# Patient Record
Sex: MALE | Race: WHITE | NOT HISPANIC OR LATINO | Employment: UNEMPLOYED | URBAN - METROPOLITAN AREA
[De-identification: names, ages, dates, MRNs, and addresses within clinical notes are randomized per-mention and may not be internally consistent; named-entity substitution may affect disease eponyms.]

---

## 2017-01-11 ENCOUNTER — HISTORICAL (OUTPATIENT)
Dept: WOUND CARE | Facility: HOSPITAL | Age: 57
End: 2017-01-11

## 2017-01-18 ENCOUNTER — HISTORICAL (OUTPATIENT)
Dept: WOUND CARE | Facility: HOSPITAL | Age: 57
End: 2017-01-18

## 2017-01-24 ENCOUNTER — HISTORICAL (OUTPATIENT)
Dept: WOUND CARE | Facility: HOSPITAL | Age: 57
End: 2017-01-24

## 2017-01-27 ENCOUNTER — HISTORICAL (OUTPATIENT)
Dept: LAB | Facility: HOSPITAL | Age: 57
End: 2017-01-27

## 2017-02-01 ENCOUNTER — HISTORICAL (OUTPATIENT)
Dept: WOUND CARE | Facility: HOSPITAL | Age: 57
End: 2017-02-01

## 2017-02-02 ENCOUNTER — HISTORICAL (OUTPATIENT)
Dept: WOUND CARE | Facility: HOSPITAL | Age: 57
End: 2017-02-02

## 2017-02-03 ENCOUNTER — HISTORICAL (OUTPATIENT)
Dept: WOUND CARE | Facility: HOSPITAL | Age: 57
End: 2017-02-03

## 2017-02-06 ENCOUNTER — HISTORICAL (OUTPATIENT)
Dept: WOUND CARE | Facility: HOSPITAL | Age: 57
End: 2017-02-06

## 2017-02-09 ENCOUNTER — HISTORICAL (OUTPATIENT)
Dept: WOUND CARE | Facility: HOSPITAL | Age: 57
End: 2017-02-09

## 2017-02-10 ENCOUNTER — HISTORICAL (OUTPATIENT)
Dept: WOUND CARE | Facility: HOSPITAL | Age: 57
End: 2017-02-10

## 2017-02-14 ENCOUNTER — HISTORICAL (OUTPATIENT)
Dept: WOUND CARE | Facility: HOSPITAL | Age: 57
End: 2017-02-14

## 2017-02-15 ENCOUNTER — HISTORICAL (OUTPATIENT)
Dept: WOUND CARE | Facility: HOSPITAL | Age: 57
End: 2017-02-15

## 2017-02-16 ENCOUNTER — HISTORICAL (OUTPATIENT)
Dept: WOUND CARE | Facility: HOSPITAL | Age: 57
End: 2017-02-16

## 2017-07-07 ENCOUNTER — HISTORICAL (OUTPATIENT)
Dept: ADMINISTRATIVE | Facility: HOSPITAL | Age: 57
End: 2017-07-07

## 2017-07-07 LAB
BUN SERPL-MCNC: 20 MG/DL (ref 10–20)
CALCIUM SERPL-MCNC: 8.8 MG/DL (ref 8–10.5)
CHLORIDE SERPL-SCNC: 106 MMOL/L (ref 100–108)
CO2 SERPL-SCNC: 30 MMOL/L (ref 21–35)
CREAT SERPL-MCNC: 1.11 MG/DL (ref 0.7–1.3)
GLUCOSE SERPL-MCNC: 39 MG/DL (ref 75–116)
POTASSIUM SERPL-SCNC: 4.1 MMOL/L (ref 3.6–5.2)
SODIUM SERPL-SCNC: 144 MMOL/L (ref 135–145)

## 2018-01-10 ENCOUNTER — HISTORICAL (OUTPATIENT)
Dept: ADMINISTRATIVE | Facility: HOSPITAL | Age: 58
End: 2018-01-10

## 2018-02-21 ENCOUNTER — HISTORICAL (OUTPATIENT)
Dept: ADMINISTRATIVE | Facility: HOSPITAL | Age: 58
End: 2018-02-21

## 2021-07-08 ENCOUNTER — HISTORICAL (OUTPATIENT)
Dept: ADMINISTRATIVE | Facility: HOSPITAL | Age: 61
End: 2021-07-08

## 2021-09-09 ENCOUNTER — HISTORICAL (OUTPATIENT)
Dept: ADMINISTRATIVE | Facility: HOSPITAL | Age: 61
End: 2021-09-09

## 2022-04-07 ENCOUNTER — HISTORICAL (OUTPATIENT)
Dept: RADIOLOGY | Facility: HOSPITAL | Age: 62
End: 2022-04-07

## 2022-04-07 ENCOUNTER — HISTORICAL (OUTPATIENT)
Dept: ADMINISTRATIVE | Facility: HOSPITAL | Age: 62
End: 2022-04-07

## 2022-04-07 LAB
ALBUMIN SERPL-MCNC: 3.9 G/DL (ref 3.4–4.8)
ALBUMIN/GLOB SERPL: 1 {RATIO} (ref 1.1–2)
ALP SERPL-CCNC: 104 U/L (ref 40–150)
ALT SERPL-CCNC: 110 U/L (ref 0–55)
AST SERPL-CCNC: 61 U/L (ref 5–34)
BILIRUB SERPL-MCNC: 0.5 MG/DL
BILIRUBIN DIRECT+TOT PNL SERPL-MCNC: 0.2 (ref 0–0.8)
BILIRUBIN DIRECT+TOT PNL SERPL-MCNC: 0.3 (ref 0–0.5)
BUN SERPL-MCNC: 21 MG/DL (ref 8.4–25.7)
CALCIUM SERPL-MCNC: 9.8 MG/DL (ref 8.7–10.5)
CHLORIDE SERPL-SCNC: 107 MMOL/L (ref 98–107)
CO2 SERPL-SCNC: 22 MMOL/L (ref 23–31)
CREAT SERPL-MCNC: 1.21 MG/DL (ref 0.73–1.18)
CREAT UR-MCNC: 34 MG/DL (ref 58–161)
GGT SERPL-CCNC: 89 U/L (ref 12–64)
GLOBULIN SER-MCNC: 3.8 G/DL (ref 2.4–3.5)
GLUCOSE SERPL-MCNC: 165 MG/DL (ref 82–115)
HBV SURFACE AB SER-ACNC: 0.4 M[IU]/ML
HBV SURFACE AB SERPL IA-ACNC: NONREACTIVE M[IU]/ML
HBV SURFACE AG SERPL QL IA: 0.24
HBV SURFACE AG SERPL QL IA: NONREACTIVE
HCV AB SERPL QL IA: 0.16
HCV AB SERPL QL IA: NONREACTIVE
HEMOLYSIS INTERF INDEX SERPL-ACNC: 1
HIV 1+2 AB+HIV1 P24 AG SERPL QL IA: 0.04
HIV 1+2 AB+HIV1 P24 AG SERPL QL IA: NONREACTIVE
ICTERIC INTERF INDEX SERPL-ACNC: 1
LIPEMIC INTERF INDEX SERPL-ACNC: 0
MICROALBUMIN UR-MCNC: 7.5
MICROALBUMIN/CREAT RATIO PNL UR: 22.1 (ref 0–30)
POTASSIUM SERPL-SCNC: 5.1 MMOL/L (ref 3.5–5.1)
PROT SERPL-MCNC: 7.7 G/DL (ref 5.8–7.6)
SODIUM SERPL-SCNC: 139 MMOL/L (ref 136–145)

## 2022-04-11 ENCOUNTER — HISTORICAL (OUTPATIENT)
Dept: ADMINISTRATIVE | Facility: HOSPITAL | Age: 62
End: 2022-04-11

## 2022-04-22 ENCOUNTER — HISTORICAL (OUTPATIENT)
Dept: ADMINISTRATIVE | Facility: HOSPITAL | Age: 62
End: 2022-04-22
Payer: MEDICARE

## 2022-04-22 ENCOUNTER — HISTORICAL (OUTPATIENT)
Dept: RADIOLOGY | Facility: HOSPITAL | Age: 62
End: 2022-04-22
Payer: MEDICARE

## 2022-04-29 VITALS
SYSTOLIC BLOOD PRESSURE: 101 MMHG | WEIGHT: 198.63 LBS | HEIGHT: 66 IN | BODY MASS INDEX: 31.92 KG/M2 | DIASTOLIC BLOOD PRESSURE: 58 MMHG

## 2022-04-30 NOTE — OP NOTE
Patient:   Dhaval Hernández            MRN: [id number]            FIN: [id number]               Age:   60 years     Sex:  Male     :  1960   Associated Diagnoses:   None   Author:   Anthony Vallejo MD       Phacoemulsification of Cataract with Intraocular Implant   Preoperative Diagnosis: Cataract right eye   Postoperative Diagnosis : Cataract right eye  Surgeon: Anthony Vallejo MD  Assistant: BROWN Hernandez  Anestheisa: MAC  Complications: None  After the patient underwent topical anesthesia along with IV sedation in the holding area, the patient was brought to the operating suite. The patient was prepped and draped in a sterile fashion. A pediatric tegaderm and a lid speculum were used to retract the upper and lower lashes and lids.  A 1.0mm paracentesis was then made at the 11 oclock position. Intraocular non-preserved 1% Xylocaine (4% diluted down to 1%) was irrigated into the anterior chamber. Trypan blue dye was used to stain the anterior capsule then Endocoat was injected into the eye. A clear corneal incision was made with a 2.4 Keratome blade. A 4.75mm circular capsulotomy was then made with a pre bent 30 gauge needle and BSS was used to hydro dissect the nucleus from the capsule. The nucleus was then phacoemulsified with the Abbott machine for a EFX of (_42). The cortex was then removed with the I/A hand piece and Helon was placed into the posterior bag of the eye. An posterior chamber implant (ZCB00_) of power (17.5_) was placed in the capsular bag. The Helon was then removed from the eye with the I/A hand piece . The anterior chamber was inflated with BSS and the wound was checked for leaks. The lid speculum was removed and a drop of Besivance was placed in the operative eye. The patient was brought to the recovery suite in stable condition.

## 2022-04-30 NOTE — OP NOTE
Patient:   Dhaval Hernández            MRN: [id number]            FIN: [id number]               Age:   60 years     Sex:  Male     :  1960   Associated Diagnoses:   None   Author:   Anthony Vallejo MD       Phacoemulsification of Cataract with Intraocular Implant   Preoperative Diagnosis: Cataract left eye   Postoperative Diagnosis : Cataract left eye  Surgeon: Anthony Vallejo MD  Assistant: BROWN Hernandez  Anestheisa: MAC  Complications: None    After the patient underwent topical anesthesia along with IV sedation in the holding area, the patient was brought to the operating suite. The patient prepped and draped in a sterile fashion. A pediatric tegaderm and a lid speculum were used to retract the upper and lower lashes and lids.  A 1.0mm paracentesis was then made at the 5 oclock and 11 oclock position. Intraocular non-preserved 1% Xylocaine (4% diluted down to 1%) was irrigated into the anterior chamber. Trypan blue dye was used to stain the anterior capsule then Endocoat was injected into the eye. A clear corneal incision was made with a 2.4 Keratome blade. A 4.75mm circular capsulotomy was then made with a pre bent 30 gauge needle and BSS was used to hydro dissect the nucleus from the capsule. The nucleus was then phacoemulsified with the Abbott machine for a EFX of (_64). The cortex was then removed with the I/A hand piece and Helon was placed into the posterior bag of the eye. An posterior chamber implant (ZCB00_) of power (18.5_) was placed in the capsular bag. The Helon was then removed from the eye with the I/A hand piece . The anterior chamber was inflated with BSS and the wound was checked for leaks. The lid speculum was removed and a drop of Besivance was placed in the operative eye. The patient was brought to the recovery suite in stable condition.

## 2022-05-10 DIAGNOSIS — I25.10 CVD (CARDIOVASCULAR DISEASE): Primary | ICD-10-CM

## 2022-05-10 DIAGNOSIS — E78.5 HYPERLIPEMIA: ICD-10-CM

## 2022-05-10 DIAGNOSIS — I10 HIGH BLOOD PRESSURE: ICD-10-CM

## 2022-06-01 ENCOUNTER — PROCEDURE VISIT (OUTPATIENT)
Dept: RESPIRATORY THERAPY | Facility: HOSPITAL | Age: 62
End: 2022-06-01
Attending: INTERNAL MEDICINE
Payer: MEDICARE

## 2022-06-01 ENCOUNTER — HOSPITAL ENCOUNTER (OUTPATIENT)
Dept: RADIOLOGY | Facility: HOSPITAL | Age: 62
Discharge: HOME | End: 2022-06-01
Attending: INTERNAL MEDICINE
Payer: MEDICARE

## 2022-06-01 DIAGNOSIS — I25.10 CORONARY ATHEROSCLEROSIS OF NATIVE CORONARY ARTERY: ICD-10-CM

## 2022-06-01 DIAGNOSIS — I10 ESSENTIAL HYPERTENSION, MALIGNANT: ICD-10-CM

## 2022-06-01 DIAGNOSIS — E78.5 HYPERLIPEMIA: ICD-10-CM

## 2022-06-01 DIAGNOSIS — I10 HIGH BLOOD PRESSURE: ICD-10-CM

## 2022-06-01 DIAGNOSIS — I25.10 CVD (CARDIOVASCULAR DISEASE): Primary | ICD-10-CM

## 2022-06-01 PROCEDURE — 94010 BREATHING CAPACITY TEST: CPT

## 2022-06-01 PROCEDURE — 94729 DIFFUSING CAPACITY: CPT

## 2022-06-01 PROCEDURE — 94727 GAS DIL/WSHOT DETER LNG VOL: CPT

## 2022-06-01 PROCEDURE — 71046 X-RAY EXAM CHEST 2 VIEWS: CPT | Mod: TC

## 2023-01-17 ENCOUNTER — CLINICAL SUPPORT (OUTPATIENT)
Dept: RESPIRATORY THERAPY | Facility: HOSPITAL | Age: 63
End: 2023-01-17
Attending: NURSE PRACTITIONER
Payer: MEDICARE

## 2023-01-17 DIAGNOSIS — E11.9 DIABETES MELLITUS WITHOUT COMPLICATION: Primary | ICD-10-CM

## 2023-01-17 DIAGNOSIS — E11.9 DIABETES MELLITUS WITHOUT COMPLICATION: ICD-10-CM

## 2023-01-17 PROCEDURE — 93010 ELECTROCARDIOGRAM REPORT: CPT | Mod: ,,, | Performed by: INTERNAL MEDICINE

## 2023-01-17 PROCEDURE — 93005 ELECTROCARDIOGRAM TRACING: CPT

## 2023-01-17 PROCEDURE — 93010 EKG 12-LEAD: ICD-10-PCS | Mod: ,,, | Performed by: INTERNAL MEDICINE

## 2023-06-13 ENCOUNTER — LAB VISIT (OUTPATIENT)
Dept: LAB | Facility: HOSPITAL | Age: 63
End: 2023-06-13
Attending: INTERNAL MEDICINE
Payer: MEDICARE

## 2023-06-13 DIAGNOSIS — I10 HYPERTENSION, UNSPECIFIED TYPE: ICD-10-CM

## 2023-06-13 DIAGNOSIS — I25.10 CORONARY ARTERY DISEASE, UNSPECIFIED VESSEL OR LESION TYPE, UNSPECIFIED WHETHER ANGINA PRESENT, UNSPECIFIED WHETHER NATIVE OR TRANSPLANTED HEART: Primary | ICD-10-CM

## 2023-06-13 LAB
ALBUMIN SERPL-MCNC: 3.9 G/DL (ref 3.4–4.8)
ALBUMIN/GLOB SERPL: 1 RATIO (ref 1.1–2)
ALP SERPL-CCNC: 82 UNIT/L (ref 40–150)
ALT SERPL-CCNC: 40 UNIT/L (ref 0–55)
AST SERPL-CCNC: 34 UNIT/L (ref 5–34)
BILIRUBIN DIRECT+TOT PNL SERPL-MCNC: 0.5 MG/DL
BUN SERPL-MCNC: 20 MG/DL (ref 8.4–25.7)
CALCIUM SERPL-MCNC: 9.5 MG/DL (ref 8.8–10)
CHLORIDE SERPL-SCNC: 107 MMOL/L (ref 98–107)
CO2 SERPL-SCNC: 19 MMOL/L (ref 23–31)
CREAT SERPL-MCNC: 1.4 MG/DL (ref 0.73–1.18)
GFR SERPLBLD CREATININE-BSD FMLA CKD-EPI: 56 MLS/MIN/1.73/M2
GLOBULIN SER-MCNC: 4.1 GM/DL (ref 2.4–3.5)
GLUCOSE SERPL-MCNC: 137 MG/DL (ref 82–115)
POTASSIUM SERPL-SCNC: 4.8 MMOL/L (ref 3.5–5.1)
PROT SERPL-MCNC: 8 GM/DL (ref 5.8–7.6)
SODIUM SERPL-SCNC: 139 MMOL/L (ref 136–145)

## 2023-06-13 PROCEDURE — 80053 COMPREHEN METABOLIC PANEL: CPT

## 2023-06-13 PROCEDURE — 36415 COLL VENOUS BLD VENIPUNCTURE: CPT

## 2023-10-25 ENCOUNTER — TELEPHONE (OUTPATIENT)
Dept: FAMILY MEDICINE | Facility: CLINIC | Age: 63
End: 2023-10-25
Payer: MEDICARE

## 2023-10-25 NOTE — TELEPHONE ENCOUNTER
----- Message from Matt Joyce sent at 10/25/2023  9:13 AM CDT -----  Contact: pt  Type: Needs Medical Advice  Who Called: pt  Best Call Back Number: [phone]    Additional Information: Pt is calling the office needs an appt for him and wife on the same day.Please call back and advise.

## 2023-10-30 ENCOUNTER — TELEPHONE (OUTPATIENT)
Dept: FAMILY MEDICINE | Facility: CLINIC | Age: 63
End: 2023-10-30

## 2023-10-30 ENCOUNTER — LAB VISIT (OUTPATIENT)
Dept: LAB | Facility: HOSPITAL | Age: 63
End: 2023-10-30
Attending: STUDENT IN AN ORGANIZED HEALTH CARE EDUCATION/TRAINING PROGRAM
Payer: MEDICAID

## 2023-10-30 ENCOUNTER — OFFICE VISIT (OUTPATIENT)
Dept: FAMILY MEDICINE | Facility: CLINIC | Age: 63
End: 2023-10-30
Payer: MEDICARE

## 2023-10-30 VITALS
BODY MASS INDEX: 31.82 KG/M2 | SYSTOLIC BLOOD PRESSURE: 110 MMHG | DIASTOLIC BLOOD PRESSURE: 60 MMHG | HEART RATE: 64 BPM | OXYGEN SATURATION: 98 % | WEIGHT: 198 LBS | HEIGHT: 66 IN | TEMPERATURE: 98 F

## 2023-10-30 DIAGNOSIS — Z76.89 ENCOUNTER TO ESTABLISH CARE: ICD-10-CM

## 2023-10-30 DIAGNOSIS — Z13.31 POSITIVE DEPRESSION SCREENING: ICD-10-CM

## 2023-10-30 DIAGNOSIS — Z23 NEED FOR PROPHYLACTIC VACCINATION AGAINST STREPTOCOCCUS PNEUMONIAE (PNEUMOCOCCUS) AND INFLUENZA: ICD-10-CM

## 2023-10-30 DIAGNOSIS — F32.A DEPRESSION, UNSPECIFIED DEPRESSION TYPE: ICD-10-CM

## 2023-10-30 DIAGNOSIS — Z89.519: ICD-10-CM

## 2023-10-30 DIAGNOSIS — N18.2 TYPE 2 DIABETES MELLITUS WITH STAGE 2 CHRONIC KIDNEY DISEASE, WITHOUT LONG-TERM CURRENT USE OF INSULIN: Primary | ICD-10-CM

## 2023-10-30 DIAGNOSIS — Z12.12 ENCOUNTER FOR SCREENING FOR COLORECTAL MALIGNANT NEOPLASM: ICD-10-CM

## 2023-10-30 DIAGNOSIS — Z12.11 ENCOUNTER FOR SCREENING FOR COLORECTAL MALIGNANT NEOPLASM: ICD-10-CM

## 2023-10-30 DIAGNOSIS — I25.10 CVD (CARDIOVASCULAR DISEASE): ICD-10-CM

## 2023-10-30 DIAGNOSIS — E11.22 TYPE 2 DIABETES MELLITUS WITH STAGE 2 CHRONIC KIDNEY DISEASE, WITHOUT LONG-TERM CURRENT USE OF INSULIN: Primary | ICD-10-CM

## 2023-10-30 PROBLEM — E08.22 DIABETES MELLITUS DUE TO UNDERLYING CONDITION WITH DIABETIC CHRONIC KIDNEY DISEASE: Status: ACTIVE | Noted: 2023-10-30

## 2023-10-30 LAB
BASOPHILS # BLD AUTO: 0.05 K/UL (ref 0–0.2)
BASOPHILS NFR BLD: 0.8 % (ref 0–1.9)
DIFFERENTIAL METHOD: ABNORMAL
EOSINOPHIL # BLD AUTO: 0.1 K/UL (ref 0–0.5)
EOSINOPHIL NFR BLD: 1.9 % (ref 0–8)
ERYTHROCYTE [DISTWIDTH] IN BLOOD BY AUTOMATED COUNT: 18.6 % (ref 11.5–14.5)
HCT VFR BLD AUTO: 42.3 % (ref 40–54)
HGB BLD-MCNC: 13.2 G/DL (ref 14–18)
IMM GRANULOCYTES # BLD AUTO: 0.02 K/UL (ref 0–0.04)
IMM GRANULOCYTES NFR BLD AUTO: 0.3 % (ref 0–0.5)
LYMPHOCYTES # BLD AUTO: 2 K/UL (ref 1–4.8)
LYMPHOCYTES NFR BLD: 31.3 % (ref 18–48)
MCH RBC QN AUTO: 25.9 PG (ref 27–31)
MCHC RBC AUTO-ENTMCNC: 31.2 G/DL (ref 32–36)
MCV RBC AUTO: 83 FL (ref 82–98)
MONOCYTES # BLD AUTO: 0.5 K/UL (ref 0.3–1)
MONOCYTES NFR BLD: 8.6 % (ref 4–15)
NEUTROPHILS # BLD AUTO: 3.6 K/UL (ref 1.8–7.7)
NEUTROPHILS NFR BLD: 57.1 % (ref 38–73)
NRBC BLD-RTO: 0 /100 WBC
PLATELET # BLD AUTO: 248 K/UL (ref 150–450)
PMV BLD AUTO: 10.7 FL (ref 9.2–12.9)
RBC # BLD AUTO: 5.09 M/UL (ref 4.6–6.2)
WBC # BLD AUTO: 6.26 K/UL (ref 3.9–12.7)

## 2023-10-30 PROCEDURE — 1101F PT FALLS ASSESS-DOCD LE1/YR: CPT | Mod: CPTII,S$GLB,, | Performed by: STUDENT IN AN ORGANIZED HEALTH CARE EDUCATION/TRAINING PROGRAM

## 2023-10-30 PROCEDURE — 1160F PR REVIEW ALL MEDS BY PRESCRIBER/CLIN PHARMACIST DOCUMENTED: ICD-10-PCS | Mod: CPTII,S$GLB,, | Performed by: STUDENT IN AN ORGANIZED HEALTH CARE EDUCATION/TRAINING PROGRAM

## 2023-10-30 PROCEDURE — G0009 ADMIN PNEUMOCOCCAL VACCINE: HCPCS | Mod: S$GLB,,, | Performed by: STUDENT IN AN ORGANIZED HEALTH CARE EDUCATION/TRAINING PROGRAM

## 2023-10-30 PROCEDURE — 85025 COMPLETE CBC W/AUTO DIFF WBC: CPT | Performed by: STUDENT IN AN ORGANIZED HEALTH CARE EDUCATION/TRAINING PROGRAM

## 2023-10-30 PROCEDURE — 99999 PR PBB SHADOW E&M-EST. PATIENT-LVL V: ICD-10-PCS | Mod: PBBFAC,,, | Performed by: STUDENT IN AN ORGANIZED HEALTH CARE EDUCATION/TRAINING PROGRAM

## 2023-10-30 PROCEDURE — 84443 ASSAY THYROID STIM HORMONE: CPT | Performed by: STUDENT IN AN ORGANIZED HEALTH CARE EDUCATION/TRAINING PROGRAM

## 2023-10-30 PROCEDURE — G0009 PNEUMOCOCCAL CONJUGATE VACCINE 20-VALENT: ICD-10-PCS | Mod: S$GLB,,, | Performed by: STUDENT IN AN ORGANIZED HEALTH CARE EDUCATION/TRAINING PROGRAM

## 2023-10-30 PROCEDURE — 90677 PNEUMOCOCCAL CONJUGATE VACCINE 20-VALENT: ICD-10-PCS | Mod: S$GLB,,, | Performed by: STUDENT IN AN ORGANIZED HEALTH CARE EDUCATION/TRAINING PROGRAM

## 2023-10-30 PROCEDURE — 3288F PR FALLS RISK ASSESSMENT DOCUMENTED: ICD-10-PCS | Mod: CPTII,S$GLB,, | Performed by: STUDENT IN AN ORGANIZED HEALTH CARE EDUCATION/TRAINING PROGRAM

## 2023-10-30 PROCEDURE — 4010F PR ACE/ARB THEARPY RXD/TAKEN: ICD-10-PCS | Mod: CPTII,S$GLB,, | Performed by: STUDENT IN AN ORGANIZED HEALTH CARE EDUCATION/TRAINING PROGRAM

## 2023-10-30 PROCEDURE — 99203 PR OFFICE/OUTPT VISIT, NEW, LEVL III, 30-44 MIN: ICD-10-PCS | Mod: 25,S$GLB,, | Performed by: STUDENT IN AN ORGANIZED HEALTH CARE EDUCATION/TRAINING PROGRAM

## 2023-10-30 PROCEDURE — 3288F FALL RISK ASSESSMENT DOCD: CPT | Mod: CPTII,S$GLB,, | Performed by: STUDENT IN AN ORGANIZED HEALTH CARE EDUCATION/TRAINING PROGRAM

## 2023-10-30 PROCEDURE — 3074F SYST BP LT 130 MM HG: CPT | Mod: CPTII,S$GLB,, | Performed by: STUDENT IN AN ORGANIZED HEALTH CARE EDUCATION/TRAINING PROGRAM

## 2023-10-30 PROCEDURE — 1159F PR MEDICATION LIST DOCUMENTED IN MEDICAL RECORD: ICD-10-PCS | Mod: CPTII,S$GLB,, | Performed by: STUDENT IN AN ORGANIZED HEALTH CARE EDUCATION/TRAINING PROGRAM

## 2023-10-30 PROCEDURE — 3078F PR MOST RECENT DIASTOLIC BLOOD PRESSURE < 80 MM HG: ICD-10-PCS | Mod: CPTII,S$GLB,, | Performed by: STUDENT IN AN ORGANIZED HEALTH CARE EDUCATION/TRAINING PROGRAM

## 2023-10-30 PROCEDURE — 90694 VACC AIIV4 NO PRSRV 0.5ML IM: CPT | Mod: S$GLB,,, | Performed by: STUDENT IN AN ORGANIZED HEALTH CARE EDUCATION/TRAINING PROGRAM

## 2023-10-30 PROCEDURE — 36415 COLL VENOUS BLD VENIPUNCTURE: CPT | Mod: PO | Performed by: STUDENT IN AN ORGANIZED HEALTH CARE EDUCATION/TRAINING PROGRAM

## 2023-10-30 PROCEDURE — 80061 LIPID PANEL: CPT | Performed by: STUDENT IN AN ORGANIZED HEALTH CARE EDUCATION/TRAINING PROGRAM

## 2023-10-30 PROCEDURE — 1101F PR PT FALLS ASSESS DOC 0-1 FALLS W/OUT INJ PAST YR: ICD-10-PCS | Mod: CPTII,S$GLB,, | Performed by: STUDENT IN AN ORGANIZED HEALTH CARE EDUCATION/TRAINING PROGRAM

## 2023-10-30 PROCEDURE — 4010F ACE/ARB THERAPY RXD/TAKEN: CPT | Mod: CPTII,S$GLB,, | Performed by: STUDENT IN AN ORGANIZED HEALTH CARE EDUCATION/TRAINING PROGRAM

## 2023-10-30 PROCEDURE — 90677 PCV20 VACCINE IM: CPT | Mod: S$GLB,,, | Performed by: STUDENT IN AN ORGANIZED HEALTH CARE EDUCATION/TRAINING PROGRAM

## 2023-10-30 PROCEDURE — 80053 COMPREHEN METABOLIC PANEL: CPT | Performed by: STUDENT IN AN ORGANIZED HEALTH CARE EDUCATION/TRAINING PROGRAM

## 2023-10-30 PROCEDURE — 3074F PR MOST RECENT SYSTOLIC BLOOD PRESSURE < 130 MM HG: ICD-10-PCS | Mod: CPTII,S$GLB,, | Performed by: STUDENT IN AN ORGANIZED HEALTH CARE EDUCATION/TRAINING PROGRAM

## 2023-10-30 PROCEDURE — 3008F BODY MASS INDEX DOCD: CPT | Mod: CPTII,S$GLB,, | Performed by: STUDENT IN AN ORGANIZED HEALTH CARE EDUCATION/TRAINING PROGRAM

## 2023-10-30 PROCEDURE — 99999 PR PBB SHADOW E&M-EST. PATIENT-LVL V: CPT | Mod: PBBFAC,,, | Performed by: STUDENT IN AN ORGANIZED HEALTH CARE EDUCATION/TRAINING PROGRAM

## 2023-10-30 PROCEDURE — 1159F MED LIST DOCD IN RCRD: CPT | Mod: CPTII,S$GLB,, | Performed by: STUDENT IN AN ORGANIZED HEALTH CARE EDUCATION/TRAINING PROGRAM

## 2023-10-30 PROCEDURE — G0008 FLU VACCINE - QUADRIVALENT - ADJUVANTED: ICD-10-PCS | Mod: S$GLB,,, | Performed by: STUDENT IN AN ORGANIZED HEALTH CARE EDUCATION/TRAINING PROGRAM

## 2023-10-30 PROCEDURE — 1160F RVW MEDS BY RX/DR IN RCRD: CPT | Mod: CPTII,S$GLB,, | Performed by: STUDENT IN AN ORGANIZED HEALTH CARE EDUCATION/TRAINING PROGRAM

## 2023-10-30 PROCEDURE — 83036 HEMOGLOBIN GLYCOSYLATED A1C: CPT | Performed by: STUDENT IN AN ORGANIZED HEALTH CARE EDUCATION/TRAINING PROGRAM

## 2023-10-30 PROCEDURE — 90694 FLU VACCINE - QUADRIVALENT - ADJUVANTED: ICD-10-PCS | Mod: S$GLB,,, | Performed by: STUDENT IN AN ORGANIZED HEALTH CARE EDUCATION/TRAINING PROGRAM

## 2023-10-30 PROCEDURE — 3008F PR BODY MASS INDEX (BMI) DOCUMENTED: ICD-10-PCS | Mod: CPTII,S$GLB,, | Performed by: STUDENT IN AN ORGANIZED HEALTH CARE EDUCATION/TRAINING PROGRAM

## 2023-10-30 PROCEDURE — 99203 OFFICE O/P NEW LOW 30 MIN: CPT | Mod: 25,S$GLB,, | Performed by: STUDENT IN AN ORGANIZED HEALTH CARE EDUCATION/TRAINING PROGRAM

## 2023-10-30 PROCEDURE — G0008 ADMIN INFLUENZA VIRUS VAC: HCPCS | Mod: S$GLB,,, | Performed by: STUDENT IN AN ORGANIZED HEALTH CARE EDUCATION/TRAINING PROGRAM

## 2023-10-30 PROCEDURE — 3078F DIAST BP <80 MM HG: CPT | Mod: CPTII,S$GLB,, | Performed by: STUDENT IN AN ORGANIZED HEALTH CARE EDUCATION/TRAINING PROGRAM

## 2023-10-30 RX ORDER — ATORVASTATIN CALCIUM 80 MG/1
40 TABLET, FILM COATED ORAL
COMMUNITY
Start: 2023-10-26

## 2023-10-30 RX ORDER — ESCITALOPRAM OXALATE 5 MG/1
5 TABLET ORAL DAILY
Qty: 30 TABLET | Refills: 11 | Status: SHIPPED | OUTPATIENT
Start: 2023-10-30 | End: 2024-10-29

## 2023-10-30 RX ORDER — MULTIVITAMIN
1 TABLET ORAL
COMMUNITY

## 2023-10-30 RX ORDER — METFORMIN HYDROCHLORIDE 1000 MG/1
1000 TABLET ORAL
COMMUNITY
Start: 2021-07-01 | End: 2023-12-20 | Stop reason: SDUPTHER

## 2023-10-30 RX ORDER — EZETIMIBE 10 MG/1
10 TABLET ORAL
COMMUNITY
Start: 2021-07-01

## 2023-10-30 RX ORDER — CARVEDILOL 25 MG/1
25 TABLET ORAL
COMMUNITY

## 2023-10-30 RX ORDER — LOSARTAN POTASSIUM 100 MG/1
100 TABLET ORAL
COMMUNITY
Start: 2023-10-26

## 2023-10-30 RX ORDER — SPIRONOLACTONE 25 MG/1
12.5 TABLET ORAL
COMMUNITY
Start: 2021-07-01 | End: 2023-10-30

## 2023-10-30 RX ORDER — LEVOTHYROXINE SODIUM 75 UG/1
75 TABLET ORAL EVERY MORNING
COMMUNITY
Start: 2023-10-26 | End: 2023-12-20 | Stop reason: SDUPTHER

## 2023-10-30 RX ORDER — ASPIRIN 81 MG/1
81 TABLET ORAL
COMMUNITY
Start: 2023-10-26

## 2023-10-30 RX ORDER — AMIODARONE HYDROCHLORIDE 200 MG/1
200 TABLET ORAL
COMMUNITY

## 2023-10-30 RX ORDER — NAPROXEN SODIUM 220 MG/1
TABLET ORAL
COMMUNITY
Start: 2021-07-01

## 2023-10-30 RX ORDER — AMLODIPINE BESYLATE 5 MG/1
5 TABLET ORAL
COMMUNITY
Start: 2021-07-01 | End: 2023-12-20 | Stop reason: SDUPTHER

## 2023-10-30 RX ORDER — SITAGLIPTIN 100 MG/1
100 TABLET, FILM COATED ORAL
COMMUNITY
Start: 2023-10-26 | End: 2023-12-20 | Stop reason: SDUPTHER

## 2023-10-30 RX ORDER — ALOGLIPTIN 12.5 MG/1
12.5 TABLET, FILM COATED ORAL
COMMUNITY
Start: 2021-07-01 | End: 2023-10-30

## 2023-10-30 RX ORDER — FAMOTIDINE 20 MG/1
20 TABLET, FILM COATED ORAL 2 TIMES DAILY
COMMUNITY
Start: 2023-10-26

## 2023-10-30 RX ORDER — ASPIRIN 325 MG
50000 TABLET, DELAYED RELEASE (ENTERIC COATED) ORAL
COMMUNITY
Start: 2021-07-01

## 2023-10-30 NOTE — PATIENT INSTRUCTIONS
Allan Puentes,     If you are due for any health screening(s) below please notify me so we can arrange them to be ordered and scheduled. Most healthy patients at your age complete them, but you are free to accept or refuse.     If you can't do it, I'll definitely understand. If you can, I'd certainly appreciate it!    Tests to Keep You Healthy    Colon Cancer Screening: DUE  Last Blood Pressure <= 139/89 ( ): NO      Its time for your colon cancer screening     Colorectal cancer is one of the leading causes of cancer death for men and women but it doesnt have to be. Screenings can prevent colorectal cancer or find it early enough to treat and cure the disease.     Our records indicate that you may be overdue for colon cancer screening. A colonoscopy or stool screening test can help identify patients at risk for developing colon cancer. Cancer screenings save lives, so schedule yours today to stay healthy.     A colonoscopy is the preferred test for detecting colon cancer. It is needed only once every 10 years if results are negative. While you are sedated, a flexible, lighted tube with a tiny camera is inserted into the rectum and advanced through the colon to look for cancers.     An alternative screening test that is used at home and returned to the lab may also be used. It detects hidden blood in bowel movements which could indicate cancer in the colon. If results are positive, you will need a colonoscopy to determine if the blood is a sign of cancer. This type of follow up (diagnostic) colonoscopy usually requires additional copays as required by your insurance provider.     If you recently had your colon cancer screening performed outside of [facility], please let your Health care team know so that they can update your health record. Please contact your PCP if you have any questions.

## 2023-10-30 NOTE — PROGRESS NOTES
[facility] Note    Subjective:  Chief Complaint:   Chief Complaint   Patient presents with    Establish Care       History of Present Illness:  Dhaval is a pleasant intelligent patient who is here for establishing care    DM  Empagliflozin 25  Lantus 16U qhs   Januvia 100  Metformin 1000 bid    HTN  Amlodipine 5  Losartan 100    HLD  Atorvastatin 80  Asa    Hypothyroid  Levothyroxine 75    GERD  Famotidine 20    Cardiac arrhythmias s/p pacemaker   Amiodarone  Follows with Cardiology and EP  [facility]     CKD 2  Was seen by Nephrology, states no longer needed    Positive depression screening  Denies SI/HI/AVH  Mood screen score:  Nine-symptom Checklist   Name ______________________ Date _________   Over the last 2 weeks, how often have you been bothered by any of the following problems? Not at all Several days More than half the days Nearly every day   1. Little interest or pleasure in doing things 0x 1 2 3   2. Feeling down, depressed, or hopeless 0 1 2x 3   3. Trouble falling or staying asleep, or sleeping too much 0 1 2x 3   4. Feeling tired or having little energy 0 1 2x 3   5. Poor appetite or overeating 0x 1 2 3   6. Feeling bad about yourself--or that you are a failure or have let yourself or your family down 0 1 2 3x   7. Trouble concentrating on things, such as reading the newspaper or watching television 0 1 2x 3   8. Moving or speaking so slowly that other people could have noticed? Or the opposite--being so fidgety or restless that you have been moving around a lot more than usual 0 1 2x 3   9. Thoughts that you would be better off dead or of hurting yourself in some way 0 1 2 3x   (For office coding: Total Score __16__ = ____ + ____ + ____)     If you checked off any problems, how difficult have these problems made it for you to do your work, take care of things at home, or get along with other people?  Not difficult at all Somewhat  difficult Very difficult Extremely difficult   ? ? ? ?     Depression Severity: 0-4 none, 5-9 mild, 10-14 moderate, 15-19 moderately severe, 20-27 severe.  Interpreting PHQ-9 Scores  Diagnosis Total Score For Score Action  ? 4   The score suggests the patient may not need depression  treatment  5 - 14 Physician uses clinical judgment about treatment, based on  patient's duration of symptoms and functional impairment    > 14  Warrants treatment for depression, using antidepressant,  psychotherapy and/or a combination of treatment    Colon cancer screening due, discussed risk vs benefit of colonoscopy vs cologuard     Recommend COVID, Shingles, RSV, Pneumococcal, flu vaccinations.     Screening  Health Maintenance         Date Due Completion Date    PROSTATE-SPECIFIC ANTIGEN Never done ---    Lipid Panel Never done ---    TETANUS VACCINE Never done ---    Colorectal Cancer Screening Never done ---    Shingles Vaccine (1 of 2) Never done ---    RSV Vaccine (Age 60+) (1 - 1-dose 60+ series) Never done ---    Pneumococcal Vaccines (Age 65+) (2 - PCV) 02/13/2020 2/13/2019    Influenza Vaccine (1) 09/01/2023 1/21/2022    COVID-19 Vaccine (4 - 2023-24 season) 09/01/2023 1/21/2022    High Dose Statin 10/30/2024 10/30/2023    Aspirin/Antiplatelet Therapy 10/30/2024 10/30/2023          There is no immunization history for the selected administration types on file for this patient.  The ASCVD Risk score (Chante DK, et al., 2019) failed to calculate for the following reasons:    Cannot find a previous HDL lab    Cannot find a previous total cholesterol lab    Allergies:  Review of patient's allergies indicates:   Allergen Reactions    Fish containing products      Other reaction(s): .       Home Medications:  Current Outpatient Medications on File Prior to Visit   Medication Sig    amiodarone (PACERONE) 200 MG Tab Take 200 mg by mouth.    amLODIPine (NORVASC) 5 MG tablet Take 5 mg by mouth.    aspirin (ECOTRIN) 81 MG EC tablet  Take 81 mg by mouth.    atorvastatin (LIPITOR) 80 MG tablet Take 40 mg by mouth.    carvediloL (COREG) 25 MG tablet Take 25 mg by mouth.    cholecalciferol, vitamin D3, 1,250 mcg (50,000 unit) capsule Take 50,000 Int'l Units by mouth.    ezetimibe (ZETIA) 10 mg tablet Take 10 mg by mouth.    famotidine (PEPCID) 20 MG tablet Take 20 mg by mouth 2 (two) times daily.    insulin glargine,hum.rec.anlog (LANTUS SOLOSTAR U-100 INSULIN SUBQ) Inject into the skin.    JANUVIA 100 mg Tab Take 100 mg by mouth.    levothyroxine (SYNTHROID) 75 MCG tablet Take 75 mcg by mouth every morning.    losartan (COZAAR) 100 MG tablet Take 100 mg by mouth.    metFORMIN (GLUCOPHAGE) 1000 MG tablet Take 1,000 mg by mouth.    multivitamin (THERAGRAN) per tablet Take 1 tablet by mouth.    omega 3-dha-epa-fish oil (FISH OIL) 1,200 (144-216) mg Cap   0 Refill(s)    [DISCONTINUED] alogliptin (NESINA) 12.5 mg Tab Take 12.5 mg by mouth.    [DISCONTINUED] empagliflozin (JARDIANCE) 25 mg tablet Take 1 tablet by mouth.    [DISCONTINUED] spironolactone (ALDACTONE) 25 MG tablet Take 12.5 mg by mouth.     No current facility-administered medications on file prior to visit.       Past Medical History:   Diagnosis Date    Diabetes mellitus, type 2     GERD (gastroesophageal reflux disease)     Hyperlipidemia     Hypertension      Past Surgical History:   Procedure Laterality Date    FRACTURE SURGERY      INSERTION OF PACEMAKER      LEG AMPUTATION       History reviewed. No pertinent family history.  Social History     Tobacco Use    Smoking status: Some Days     Types: Cigarettes    Smokeless tobacco: Never            The patient's past medical history, surgical history, social history, family history, allergies and medications have been reviewed.    Review of Systems     10 point review of systems was conducted and only the pertinent positives and pertinent negatives are noted above in the HPI section.    Physical Examination  General appearance: alert,  "cooperative, no distress  HEENT: normocephalic, atraumatic, PERRLA  Neck: trachea midline, no neck stiffness  Lungs: clear to auscultation, no wheezes, rales or rhonchi, symmetric air entry  Heart: normal rate, regular rhythm, normal S1, S2, no murmurs, rubs, clicks or gallops  Abdomen: soft, nontender  Skin: No rashes or abnormal skin lesions, no apparent jaundice or bruising  Extremities: Full ROM of all extremities, left left amputation   Neurological:alert, oriented, normal speech, no new focal findings or movement disorder noted from baseline, wheelchair       BP Readings from Last 3 Encounters:   10/30/23 110/60   02/21/18 (!) 101/58     Wt Readings from Last 3 Encounters:   10/30/23 89.8 kg (198 lb)   02/21/18 90.1 kg (198 lb 10.2 oz)     /60 (BP Location: Right arm, Patient Position: Sitting, BP Method: Medium (Automatic))   Pulse 64   Temp 98 °F (36.7 °C) (Oral)   Ht 5' 6.14" (1.68 m)   Wt 89.8 kg (198 lb)   SpO2 98%   BMI 31.82 kg/m²       274}  Laboratory: I have reviewed old labs below:       274}    Lab Results   Component Value Date    WBC 5.3 12/10/2017    HGB 17.6 12/10/2017    HCT 52.6 (H) 12/10/2017    MCV 87 12/10/2017     12/10/2017     06/13/2023    K 4.8 06/13/2023    CALCIUM 9.5 06/13/2023    PHOS 2.9 11/24/2017    CO2 19 (L) 06/13/2023    BUN 20.0 06/13/2023    CREATININE 1.40 (H) 06/13/2023    EGFRNORACEVR 56 06/13/2023    ALBUMIN 3.9 06/13/2023    BILITOT 0.5 06/13/2023    ALKPHOS 82 06/13/2023    ALT 40 06/13/2023    AST 34 06/13/2023    INR 1.04 11/24/2017    HGBA1C 7.0 (H) 11/23/2017      Lab reviewed by me: Particular labs of significance that I will monitor, workup, or treat to improve are mentioned below in diagnostic impression remarks.    Imaging/EKG: I have reviewed the pertinent results and my findings are noted in remarks.     274}    CC:   Chief Complaint   Patient presents with    Establish Care           274}    Assessment/Plan  Dhaval Hernández is a " 63 y.o. male who presents to clinic with:  1. Type 2 diabetes mellitus with stage 2 chronic kidney disease, without long-term current use of insulin    2. Encounter to establish care    3. Status post unilateral below-knee amputation    4. BMI 31.0-31.9,adult    5. CVD (cardiovascular disease)    6. Positive depression screening    7. Encounter for screening for colorectal malignant neoplasm    8. Diabetes mellitus due to underlying condition with diabetic chronic kidney disease    9. Depression, unspecified depression type    10. Need for prophylactic vaccination against Streptococcus pneumoniae (pneumococcus) and influenza          274}  Diagnostic Impression Remarks       63 y.o. male who  has a past medical history of Diabetes mellitus, type 2, GERD (gastroesophageal reflux disease), Hyperlipidemia, and Hypertension. presents to clinic today for est care    This is the extent of this pleasant patient's concerns at this present time. He did not feel chest pain upon exertion, dyspnea, nausea, vomiting, diaphoresis, or syncope. No pleuritic chest pain, unilateral leg swelling, calf tenderness, or calf pain. Negative for unintentional weight loss night sweats, hematuria, and fevers. Dhaval will return to clinic in a few months for further workup and reassessment or sooner as needed. He was instructed to call the clinic or go to the emergency department or urgent care immediately if his symptoms do not improve, worsens, or if any new symptoms develop. As we discussed that symptoms could worsen over the next 24 hours he was advised that if any increased swelling, pain, or numbness arise to go immediately to the ED. Patient knows to call any time if an emergency arises. Shared decision making occurred and he verbalized understanding in agreement with this plan. I discussed imaging findings, diagnosis, possibilities, treatment options, medications, risks, and benefits. He had many questions regarding the options and  long-term effects. All questions were answered. He expressed understanding after counseling regarding the diagnosis and recommendations. He was capable and demonstrated competence with understanding of these options. Shared decision making was performed resulting in him choosing the current treatment plan. Patient handout was given with instructions and recommendations. Advised the patient that if they become pregnant to alert us immediately to assess for medication changes. Having a healthy weight can decrease the risk of 13 cancers and is an important goal. I also discussed the importance of close follow up to discuss labs, change or modify his medications if needed, monitor side effects, and further evaluation of medical problems.     Additional workup planned: see labs ordered below.    See below for labs and meds ordered with associated diagnosis      1. Encounter to establish care    2. Type 2 diabetes mellitus with stage 2 chronic kidney disease, without long-term current use of insulin  - empagliflozin (JARDIANCE) 25 mg tablet; Take 1 tablet (25 mg total) by mouth once daily.  Dispense: 90 tablet; Refill: 3  Continue current regimen   Monitor    3. Status post unilateral below-knee amputation  - WHEELCHAIR FOR HOME USE    4. BMI 31.0-31.9,adult  - CBC Auto Differential; Future  - Comprehensive Metabolic Panel; Future  - Hemoglobin A1C; Future  - Lipid Panel; Future  - TSH; Future  I also recommend the following therapeutic lifestyle changes:     - Transition to a low salt, mediterranean-style diet which emphasizes:  Eating primarily plant-based foods, such as fruits and vegetables, whole grains, legumes and nuts   Replacing butter with healthy fats, such as olive oil   Using herbs and spices instead of salt to flavor foods   Limiting red meat to no more than a few times a month   Eating fish and poultry at least twice a week      - Patient counseled on benefits of regular exercise.  Initiation of a graded  aerobic exercise plan (goal 30-45 minutes per day 4-5 times per week)  Patient encouraged to participate in low intensity strength exercising and if unfamiliar with strength and conditioning training, I recommend joining a gym with access to a  to further instruct the patient.      - If weight/obesity is a concern a reasonable weight loss goal of 1-2lbs per month to reach a goal of 5-10% weight loss in 5-6 months, or a BMI less than 25.    5. CVD (cardiovascular disease)  - Ambulatory referral/consult to Cardiology; Future    6. Positive depression screening  See Depression     7. Encounter for screening for colorectal malignant neoplasm  - Cologuard Screening (Multitarget Stool DNA); Future  - Cologuard Screening (Multitarget Stool DNA)    8. Diabetes mellitus due to underlying condition with diabetic chronic kidney disease  - Hemoglobin A1C; Future  - Lipid Panel; Future  - TSH; Future    9. Depression, unspecified depression type  - Ambulatory referral/consult to Psychology; Future  - EScitalopram oxalate (LEXAPRO) 5 MG Tab; Take 1 tablet (5 mg total) by mouth once daily.  Dispense: 30 tablet; Refill: 11  PHQ9 score 16    10. Need for prophylactic vaccination against Streptococcus pneumoniae (pneumococcus) and influenza  - (In Office Administered) Pneumococcal Conjugate Vaccine (20 Valent) (IM) (Preferred)  Received flu today    RTC 6 wk for depression fu or PRN     Jessie Jung MD, [facility]   Family Medicine Physician  10/30/2023      If you are due for any health screening(s) below please notify me so we can arrange them to be ordered and scheduled to maintain your health.     Health Maintenance Due   Topic    Lipid Panel     Colorectal Cancer Screening           Colon Cancer Screening    Of cancers affecting both men and women, colorectal cancer is the third leading cancer killer in the [place]. But it doesnt have to be. Screening can prevent colorectal cancer or find it at an early  stage when treatment often leads to a cure.    A colonoscopy is the preferred test for detecting colon cancer. It is needed only once every 10 years if results are negative. While sedated, a flexible, lighted tube with a tiny camera is inserted into the rectum and advanced through the colon to look for cancers. An alternative screening test that is used at home and returned to the lab may also be used. It detects hidden blood in bowel movements which could indicate cancer in the colon. If results are positive, you will need a colonoscopy to determine if the blood is a sign of cancer. This type of follow up (diagnostic) colonoscopy usually requires additional copays as required by your insurance provider. Please contact your PCP if you have any questions.              The following information is provided to all patients.  This information is to help you find resources for any of the problems found today that may be affecting your health:                Living healthy guide: www.[facility].gov       Understanding Diabetes: www.diabetes.org       Eating healthy: www.cdc.gov/healthyweight      [facility] home safety checklist: www.cdc.gov/steadi/patient.html      Agency on Aging: www.goea.[place].gov       Alcoholics anonymous (AA): www.aa.org      Physical Activity: www.connor.nih.gov/ic8zxoc       Tobacco use: www.quitwithusla.org

## 2023-10-30 NOTE — PROGRESS NOTES
ID patient by name and date of birth.  Allergies confirmed. HD Flu Immunization given as per orders , using aseptic technique.  Patient tolerated well.  Information sheet reviewed and given to patient.     ID patient by name and date of birth.  Allergies confirmed. Pneumo 20 Immunization given as per orders , using aseptic technique.  Patient tolerated well.  Information sheet reviewed and given to patient.

## 2023-10-30 NOTE — TELEPHONE ENCOUNTER
----- Message from Jessie Jung MD sent at 10/30/2023 10:32 AM CDT -----  Regarding: dme  DME for wheelchair ordered, please fax    Dr. Jung

## 2023-10-31 LAB
ALBUMIN SERPL BCP-MCNC: 4.1 G/DL (ref 3.5–5.2)
ALP SERPL-CCNC: 97 U/L (ref 55–135)
ALT SERPL W/O P-5'-P-CCNC: 37 U/L (ref 10–44)
ANION GAP SERPL CALC-SCNC: 11 MMOL/L (ref 8–16)
AST SERPL-CCNC: 34 U/L (ref 10–40)
BILIRUB SERPL-MCNC: 0.7 MG/DL (ref 0.1–1)
BUN SERPL-MCNC: 20 MG/DL (ref 8–23)
CALCIUM SERPL-MCNC: 9.8 MG/DL (ref 8.7–10.5)
CHLORIDE SERPL-SCNC: 107 MMOL/L (ref 95–110)
CHOLEST SERPL-MCNC: 132 MG/DL (ref 120–199)
CHOLEST/HDLC SERPL: 2.8 {RATIO} (ref 2–5)
CO2 SERPL-SCNC: 18 MMOL/L (ref 23–29)
CREAT SERPL-MCNC: 1.1 MG/DL (ref 0.5–1.4)
EST. GFR  (NO RACE VARIABLE): >60 ML/MIN/1.73 M^2
ESTIMATED AVG GLUCOSE: 140 MG/DL (ref 68–131)
GLUCOSE SERPL-MCNC: 98 MG/DL (ref 70–110)
HBA1C MFR BLD: 6.5 % (ref 4–5.6)
HDLC SERPL-MCNC: 48 MG/DL (ref 40–75)
HDLC SERPL: 36.4 % (ref 20–50)
LDLC SERPL CALC-MCNC: 67.2 MG/DL (ref 63–159)
NONHDLC SERPL-MCNC: 84 MG/DL
POTASSIUM SERPL-SCNC: 4.7 MMOL/L (ref 3.5–5.1)
PROT SERPL-MCNC: 8.3 G/DL (ref 6–8.4)
SODIUM SERPL-SCNC: 136 MMOL/L (ref 136–145)
TRIGL SERPL-MCNC: 84 MG/DL (ref 30–150)
TSH SERPL DL<=0.005 MIU/L-ACNC: 2.18 UIU/ML (ref 0.4–4)

## 2023-11-10 ENCOUNTER — TELEPHONE (OUTPATIENT)
Dept: FAMILY MEDICINE | Facility: CLINIC | Age: 63
End: 2023-11-10
Payer: MEDICARE

## 2023-11-10 DIAGNOSIS — N18.2 TYPE 2 DIABETES MELLITUS WITH STAGE 2 CHRONIC KIDNEY DISEASE, WITHOUT LONG-TERM CURRENT USE OF INSULIN: ICD-10-CM

## 2023-11-10 DIAGNOSIS — E11.22 TYPE 2 DIABETES MELLITUS WITH STAGE 2 CHRONIC KIDNEY DISEASE, WITHOUT LONG-TERM CURRENT USE OF INSULIN: ICD-10-CM

## 2023-11-10 DIAGNOSIS — Z89.519: ICD-10-CM

## 2023-11-10 DIAGNOSIS — I25.10 CVD (CARDIOVASCULAR DISEASE): Primary | ICD-10-CM

## 2023-11-10 NOTE — TELEPHONE ENCOUNTER
Spoke to patient   Instructed new orders have been sent    Instructed to call back if no response from [facility]

## 2023-11-10 NOTE — TELEPHONE ENCOUNTER
Spoke with [facility]   His insurance will not pay for the diagnosis code   Needed to be changed   Wheel chair order pended      ----- Message from Lissy Bledsoe sent at 11/10/2023  8:16 AM CST -----  Type:  Needs Medical Advice    Who Called:  Pt    Would the patient rather a call back or a response via [facility]?  Call back    Best Call Back Number:  [phone]    Additional Information:  Pt is calling to check the status of the wheel chair because he has not heard anything back about it.   Please call back to advise. Thanks!

## 2023-11-13 ENCOUNTER — TELEPHONE (OUTPATIENT)
Dept: FAMILY MEDICINE | Facility: CLINIC | Age: 63
End: 2023-11-13
Payer: MEDICARE

## 2023-11-13 NOTE — TELEPHONE ENCOUNTER
----- Message from Lynette Cui sent at 11/13/2023  8:14 AM CST -----  Regarding: wheel chair has not arrived  Type:  Needs Medical Advice    Who Called: pt    Would the patient rather a call back or a response via [facility]? Call back    Best Call Back Number: [phone]    Additional Information: Wheel chair has not arrived.  Wheel chair company was supposed to call him over the weekend and they did not call.  Please advise.  Thank you.

## 2023-11-15 ENCOUNTER — TELEPHONE (OUTPATIENT)
Dept: PSYCHIATRY | Facility: CLINIC | Age: 63
End: 2023-11-15
Payer: MEDICARE

## 2023-11-15 NOTE — TELEPHONE ENCOUNTER
Called to verify patient would like to be placed on the wait list. No answer, no voice mail set up, sent my chart message.

## 2023-11-16 LAB — NONINV COLON CA DNA+OCC BLD SCRN STL QL: NEGATIVE

## 2023-11-20 ENCOUNTER — TELEPHONE (OUTPATIENT)
Dept: FAMILY MEDICINE | Facility: CLINIC | Age: 63
End: 2023-11-20
Payer: MEDICARE

## 2023-11-20 DIAGNOSIS — F32.A DEPRESSION, UNSPECIFIED DEPRESSION TYPE: ICD-10-CM

## 2023-11-20 DIAGNOSIS — E11.22 TYPE 2 DIABETES MELLITUS WITH STAGE 2 CHRONIC KIDNEY DISEASE, WITHOUT LONG-TERM CURRENT USE OF INSULIN: ICD-10-CM

## 2023-11-20 DIAGNOSIS — N18.2 TYPE 2 DIABETES MELLITUS WITH STAGE 2 CHRONIC KIDNEY DISEASE, WITHOUT LONG-TERM CURRENT USE OF INSULIN: ICD-10-CM

## 2023-11-20 RX ORDER — FAMOTIDINE 20 MG/1
20 TABLET, FILM COATED ORAL 2 TIMES DAILY
Qty: 120 TABLET | Refills: 3 | OUTPATIENT
Start: 2023-11-20

## 2023-11-20 RX ORDER — ESCITALOPRAM OXALATE 5 MG/1
5 TABLET ORAL DAILY
Qty: 30 TABLET | Refills: 11 | OUTPATIENT
Start: 2023-11-20 | End: 2024-11-19

## 2023-11-20 RX ORDER — LEVOTHYROXINE SODIUM 75 UG/1
75 TABLET ORAL EVERY MORNING
Qty: 90 TABLET | Refills: 3 | OUTPATIENT
Start: 2023-11-20

## 2023-11-20 RX ORDER — LOSARTAN POTASSIUM 100 MG/1
100 TABLET ORAL DAILY
Qty: 90 TABLET | Refills: 3 | OUTPATIENT
Start: 2023-11-20

## 2023-11-20 RX ORDER — SITAGLIPTIN 100 MG/1
100 TABLET, FILM COATED ORAL DAILY
Qty: 90 TABLET | Refills: 3 | OUTPATIENT
Start: 2023-11-20

## 2023-11-20 RX ORDER — ATORVASTATIN CALCIUM 80 MG/1
40 TABLET, FILM COATED ORAL DAILY
Qty: 90 TABLET | Refills: 3 | OUTPATIENT
Start: 2023-11-20

## 2023-11-20 RX ORDER — ASPIRIN 81 MG/1
81 TABLET ORAL ONCE
Qty: 1 TABLET | Refills: 0 | OUTPATIENT
Start: 2023-11-20 | End: 2023-11-20

## 2023-11-20 NOTE — TELEPHONE ENCOUNTER
Called patient in regards to medication refills. No answer, unable to leave voicemail due to none being set up.

## 2023-11-20 NOTE — TELEPHONE ENCOUNTER
No care due was identified.  Health [facility] Embedded Care Due Messages. Reference number: [id number].   11/20/2023 10:28:15 AM CST

## 2023-11-20 NOTE — TELEPHONE ENCOUNTER
----- Message from Antonette Chacko sent at 11/20/2023  9:09 AM CST -----  Type:  RX Refill Request    Who Called:  pt  Refill or New Rx:  REFILL  RX Name and Strength:  Every Rx that needs a refill  How is the patient currently taking it? (ex. 1XDay):  as directed  Is this a 30 day or 90 day RX:  90  Preferred Pharmacy with phone number:    [facility] [place] - [address]  [address] [place] [address]  Phone: [phone] Fax: [phone]  Best Call Back Number:  [phone]  Additional Information:  pt is calling in regards to Please call back and advise. Thanks!

## 2023-12-11 PROBLEM — I70.0 ATHEROSCLEROSIS OF AORTA: Chronic | Status: ACTIVE | Noted: 2023-12-11

## 2023-12-11 PROBLEM — Z95.0 CARDIAC PACEMAKER: Chronic | Status: ACTIVE | Noted: 2023-12-11

## 2023-12-12 NOTE — PATIENT INSTRUCTIONS
Nail Fungal Infection  A nail fungal infection changes the way fingernails and toenails look. They may thicken, discolor, change shape, or split. This condition is hard to treat because nails grow slowly and have limited blood supply. The infection often comes back after treatment.  There are 2 types of medicines used to treat this condition:  Topical anti-fungal medicines. These are applied to the surface of the skin and nail area. These medicines are not very effective because they cant get deep into the nail.  Oral antifungal medicines. These medicines work better because they go into the nail from the inside out. But the infection may still come back. It may take 9 to 12 months for your nail to look normal again. This means you are cured. You can repeat treatment if needed. Most people take these medicines without any problems. It is rare to stop therapy because of side effects. But your healthcare provider may give you some monitoring tests. Talk about possible side effects with your provider before starting treatment.  If medicines fail, the nail can be removed surgically or chemically. These methods physically remove the fungus from the body. This helps medical treatment be more effective.  Home care  Use medicines exactly as directed for as long as directed. Treating a fungal infection can take longer than other kinds of infections.  Smoking is a risk factor for fungal infection. This is one more reason to quit.  Wear absorbent socks, and shoes that let your feet breathe. Sweaty feet increase your risk of fungal infection. They also make an existing infection harder to treat.  Use footwear when in damp public places like swimming pools, gyms, and shower rooms. This will help you avoid the fungus that grows there.  Don't share nail clippers or scissors with others.  Follow-up care  Follow up with your healthcare provider, or as advised.  When to seek medical advice  Call your healthcare provider right away  if any of these occur:  Skin by the nail becomes red, swollen, painful, or drains pus (a creamy yellow or white liquid)  Side effects from oral anti-fungal medicines  Date Last Reviewed: 8/1/2016  © 5501-7034 [employer]. [address] [place] [address]. All rights reserved. This information is not intended as a substitute for professional medical care. Always follow your healthcare professional's instructions.

## 2023-12-13 ENCOUNTER — OFFICE VISIT (OUTPATIENT)
Dept: PODIATRY | Facility: CLINIC | Age: 63
End: 2023-12-13
Payer: MEDICARE

## 2023-12-13 VITALS — BODY MASS INDEX: 31.82 KG/M2 | WEIGHT: 198 LBS | HEIGHT: 66 IN

## 2023-12-13 DIAGNOSIS — E11.22 TYPE 2 DIABETES MELLITUS WITH STAGE 2 CHRONIC KIDNEY DISEASE, WITHOUT LONG-TERM CURRENT USE OF INSULIN: Primary | ICD-10-CM

## 2023-12-13 DIAGNOSIS — E11.42 DIABETIC POLYNEUROPATHY ASSOCIATED WITH TYPE 2 DIABETES MELLITUS: ICD-10-CM

## 2023-12-13 DIAGNOSIS — B35.1 ONYCHOMYCOSIS DUE TO DERMATOPHYTE: ICD-10-CM

## 2023-12-13 DIAGNOSIS — N18.2 TYPE 2 DIABETES MELLITUS WITH STAGE 2 CHRONIC KIDNEY DISEASE, WITHOUT LONG-TERM CURRENT USE OF INSULIN: Primary | ICD-10-CM

## 2023-12-13 PROCEDURE — 99203 OFFICE O/P NEW LOW 30 MIN: CPT | Mod: S$GLB,,, | Performed by: PODIATRIST

## 2023-12-13 PROCEDURE — 3288F PR FALLS RISK ASSESSMENT DOCUMENTED: ICD-10-PCS | Mod: CPTII,S$GLB,, | Performed by: PODIATRIST

## 2023-12-13 PROCEDURE — 99203 PR OFFICE/OUTPT VISIT, NEW, LEVL III, 30-44 MIN: ICD-10-PCS | Mod: S$GLB,,, | Performed by: PODIATRIST

## 2023-12-13 PROCEDURE — 1159F PR MEDICATION LIST DOCUMENTED IN MEDICAL RECORD: ICD-10-PCS | Mod: CPTII,S$GLB,, | Performed by: PODIATRIST

## 2023-12-13 PROCEDURE — 1101F PR PT FALLS ASSESS DOC 0-1 FALLS W/OUT INJ PAST YR: ICD-10-PCS | Mod: CPTII,S$GLB,, | Performed by: PODIATRIST

## 2023-12-13 PROCEDURE — 3008F PR BODY MASS INDEX (BMI) DOCUMENTED: ICD-10-PCS | Mod: CPTII,S$GLB,, | Performed by: PODIATRIST

## 2023-12-13 PROCEDURE — 99999 PR PBB SHADOW E&M-EST. PATIENT-LVL III: CPT | Mod: PBBFAC,,, | Performed by: PODIATRIST

## 2023-12-13 PROCEDURE — 3044F PR MOST RECENT HEMOGLOBIN A1C LEVEL <7.0%: ICD-10-PCS | Mod: CPTII,S$GLB,, | Performed by: PODIATRIST

## 2023-12-13 PROCEDURE — 99999 PR PBB SHADOW E&M-EST. PATIENT-LVL III: ICD-10-PCS | Mod: PBBFAC,,, | Performed by: PODIATRIST

## 2023-12-13 PROCEDURE — 1160F RVW MEDS BY RX/DR IN RCRD: CPT | Mod: CPTII,S$GLB,, | Performed by: PODIATRIST

## 2023-12-13 PROCEDURE — 4010F ACE/ARB THERAPY RXD/TAKEN: CPT | Mod: CPTII,S$GLB,, | Performed by: PODIATRIST

## 2023-12-13 PROCEDURE — 3288F FALL RISK ASSESSMENT DOCD: CPT | Mod: CPTII,S$GLB,, | Performed by: PODIATRIST

## 2023-12-13 PROCEDURE — 1160F PR REVIEW ALL MEDS BY PRESCRIBER/CLIN PHARMACIST DOCUMENTED: ICD-10-PCS | Mod: CPTII,S$GLB,, | Performed by: PODIATRIST

## 2023-12-13 PROCEDURE — 4010F PR ACE/ARB THEARPY RXD/TAKEN: ICD-10-PCS | Mod: CPTII,S$GLB,, | Performed by: PODIATRIST

## 2023-12-13 PROCEDURE — 1101F PT FALLS ASSESS-DOCD LE1/YR: CPT | Mod: CPTII,S$GLB,, | Performed by: PODIATRIST

## 2023-12-13 PROCEDURE — 3044F HG A1C LEVEL LT 7.0%: CPT | Mod: CPTII,S$GLB,, | Performed by: PODIATRIST

## 2023-12-13 PROCEDURE — 3008F BODY MASS INDEX DOCD: CPT | Mod: CPTII,S$GLB,, | Performed by: PODIATRIST

## 2023-12-13 PROCEDURE — 1159F MED LIST DOCD IN RCRD: CPT | Mod: CPTII,S$GLB,, | Performed by: PODIATRIST

## 2023-12-13 RX ORDER — CICLOPIROX 80 MG/ML
SOLUTION TOPICAL NIGHTLY
Qty: 6.6 ML | Refills: 5 | Status: SHIPPED | OUTPATIENT
Start: 2023-12-13

## 2023-12-13 NOTE — PROGRESS NOTES
"  [address] [facility] [address] [place] [address]  Phone: [phone]   [phone]    Patient's PCP:Jessie Jung MD  Referring Provider: Aaareferral Self    Subjective:      Chief Complaint:: Nail Problem (Fungal nails )    Nail Problem  Associated symptoms include numbness. Pertinent negatives include no abdominal pain, arthralgias, chest pain, chills, coughing, fatigue, fever, headaches, joint swelling, myalgias, nausea, neck pain, rash or weakness.     Dhaval Hernández is a 63 y.o. male who presents today with a complaint of fungal nails. The current episode started a few months.  The symptoms include hardened discolored nails. Probable cause of complaint unknown.  The symptoms are aggravated by none. The problem has stayed the same.     Systemic Doctor:   Date Last Seen: 10/30/23  Blood Sugar: not taken   Hemoglobin A1c: 6.5    Vitals:    12/13/23 1013   Weight: 89.8 kg (198 lb)   Height: 5' 6" (1.676 m)   PainSc: 0-No pain      Shoe Size: 10    Past Surgical History:   Procedure Laterality Date    FRACTURE SURGERY      INSERTION OF PACEMAKER      LEG AMPUTATION       Past Medical History:   Diagnosis Date    Diabetes mellitus, type 2     GERD (gastroesophageal reflux disease)     Hyperlipidemia     Hypertension      History reviewed. No pertinent family history.     Social History:   Marital Status:   Alcohol History:  has no history on file for alcohol use.  Tobacco History:  reports that he has been smoking cigarettes. He has never used smokeless tobacco.  Drug History:  has no history on file for drug use.    Review of patient's allergies indicates:   Allergen Reactions    Fish containing products      Other reaction(s): .       Current Outpatient Medications   Medication Sig Dispense Refill    amiodarone (PACERONE) 200 MG Tab Take 200 mg by mouth.      amLODIPine (NORVASC) 5 MG tablet Take 5 mg by mouth.      aspirin (ECOTRIN) 81 MG EC tablet Take 81 mg by mouth.      " atorvastatin (LIPITOR) 80 MG tablet Take 40 mg by mouth.      carvediloL (COREG) 25 MG tablet Take 25 mg by mouth.      cholecalciferol, vitamin D3, 1,250 mcg (50,000 unit) capsule Take 50,000 Int'l Units by mouth.      ciclopirox (PENLAC) 8 % Soln Apply topically nightly. 6.6 mL 5    empagliflozin (JARDIANCE) 25 mg tablet Take 1 tablet (25 mg total) by mouth once daily. 90 tablet 3    EScitalopram oxalate (LEXAPRO) 5 MG Tab Take 1 tablet (5 mg total) by mouth once daily. 30 tablet 11    ezetimibe (ZETIA) 10 mg tablet Take 10 mg by mouth.      famotidine (PEPCID) 20 MG tablet Take 20 mg by mouth 2 (two) times daily.      insulin glargine,hum.rec.anlog (LANTUS SOLOSTAR U-100 INSULIN SUBQ) Inject into the skin.      JANUVIA 100 mg Tab Take 100 mg by mouth.      levothyroxine (SYNTHROID) 75 MCG tablet Take 75 mcg by mouth every morning.      losartan (COZAAR) 100 MG tablet Take 100 mg by mouth.      metFORMIN (GLUCOPHAGE) 1000 MG tablet Take 1,000 mg by mouth.      multivitamin (THERAGRAN) per tablet Take 1 tablet by mouth.      omega 3-dha-epa-fish oil (FISH OIL) 1,200 (144-216) mg Cap   0 Refill(s)       No current facility-administered medications for this visit.       Review of Systems   Constitutional:  Negative for chills, fatigue, fever and unexpected weight change.   HENT:  Negative for hearing loss and trouble swallowing.    Eyes:  Negative for photophobia and visual disturbance.   Respiratory:  Negative for cough, shortness of breath and wheezing.    Cardiovascular:  Negative for chest pain, palpitations and leg swelling.   Gastrointestinal:  Negative for abdominal pain and nausea.   Genitourinary:  Negative for dysuria and frequency.   Musculoskeletal:  Positive for gait problem. Negative for arthralgias, back pain, joint swelling, myalgias and neck pain.   Skin:  Negative for rash and wound.   Neurological:  Positive for numbness. Negative for tremors, seizures, weakness and headaches.   Hematological:   Does not bruise/bleed easily.         Objective:        Physical Exam:   Foot Exam    General  Orientation: alert and oriented to person, place, and time   Affect: appropriate   Assistance: wheelchair use       Right Foot/Ankle     Inspection and Palpation  Ecchymosis: none  Tenderness: none   Swelling: (Mild lower extremity edema)  Arch: normal  Skin Exam: skin intact; no drainage, no ulcer and no erythema   Fungus Toenails: present    Neurovascular  Dorsalis pedis: 1+  Posterior tibial: 1+  Capillary Refill: 3+  Varicose veins: not present  Saphenous nerve sensation: diminished  Tibial nerve sensation: diminished  Superficial peroneal nerve sensation: diminished  Deep peroneal nerve sensation: diminished  Sural nerve sensation: diminished    Comments  Nails 1 through 5 show varying degrees of thickening discoloration and dystrophy    Left Foot/Ankle      Comments  Previous lower extremity amputation fully healed.    Physical Exam  Cardiovascular:      Pulses:           Dorsalis pedis pulses are 1+ on the right side.        Posterior tibial pulses are 1+ on the right side.   Feet:      Right foot:      Skin integrity: No ulcer or erythema.      Toenail Condition: Fungal disease present.              Right Ankle/Foot Exam     Comments:  Nails 1 through 5 show varying degrees of thickening discoloration and dystrophy    Left Ankle/Foot Exam     Comments:  Previous lower extremity amputation fully healed.      Vascular Exam     Right Pulses  Dorsalis Pedis:      1+  Posterior Tibial:      1+           Imaging: none            Assessment:       1. Type 2 diabetes mellitus with stage 2 chronic kidney disease, without long-term current use of insulin    2. Diabetic polyneuropathy associated with type 2 diabetes mellitus    3. Onychomycosis due to dermatophyte      Plan:   Type 2 diabetes mellitus with stage 2 chronic kidney disease, without long-term current use of insulin    Diabetic polyneuropathy associated with type  2 diabetes mellitus    Onychomycosis due to dermatophyte  -     ciclopirox (PENLAC) 8 % Soln; Apply topically nightly.  Dispense: 6.6 mL; Refill: 5      Follow up if symptoms worsen or fail to improve.    Procedures        Fungal infection of toenails explained. Treatment options including no treatment, periodic debridement, topical medications, oral medications, and removal of the nail were discussed, as well as success rates and risks of recurrence. We agreed on topical medication      Counseling:     I provided patient education verbally regarding:   Patient diagnosis, treatment options, as well as alternatives, risks, and benefits.     This note was created using Dragon voice recognition software that occasionally misinterpreted phrases or words.

## 2023-12-19 ENCOUNTER — TELEPHONE (OUTPATIENT)
Dept: FAMILY MEDICINE | Facility: CLINIC | Age: 63
End: 2023-12-19
Payer: MEDICARE

## 2023-12-19 NOTE — TELEPHONE ENCOUNTER
----- Message from Monique Carranza, Patient Care Assistant sent at 12/19/2023 11:21 AM CST -----  Contact: Yvonne younger  Type:  RX Refill Request    Who Called:  Carmita   Refill or New Rx:   refill   RX Name and Strength: JANUVIA 100 mg Tab, metFORMIN (GLUCOPHAGE) 1000 MG tablet,   amLODIPine (NORVASC) 5 MG tablet,  levothyroxine (SYNTHROID) 75 MCG tablet   How is the patient currently taking it? (ex. 1XDay):   as directed   Is this a 30 day or 90 day RX:  30  [facility] Pharm[facility] with phone number:    [facility] [place] [facility] [address] [facility]  [address] [facility]  [place] [address]  Phone: [phone] Fax: [phone]  Local or Mail Order:  local   Ordering Provider:  Dr Erich Mckay Call Back Number  Additional Information:  thanks

## 2023-12-19 NOTE — TELEPHONE ENCOUNTER
Returned patients call to advise he has refills available at the pharmacy. He just needs to call them. No answer, left voicemail to return call.

## 2023-12-20 DIAGNOSIS — E11.9 TYPE 2 DIABETES MELLITUS WITHOUT COMPLICATION: ICD-10-CM

## 2023-12-20 RX ORDER — METFORMIN HYDROCHLORIDE 1000 MG/1
1000 TABLET ORAL 2 TIMES DAILY WITH MEALS
Qty: 180 TABLET | Refills: 3 | Status: SHIPPED | OUTPATIENT
Start: 2023-12-20 | End: 2024-12-19

## 2023-12-20 RX ORDER — LEVOTHYROXINE SODIUM 75 UG/1
75 TABLET ORAL EVERY MORNING
Qty: 90 TABLET | Refills: 3 | Status: SHIPPED | OUTPATIENT
Start: 2023-12-20 | End: 2024-12-19

## 2023-12-20 RX ORDER — AMLODIPINE BESYLATE 5 MG/1
5 TABLET ORAL DAILY
Qty: 90 TABLET | Refills: 3 | Status: SHIPPED | OUTPATIENT
Start: 2023-12-20 | End: 2024-12-19

## 2023-12-20 RX ORDER — SITAGLIPTIN 100 MG/1
100 TABLET, FILM COATED ORAL DAILY
Qty: 90 TABLET | Refills: 3 | Status: SHIPPED | OUTPATIENT
Start: 2023-12-20 | End: 2024-12-19

## 2023-12-20 NOTE — TELEPHONE ENCOUNTER
----- Message from Lindsay Lynch sent at 12/20/2023 12:08 PM CST -----  Regarding: refill  Contact: patient  Type:  RX Refill Request    Who Called:  patient   Refill or New Rx:  refill  RX Name and Strength:    JANUVIA 100 mg Tab  amLODIPine (NORVASC) 5 MG tablet  levothyroxine (SYNTHROID) 75 MCG tablet    metFORMIN (GLUCOPHAGE) 1000 MG tablet    How is the patient currently taking it? (ex. 1XDay):    Is this a 30 day or 90 day RX:    [facility] Pharm[facility] with phone number:    [facility] [place] - [address] [facility]  [address] [facility]  [place] [address]  Phone: [phone] Fax: [phone]      Local or Mail Order:    Ordering Provider:    Woody Call Back Number:  [phone]    Additional Information:  call once it has been sent thanks!

## 2024-01-08 ENCOUNTER — TELEPHONE (OUTPATIENT)
Dept: FAMILY MEDICINE | Facility: CLINIC | Age: 64
End: 2024-01-08
Payer: MEDICARE

## 2024-01-08 NOTE — TELEPHONE ENCOUNTER
----- Message from Lissy Bledsoe sent at 1/8/2024  9:06 AM CST -----  Type:  Needs Medical Advice    Who Called:  Pt    Would the patient rather a call back or a response via [facility]?  Call back    Best Call Back Number:  [phone]    Additional Information:  Pt needs Dr Jung to call insurance and confirm she is his pcp. They are trying to refer him to a new pcp and he does not want to change. He still has [employer] managed medicare.   Please call back to advise. Thanks!

## 2024-01-08 NOTE — TELEPHONE ENCOUNTER
[facility] regards to needing to keep Dr Bansal's as his PCP. [facility] stated patient can continue to see Dr Jung, she is covered under patients plan.

## 2024-01-24 ENCOUNTER — TELEPHONE (OUTPATIENT)
Dept: CARDIOLOGY | Facility: CLINIC | Age: 64
End: 2024-01-24
Payer: MEDICARE

## 2024-01-24 NOTE — TELEPHONE ENCOUNTER
----- Message from Arsenio Guthrie sent at 1/24/2024  9:30 AM CST -----  Contact: Self  Type: Needs Medical Advice  Who Called:  Patient    Best Call Back Number: [phone]   Additional Information: States transportation was regulo for 11:30. Would like to know if he can come into appt for 11:30 on 1/30

## 2024-04-11 ENCOUNTER — TELEPHONE (OUTPATIENT)
Dept: CARDIOLOGY | Facility: CLINIC | Age: 64
End: 2024-04-11
Payer: MEDICARE

## 2024-04-11 NOTE — TELEPHONE ENCOUNTER
----- Message from Gustavo Ku sent at 4/11/2024 11:53 AM CDT -----  Regarding: appt question  Contact: wife at [phone]  Type: Needs Medical Advice    Who Called:  wife / tan Mckay Call Back Number: [phone]    Additional Information: pt has 4/12/24 appt at 09:00 with Dr Diaz. Pt needs 24-hour notice for transpo. PT is currently scheduled for [address] with transpo. If needs different location will need to know early today to reschedule transpo. If can't get transpo reschedule appt b/c of transpo.

## 2024-04-12 ENCOUNTER — OFFICE VISIT (OUTPATIENT)
Dept: CARDIOLOGY | Facility: CLINIC | Age: 64
End: 2024-04-12
Payer: MEDICARE

## 2024-04-12 VITALS
SYSTOLIC BLOOD PRESSURE: 142 MMHG | DIASTOLIC BLOOD PRESSURE: 71 MMHG | BODY MASS INDEX: 31.82 KG/M2 | HEIGHT: 66 IN | WEIGHT: 198 LBS | HEART RATE: 66 BPM

## 2024-04-12 DIAGNOSIS — I42.9 CARDIOMYOPATHY, UNSPECIFIED TYPE: ICD-10-CM

## 2024-04-12 DIAGNOSIS — Z95.810 ICD (IMPLANTABLE CARDIOVERTER-DEFIBRILLATOR) IN PLACE: Primary | ICD-10-CM

## 2024-04-12 DIAGNOSIS — I70.0 ATHEROSCLEROSIS OF AORTA: ICD-10-CM

## 2024-04-12 DIAGNOSIS — I25.10 CORONARY ARTERY DISEASE, UNSPECIFIED VESSEL OR LESION TYPE, UNSPECIFIED WHETHER ANGINA PRESENT, UNSPECIFIED WHETHER NATIVE OR TRANSPLANTED HEART: ICD-10-CM

## 2024-04-12 DIAGNOSIS — I10 HYPERTENSION, UNSPECIFIED TYPE: ICD-10-CM

## 2024-04-12 PROCEDURE — 3077F SYST BP >= 140 MM HG: CPT | Mod: CPTII,S$GLB,, | Performed by: INTERNAL MEDICINE

## 2024-04-12 PROCEDURE — 4010F ACE/ARB THERAPY RXD/TAKEN: CPT | Mod: CPTII,S$GLB,, | Performed by: INTERNAL MEDICINE

## 2024-04-12 PROCEDURE — 3078F DIAST BP <80 MM HG: CPT | Mod: CPTII,S$GLB,, | Performed by: INTERNAL MEDICINE

## 2024-04-12 PROCEDURE — 1101F PT FALLS ASSESS-DOCD LE1/YR: CPT | Mod: CPTII,S$GLB,, | Performed by: INTERNAL MEDICINE

## 2024-04-12 PROCEDURE — 1159F MED LIST DOCD IN RCRD: CPT | Mod: CPTII,S$GLB,, | Performed by: INTERNAL MEDICINE

## 2024-04-12 PROCEDURE — 3288F FALL RISK ASSESSMENT DOCD: CPT | Mod: CPTII,S$GLB,, | Performed by: INTERNAL MEDICINE

## 2024-04-12 PROCEDURE — 99999 PR PBB SHADOW E&M-EST. PATIENT-LVL III: CPT | Mod: PBBFAC,,, | Performed by: INTERNAL MEDICINE

## 2024-04-12 PROCEDURE — 99204 OFFICE O/P NEW MOD 45 MIN: CPT | Mod: S$GLB,,, | Performed by: INTERNAL MEDICINE

## 2024-04-12 PROCEDURE — 3008F BODY MASS INDEX DOCD: CPT | Mod: CPTII,S$GLB,, | Performed by: INTERNAL MEDICINE

## 2024-04-12 PROCEDURE — 1126F AMNT PAIN NOTED NONE PRSNT: CPT | Mod: CPTII,S$GLB,, | Performed by: INTERNAL MEDICINE

## 2024-04-12 PROCEDURE — 1160F RVW MEDS BY RX/DR IN RCRD: CPT | Mod: CPTII,S$GLB,, | Performed by: INTERNAL MEDICINE

## 2024-04-12 PROCEDURE — 93000 ELECTROCARDIOGRAM COMPLETE: CPT | Mod: S$GLB,,, | Performed by: GENERAL PRACTICE

## 2024-04-12 NOTE — PROGRESS NOTES
Subjective:    Patient ID:  Dhaval Hernández is a 63 y.o. male patient here for evaluation Establish Care      History of Present Illness:     63-year-old male with past medical history of diabetes, hypertension, hyperlipidemia, MI status post PCI few years ago, ICD placement in 2016, CVA, left leg traumatic amputation many years ago came here for establishment of care.  Patient moved from [facility] in October 2023.  He was following up Cardiology at [facility] before.  He had an echo last year.  No recent stress test.  Device check was 1 year ago.  Denies anginal symptoms.  Wheelchair-bound.  Denies any ICD shocks.  Compliant with the medications.  Right foot dependent edema which is chronic        Review of patient's allergies indicates:   Allergen Reactions    Fish containing products      Other reaction(s): .       Past Medical History:   Diagnosis Date    Diabetes mellitus, type 2     GERD (gastroesophageal reflux disease)     Hyperlipidemia     Hypertension      Past Surgical History:   Procedure Laterality Date    FRACTURE SURGERY      INSERTION OF PACEMAKER      LEG AMPUTATION       Social History     Tobacco Use    Smoking status: Some Days     Types: Cigarettes    Smokeless tobacco: Never        Review of Systems   Negative except as mentioned in HPI         Objective        Vitals:    04/12/24 0843   BP: (!) 142/71   Pulse: 66       LIPIDS - LAST 2   Lab Results   Component Value Date    CHOL 132 10/30/2023    HDL 48 10/30/2023    LDLCALC 67.2 10/30/2023    TRIG 84 10/30/2023    CHOLHDL 36.4 10/30/2023       CBC - LAST 2  Lab Results   Component Value Date    WBC 6.26 10/30/2023    WBC 5.3 12/10/2017    RBC 5.09 10/30/2023    RBC 6.06 12/10/2017    HGB 13.2 (L) 10/30/2023    HGB 17.6 12/10/2017    HCT 42.3 10/30/2023    HCT 52.6 (H) 12/10/2017    MCV 83 10/30/2023    MCV 87 12/10/2017    MCH 25.9 (L) 10/30/2023    MCH 29 12/10/2017    MCHC 31.2 (L) 10/30/2023    MCHC 34 12/10/2017    RDW 18.6 (H)  10/30/2023    RDW 16.2 12/10/2017     10/30/2023     12/10/2017    MPV 10.7 10/30/2023    MPV 9.3 12/10/2017    GRAN 3.6 10/30/2023    GRAN 57.1 10/30/2023    LYMPH 2.0 10/30/2023    LYMPH 31.3 10/30/2023    MONO 0.5 10/30/2023    MONO 8.6 10/30/2023    BASO 0.05 10/30/2023    NRBC 0 10/30/2023       CHEMISTRY & LIVER FUNCTION - LAST 2  Lab Results   Component Value Date     10/30/2023     06/13/2023    K 4.7 10/30/2023    K 4.8 06/13/2023     10/30/2023    CO2 18 (L) 10/30/2023    CO2 19 (L) 06/13/2023    ANIONGAP 11 10/30/2023    BUN 20 10/30/2023    BUN 20.0 06/13/2023    CREATININE 1.1 10/30/2023    CREATININE 1.40 (H) 06/13/2023    GLU 98 10/30/2023    CALCIUM 9.8 10/30/2023    CALCIUM 9.5 06/13/2023    MG 2.1 11/24/2017    MG 1.7 (L) 11/23/2017    ALBUMIN 4.1 10/30/2023    ALBUMIN 3.9 06/13/2023    PROT 8.3 10/30/2023    ALKPHOS 97 10/30/2023    ALKPHOS 82 06/13/2023    ALT 37 10/30/2023    ALT 40 06/13/2023    AST 34 10/30/2023    AST 34 06/13/2023    BILITOT 0.7 10/30/2023    BILITOT 0.5 06/13/2023        CARDIAC PROFILE - LAST 2  Lab Results   Component Value Date     (H) 11/28/2017     (H) 11/28/2017     05/18/2017    CPKMB 1.4 05/18/2017    TROPONINI <0.017 05/18/2017        COAGULATION - LAST 2  Lab Results   Component Value Date    INR 1.04 11/24/2017    INR 1.1 05/18/2017       ENDOCRINE & PSA - LAST 2  Lab Results   Component Value Date    HGBA1C 6.5 (H) 10/30/2023    HGBA1C 7.0 (H) 11/23/2017    TSH 2.178 10/30/2023        ECHOCARDIOGRAM RESULTS  No results found for this or any previous visit.      CURRENT/PREVIOUS VISIT EKG  Results for orders placed or performed in visit on 01/17/23   EKG 12-lead    Collection Time: 01/17/23  9:46 AM    Narrative    Test Reason : E11.9,    Vent. Rate : 066 BPM     Atrial Rate : 066 BPM     P-R Int : 266 ms          QRS Dur : 082 ms      QT Int : 424 ms       P-R-T Axes : 031 -68 027 degrees     QTc Int : 444  ms    Sinus rhythm with 1st degree A-V block  Left axis deviation  Low voltage QRS  Inferior infarct ,age undetermined  Abnormal ECG  Confirmed by Giovanny Camacho MD ([id number]) on 1/17/2023 10:17:37 PM    Referred By: RAFA CHURCHILL           Confirmed By:Giovanny Camacho MD     No valid procedures specified.   No results found for this or any previous visit.    No valid procedures specified.          PREVIOUS STRESS TEST              PREVIOUS ANGIOGRAM        PHYSICAL EXAM    CONSTITUTIONAL: Well built, well nourished in no apparent distress  HEENT: No pallor  NECK: no JVD  LUNGS: CTA b/l  HEART: regular rate and rhythm, S1, S2 normal, no murmur   ABDOMEN: soft, non-tender; bowel sounds normal  EXTREMITIES:  Left leg amputation.  Right foot edema.  NEURO: AAO X 3   Psych:  Normal affect    I HAVE REVIEWED :    The vital signs, nurses notes, and all the pertinent radiology and labs.        Current Outpatient Medications   Medication Instructions    amiodarone (PACERONE) 200 mg, Oral    amLODIPine (NORVASC) 5 mg, Oral, Daily    aspirin (ECOTRIN) 81 mg, Oral    atorvastatin (LIPITOR) 40 mg, Oral    carvediloL (COREG) 25 mg, Oral    cholecalciferol, vitamin D3, 1,250 mcg (50,000 unit) capsule 50,000 Int'l Units, Oral    ciclopirox (PENLAC) 8 % Soln Topical (Top), Nightly    empagliflozin (JARDIANCE) 25 mg, Oral, Daily    EScitalopram oxalate (LEXAPRO) 5 mg, Oral, Daily    ezetimibe (ZETIA) 10 mg, Oral    famotidine (PEPCID) 20 mg, Oral, 2 times daily    insulin glargine,hum.rec.anlog (LANTUS SOLOSTAR U-100 INSULIN SUBQ) Subcutaneous    JANUVIA 100 mg, Oral, Daily    levothyroxine (SYNTHROID) 75 mcg, Oral, Every morning    losartan (COZAAR) 100 mg, Oral    metFORMIN (GLUCOPHAGE) 1,000 mg, Oral, 2 times daily with meals    multivitamin (THERAGRAN) per tablet 1 tablet, Oral    omega 3-dha-epa-fish oil (FISH OIL) 1,200 (144-216) mg Cap   0 Refill(s)        ECG reviewed by me: SR, 1st degree AV block, inferior infarct, anterolateral  infarct  Assessment & Plan     63-year-old male with past medical history of diabetes, hypertension, hyperlipidemia, MI status post PCI few years ago, ICD placement in 2016, CVA, left leg traumatic amputation many years ago came here for establishment of care.  Patient moved from [facility] in October 2023.  He was following up Cardiology at [facility] before.  He had an echo last year.  No recent stress test.  Device check was 1 year ago.  Denies anginal symptoms.  Wheelchair-bound.  Denies any ICD shocks.  Compliant with the medications.  Right foot dependent edema which is chronic    History of MI status post PCI  History of CVA  History of ICD placement. ?  Cardiomyopathy  Aortic atherosclerosis  Diabetes  Hypertension  Dyslipidemia    Plan:  Patient's previous records not available to review.  will obtain records from [facility]  Continue current meds  LDL less than 70  Home BP monitoring  [employer]tr[employer] device check  Repeat echocardiogram          Follow up in about 3 months (around 7/12/2024).

## 2024-04-16 ENCOUNTER — TELEPHONE (OUTPATIENT)
Dept: CARDIOLOGY | Facility: HOSPITAL | Age: 64
End: 2024-04-16

## 2024-04-17 ENCOUNTER — CLINICAL SUPPORT (OUTPATIENT)
Dept: CARDIOLOGY | Facility: HOSPITAL | Age: 64
End: 2024-04-17
Attending: INTERNAL MEDICINE
Payer: MEDICARE

## 2024-04-17 VITALS — HEIGHT: 66 IN | WEIGHT: 198 LBS | BODY MASS INDEX: 31.82 KG/M2

## 2024-04-17 DIAGNOSIS — Z95.810 ICD (IMPLANTABLE CARDIOVERTER-DEFIBRILLATOR) IN PLACE: ICD-10-CM

## 2024-04-17 DIAGNOSIS — I42.9 CARDIOMYOPATHY, UNSPECIFIED TYPE: ICD-10-CM

## 2024-04-17 LAB
AORTIC ROOT ANNULUS: 3.5 CM
AORTIC VALVE CUSP SEPERATION: 2.1 CM
AV INDEX (PROSTH): 0.84
AV MEAN GRADIENT: 3 MMHG
AV PEAK GRADIENT: 5 MMHG
AV VALVE AREA BY VELOCITY RATIO: 2.39 CM²
AV VALVE AREA: 2.37 CM²
AV VELOCITY RATIO: 0.84
BSA FOR ECHO PROCEDURE: 2.04 M2
CV ECHO LV RWT: 0.54 CM
DOP CALC AO PEAK VEL: 1.14 M/S
DOP CALC AO VTI: 25.9 CM
DOP CALC LVOT AREA: 2.8 CM2
DOP CALC LVOT DIAMETER: 1.9 CM
DOP CALC LVOT PEAK VEL: 0.96 M/S
DOP CALC LVOT STROKE VOLUME: 61.49 CM3
DOP CALC MV VTI: 32 CM
DOP CALCLVOT PEAK VEL VTI: 21.7 CM
E WAVE DECELERATION TIME: 190 MSEC
E/A RATIO: 1.93
E/E' RATIO: 12 M/S
ECHO LV POSTERIOR WALL: 1.3 CM (ref 0.6–1.1)
FRACTIONAL SHORTENING: 35 % (ref 28–44)
INTERVENTRICULAR SEPTUM: 1.3 CM (ref 0.6–1.1)
IVC DIAMETER: 2.3 CM
LEFT INTERNAL DIMENSION IN SYSTOLE: 3.1 CM (ref 2.1–4)
LEFT VENTRICLE DIASTOLIC VOLUME INDEX: 54.27 ML/M2
LEFT VENTRICLE DIASTOLIC VOLUME: 108 ML
LEFT VENTRICLE MASS INDEX: 123 G/M2
LEFT VENTRICLE SYSTOLIC VOLUME INDEX: 19 ML/M2
LEFT VENTRICLE SYSTOLIC VOLUME: 37.9 ML
LEFT VENTRICULAR INTERNAL DIMENSION IN DIASTOLE: 4.8 CM (ref 3.5–6)
LEFT VENTRICULAR MASS: 245.73 G
LV LATERAL E/E' RATIO: 9 M/S
LV SEPTAL E/E' RATIO: 18 M/S
LVOT MG: 2 MMHG
LVOT MV: 0.65 CM/S
MV MEAN GRADIENT: 2 MMHG
MV PEAK A VEL: 0.56 M/S
MV PEAK E VEL: 1.08 M/S
MV PEAK GRADIENT: 6 MMHG
MV VALVE AREA BY CONTINUITY EQUATION: 1.92 CM2
OHS CV RV/LV RATIO: 0.73 CM
OHS LV EJECTION FRACTION SIMPSONS BIPLANE MOD: 61 %
PISA MRMAX VEL: 3.93 M/S
PISA TR MAX VEL: 2.69 M/S
PV MV: 0.53 M/S
PV PEAK GRADIENT: 2 MMHG
PV PEAK VELOCITY: 0.76 M/S
RA PRESSURE ESTIMATED: 3 MMHG
RIGHT VENTRICULAR END-DIASTOLIC DIMENSION: 3.5 CM
RV TB RVSP: 6 MMHG
RV TISSUE DOPPLER FREE WALL SYSTOLIC VELOCITY 1 (APICAL 4 CHAMBER VIEW): 10.3 CM/S
TDI LATERAL: 0.12 M/S
TDI SEPTAL: 0.06 M/S
TDI: 0.09 M/S
TR MAX PG: 29 MMHG
TRICUSPID ANNULAR PLANE SYSTOLIC EXCURSION: 2.63 CM
TV REST PULMONARY ARTERY PRESSURE: 32 MMHG
Z-SCORE OF LEFT VENTRICULAR DIMENSION IN END DIASTOLE: -1.84
Z-SCORE OF LEFT VENTRICULAR DIMENSION IN END SYSTOLE: -1.06

## 2024-04-17 PROCEDURE — 93306 TTE W/DOPPLER COMPLETE: CPT | Mod: 26,,, | Performed by: INTERNAL MEDICINE

## 2024-04-17 PROCEDURE — 93306 TTE W/DOPPLER COMPLETE: CPT

## 2024-05-02 DIAGNOSIS — Z95.0 CARDIAC PACEMAKER: Primary | Chronic | ICD-10-CM

## 2024-05-02 LAB
OHS QRS DURATION: 74 MS
OHS QTC CALCULATION: 453 MS

## 2024-05-07 ENCOUNTER — HOSPITAL ENCOUNTER (OUTPATIENT)
Dept: CARDIOLOGY | Facility: CLINIC | Age: 64
Discharge: HOME | End: 2024-05-07
Attending: INTERNAL MEDICINE
Payer: MEDICARE

## 2024-05-07 DIAGNOSIS — Z95.810 ICD (IMPLANTABLE CARDIOVERTER-DEFIBRILLATOR) IN PLACE: ICD-10-CM

## 2024-05-07 PROCEDURE — 93289 INTERROG DEVICE EVAL HEART: CPT | Mod: ,,, | Performed by: GENERAL PRACTICE

## 2024-05-08 DIAGNOSIS — E11.9 TYPE 2 DIABETES MELLITUS WITHOUT COMPLICATION: ICD-10-CM

## 2024-05-21 LAB
BATTERY VOLTAGE (V): 2.98 V
CHARGE TIME (SEC): 4 SEC
HV IMPEDANCE (OHM): 68 OHM
IMPEDANCE RA LEAD (NATIVE): 361 OHMS
P/R-WAVE RA LEAD (NATIVE): 11.9 MV
THRESHOLD MS RA LEAD (NATIVE): 0.4 MS
THRESHOLD V RA LEAD (NATIVE): 1.25 V

## 2024-05-22 ENCOUNTER — TELEPHONE (OUTPATIENT)
Dept: CARDIOLOGY | Facility: CLINIC | Age: 64
End: 2024-05-22
Payer: MEDICARE

## 2024-05-22 NOTE — TELEPHONE ENCOUNTER
----- Message from Nancie Nunez sent at 5/22/2024  9:14 AM CDT -----  Regarding: Needs Medical Release Status  Contact: patient at [phone]  Type: Needs Medical Release Status  Who Called:  patient at [phone]    Additional Information: calling to see if medical release was sent to patient's doctors in [place], Dr. Barney (cardio) [phone] and Dr. Mcdowell (pacemaker) [phone]. They are stating it wasn't received. Please call and advise. Thank you

## 2024-05-22 NOTE — TELEPHONE ENCOUNTER
----- Message from Merissa Shafer, Patient Care Assistant sent at 5/22/2024  9:25 AM CDT -----  Regarding: advice  Contact: June with [facility]  Type: Needs Medical Advice    Who Called:  June with [facility]    Best Call Back Number: [phone] - fax # [phone]     Additional Information: please call June with [facility] to advise. Thanks!

## 2024-06-04 DIAGNOSIS — Z89.519: ICD-10-CM

## 2024-06-04 DIAGNOSIS — M62.838 MUSCLE SPASM: Primary | ICD-10-CM

## 2024-06-04 RX ORDER — AMLODIPINE BESYLATE 5 MG/1
5 TABLET ORAL DAILY
Qty: 90 TABLET | Refills: 1 | Status: SHIPPED | OUTPATIENT
Start: 2024-06-04 | End: 2025-06-04

## 2024-06-04 RX ORDER — LEVOTHYROXINE SODIUM 75 UG/1
75 TABLET ORAL EVERY MORNING
Qty: 90 TABLET | Refills: 1 | Status: SHIPPED | OUTPATIENT
Start: 2024-06-04 | End: 2025-06-04

## 2024-06-04 RX ORDER — METFORMIN HYDROCHLORIDE 1000 MG/1
1000 TABLET ORAL 2 TIMES DAILY WITH MEALS
Qty: 180 TABLET | Refills: 1 | Status: SHIPPED | OUTPATIENT
Start: 2024-06-04 | End: 2025-06-04

## 2024-06-04 RX ORDER — SITAGLIPTIN 100 MG/1
100 TABLET, FILM COATED ORAL DAILY
Qty: 90 TABLET | Refills: 1 | Status: SHIPPED | OUTPATIENT
Start: 2024-06-04 | End: 2025-06-04

## 2024-06-04 RX ORDER — METHOCARBAMOL 500 MG/1
500 TABLET, FILM COATED ORAL 3 TIMES DAILY
Qty: 30 TABLET | Refills: 0 | Status: SHIPPED | OUTPATIENT
Start: 2024-06-04 | End: 2024-06-14

## 2024-06-04 RX ORDER — INSULIN GLARGINE 100 [IU]/ML
16 INJECTION, SOLUTION SUBCUTANEOUS NIGHTLY
Qty: 14.4 ML | Refills: 1 | Status: SHIPPED | OUTPATIENT
Start: 2024-06-04 | End: 2025-06-04

## 2024-06-04 NOTE — TELEPHONE ENCOUNTER
Patient is stating that he had a leg amputated, states that it is painful and cramping. Wants to know if you will call out a medication for him.

## 2024-06-05 RX ORDER — AMIODARONE HYDROCHLORIDE 200 MG/1
200 TABLET ORAL DAILY
Qty: 30 TABLET | Refills: 11 | Status: SHIPPED | OUTPATIENT
Start: 2024-06-05

## 2024-06-05 NOTE — TELEPHONE ENCOUNTER
----- Message from Merissa Shafer, Patient Care Assistant sent at 6/5/2024  1:40 PM CDT -----  Regarding: refill  [facility]  Type:  RX Refill Request    Who Called:  [facility]   Refill or New Rx:  refill   RX Name and Strength:  amiodarone (PACERONE) 200 MG Tab    How is the patient currently taking it? 1xday   Is this a 30 day or 90 day RX:  30    Preferred Pharm[facility] with phone number:    [facility] [place] [facility] [address] [facility]  [address] [place] [address]  Phone: [phone] Fax: [phone]    Local or Mail Order:  local   Ordering Provider:  Erich Mckay Call Back Number:  [phone]     Additional Information:  please call to advise. Thanks!

## 2024-07-03 RX ORDER — SPIRONOLACTONE 25 MG/1
12.5 TABLET ORAL
COMMUNITY
Start: 2024-06-05

## 2024-07-03 NOTE — TELEPHONE ENCOUNTER
----- Message from Kathryn Farfan sent at 7/3/2024  4:17 PM CDT -----  Contact: pt  Type:  RX Refill Request    Who Called: pt    Refill or New Rx: refill    RX Name and Strength: 2 medications    carvediloL (COREG) 25 MG tablet  Spironolactone 25 mg    How is the patient currently taking it? (ex. 1XDay): as directed    Is this a 30 day or 90 day RX: 30    [facility] Pharm[facility] with phone number:  [facility] [place] [facility] [address] [facility]  [address] [facility]  [place] [address]  Phone: [phone] Fax: [phone]      Local or Mail Order:local    Ordering Provider: Erich    Would the patient rather a call back or a response via [facility]?  Call if questions    Best Call Back Number: [phone]    Additional Information: please refill scripts     Pharmacy is waiting on these 2 in order to fill all meds in pack dose at once    Thanks

## 2024-07-05 RX ORDER — SPIRONOLACTONE 25 MG/1
12.5 TABLET ORAL
OUTPATIENT
Start: 2024-07-05

## 2024-07-05 RX ORDER — CARVEDILOL 25 MG/1
25 TABLET ORAL DAILY
Qty: 90 TABLET | Refills: 3 | Status: SHIPPED | OUTPATIENT
Start: 2024-07-05 | End: 2025-07-05

## 2024-07-05 RX ORDER — SPIRONOLACTONE 25 MG/1
TABLET ORAL
Qty: 15 TABLET | Refills: 10 | OUTPATIENT
Start: 2024-07-05

## 2024-07-05 NOTE — TELEPHONE ENCOUNTER
----- Message from Merissa Shafer, Patient Care Assistant sent at 7/5/2024 12:21 PM CDT -----  Regarding: refill  Contact: Annelise castro's wife  Type:  RX Refill Request    Who Called:  Annelise pt's wife   Refill or New Rx:  refill   RX Name and Strength:  spironolactone (ALDACTONE) 25 MG tablet    How is the patient currently taking it? 1xday   Is this a 30 day or 90 day RX:  30    Preferred Pharm[facility] with phone number:    [facility] [place] [facility] [address] [facility]  [address] [facility]  [place] [address]  Phone: [phone] Fax: [phone]    Local or Mail Order:  local   Ordering Provider:  Erich Mckay Call Back Number:     Additional Information: please call Annelise castro's wife to advise. Thanks!

## 2024-07-08 RX ORDER — SPIRONOLACTONE 25 MG/1
12.5 TABLET ORAL
OUTPATIENT
Start: 2024-07-08

## 2024-07-08 NOTE — TELEPHONE ENCOUNTER
----- Message from Merissa Shafer, Patient Care Assistant sent at 7/8/2024 11:42 AM CDT -----  Regarding: refill  Contact: pt  Type:  RX Refill Request    Who Called:  pt     Refill or New Rx:  refill    RX Name and Strength:  spironolactone (ALDACTONE) 25 MG tablet    How is the patient currently taking it? 1xday   Is this a 30 day or 90 day RX:  60     Preferred Pharmacy with phone number:   [facility] - [place] - [address]  [address] [place] [address]  Phone: [phone] Fax: [phone]    Local or Mail Order:  local   Ordering Provider:  Erich Mckay Call Back Number:  [phone]     Additional Information:  please call to advise. Thanks!

## 2024-07-09 RX ORDER — SPIRONOLACTONE 25 MG/1
12.5 TABLET ORAL DAILY
Qty: 45 TABLET | Refills: 3 | Status: SHIPPED | OUTPATIENT
Start: 2024-07-09 | End: 2025-07-09

## 2024-07-09 NOTE — TELEPHONE ENCOUNTER
No care due was identified.  [facility] Embedded Care Due Messages. Reference number: [id number].   7/09/2024 11:06:27 AM CDT

## 2024-07-09 NOTE — TELEPHONE ENCOUNTER
----- Message from Moncho Alexander sent at 7/9/2024 10:53 AM CDT -----  Regarding: refill  Contact: patient  Type:  RX Refill Request    Who Called: patient  Refill or New Rx:refill  RX Name and Strength:spironolactone (ALDACTONE) 25 MG tablet/ Check all active medication to be refilled  How is the patient currently taking it? (ex. 1XDay):  Is this a 30 day or 90 day RX:90  Preferred Pharmacy with phone number:  [facility] - [place] - [address] [facility]  [address] [facility]  [place] [address]  Phone: [phone] Fax: [phone]  Local or Mail Order:local  Ordering Provider:Erich  Would the patient rather a call back or a response via [facility]? refill  Best Call Back Number:[phone]  Additional Information:

## 2024-07-09 NOTE — TELEPHONE ENCOUNTER
Spoke to pt's wife who is requesting refills. States pt has been taking this medication and is currently almost out. Pt is taking 12.5mg a half tablet of 25mg of Spironolactone.

## 2024-07-10 DIAGNOSIS — E11.9 TYPE 2 DIABETES MELLITUS WITHOUT COMPLICATION, UNSPECIFIED WHETHER LONG TERM INSULIN USE: ICD-10-CM

## 2024-07-11 ENCOUNTER — TELEPHONE (OUTPATIENT)
Dept: FAMILY MEDICINE | Facility: CLINIC | Age: 64
End: 2024-07-11
Payer: MEDICARE

## 2024-07-11 NOTE — TELEPHONE ENCOUNTER
Returned call and spoke to patient wife regarding appointment. Appointment scheduled with Dr. Jung for 7/23/24

## 2024-07-11 NOTE — TELEPHONE ENCOUNTER
----- Message from Robbie Casillas sent at 7/11/2024  1:20 PM CDT -----  Contact: Self  Type:  Sooner Appointment Request    Caller is requesting a sooner appointment.  Caller declined first available appointment listed below.  Caller will not accept being placed on the waitlist and is requesting a message be sent to doctor.    Name of Caller:  Patient  When is the first available appointment?  Aug    Would the patient rather a call back or a response via [facility]? Call Back  Best Call Back Number:  [phone]  Additional Information:  Patient canceled his appt for July 17, and is wanting to be seen sooner than Aug

## 2024-07-12 NOTE — TELEPHONE ENCOUNTER
Care Due:                  Date            Visit Type   Department     Provider  --------------------------------------------------------------------------------                                NP -                              PRIMARY      [facility] FAM[facility]  Last Visit: 10-      CARE ([facility])   MEDICINE       Jessie Jung                              EP -                              PRIMARY      [facility]  Next Visit: 07-      CARE ([facility])   MEDICINE       Jessie Jung                                                            Last  Test          Frequency    Reason                     Performed    Due Date  --------------------------------------------------------------------------------    HBA1C.......  6 months...  JANUVIA, empagliflozin,    10-   04-                             insulin, metFORMIN.......    [employer] Embedded Care Due Messages. Reference number: [id number].   7/12/2024 3:02:43 PM CDT

## 2024-07-12 NOTE — TELEPHONE ENCOUNTER
Returned call to pharmacy to advise the coreg is to be taken once daily per the patients chart. Verbalized understanding.

## 2024-07-12 NOTE — TELEPHONE ENCOUNTER
----- Message from Antonettedane Chacko sent at 7/12/2024  2:59 PM CDT -----  Type:  Pharmacy Calling to Clarify an RX    Name of Caller:  Maria T  Pharmacy Name:    [facility] [place] - [address] [facility]  [address] [place] [address]  Phone: [phone] Fax: [phone]  Prescription Name:  carvediloL (COREG) 25 MG tablet  What do they need to clarify?:  directions  Best Call Back Number:  [phone]  Additional Information:  Maria T is calling in regards to needing to make sure the pt's Rx was supposed to be changed from twice a day to once a day. Please call back and advise. Thanks!

## 2024-07-13 NOTE — TELEPHONE ENCOUNTER
Refill Routing Note   Medication(s) are not appropriate for processing by [facility] for the following reason(s):        New or recently adjusted medication  Required vitals abnormal    [facility] action(s):  Defer        Medication Therapy Plan: Pharmacy comment: patient has been on twice daily. Was it changed on purpose to once daily?      Appointments  past 12m or future 3m with PCP    Date Provider   Last Visit   10/30/2023 Jessie Jung MD   Next Visit   7/23/2024 Jessie Jung MD   ED visits in past 90 days: 0        Note composed:7:03 AM 07/13/2024

## 2024-07-15 RX ORDER — CARVEDILOL 25 MG/1
25 TABLET ORAL DAILY
Qty: 90 TABLET | Refills: 3 | Status: SHIPPED | OUTPATIENT
Start: 2024-07-15 | End: 2025-07-15

## 2024-07-22 ENCOUNTER — TELEPHONE (OUTPATIENT)
Dept: CARDIOLOGY | Facility: CLINIC | Age: 64
End: 2024-07-22
Payer: MEDICARE

## 2024-07-22 NOTE — TELEPHONE ENCOUNTER
----- Message from Agnes Ramirez sent at 7/22/2024 11:32 AM CDT -----  Name of Who is Calling:SEBAS BLANCO [[id number]]        What is the request in detail:Pt would like a callback from the office in regards to getting confirmation records were received from (cardio/pacemaker) Dr Barney and Dr Mcdowell office in [place]. ALSO to [facility] 3 mon f/u appt.Please advise thank you       Can the clinic reply by [facility]:NO        What Number to Call Back [facility] not [facility]Telephone Information:  Mobile          [phone]

## 2024-07-23 ENCOUNTER — TELEPHONE (OUTPATIENT)
Dept: FAMILY MEDICINE | Facility: CLINIC | Age: 64
End: 2024-07-23

## 2024-07-23 ENCOUNTER — PATIENT MESSAGE (OUTPATIENT)
Dept: FAMILY MEDICINE | Facility: CLINIC | Age: 64
End: 2024-07-23

## 2024-07-23 ENCOUNTER — OFFICE VISIT (OUTPATIENT)
Dept: FAMILY MEDICINE | Facility: CLINIC | Age: 64
End: 2024-07-23
Payer: MEDICARE

## 2024-07-23 VITALS
HEIGHT: 66 IN | OXYGEN SATURATION: 96 % | BODY MASS INDEX: 36.28 KG/M2 | DIASTOLIC BLOOD PRESSURE: 60 MMHG | SYSTOLIC BLOOD PRESSURE: 132 MMHG | HEART RATE: 68 BPM | WEIGHT: 225.75 LBS | TEMPERATURE: 98 F

## 2024-07-23 DIAGNOSIS — Z89.519: ICD-10-CM

## 2024-07-23 DIAGNOSIS — F32.A DEPRESSION, UNSPECIFIED DEPRESSION TYPE: Primary | ICD-10-CM

## 2024-07-23 PROCEDURE — 3078F DIAST BP <80 MM HG: CPT | Mod: CPTII,S$GLB,, | Performed by: STUDENT IN AN ORGANIZED HEALTH CARE EDUCATION/TRAINING PROGRAM

## 2024-07-23 PROCEDURE — 1101F PT FALLS ASSESS-DOCD LE1/YR: CPT | Mod: CPTII,S$GLB,, | Performed by: STUDENT IN AN ORGANIZED HEALTH CARE EDUCATION/TRAINING PROGRAM

## 2024-07-23 PROCEDURE — 1126F AMNT PAIN NOTED NONE PRSNT: CPT | Mod: CPTII,S$GLB,, | Performed by: STUDENT IN AN ORGANIZED HEALTH CARE EDUCATION/TRAINING PROGRAM

## 2024-07-23 PROCEDURE — 4010F ACE/ARB THERAPY RXD/TAKEN: CPT | Mod: CPTII,S$GLB,, | Performed by: STUDENT IN AN ORGANIZED HEALTH CARE EDUCATION/TRAINING PROGRAM

## 2024-07-23 PROCEDURE — 99214 OFFICE O/P EST MOD 30 MIN: CPT | Mod: S$GLB,,, | Performed by: STUDENT IN AN ORGANIZED HEALTH CARE EDUCATION/TRAINING PROGRAM

## 2024-07-23 PROCEDURE — 3008F BODY MASS INDEX DOCD: CPT | Mod: CPTII,S$GLB,, | Performed by: STUDENT IN AN ORGANIZED HEALTH CARE EDUCATION/TRAINING PROGRAM

## 2024-07-23 PROCEDURE — 3288F FALL RISK ASSESSMENT DOCD: CPT | Mod: CPTII,S$GLB,, | Performed by: STUDENT IN AN ORGANIZED HEALTH CARE EDUCATION/TRAINING PROGRAM

## 2024-07-23 PROCEDURE — 99999 PR PBB SHADOW E&M-EST. PATIENT-LVL V: CPT | Mod: PBBFAC,,, | Performed by: STUDENT IN AN ORGANIZED HEALTH CARE EDUCATION/TRAINING PROGRAM

## 2024-07-23 PROCEDURE — 1160F RVW MEDS BY RX/DR IN RCRD: CPT | Mod: CPTII,S$GLB,, | Performed by: STUDENT IN AN ORGANIZED HEALTH CARE EDUCATION/TRAINING PROGRAM

## 2024-07-23 PROCEDURE — 1159F MED LIST DOCD IN RCRD: CPT | Mod: CPTII,S$GLB,, | Performed by: STUDENT IN AN ORGANIZED HEALTH CARE EDUCATION/TRAINING PROGRAM

## 2024-07-23 PROCEDURE — 3075F SYST BP GE 130 - 139MM HG: CPT | Mod: CPTII,S$GLB,, | Performed by: STUDENT IN AN ORGANIZED HEALTH CARE EDUCATION/TRAINING PROGRAM

## 2024-07-23 RX ORDER — ESCITALOPRAM OXALATE 10 MG/1
10 TABLET ORAL DAILY
Qty: 90 TABLET | Refills: 3 | Status: SHIPPED | OUTPATIENT
Start: 2024-07-23 | End: 2025-07-23

## 2024-07-23 NOTE — TELEPHONE ENCOUNTER
Returned call to pharmacy and advised that patient's lexapro prescription has been changed to 10 mg. Verbalized understanding.

## 2024-07-23 NOTE — PROGRESS NOTES
[facility] Note    Subjective:  Chief Complaint:   Chief Complaint   Patient presents with    Diabetes    Follow-up       History of Present Illness:  Dhaval is here for depression follow up   [place]     Positive depression screening last visit  missed 6 wk f/u   Started on lexapro 5 - tolerating well   Reports his dog , had received comfort from him    Denies SI/HI/AVH    DM  HLD  Empagliflozin 25  Lantus 16U qhs   Januvia 100  Metformin 1000 bid  Atorvastatin 80  Ezetimibe 10   Asa 81  Fasting glucose: no checks      Hypothyroid  Levothyroxine 75     GERD  Famotidine 20 bid      Cardiac arrhythmias s/p pacemaker   HTN  Amlodipine 5  Losartan 100  Spironolactone 25   Amiodarone 200   Carvedilol 25  Follows with Cardiology and EP  [facility]        Allergies:  Review of patient's allergies indicates:   Allergen Reactions    Fish containing products      Other reaction(s): .       Home Medications:  Current Outpatient Medications on File Prior to Visit   Medication Sig    amiodarone (PACERONE) 200 MG Tab Take 1 tablet (200 mg total) by mouth once daily.    amLODIPine (NORVASC) 5 MG tablet Take 1 tablet (5 mg total) by mouth once daily.    aspirin (ECOTRIN) 81 MG EC tablet Take 81 mg by mouth.    atorvastatin (LIPITOR) 80 MG tablet Take 40 mg by mouth.    carvediloL (COREG) 25 MG tablet Take 1 tablet (25 mg total) by mouth once daily.    cholecalciferol, vitamin D3, 1,250 mcg (50,000 unit) capsule Take 50,000 Int'l Units by mouth.    ciclopirox (PENLAC) 8 % Soln Apply topically nightly.    empagliflozin (JARDIANCE) 25 mg tablet Take 1 tablet (25 mg total) by mouth once daily.    ezetimibe (ZETIA) 10 mg tablet Take 10 mg by mouth.    famotidine (PEPCID) 20 MG tablet Take 20 mg by mouth 2 (two) times daily.    insulin glargine U-100, Lantus, (LANTUS SOLOSTAR U-100 INSULIN) 100 unit/mL (3 mL) InPn pen Inject 16 Units into the skin every evening.     JANUVIA 100 mg Tab Take 1 tablet (100 mg total) by mouth once daily.    levothyroxine (SYNTHROID) 75 MCG tablet Take 1 tablet (75 mcg total) by mouth every morning.    losartan (COZAAR) 100 MG tablet Take 100 mg by mouth.    metFORMIN (GLUCOPHAGE) 1000 MG tablet Take 1 tablet (1,000 mg total) by mouth 2 (two) times daily with meals.    multivitamin (THERAGRAN) per tablet Take 1 tablet by mouth.    omega 3-dha-epa-fish oil (FISH OIL) 1,200 (144-216) mg Cap   0 Refill(s)    spironolactone (ALDACTONE) 25 MG tablet Take 0.5 tablets (12.5 mg total) by mouth once daily.    [DISCONTINUED] EScitalopram oxalate (LEXAPRO) 5 MG Tab Take 1 tablet (5 mg total) by mouth once daily.     No current facility-administered medications on file prior to visit.       Past Medical History:   Diagnosis Date    Diabetes mellitus, type 2     GERD (gastroesophageal reflux disease)     Hyperlipidemia     Hypertension      Past Surgical History:   Procedure Laterality Date    FRACTURE SURGERY      INSERTION OF PACEMAKER      LEG AMPUTATION       No family history on file.  Social History     Tobacco Use    Smoking status: Some Days     Types: Cigarettes    Smokeless tobacco: Never            The patient's past medical history, surgical history, social history, family history, allergies and medications have been reviewed.    Review of Systems     10 point review of systems was conducted and only the pertinent positives and pertinent negatives are noted above in the HPI section.    Physical Examination  General appearance: alert, cooperative, no distress, ambulates in wheelchair   HEENT: normocephalic, atraumatic, PERRLA   Neck: trachea midline,   no neck stiffness  Lungs: clear to auscultation, no wheezes, rales or rhonchi, symmetric air entry  Heart: normal rate, regular rhythm, normal S1, S2, no murmurs, rubs, clicks or gallops  Skin: No rashes or abnormal skin lesions, no apparent jaundice or bruising  Extremities: Full ROM of all  "extremities, left leg ambulation above the knee   Neurological:alert, oriented, normal speech, no new focal findings or movement disorder noted from baseline      BP Readings from Last 3 Encounters:   07/23/24 132/60   04/12/24 (!) 142/71   10/30/23 110/60     Wt Readings from Last 3 Encounters:   07/23/24 102.4 kg (225 lb 12 oz)   04/17/24 89.8 kg (197 lb 15.6 oz)   04/12/24 89.8 kg (198 lb)     /60 (BP Location: Right arm, Patient Position: Sitting, BP Method: Medium (Manual))   Pulse 68   Temp 98.2 °F (36.8 °C) (Oral)   Ht 5' 6" (1.676 m)   Wt 102.4 kg (225 lb 12 oz)   SpO2 96%   BMI 36.44 kg/m²    274}  Laboratory: I have reviewed old labs below:    274}    Lab Results   Component Value Date    WBC 6.26 10/30/2023    HGB 13.2 (L) 10/30/2023    HCT 42.3 10/30/2023    MCV 83 10/30/2023     10/30/2023     10/30/2023    K 4.7 10/30/2023     10/30/2023    CALCIUM 9.8 10/30/2023    PHOS 2.9 11/24/2017    CO2 18 (L) 10/30/2023    GLU 98 10/30/2023    BUN 20 10/30/2023    CREATININE 1.1 10/30/2023    EGFRNORACEVR >60.0 10/30/2023    ANIONGAP 11 10/30/2023    PROT 8.3 10/30/2023    ALBUMIN 4.1 10/30/2023    BILITOT 0.7 10/30/2023    ALKPHOS 97 10/30/2023    ALT 37 10/30/2023    AST 34 10/30/2023    INR 1.04 11/24/2017    CHOL 132 10/30/2023    TRIG 84 10/30/2023    HDL 48 10/30/2023    LDLCALC 67.2 10/30/2023    TSH 2.178 10/30/2023    HGBA1C 6.5 (H) 10/30/2023      Lab reviewed by me: Particular labs of significance that I will monitor, workup, or treat to improve are mentioned below in diagnostic impression remarks.    Imaging/EKG: I have reviewed the pertinent results and my findings are noted in remarks.  274}    CC:   Chief Complaint   Patient presents with    Diabetes    Follow-up        274}    Assessment/Plan  Dhaval Hernández is a 63 y.o. male who presents to clinic with:  1. Depression, unspecified depression type    2. Status post unilateral below-knee amputation       " 274}  Diagnostic Impression Remarks       63 y.o. male who presents to clinic today for follow up     This is the extent of this pleasant patient's concerns at this present time.  I also discussed the importance of close follow up to discuss labs, change or modify his medications if needed, monitor side effects, and further evaluation of medical problems.     Additional workup planned: see labs ordered below.    See below for labs and meds ordered with associated diagnosis      1. Depression, unspecified depression type  - EScitalopram oxalate (LEXAPRO) 10 MG tablet; Take 1 tablet (10 mg total) by mouth once daily.  Dispense: 90 tablet; Refill: 3  Uncontrolled. Increase lexapro 5 to 10.     2. Status post unilateral below-knee amputation  - Ambulatory referral/consult to Physical Medicine Rehab; Future  Pain at end of amputation where sits on chair and cramping, not relieved by muscle relaxer   Recommended physical cushioning         RTC 3 mo for annual or prn     Jessie Jung MD, [facility]   Family Medicine Physician  07/23/2024    [email or web address]

## 2024-07-23 NOTE — TELEPHONE ENCOUNTER
----- Message from Ronit Hendricks sent at 7/23/2024 10:20 AM CDT -----  Contact: self  Type: Pharmacy Calling to Clarify an RX        Name of Caller: [facility]   Pharmacy Name: [facility]   Prescription Name: EScitalopram oxalate (LEXAPRO) 10 MG tablet   What do they need to clarify?: Pharmacy just wants to confirm if the pt is on 5mg or 10mg. Thanks   Best Call Back Number: [phone]  Additional Information: Plz call to confirm Dosage

## 2024-07-23 NOTE — PATIENT INSTRUCTIONS
Allan Puentes,     If you are due for any health screening(s) below please notify me so we can arrange them to be ordered and scheduled. Most healthy patients at your age complete them, but you are free to accept or refuse.     If you can't do it, I'll definitely understand. If you can, I'd certainly appreciate it!    Tests to Keep You Healthy    Eye Exam: ORDERED BUT NOT SCHEDULED  Colon Cancer Screening: Met on 11/7/2023  Last Blood Pressure <= 139/89 (4/12/2024): NO  Last HbA1c < 8 (10/30/2023): Yes      Your diabetic retinal eye exam is due     Diabetes is the #1 cause of blindness in the [place] - early detection before signs or symptoms develop can prevent debilitating blindness.     Our records indicate that you may be overdue for your annual diabetic eye exam. Eye screening can help identify patients at risk for developing vision loss which is common in diabetes. This simple screening is an important step to keeping you healthy and preventing complications from diabetes.     This recommended diabetic eye exam should take place once per year and can prevent and treat diabetes complications in the eye before developing symptoms. This can be done with a special camera is used to take photographs of the back of your eye without having to dilate them, or you can see an eye doctor for a full dilated exam.     If you recently had your yearly diabetic eye exam performed outside of [facility], please let your Health care team know so that they can update your health record.        Were here to help you quit smoking     Our records indicated that you are still smoking. One of the best things you can do for your health is to stop smoking and we are here to help.     Talk with your provider about our Smoking Cessation Program and how we can support you on your journey.

## 2024-09-03 ENCOUNTER — PATIENT MESSAGE (OUTPATIENT)
Dept: ADMINISTRATIVE | Facility: HOSPITAL | Age: 64
End: 2024-09-03
Payer: MEDICARE

## 2024-09-25 DIAGNOSIS — E11.22 TYPE 2 DIABETES MELLITUS WITH STAGE 2 CHRONIC KIDNEY DISEASE, WITHOUT LONG-TERM CURRENT USE OF INSULIN: ICD-10-CM

## 2024-09-25 DIAGNOSIS — N18.2 TYPE 2 DIABETES MELLITUS WITH STAGE 2 CHRONIC KIDNEY DISEASE, WITHOUT LONG-TERM CURRENT USE OF INSULIN: ICD-10-CM

## 2024-09-25 NOTE — TELEPHONE ENCOUNTER
Refill Routing Note   Medication(s) are not appropriate for processing by [facility] for the following reason(s):        Required labs outdated    [facility] action(s):  Defer     Requires labs : Yes             Appointments  past 12m or future 3m with PCP    Date Provider   Last Visit   7/23/2024 Jessie Jung MD   Next Visit   10/23/2024 Jessie Jung MD   ED visits in past 90 days: 0        Note composed:6:40 PM 09/25/2024

## 2024-09-25 NOTE — TELEPHONE ENCOUNTER
Care Due:                  Date            Visit Type   Department     Provider  --------------------------------------------------------------------------------                                EP -                              PRIMARY      [facility]  Last Visit: 07-      CARE ([facility])   MEDICINE       Jessie Jung                              EP -                              PRIMARY      [facility]  Next Visit: 10-      CARE ([facility])   MEDICINE       Jessie Jung                                                            Last  Test          Frequency    Reason                     Performed    Due Date  --------------------------------------------------------------------------------    CMP.........  12 months..  JANUVIA, empagliflozin,    10-   10-                             insulin, metFORMIN,                             spironolactone...........    HBA1C.......  6 months...  JANUVIA, empagliflozin,    10-   04-                             insulin, metFORMIN.......    TSH.........  12 months..  levothyroxine............  10-   10-    [facility] Embedded Care Due Messages. Reference number: [id number].   9/25/2024 12:12:05 PM CDT

## 2024-09-30 NOTE — TELEPHONE ENCOUNTER
No care due was identified.  [facility] Embedded Care Due Messages. Reference number: [id number].   9/30/2024 1:36:35 PM CDT

## 2024-09-30 NOTE — TELEPHONE ENCOUNTER
Refill Routing Note   Medication(s) are not appropriate for processing by [facility] for the following reason(s):        No active prescription written by provider    [facility] action(s):  Defer             Appointments  past 12m or future 3m with PCP    Date Provider   Last Visit   7/23/2024 Jessie Jung MD   Next Visit   10/23/2024 Jessie Jung MD   ED visits in past 90 days: 0        Note composed:3:44 PM 09/30/2024

## 2024-10-01 ENCOUNTER — OFFICE VISIT (OUTPATIENT)
Dept: CARDIOLOGY | Facility: CLINIC | Age: 64
End: 2024-10-01
Payer: MEDICARE

## 2024-10-01 ENCOUNTER — TELEPHONE (OUTPATIENT)
Dept: CARDIOLOGY | Facility: CLINIC | Age: 64
End: 2024-10-01
Payer: MEDICARE

## 2024-10-01 VITALS
HEIGHT: 66 IN | OXYGEN SATURATION: 96 % | BODY MASS INDEX: 36.44 KG/M2 | SYSTOLIC BLOOD PRESSURE: 132 MMHG | HEART RATE: 60 BPM | DIASTOLIC BLOOD PRESSURE: 72 MMHG

## 2024-10-01 DIAGNOSIS — Z95.810 ICD (IMPLANTABLE CARDIOVERTER-DEFIBRILLATOR) IN PLACE: Primary | ICD-10-CM

## 2024-10-01 DIAGNOSIS — Z79.4 DIABETES MELLITUS DUE TO UNDERLYING CONDITION WITH STAGE 1 CHRONIC KIDNEY DISEASE, WITH LONG-TERM CURRENT USE OF INSULIN: ICD-10-CM

## 2024-10-01 DIAGNOSIS — Z95.0 CARDIAC PACEMAKER: Chronic | ICD-10-CM

## 2024-10-01 DIAGNOSIS — Z71.2 ENCOUNTER TO DISCUSS TEST RESULTS: ICD-10-CM

## 2024-10-01 DIAGNOSIS — E08.22 DIABETES MELLITUS DUE TO UNDERLYING CONDITION WITH STAGE 1 CHRONIC KIDNEY DISEASE, WITH LONG-TERM CURRENT USE OF INSULIN: ICD-10-CM

## 2024-10-01 DIAGNOSIS — N18.2 TYPE 2 DIABETES MELLITUS WITH STAGE 2 CHRONIC KIDNEY DISEASE, WITHOUT LONG-TERM CURRENT USE OF INSULIN: ICD-10-CM

## 2024-10-01 DIAGNOSIS — I70.0 ATHEROSCLEROSIS OF AORTA: Chronic | ICD-10-CM

## 2024-10-01 DIAGNOSIS — E11.22 TYPE 2 DIABETES MELLITUS WITH STAGE 2 CHRONIC KIDNEY DISEASE, WITHOUT LONG-TERM CURRENT USE OF INSULIN: ICD-10-CM

## 2024-10-01 DIAGNOSIS — Z86.73 HISTORY OF STROKE: ICD-10-CM

## 2024-10-01 DIAGNOSIS — N18.1 DIABETES MELLITUS DUE TO UNDERLYING CONDITION WITH STAGE 1 CHRONIC KIDNEY DISEASE, WITH LONG-TERM CURRENT USE OF INSULIN: ICD-10-CM

## 2024-10-01 DIAGNOSIS — E78.2 MIXED HYPERLIPIDEMIA: ICD-10-CM

## 2024-10-01 DIAGNOSIS — E66.01 SEVERE OBESITY (BMI 35.0-39.9) WITH COMORBIDITY: ICD-10-CM

## 2024-10-01 DIAGNOSIS — I10 PRIMARY HYPERTENSION: ICD-10-CM

## 2024-10-01 DIAGNOSIS — Z89.519: ICD-10-CM

## 2024-10-01 PROCEDURE — 99999 PR PBB SHADOW E&M-EST. PATIENT-LVL IV: CPT | Mod: PBBFAC,,, | Performed by: NURSE PRACTITIONER

## 2024-10-01 RX ORDER — CARVEDILOL 12.5 MG/1
12.5 TABLET ORAL 2 TIMES DAILY
Qty: 180 TABLET | Refills: 3 | Status: SHIPPED | OUTPATIENT
Start: 2024-10-01 | End: 2025-10-01
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
## 2024-10-01 NOTE — ASSESSMENT & PLAN NOTE
Reviewed echocardiogram and ICD report results with the patient and his wife    We need to get his prior cardiology records from [place]
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
## 2024-10-01 NOTE — ASSESSMENT & PLAN NOTE
Latest Reference Range & Units Most Recent   Hemoglobin A1C External 4.0 - 5.6 % 6.5 (H)  10/30/23 12:05   (H): Data is abnormally high

## 2024-10-01 NOTE — ASSESSMENT & PLAN NOTE
Stroke 2016  Blood pressure and cholesterol are well controlled  Continue aspirin, Zetia, and statin

## 2024-10-01 NOTE — ASSESSMENT & PLAN NOTE
Device check on 5/7/24 as below:      Medtronic in clinic ICD check.    Normal device function.    Normal lead function.    No new events.    No ICD shocks.    Return 1 year.    See full report in media.    Battery voltage: 2.98 V Estimated longevity: 4.6 years. Cap Reform Date: 4/21/2024 Charge Time (sec): 4.0 HV / SVC Impedance: 68 Ohms Leads RV Lead: P/R-wave: 11.9 mV Impedance: 361 Ohms Paced: 0% Thresholds RV Lead: 1.25 V @ 0.4 ms

## 2024-10-01 NOTE — ASSESSMENT & PLAN NOTE
Continue current regimen of Zetia and Lipitor and fish oil   Latest Reference Range & Units Most Recent   Cholesterol Total 120 - 199 mg/dL 132  10/30/23 12:05   HDL 40 - 75 mg/dL 48  10/30/23 12:05             Total Cholesterol/HDL Ratio 2.0 - 5.0  2.8  10/30/23 12:05   Triglycerides 30 - 150 mg/dL 84  10/30/23 12:05   LDL Cholesterol 63.0 - 159.0 mg/dL 67.2  10/30/23 12:05   Follow up Dr. Jung, lab work scheduled this month

## 2024-10-01 NOTE — ASSESSMENT & PLAN NOTE
/72 in the office today.  Continue losartan 100 mg daily, Coreg 12.5 mg b.i.d., amlodipine 5 mg daily, and spironolactone 12.5 mg daily      Obtain medical records from cardiologist in [place]

## 2024-10-01 NOTE — ASSESSMENT & PLAN NOTE
We discussed Endocrinology referral  He is currently seeing Dr. Jung primary care for his diabetes management    Overdue for A1c  A1c 10/2023 6.5  States he is scheduled to get blood work this month  Wife is concerned about high metformin dosage stating he has had more diarrhea with this dose   Will discuss with Dr Booth.

## 2024-10-02 RX ORDER — ATORVASTATIN CALCIUM 80 MG/1
80 TABLET, FILM COATED ORAL NIGHTLY
Qty: 90 TABLET | Refills: 3 | Status: SHIPPED | OUTPATIENT
Start: 2024-10-02 | End: 2025-10-02

## 2024-10-02 RX ORDER — LOSARTAN POTASSIUM 100 MG/1
100 TABLET ORAL DAILY
Qty: 90 TABLET | Refills: 3 | Status: SHIPPED | OUTPATIENT
Start: 2024-10-02 | End: 2025-10-02

## 2024-10-22 ENCOUNTER — HOSPITAL ENCOUNTER (INPATIENT)
Facility: HOSPITAL | Age: 64
LOS: 9 days | Discharge: NURSING, REHAB OR LONG-TERM CARE | DRG: 853 | End: 2024-10-31
Attending: EMERGENCY MEDICINE | Admitting: INTERNAL MEDICINE
Payer: MEDICARE

## 2024-10-22 DIAGNOSIS — N17.9 ACUTE RENAL FAILURE, UNSPECIFIED ACUTE RENAL FAILURE TYPE: ICD-10-CM

## 2024-10-22 DIAGNOSIS — K80.10 CALCULUS OF GALLBLADDER WITH CHOLECYSTITIS WITHOUT BILIARY OBSTRUCTION, UNSPECIFIED CHOLECYSTITIS ACUITY: ICD-10-CM

## 2024-10-22 DIAGNOSIS — B95.8 BACTEREMIA DUE TO STAPHYLOCOCCUS: ICD-10-CM

## 2024-10-22 DIAGNOSIS — E86.0 DEHYDRATION: ICD-10-CM

## 2024-10-22 DIAGNOSIS — L02.414 ABSCESS OF LEFT ELBOW: ICD-10-CM

## 2024-10-22 DIAGNOSIS — A41.9 SEPSIS, DUE TO UNSPECIFIED ORGANISM, UNSPECIFIED WHETHER ACUTE ORGAN DYSFUNCTION PRESENT: ICD-10-CM

## 2024-10-22 DIAGNOSIS — Z95.810 ICD (IMPLANTABLE CARDIOVERTER-DEFIBRILLATOR) IN PLACE: ICD-10-CM

## 2024-10-22 DIAGNOSIS — Z86.73 HISTORY OF STROKE: ICD-10-CM

## 2024-10-22 DIAGNOSIS — I95.9 HYPOTENSION: Primary | ICD-10-CM

## 2024-10-22 DIAGNOSIS — R78.81 BACTEREMIA DUE TO STAPHYLOCOCCUS: ICD-10-CM

## 2024-10-22 DIAGNOSIS — R65.21 SEPTIC SHOCK: ICD-10-CM

## 2024-10-22 DIAGNOSIS — R07.9 CHEST PAIN: ICD-10-CM

## 2024-10-22 DIAGNOSIS — Z95.0 CARDIAC PACEMAKER: ICD-10-CM

## 2024-10-22 DIAGNOSIS — A41.9 SEPTIC SHOCK: ICD-10-CM

## 2024-10-22 DIAGNOSIS — D72.829 LEUKOCYTOSIS, UNSPECIFIED TYPE: ICD-10-CM

## 2024-10-22 DIAGNOSIS — M70.22 OLECRANON BURSITIS OF LEFT ELBOW: ICD-10-CM

## 2024-10-22 PROBLEM — K81.0 CHOLECYSTITIS, ACUTE: Status: ACTIVE | Noted: 2024-10-22

## 2024-10-22 PROBLEM — J18.9 ACUTE PNEUMONIA: Status: ACTIVE | Noted: 2024-10-22

## 2024-10-22 PROBLEM — E87.5 HYPERKALEMIA: Status: ACTIVE | Noted: 2024-10-22

## 2024-10-22 PROBLEM — E87.29 HIGH ANION GAP METABOLIC ACIDOSIS: Status: ACTIVE | Noted: 2024-10-22

## 2024-10-22 PROBLEM — F17.200 TOBACCO DEPENDENCY: Status: ACTIVE | Noted: 2024-10-22

## 2024-10-22 PROBLEM — I48.20 ATRIAL FIBRILLATION, CHRONIC: Status: ACTIVE | Noted: 2024-10-22

## 2024-10-22 LAB
ALBUMIN SERPL BCP-MCNC: 3.1 G/DL (ref 3.5–5.2)
ALLENS TEST: ABNORMAL
ALP SERPL-CCNC: 68 U/L (ref 55–135)
ALT SERPL W/O P-5'-P-CCNC: 69 U/L (ref 10–44)
ANION GAP SERPL CALC-SCNC: 15 MMOL/L (ref 8–16)
ANION GAP SERPL CALC-SCNC: 18 MMOL/L (ref 8–16)
ANISOCYTOSIS BLD QL SMEAR: SLIGHT
ANISOCYTOSIS BLD QL SMEAR: SLIGHT
AST SERPL-CCNC: 58 U/L (ref 10–40)
BACTERIA #/AREA URNS HPF: ABNORMAL /HPF
BASOPHILS # BLD AUTO: 0.04 K/UL (ref 0–0.2)
BASOPHILS # BLD AUTO: 0.04 K/UL (ref 0–0.2)
BASOPHILS NFR BLD: 0.2 % (ref 0–1.9)
BASOPHILS NFR BLD: 0.2 % (ref 0–1.9)
BILIRUB SERPL-MCNC: 0.6 MG/DL (ref 0.1–1)
BILIRUB UR QL STRIP: NEGATIVE
BNP SERPL-MCNC: 170 PG/ML (ref 0–99)
BUN SERPL-MCNC: 64 MG/DL (ref 8–23)
BUN SERPL-MCNC: 71 MG/DL (ref 8–23)
BURR CELLS BLD QL SMEAR: ABNORMAL
BURR CELLS BLD QL SMEAR: ABNORMAL
CALCIUM SERPL-MCNC: 7.9 MG/DL (ref 8.7–10.5)
CALCIUM SERPL-MCNC: 8 MG/DL (ref 8.7–10.5)
CHLORIDE SERPL-SCNC: 100 MMOL/L (ref 95–110)
CHLORIDE SERPL-SCNC: 100 MMOL/L (ref 95–110)
CLARITY UR: CLEAR
CO2 SERPL-SCNC: 12 MMOL/L (ref 23–29)
CO2 SERPL-SCNC: 13 MMOL/L (ref 23–29)
COLOR UR: YELLOW
CREAT SERPL-MCNC: 4.5 MG/DL (ref 0.5–1.4)
CREAT SERPL-MCNC: 5.1 MG/DL (ref 0.5–1.4)
CREAT SERPL-MCNC: 5.2 MG/DL (ref 0.5–1.4)
DELSYS: ABNORMAL
DIFFERENTIAL METHOD BLD: ABNORMAL
DIFFERENTIAL METHOD BLD: ABNORMAL
EOSINOPHIL # BLD AUTO: 0 K/UL (ref 0–0.5)
EOSINOPHIL # BLD AUTO: 0.1 K/UL (ref 0–0.5)
EOSINOPHIL NFR BLD: 0.2 % (ref 0–8)
EOSINOPHIL NFR BLD: 0.4 % (ref 0–8)
ERYTHROCYTE [DISTWIDTH] IN BLOOD BY AUTOMATED COUNT: 18.6 % (ref 11.5–14.5)
ERYTHROCYTE [DISTWIDTH] IN BLOOD BY AUTOMATED COUNT: 18.6 % (ref 11.5–14.5)
EST. GFR  (NO RACE VARIABLE): 11.7 ML/MIN/1.73 M^2
EST. GFR  (NO RACE VARIABLE): 13.7 ML/MIN/1.73 M^2
GLUCOSE SERPL-MCNC: 171 MG/DL (ref 70–110)
GLUCOSE SERPL-MCNC: 196 MG/DL (ref 70–110)
GLUCOSE SERPL-MCNC: 208 MG/DL (ref 70–110)
GLUCOSE UR QL STRIP: ABNORMAL
HCO3 UR-SCNC: 12 MMOL/L (ref 24–28)
HCT VFR BLD AUTO: 32.9 % (ref 40–54)
HCT VFR BLD AUTO: 33 % (ref 40–54)
HCT VFR BLD CALC: 32 %PCV (ref 36–54)
HGB BLD-MCNC: 10.2 G/DL (ref 14–18)
HGB BLD-MCNC: 9.9 G/DL (ref 14–18)
HGB UR QL STRIP: ABNORMAL
HYALINE CASTS #/AREA URNS LPF: 49 /LPF
HYPOCHROMIA BLD QL SMEAR: ABNORMAL
HYPOCHROMIA BLD QL SMEAR: ABNORMAL
IMM GRANULOCYTES # BLD AUTO: 0.43 K/UL (ref 0–0.04)
IMM GRANULOCYTES # BLD AUTO: 0.57 K/UL (ref 0–0.04)
IMM GRANULOCYTES NFR BLD AUTO: 2.1 % (ref 0–0.5)
IMM GRANULOCYTES NFR BLD AUTO: 3.1 % (ref 0–0.5)
KETONES UR QL STRIP: NEGATIVE
LACTATE SERPL-SCNC: 1.9 MMOL/L (ref 0.5–1.9)
LACTATE SERPL-SCNC: 2.5 MMOL/L (ref 0.5–1.9)
LDH SERPL L TO P-CCNC: 3.4 MMOL/L (ref 0.5–2.2)
LEUKOCYTE ESTERASE UR QL STRIP: NEGATIVE
LIPASE SERPL-CCNC: 9 U/L (ref 4–60)
LYMPHOCYTES # BLD AUTO: 0.9 K/UL (ref 1–4.8)
LYMPHOCYTES # BLD AUTO: 1.3 K/UL (ref 1–4.8)
LYMPHOCYTES NFR BLD: 4.8 % (ref 18–48)
LYMPHOCYTES NFR BLD: 6.2 % (ref 18–48)
MAGNESIUM SERPL-MCNC: 1.6 MG/DL (ref 1.6–2.6)
MCH RBC QN AUTO: 26 PG (ref 27–31)
MCH RBC QN AUTO: 27.1 PG (ref 27–31)
MCHC RBC AUTO-ENTMCNC: 30.1 G/DL (ref 32–36)
MCHC RBC AUTO-ENTMCNC: 30.9 G/DL (ref 32–36)
MCV RBC AUTO: 86 FL (ref 82–98)
MCV RBC AUTO: 88 FL (ref 82–98)
MICROSCOPIC COMMENT: ABNORMAL
MODE: ABNORMAL
MONOCYTES # BLD AUTO: 1.7 K/UL (ref 0.3–1)
MONOCYTES # BLD AUTO: 2.6 K/UL (ref 0.3–1)
MONOCYTES NFR BLD: 12.4 % (ref 4–15)
MONOCYTES NFR BLD: 9 % (ref 4–15)
NEUTROPHILS # BLD AUTO: 15.4 K/UL (ref 1.8–7.7)
NEUTROPHILS # BLD AUTO: 16.2 K/UL (ref 1.8–7.7)
NEUTROPHILS NFR BLD: 78.7 % (ref 38–73)
NEUTROPHILS NFR BLD: 82.7 % (ref 38–73)
NITRITE UR QL STRIP: NEGATIVE
NON-SQ EPI CELLS #/AREA URNS HPF: 1 /HPF
NRBC BLD-RTO: 0 /100 WBC
NRBC BLD-RTO: 0 /100 WBC
OVALOCYTES BLD QL SMEAR: ABNORMAL
OVALOCYTES BLD QL SMEAR: ABNORMAL
PCO2 BLDA: 26.5 MMHG (ref 35–45)
PH SMN: 7.26 [PH] (ref 7.35–7.45)
PH UR STRIP: 6 [PH] (ref 5–8)
PLATELET # BLD AUTO: 193 K/UL (ref 150–450)
PLATELET # BLD AUTO: 211 K/UL (ref 150–450)
PLATELET BLD QL SMEAR: ABNORMAL
PLATELET BLD QL SMEAR: ABNORMAL
PMV BLD AUTO: 10.5 FL (ref 9.2–12.9)
PMV BLD AUTO: 10.9 FL (ref 9.2–12.9)
PO2 BLDA: 63 MMHG (ref 80–100)
POC BE: -15 MMOL/L
POC IONIZED CALCIUM: 1.14 MMOL/L (ref 1.06–1.42)
POC SATURATED O2: 89 % (ref 95–100)
POC TCO2: 13 MMOL/L (ref 23–27)
POCT GLUCOSE: 204 MG/DL (ref 70–110)
POCT GLUCOSE: 240 MG/DL (ref 70–110)
POTASSIUM BLD-SCNC: 5.7 MMOL/L (ref 3.5–5.1)
POTASSIUM SERPL-SCNC: 5.2 MMOL/L (ref 3.5–5.1)
POTASSIUM SERPL-SCNC: 5.7 MMOL/L (ref 3.5–5.1)
POTASSIUM SERPL-SCNC: 5.7 MMOL/L (ref 3.5–5.1)
PROCALCITONIN SERPL IA-MCNC: 9.38 NG/ML (ref 0–0.5)
PROT SERPL-MCNC: 5.9 G/DL (ref 6–8.4)
PROT UR QL STRIP: ABNORMAL
RBC # BLD AUTO: 3.76 M/UL (ref 4.6–6.2)
RBC # BLD AUTO: 3.81 M/UL (ref 4.6–6.2)
RBC #/AREA URNS HPF: 10 /HPF (ref 0–4)
SAMPLE: ABNORMAL
SITE: ABNORMAL
SODIUM BLD-SCNC: 128 MMOL/L (ref 136–145)
SODIUM SERPL-SCNC: 128 MMOL/L (ref 136–145)
SODIUM SERPL-SCNC: 130 MMOL/L (ref 136–145)
SP GR UR STRIP: 1.01 (ref 1–1.03)
SQUAMOUS #/AREA URNS HPF: 0 /HPF
TROPONIN I SERPL HS-MCNC: 13.6 PG/ML (ref 0–14.9)
URN SPEC COLLECT METH UR: ABNORMAL
UROBILINOGEN UR STRIP-ACNC: NEGATIVE EU/DL
WBC # BLD AUTO: 18.57 K/UL (ref 3.9–12.7)
WBC # BLD AUTO: 20.63 K/UL (ref 3.9–12.7)
WBC #/AREA URNS HPF: 19 /HPF (ref 0–5)
YEAST URNS QL MICRO: ABNORMAL

## 2024-10-22 PROCEDURE — 93005 ELECTROCARDIOGRAM TRACING: CPT | Performed by: INTERNAL MEDICINE

## 2024-10-22 PROCEDURE — 63600175 PHARM REV CODE 636 W HCPCS: Performed by: INTERNAL MEDICINE

## 2024-10-22 PROCEDURE — 84484 ASSAY OF TROPONIN QUANT: CPT | Performed by: EMERGENCY MEDICINE

## 2024-10-22 PROCEDURE — 25000003 PHARM REV CODE 250: Performed by: INTERNAL MEDICINE

## 2024-10-22 PROCEDURE — 94799 UNLISTED PULMONARY SVC/PX: CPT

## 2024-10-22 PROCEDURE — 83690 ASSAY OF LIPASE: CPT | Performed by: EMERGENCY MEDICINE

## 2024-10-22 PROCEDURE — 87077 CULTURE AEROBIC IDENTIFY: CPT | Performed by: EMERGENCY MEDICINE

## 2024-10-22 PROCEDURE — 83605 ASSAY OF LACTIC ACID: CPT | Mod: 91 | Performed by: INTERNAL MEDICINE

## 2024-10-22 PROCEDURE — 99406 BEHAV CHNG SMOKING 3-10 MIN: CPT

## 2024-10-22 PROCEDURE — 25000003 PHARM REV CODE 250: Performed by: EMERGENCY MEDICINE

## 2024-10-22 PROCEDURE — 84132 ASSAY OF SERUM POTASSIUM: CPT | Performed by: INTERNAL MEDICINE

## 2024-10-22 PROCEDURE — 82803 BLOOD GASES ANY COMBINATION: CPT

## 2024-10-22 PROCEDURE — 80048 BASIC METABOLIC PNL TOTAL CA: CPT | Performed by: EMERGENCY MEDICINE

## 2024-10-22 PROCEDURE — 36415 COLL VENOUS BLD VENIPUNCTURE: CPT | Performed by: EMERGENCY MEDICINE

## 2024-10-22 PROCEDURE — 96376 TX/PRO/DX INJ SAME DRUG ADON: CPT

## 2024-10-22 PROCEDURE — 96365 THER/PROPH/DIAG IV INF INIT: CPT

## 2024-10-22 PROCEDURE — 84145 PROCALCITONIN (PCT): CPT | Performed by: EMERGENCY MEDICINE

## 2024-10-22 PROCEDURE — 82962 GLUCOSE BLOOD TEST: CPT

## 2024-10-22 PROCEDURE — 51702 INSERT TEMP BLADDER CATH: CPT

## 2024-10-22 PROCEDURE — 83735 ASSAY OF MAGNESIUM: CPT | Performed by: EMERGENCY MEDICINE

## 2024-10-22 PROCEDURE — 36600 WITHDRAWAL OF ARTERIAL BLOOD: CPT

## 2024-10-22 PROCEDURE — 96374 THER/PROPH/DIAG INJ IV PUSH: CPT | Mod: 59

## 2024-10-22 PROCEDURE — 87186 SC STD MICRODIL/AGAR DIL: CPT | Performed by: EMERGENCY MEDICINE

## 2024-10-22 PROCEDURE — 81001 URINALYSIS AUTO W/SCOPE: CPT | Performed by: EMERGENCY MEDICINE

## 2024-10-22 PROCEDURE — 87154 CUL TYP ID BLD PTHGN 6+ TRGT: CPT | Performed by: EMERGENCY MEDICINE

## 2024-10-22 PROCEDURE — 99285 EMERGENCY DEPT VISIT HI MDM: CPT | Mod: 25

## 2024-10-22 PROCEDURE — 87040 BLOOD CULTURE FOR BACTERIA: CPT | Mod: 59 | Performed by: EMERGENCY MEDICINE

## 2024-10-22 PROCEDURE — 96361 HYDRATE IV INFUSION ADD-ON: CPT

## 2024-10-22 PROCEDURE — 63600175 PHARM REV CODE 636 W HCPCS: Performed by: EMERGENCY MEDICINE

## 2024-10-22 PROCEDURE — 85025 COMPLETE CBC W/AUTO DIFF WBC: CPT | Mod: 91 | Performed by: EMERGENCY MEDICINE

## 2024-10-22 PROCEDURE — 80053 COMPREHEN METABOLIC PANEL: CPT | Performed by: EMERGENCY MEDICINE

## 2024-10-22 PROCEDURE — 93010 ELECTROCARDIOGRAM REPORT: CPT | Mod: ,,, | Performed by: INTERNAL MEDICINE

## 2024-10-22 PROCEDURE — 99900035 HC TECH TIME PER 15 MIN (STAT)

## 2024-10-22 PROCEDURE — 83880 ASSAY OF NATRIURETIC PEPTIDE: CPT | Performed by: EMERGENCY MEDICINE

## 2024-10-22 PROCEDURE — 81003 URINALYSIS AUTO W/O SCOPE: CPT | Performed by: EMERGENCY MEDICINE

## 2024-10-22 PROCEDURE — 96367 TX/PROPH/DG ADDL SEQ IV INF: CPT

## 2024-10-22 PROCEDURE — 87086 URINE CULTURE/COLONY COUNT: CPT | Performed by: EMERGENCY MEDICINE

## 2024-10-22 PROCEDURE — 82565 ASSAY OF CREATININE: CPT

## 2024-10-22 PROCEDURE — 83605 ASSAY OF LACTIC ACID: CPT | Performed by: EMERGENCY MEDICINE

## 2024-10-22 PROCEDURE — 25000003 PHARM REV CODE 250: Mod: JZ,JG | Performed by: INTERNAL MEDICINE

## 2024-10-22 PROCEDURE — 99900031 HC PATIENT EDUCATION (STAT)

## 2024-10-22 PROCEDURE — 96375 TX/PRO/DX INJ NEW DRUG ADDON: CPT

## 2024-10-22 PROCEDURE — 94761 N-INVAS EAR/PLS OXIMETRY MLT: CPT | Mod: XB

## 2024-10-22 PROCEDURE — 96366 THER/PROPH/DIAG IV INF ADDON: CPT

## 2024-10-22 PROCEDURE — 20000000 HC ICU ROOM

## 2024-10-22 RX ORDER — PANTOPRAZOLE SODIUM 40 MG/10ML
40 INJECTION, POWDER, LYOPHILIZED, FOR SOLUTION INTRAVENOUS DAILY
Status: DISCONTINUED | OUTPATIENT
Start: 2024-10-23 | End: 2024-10-31 | Stop reason: HOSPADM

## 2024-10-22 RX ORDER — SODIUM BICARBONATE 1 MEQ/ML
50 SYRINGE (ML) INTRAVENOUS
Status: COMPLETED | OUTPATIENT
Start: 2024-10-22 | End: 2024-10-22

## 2024-10-22 RX ORDER — INSULIN ASPART 100 [IU]/ML
0-10 INJECTION, SOLUTION INTRAVENOUS; SUBCUTANEOUS
Status: DISCONTINUED | OUTPATIENT
Start: 2024-10-22 | End: 2024-10-31 | Stop reason: HOSPADM

## 2024-10-22 RX ORDER — VANCOMYCIN HCL IN 5 % DEXTROSE 1G/250ML
1000 PLASTIC BAG, INJECTION (ML) INTRAVENOUS ONCE
Status: COMPLETED | OUTPATIENT
Start: 2024-10-22 | End: 2024-10-22

## 2024-10-22 RX ORDER — ONDANSETRON HYDROCHLORIDE 2 MG/ML
4 INJECTION, SOLUTION INTRAVENOUS EVERY 6 HOURS PRN
Status: DISCONTINUED | OUTPATIENT
Start: 2024-10-22 | End: 2024-10-31 | Stop reason: HOSPADM

## 2024-10-22 RX ORDER — FUROSEMIDE 10 MG/ML
60 INJECTION INTRAMUSCULAR; INTRAVENOUS ONCE
Status: COMPLETED | OUTPATIENT
Start: 2024-10-22 | End: 2024-10-22

## 2024-10-22 RX ORDER — SODIUM CHLORIDE 9 MG/ML
INJECTION, SOLUTION INTRAVENOUS CONTINUOUS
Status: DISCONTINUED | OUTPATIENT
Start: 2024-10-22 | End: 2024-10-22

## 2024-10-22 RX ORDER — MEROPENEM 1 G/1
1 INJECTION, POWDER, FOR SOLUTION INTRAVENOUS
Status: DISCONTINUED | OUTPATIENT
Start: 2024-10-22 | End: 2024-10-22

## 2024-10-22 RX ORDER — ACETAMINOPHEN 325 MG/1
650 TABLET ORAL EVERY 4 HOURS PRN
Status: DISCONTINUED | OUTPATIENT
Start: 2024-10-22 | End: 2024-10-31 | Stop reason: HOSPADM

## 2024-10-22 RX ORDER — GLUCAGON 1 MG
1 KIT INJECTION
Status: DISCONTINUED | OUTPATIENT
Start: 2024-10-22 | End: 2024-10-31 | Stop reason: HOSPADM

## 2024-10-22 RX ORDER — TALC
6 POWDER (GRAM) TOPICAL NIGHTLY PRN
Status: DISCONTINUED | OUTPATIENT
Start: 2024-10-22 | End: 2024-10-31 | Stop reason: HOSPADM

## 2024-10-22 RX ORDER — ACETAMINOPHEN 325 MG/1
650 TABLET ORAL EVERY 8 HOURS PRN
Status: DISCONTINUED | OUTPATIENT
Start: 2024-10-22 | End: 2024-10-31 | Stop reason: HOSPADM

## 2024-10-22 RX ORDER — NALOXONE HCL 0.4 MG/ML
0.02 VIAL (ML) INJECTION
Status: DISCONTINUED | OUTPATIENT
Start: 2024-10-22 | End: 2024-10-31 | Stop reason: HOSPADM

## 2024-10-22 RX ORDER — IBUPROFEN 200 MG
16 TABLET ORAL
Status: DISCONTINUED | OUTPATIENT
Start: 2024-10-22 | End: 2024-10-31 | Stop reason: HOSPADM

## 2024-10-22 RX ORDER — ALUMINUM HYDROXIDE, MAGNESIUM HYDROXIDE, AND SIMETHICONE 1200; 120; 1200 MG/30ML; MG/30ML; MG/30ML
30 SUSPENSION ORAL 4 TIMES DAILY PRN
Status: DISCONTINUED | OUTPATIENT
Start: 2024-10-22 | End: 2024-10-31 | Stop reason: HOSPADM

## 2024-10-22 RX ORDER — SODIUM CHLORIDE 9 MG/ML
1000 INJECTION, SOLUTION INTRAVENOUS
Status: COMPLETED | OUTPATIENT
Start: 2024-10-22 | End: 2024-10-22

## 2024-10-22 RX ORDER — IBUPROFEN 200 MG
24 TABLET ORAL
Status: DISCONTINUED | OUTPATIENT
Start: 2024-10-22 | End: 2024-10-31 | Stop reason: HOSPADM

## 2024-10-22 RX ORDER — VANCOMYCIN HCL IN 5 % DEXTROSE 1G/250ML
1000 PLASTIC BAG, INJECTION (ML) INTRAVENOUS ONCE
Status: DISCONTINUED | OUTPATIENT
Start: 2024-10-22 | End: 2024-10-23

## 2024-10-22 RX ORDER — SODIUM CHLORIDE, SODIUM LACTATE, POTASSIUM CHLORIDE, CALCIUM CHLORIDE 600; 310; 30; 20 MG/100ML; MG/100ML; MG/100ML; MG/100ML
INJECTION, SOLUTION INTRAVENOUS CONTINUOUS
Status: DISCONTINUED | OUTPATIENT
Start: 2024-10-22 | End: 2024-10-22

## 2024-10-22 RX ORDER — ENOXAPARIN SODIUM 100 MG/ML
30 INJECTION SUBCUTANEOUS EVERY 24 HOURS
Status: DISCONTINUED | OUTPATIENT
Start: 2024-10-22 | End: 2024-10-25

## 2024-10-22 RX ORDER — MUPIROCIN 20 MG/G
OINTMENT TOPICAL 2 TIMES DAILY
Status: COMPLETED | OUTPATIENT
Start: 2024-10-22 | End: 2024-10-27

## 2024-10-22 RX ORDER — ACETAMINOPHEN 500 MG
5000 TABLET ORAL DAILY
COMMUNITY

## 2024-10-22 RX ORDER — NOREPINEPHRINE BITARTRATE/D5W 4MG/250ML
0-3 PLASTIC BAG, INJECTION (ML) INTRAVENOUS CONTINUOUS
Status: DISCONTINUED | OUTPATIENT
Start: 2024-10-22 | End: 2024-10-24

## 2024-10-22 RX ORDER — MEROPENEM 500 MG/1
500 INJECTION, POWDER, FOR SOLUTION INTRAVENOUS
Status: DISCONTINUED | OUTPATIENT
Start: 2024-10-22 | End: 2024-10-24

## 2024-10-22 RX ORDER — FUROSEMIDE 10 MG/ML
80 INJECTION INTRAMUSCULAR; INTRAVENOUS ONCE
Status: DISCONTINUED | OUTPATIENT
Start: 2024-10-22 | End: 2024-10-22

## 2024-10-22 RX ORDER — CALCIUM GLUCONATE 20 MG/ML
1 INJECTION, SOLUTION INTRAVENOUS ONCE
Status: COMPLETED | OUTPATIENT
Start: 2024-10-22 | End: 2024-10-22

## 2024-10-22 RX ADMIN — MEROPENEM 500 MG: 500 INJECTION, POWDER, FOR SOLUTION INTRAVENOUS at 03:10

## 2024-10-22 RX ADMIN — NOREPINEPHRINE BITARTRATE 0.1 MCG/KG/MIN: 4 INJECTION, SOLUTION INTRAVENOUS at 01:10

## 2024-10-22 RX ADMIN — SODIUM CHLORIDE, POTASSIUM CHLORIDE, SODIUM LACTATE AND CALCIUM CHLORIDE 1000 ML: 600; 310; 30; 20 INJECTION, SOLUTION INTRAVENOUS at 11:10

## 2024-10-22 RX ADMIN — SODIUM BICARBONATE 50 MEQ: 84 INJECTION INTRAVENOUS at 03:10

## 2024-10-22 RX ADMIN — CALCIUM GLUCONATE 1 G: 20 INJECTION, SOLUTION INTRAVENOUS at 06:10

## 2024-10-22 RX ADMIN — SODIUM BICARBONATE: 84 INJECTION, SOLUTION INTRAVENOUS at 06:10

## 2024-10-22 RX ADMIN — NOREPINEPHRINE BITARTRATE 0.22 MCG/KG/MIN: 4 INJECTION, SOLUTION INTRAVENOUS at 05:10

## 2024-10-22 RX ADMIN — MUPIROCIN 1 G: 20 OINTMENT TOPICAL at 09:10

## 2024-10-22 RX ADMIN — INSULIN HUMAN 5 UNITS: 100 INJECTION, SOLUTION PARENTERAL at 06:10

## 2024-10-22 RX ADMIN — SODIUM CHLORIDE 1000 ML: 9 INJECTION, SOLUTION INTRAVENOUS at 02:10

## 2024-10-22 RX ADMIN — FUROSEMIDE 60 MG: 10 INJECTION, SOLUTION INTRAMUSCULAR; INTRAVENOUS at 06:10

## 2024-10-22 RX ADMIN — ENOXAPARIN SODIUM 30 MG: 30 INJECTION SUBCUTANEOUS at 06:10

## 2024-10-22 RX ADMIN — DEXTROSE MONOHYDRATE 25 G: 25 INJECTION, SOLUTION INTRAVENOUS at 06:10

## 2024-10-22 RX ADMIN — SODIUM CHLORIDE 1000 ML: 9 INJECTION, SOLUTION INTRAVENOUS at 04:10

## 2024-10-22 RX ADMIN — DEXTROSE MONOHYDRATE 1000 MG: 50 INJECTION, SOLUTION INTRAVENOUS at 04:10

## 2024-10-22 NOTE — HPI
64 year pt getting admitted with septic shock and hyperkalemia  Pt has been suffering from diarrhea/N&V for past 4 days  Stools very watery and vomitus was clear  Later he started having crampy abdominal pain/radiation to back. No aggravating/relieving factors   He acme to ER and was found to be in chock and got admitted

## 2024-10-22 NOTE — ASSESSMENT & PLAN NOTE
Ca gluconate/50% dextrose 25 g/iv insulin now  Serum K every 6 hrs     Recent Labs     10/22/24  1144 10/22/24  1548   K 5.7* 5.7*

## 2024-10-22 NOTE — CONSULTS
Nephrology Consult Note        Patient Name: Dhaval Hernández  MRN: [id number]    Patient Class: IP- Inpatient   Admission Date: 10/22/2024  Length of Stay: 0 days  Date of Service: 10/22/2024    Attending Physician: Caesar Gordon MD  Primary Care Provider: Jessie Jung MD    Reason for Consult: siria    SUBJECTIVE:     HPI: 64M with DM, HTN, GERD, left BKA, pacemaker, HFpEF presents to the emergency department with generalized weakness, persistent loose watery diarrheal stools over last 4 days with associated abdominal cramping. Denied vomiting, hematochezia, melena, fever.     Upon arrival to the emergency department patient found with blood pressure 70/30. Heart rate was in the 110s. Received IVF promptly, giron was placed, labs obtained - notable for SIRIA, hyponatremia, hyperkalemia, acidosis, bump in LFTs, anemia with elevated WBC. ProCal 9, , A1c 6.5. POC ABG with pH 7.26, pCO2 26, iCa 1.14, K 5.7. CT scan without renal obstruction.    Review of Systems:  Constitutional:  Negative for chills, fever, malaise/fatigue and weight loss.   HENT:  Negative for hearing loss and nosebleeds.    Eyes:  Negative for blurred vision, double vision and photophobia.   Respiratory:  Negative for cough, shortness of breath and wheezing.    Cardiovascular:  Negative for chest pain, palpitations and leg swelling.   Gastrointestinal:  Negative constipation, heartburn and vomiting, POSITIVE for abdominal pain, diarrhea, nausea.   Genitourinary:  Negative for dysuria, frequency and urgency.   Musculoskeletal:  Negative for falls, joint pain and myalgias.   Skin:  Negative for itching and rash.   Neurological:  Negative for dizziness, speech change, focal weakness, loss of consciousness and headaches.   Endo/Heme/Allergies:  Does not bruise/bleed easily.   Psychiatric/Behavioral:  Negative for depression and substance abuse. The patient is not nervous/anxious.      ASSESSMENT/PLAN:     SIRIA  Metabolic  "acidosis  Hyperkalemia  Hyponatremia  Hypovolemia  Sepsis  Anemia  HTN  DM  CKD stage 2, eGFR > 60 ml/min in 10/2023  No NSAIDs, ACEI/ARB, IV contrast or other nephrotoxins.  Keep MAP > 60, SBP > 100.  Dose meds for GFR < 30 ml/min.  Renal diet - low K, low phos.  Bicarb drip, no Lokelma, repeat labs -- consider diuretic, [id number] as needed to control hyperkalemia.  Control BG, hold Jardiance and ARB.  Broad abx, pressors as needed.  Hgb and HCT are acceptable. Monitor for now.  Tolerate asymptomatic HTN up to -160.HOLD home meds.    Thank you for allowing us to participate in the care of your patient!   We will follow the patient and provide recommendations as needed.         Laboratory:  Recent Labs   Lab 10/22/24  1144   *   K 5.7*      CO2 12*   BUN 71*   CREATININE 5.1*   *       Recent Labs   Lab 10/22/24  1144   CALCIUM 8.0*   ALBUMIN 3.1*   MG 1.6             Recent Labs   Lab 10/22/24  1117   POCTGLUCOSE 204*       Recent Labs   Lab 10/30/23  1205   Hemoglobin A1C 6.5 H       Recent Labs   Lab 10/22/24  1144 10/22/24  1423 10/22/24  1548   WBC 20.63*  --  18.57*   HGB 10.2*  --  9.9*   HCT 33.0* 32* 32.9*     --  211   MCV 88  --  86   MCHC 30.9*  --  30.1*   MONO 12.4  2.6*  --   --    EOSINOPHIL 0.4  --   --        Recent Labs   Lab 10/22/24  1144   BILITOT 0.6   PROT 5.9*   ALBUMIN 3.1*   ALKPHOS 68   ALT 69*   AST 58*             Invalid input(s): "LEUCOCYTESUR"    Recent Labs   Lab 10/22/24  1139 10/22/24  1345 10/22/24  1423   POC PH  --   --  7.263 LL   POC PCO2  --   --  26.5 LL   POC HCO3  --   --  12.0 L   POC PO2  --   --  63 L   POC SATURATED O2  --   --  89   POC BE  --   --  -15 L   Sample VENOUS VENOUS ARTERIAL       Microbiology Results (last 7 days)       Procedure Component Value Units Date/Time    Blood culture x two cultures. Draw prior to antibiotics. [[id number]] Collected: 10/22/24 1136    Order Status: Sent Specimen: Blood from Peripheral, Hand, " Left Updated: 10/22/24 1202    Narrative:      Collection has been rescheduled by ZCORBIN at 10/22/2024 11:44 Reason:   Done  Collection has been rescheduled by [email or web address] at 10/22/2024 11:44 Reason:   Done    Blood culture x two cultures. Draw prior to antibiotics. [[id number]] Collected: 10/22/24 1143    Order Status: Sent Specimen: Blood from Peripheral, Hand, Right Updated: 10/22/24 1202    Narrative:      Collection has been rescheduled by ZCORBIN at 10/22/2024 11:44 Reason:   Done  Collection has been rescheduled by MAI at 10/22/2024 11:44 Reason:   Done    Stool culture **cannot be ordered stat** [[id number]]     Order Status: No result Specimen: Stool             Review of patient's allergies indicates:   Allergen Reactions    Fish containing products      Other reaction(s): .       Outpatient meds:  No current facility-administered medications on file prior to encounter.     Current Outpatient Medications on File Prior to Encounter   Medication Sig Dispense Refill    amiodarone (PACERONE) 200 MG Tab Take 1 tablet (200 mg total) by mouth once daily. 30 tablet 11    amLODIPine (NORVASC) 5 MG tablet Take 1 tablet (5 mg total) by mouth once daily. 90 tablet 1    aspirin (ECOTRIN) 81 MG EC tablet Take 81 mg by mouth once daily.      carvediloL (COREG) 12.5 MG tablet Take 1 tablet (12.5 mg total) by mouth 2 (two) times daily. (Patient taking differently: Take 25 mg by mouth once daily.) 180 tablet 3    cholecalciferol, vitamin D3, (VITAMIN D3) 125 mcg (5,000 unit) Tab Take 5,000 Units by mouth once daily.      empagliflozin (JARDIANCE) 25 mg tablet Take 1 tablet (25 mg total) by mouth once daily. 90 tablet 0    ezetimibe (ZETIA) 10 mg tablet Take 10 mg by mouth.      famotidine (PEPCID) 20 MG tablet Take 20 mg by mouth 2 (two) times daily.      insulin glargine U-100, Lantus, (LANTUS SOLOSTAR U-100 INSULIN) 100 unit/mL (3 mL) InPn pen Inject 16 Units into the skin every evening. 14.4 mL 1    JANUVIA 100 mg Tab Take 1 tablet  (100 mg total) by mouth once daily. 90 tablet 1    levothyroxine (SYNTHROID) 75 MCG tablet Take 1 tablet (75 mcg total) by mouth every morning. 90 tablet 1    losartan (COZAAR) 100 MG tablet Take 1 tablet (100 mg total) by mouth once daily. 90 tablet 3    metFORMIN (GLUCOPHAGE) 1000 MG tablet Take 1 tablet (1,000 mg total) by mouth 2 (two) times daily with meals. 180 tablet 1    omega 3-dha-epa-fish oil (FISH OIL) 1,200 (144-216) mg Cap Take 1 capsule by mouth once daily.      spironolactone (ALDACTONE) 25 MG tablet Take 0.5 tablets (12.5 mg total) by mouth once daily. 45 tablet 3    atorvastatin (LIPITOR) 80 MG tablet Take 1 tablet (80 mg total) by mouth every evening. 90 tablet 3    cholecalciferol, vitamin D3, 1,250 mcg (50,000 unit) capsule Take 50,000 Int'l Units by mouth.      ciclopirox (PENLAC) 8 % Soln Apply topically nightly. 6.6 mL 5    EScitalopram oxalate (LEXAPRO) 10 MG tablet Take 1 tablet (10 mg total) by mouth once daily. 90 tablet 3    multivitamin (THERAGRAN) per tablet Take 1 tablet by mouth.         Scheduled meds:   meropenem IV (PEDS and ADULTS)  500 mg Intravenous Q12H    vancomycin (VANCOCIN) IV (PEDS and ADULTS)  1,000 mg Intravenous Once    Followed by    vancomycin (VANCOCIN) IV (PEDS and ADULTS)  1,000 mg Intravenous Once       Infusions:   NORepinephrine bitartrate-D5W  0-3 mcg/kg/min Intravenous Continuous 84.2 mL/hr at 10/22/24 1700 0.22 mcg/kg/min at 10/22/24 1700       PRN meds:    Current Facility-Administered Medications:     Pharmacy to dose Vancomycin consult, , , Once **AND** vancomycin - pharmacy to dose, , Intravenous, pharmacy to manage frequency    Past Medical History:   Diagnosis Date    Diabetes mellitus, type 2     GERD (gastroesophageal reflux disease)     Hyperlipidemia     Hypertension      Past Surgical History:   Procedure Laterality Date    FRACTURE SURGERY      INSERTION OF PACEMAKER      LEG AMPUTATION       No family history on file.  Social History     Tobacco  Use    Smoking status: Some Days     Types: Cigarettes    Smokeless tobacco: Never       OBJECTIVE:     Vital Signs and IO:  Temp:  [97.6 °F (36.4 °C)]   Pulse:  [55-65]   Resp:  [14-22]   BP: ()/(39-79)   SpO2:  [92 %-97 %]   No intake/output data recorded.  Wt Readings from Last 5 Encounters:   10/22/24 102.1 kg (225 lb)   07/23/24 102.4 kg (225 lb 12 oz)   04/17/24 89.8 kg (197 lb 15.6 oz)   04/12/24 89.8 kg (198 lb)   12/13/23 89.8 kg (198 lb)     Body mass index is 36.32 kg/m².    Physical Exam  Constitutional:       General: Patient is not in acute distress.     Appearance: Patient is well-developed. She is not diaphoretic.   HENT:      Head: Normocephalic and atraumatic.      Mouth/Throat: Mucous membranes are moist.   Eyes:      General: No scleral icterus.     Pupils: Pupils are equal, round, and reactive to light.   Cardiovascular:      Rate and Rhythm: Normal rate and regular rhythm.   Pulmonary:      Effort: Pulmonary effort is normal. No respiratory distress.      Breath sounds: No stridor.   Abdominal:      General: There is no distension.      Palpations: Abdomen is soft.   Musculoskeletal:         General: No deformity. Normal range of motion.      Cervical back: Neck supple.   Skin:     General: Skin is warm and dry.      Findings: No rash present. No erythema.   Neurological:      Mental Status: Patient is alert and oriented to person, place, and time.      Cranial Nerves: No cranial nerve deficit.   Psychiatric:         Behavior: Behavior normal.          Patient care time was spent personally by me on the following activities:     Obtaining a history.  Examination of patient.  Providing medical care at the patients bedside.  Developing a treatment plan with patient or surrogate and bedside caregivers.  Ordering and reviewing laboratory studies, radiographic studies, pulse oximetry.  Ordering and performing treatments and interventions.  Evaluation of patient's response to  treatment.  Discussions with consultants while on the unit and immediately available to the patient.  Re-evaluation of the patient's condition.  Documentation in the medical record.     Jabari Verma MD    [facility]  [address] [place] [id number]    [phone] - tel  [phone] - fax    10/22/2024

## 2024-10-22 NOTE — ASSESSMENT & PLAN NOTE
"Suspected pneumonia  Maintain present regime    Antibiotics (From admission, onward)      Start     Stop Route Frequency Ordered    10/22/24 2100  mupirocin 2 % ointment         10/27/24 2059 Nasl 2 times daily 10/22/24 1650    10/22/24 1615  vancomycin in dextrose 5 % 1 gram/250 mL IVPB 1,000 mg        Placed in "Followed by" Linked Group    -- IV Once 10/22/24 1332    10/22/24 1445  vancomycin in dextrose 5 % 1 gram/250 mL IVPB 1,000 mg        Placed in "Followed by" Linked Group    -- IV Once 10/22/24 1332    10/22/24 1430  meropenem injection 500 mg         -- IV Every 12 hours (non-standard times) 10/22/24 1326            Microbiology Results (last 7 days)       Procedure Component Value Units Date/Time    Blood culture x two cultures. Draw prior to antibiotics. [[id number]] Collected: 10/22/24 1136    Order Status: Sent Specimen: Blood from Peripheral, Hand, Left Updated: 10/22/24 1202    Narrative:      Collection has been rescheduled by [email or web address] at 10/22/2024 11:44 Reason:   Done  Collection has been rescheduled by [email or web address] at 10/22/2024 11:44 Reason:   Done    Blood culture x two cultures. Draw prior to antibiotics. [[id number]] Collected: 10/22/24 1143    Order Status: Sent Specimen: Blood from Peripheral, Hand, Right Updated: 10/22/24 1202    Narrative:      Collection has been rescheduled by [email or web address] at 10/22/2024 11:44 Reason:   Done  Collection has been rescheduled by [email or web address] at 10/22/2024 11:44 Reason:   Done    Stool culture **cannot be ordered stat** [[id number]]     Order Status: No result Specimen: Stool           "

## 2024-10-22 NOTE — PROGRESS NOTES
Pharmacist Renal Dose Adjustment Note    Dhaval Hernández is a 64 y.o. male being treated with the medication Meropenem    Patient Data:    Vital Signs (Most Recent):  Temp: 97.6 °F (36.4 °C) (10/22/24 1115)  Pulse: (!) 56 (10/22/24 1245)  Resp: 18 (10/22/24 1245)  BP: (!) 70/45 (10/22/24 1245)  SpO2: 95 % (10/22/24 1245) Vital Signs (72h Range):  Temp:  [97.6 °F (36.4 °C)]   Pulse:  [55-60]   Resp:  [14-18]   BP: (65-75)/(39-47)   SpO2:  [92 %-95 %]      Recent Labs   Lab 10/22/24  1144   CREATININE 5.1*     Serum creatinine: 5.1 mg/dL (H) 10/22/24 1144  Estimated creatinine clearance: 15.9 mL/min (A)    Meropenem 1g IV Q6H will be changed to Meropenem 500 mg IV Q12H for CrCl between 10 and 25 mL/min per pharmacy protocol.    Pharmacist's Name: Radha Gary  Pharmacist's Extension: [phone]

## 2024-10-22 NOTE — ED PROVIDER NOTES
Encounter Date: 10/22/2024       History     Chief Complaint   Patient presents with    Numbness     Pt is complaining of left sided numbness and tingling in arm started at 9 am.       64-year-old male with history of obesity, non-insulin-dependent diabetes, hypertension, hyperlipidemia, gastroesophageal reflux, left BKA, cardiac pacemaker, last echo 04/12/2024 with EF 65-70%.  Patient presents emergency department with complaint of generalized weakness, persistent loose watery diarrheal stools over last 4 days with associated abdominal cramping.  Patient denied vomiting although did admit being nauseated, no hematochezia, melena, no hematemesis.  Patient denied having fever.  Upon arrival to the emergency department patient found with blood pressure 70/30.  Heart rate was in the 110s.  Patient had grossly nonfocal neurologic examination.  On exam did have diffuse abdominal tenderness.  Admitted to having decreased p.o. intake as well over last 3-4 days during his recent illness.      Review of patient's allergies indicates:   Allergen Reactions    Fish containing products      Other reaction(s): .     Past Medical History:   Diagnosis Date    Diabetes mellitus, type 2     GERD (gastroesophageal reflux disease)     Hyperlipidemia     Hypertension      Past Surgical History:   Procedure Laterality Date    FRACTURE SURGERY      INSERTION OF PACEMAKER      LEG AMPUTATION       No family history on file.  Social History     Tobacco Use    Smoking status: Some Days     Types: Cigarettes    Smokeless tobacco: Never     Review of Systems   Constitutional:  Positive for fatigue. Negative for fever.   HENT:  Negative for sore throat.    Respiratory:  Negative for shortness of breath.    Cardiovascular:  Negative for chest pain.   Gastrointestinal:  Positive for abdominal pain, diarrhea and nausea. Negative for abdominal distention, blood in stool, constipation and vomiting.   Genitourinary:  Negative for dysuria.    Musculoskeletal:  Negative for back pain.   Skin:  Negative for rash.   Neurological:  Positive for weakness and light-headedness. Negative for tremors, speech difficulty and numbness.   Hematological:  Does not bruise/bleed easily.       Physical Exam     Initial Vitals [10/22/24 1115]   BP Pulse Resp Temp SpO2   (!) 75/39 60 18 97.6 °F (36.4 °C) (!) 92 %      MAP       --         Physical Exam    Nursing note and vitals reviewed.  Constitutional: He appears well-developed and well-nourished.   HENT:   Head: Normocephalic and atraumatic.   Nose: Nose normal. Mouth/Throat: Oropharynx is clear and moist.   Eyes: Conjunctivae and EOM are normal. Pupils are equal, round, and reactive to light. No scleral icterus.   Neck: Neck supple.   Normal range of motion.  Cardiovascular:  Normal rate, regular rhythm, normal heart sounds and intact distal pulses.     Exam reveals no gallop and no friction rub.       No murmur heard.  Pulmonary/Chest: No stridor. No respiratory distress.   Course bilateral breath sounds no adventitious sounds   Abdominal: Abdomen is soft. Bowel sounds are normal. He exhibits no mass. There is no abdominal tenderness.   Diffuse abdominal tenderness, no rebound, no guarding noted. There is no rebound and no guarding.   Musculoskeletal:         General: No edema. Normal range of motion.      Cervical back: Normal range of motion and neck supple.      Comments: Left BKA, moves all extremities well, cap refill less than 2 seconds.     Lymphadenopathy:     He has no cervical adenopathy.   Neurological: He is alert and oriented to person, place, and time. He has normal strength and normal reflexes. No cranial nerve deficit or sensory deficit. GCS score is 15. GCS eye subscore is 4. GCS verbal subscore is 5. GCS motor subscore is 6.   Skin: Skin is warm and dry. Capillary refill takes less than 2 seconds. No rash noted.   Psychiatric: He has a normal mood and affect. His behavior is normal. Judgment and  thought content normal.         ED Course   Procedures  Labs Reviewed   CBC W/ AUTO DIFFERENTIAL - Abnormal       Result Value    WBC 20.63 (*)     RBC 3.76 (*)     Hemoglobin 10.2 (*)     Hematocrit 33.0 (*)     MCV 88      MCH 27.1      MCHC 30.9 (*)     RDW 18.6 (*)     Platelets 193      MPV 10.5      Immature Granulocytes 2.1 (*)     Gran # (ANC) 16.2 (*)     Immature Grans (Abs) 0.43 (*)     Lymph # 1.3      Mono # 2.6 (*)     Eos # 0.1      Baso # 0.04      nRBC 0      Gran % 78.7 (*)     Lymph % 6.2 (*)     Mono % 12.4      Eosinophil % 0.4      Basophil % 0.2      Platelet Estimate Appears normal      Aniso Slight      Hypo Occasional      Ovalocytes Occasional      Everett Cells Moderate      Differential Method Automated     COMPREHENSIVE METABOLIC PANEL - Abnormal    Sodium 130 (*)     Potassium 5.7 (*)     Chloride 100      CO2 12 (*)     Glucose 171 (*)     BUN 71 (*)     Creatinine 5.1 (*)     Calcium 8.0 (*)     Total Protein 5.9 (*)     Albumin 3.1 (*)     Total Bilirubin 0.6      Alkaline Phosphatase 68      AST 58 (*)     ALT 69 (*)     eGFR 11.7 (*)     Anion Gap 18 (*)    B-TYPE NATRIURETIC PEPTIDE - Abnormal     (*)    POCT GLUCOSE - Abnormal    POCT Glucose 204 (*)    ISTAT LACTATE - Abnormal    POC Lactate 3.40 (*)     Sample VENOUS     ISTAT CREATININE - Abnormal    POC Creatinine 5.2 (*)     Sample VENOUS     ISTAT PROCEDURE - Abnormal    POC PH 7.263 (*)     POC PCO2 26.5 (*)     POC PO2 63 (*)     POC HCO3 12.0 (*)     POC BE -15 (*)     POC SATURATED O2 89      POC Glucose 196 (*)     POC Sodium 128 (*)     POC Potassium 5.7 (*)     POC TCO2 13 (*)     POC Ionized Calcium 1.14      POC Hematocrit 32 (*)     Sample ARTERIAL      Site RR      Allens Test Pass      DelSys Room Air      Mode SPONT     CULTURE, BLOOD    Narrative:     Collection has been rescheduled by MAI at 10/22/2024 11:44 Reason:   Done  Collection has been rescheduled by MAI at 10/22/2024 11:44 Reason:   Done    CULTURE, BLOOD    Narrative:     Collection has been rescheduled by ZCORBIN at 10/22/2024 11:44 Reason:   Done  Collection has been rescheduled by ZCORBIN at 10/22/2024 11:44 Reason:   Done   CULTURE, STOOL   MAGNESIUM    Magnesium 1.6     TROPONIN I HIGH SENSITIVITY    Troponin I High Sensitivity 13.6     LIPASE    Lipase 9     LACTIC ACID, PLASMA   URINALYSIS, REFLEX TO URINE CULTURE   PROCALCITONIN   STOOL EXAM-OVA,CYSTS,PARASITES   WBC, STOOL   OCCULT BLOOD X 1, STOOL   POCT LACTATE   POCT CREATININE        ECG Results              EKG 12-lead (In process)        Collection Time Result Time QRS Duration OHS QTC Calculation    10/22/24 11:45:15 10/22/24 11:54:08 90 428                     In process by Interface, Lab In [facility] (10/22/24 11:54:15)                   Narrative:    Test Reason : I95.9,    Vent. Rate : 056 BPM     Atrial Rate : 056 BPM     P-R Int : 306 ms          QRS Dur : 090 ms      QT Int : 444 ms       P-R-T Axes : 031 -27 034 degrees     QTc Int : 428 ms    Sinus bradycardia with 1st degree A-V block  Low voltage QRS  Inferior infarct (cited on or before 17-JAN-2023)  Possible Anterolateral infarct (cited on or before 12-APR-2024)  Abnormal ECG  When compared with ECG of 12-APR-2024 09:00,  No significant change was found    Referred By: [email or web address]   SELF           Confirmed By:                                   Imaging Results              X-Ray Chest AP Portable (Final result)  Result time 10/22/24 12:23:07      Final result by Ros Carpenter MD (10/22/24 12:23:07)                   Impression:      Hypoinflation with cardiomegaly and faint airspace disease in the lung bases suggestive of atelectasis versus infiltrate      Electronically signed by: Ros Carpenter  Date:    10/22/2024  Time:    12:23               Narrative:    EXAMINATION:  XR CHEST AP PORTABLE    CLINICAL HISTORY:  Sepsis;    FINDINGS:  Portable chest at 12:13 is compared to 06/01/2022 shows cardiomegaly.  There is a left  subclavian vein pacemaker with lead tip overlying the right atrium.    There is hypoinflation with faint airspace disease in the lung bases which may be secondary to atelectasis versus infiltrates.  The upper lobes are clear.  There are no pleural effusions.  Pulmonary vasculature is normal. No acute osseous abnormality.                                       Medications   vancomycin - pharmacy to dose (has no administration in time range)   NORepinephrine 4 mg in dextrose 5% 250 mL infusion (premix) (0.18 mcg/kg/min × 102.1 kg Intravenous Rate/Dose Change 10/22/24 1500)   meropenem injection 500 mg (has no administration in time range)   vancomycin in dextrose 5 % 1 gram/250 mL IVPB 1,000 mg (has no administration in time range)     Followed by   vancomycin in dextrose 5 % 1 gram/250 mL IVPB 1,000 mg (has no administration in time range)   lactated ringers bolus 1,000 mL (has no administration in time range)   lactated ringers infusion (has no administration in time range)   lactated ringers bolus 1,000 mL (0 mLs Intravenous Stopped 10/22/24 1238)   lactated ringers bolus 1,000 mL (0 mLs Intravenous Stopped 10/22/24 1238)   0.9%  NaCl infusion (1,000 mLs Intravenous New Bag 10/22/24 1431)     Medical Decision Making             ED Course as of 10/23/24 1529   Tue Oct 22, 2024   1554 Patient seen evaluated emergency department.  Patient here with hypotension, diarrhea over last 3-4 days.  Patient found with low blood pressure 70/30.  Workup in emergency department revealed white count 54150, acute kidney injury/failure with potassium 5.7 CO2 12 BUN 71 creatinine 5.1 with T bili 0.6 alk-phos 68 AST 58 ALT 69.  .  Patient CT abdomen noncontrast study showed findings consistent with mild gallbladder wall thickening with multiple gallstones.  No evidence of acute lisette cholecystitis identified however, no bowel obstruction noted.  No colitis noted.  Patient was covered with meropenem and 1 time vancomycin renal  dose in emergency department.  Patient was given IV fluid resuscitation received 3 L normal saline.  Also placed on 130 cc an hour normal saline afterwards.  Patient was begun on norepinephrine with improvement in blood pressure of 120 over 60's.  Patient did have overall improvement and hemodynamic status.  Have refill measured within 4 hours which showed cap refill less than 2-3 seconds.  Patient did have initial point of care lactic at 3.4 with repeat lactate pending.  Case was discussed with Nephrology.  Currently at this time Mercado catheter was placed.  Patient had a proximally 100 mL of dark urine return.  Also consulted General surgery.  At this time states that patient should receive HIDA scan in addition to ultrasound.  We will continue on antibiotics.  Patient remained hemodynamically adequate.  Currently being admitted to Hospital Medicine.  Repeat labs obtained including CBC and BNP.  Will follow up for electrolytes and potassium.  Did receive 1 amp sodium bicarb in emergency department. [RM]      ED Course User Index  [RM] Robbie Faith MD               Medical Decision Making:   Initial Assessment:   64-year-old male with history of obesity, non-insulin-dependent diabetes, hypertension, hyperlipidemia, gastroesophageal reflux, left BKA, cardiac pacemaker, last echo 04/12/2024 with EF 65-70%.  Patient presents emergency department with complaint of generalized weakness, persistent loose watery diarrheal stools over last 4 days with associated abdominal cramping.  Patient denied vomiting although did admit being nauseated, no hematochezia, melena, no hematemesis.  Patient denied having fever.  Upon arrival to the emergency department patient found with blood pressure 70/30.  Heart rate was in the 110s.  Patient had grossly nonfocal neurologic examination.  On exam did have diffuse abdominal tenderness.  Admitted to having decreased p.o. intake as well over last 3-4 days during his recent  "illness.    Differential Diagnosis:   Acute dehydration, acute kidney injury, volume depletion, sepsis, hypovolemia, cardiogenic shock, acute renal failure,   Clinical Tests:   Sepsis Perfusion Assessment: "I attest a sepsis perfusion exam was performed within 6 hours of sepsis, severe sepsis, or septic shock presentation, following fluid resuscitation."             Clinical Impression:  Final diagnoses:  [I95.9] Hypotension                 Robbie Faith MD  10/23/24 1529    "

## 2024-10-22 NOTE — SUBJECTIVE & OBJECTIVE
Past Medical History:   Diagnosis Date    Diabetes mellitus, type 2     GERD (gastroesophageal reflux disease)     Hyperlipidemia     Hypertension        Past Surgical History:   Procedure Laterality Date    FRACTURE SURGERY      INSERTION OF PACEMAKER      LEG AMPUTATION         Review of patient's allergies indicates:   Allergen Reactions    Fish containing products      Other reaction(s): .       No current facility-administered medications on file prior to encounter.     Current Outpatient Medications on File Prior to Encounter   Medication Sig    amiodarone (PACERONE) 200 MG Tab Take 1 tablet (200 mg total) by mouth once daily.    amLODIPine (NORVASC) 5 MG tablet Take 1 tablet (5 mg total) by mouth once daily.    aspirin (ECOTRIN) 81 MG EC tablet Take 81 mg by mouth once daily.    carvediloL (COREG) 12.5 MG tablet Take 1 tablet (12.5 mg total) by mouth 2 (two) times daily. (Patient taking differently: Take 25 mg by mouth once daily.)    cholecalciferol, vitamin D3, (VITAMIN D3) 125 mcg (5,000 unit) Tab Take 5,000 Units by mouth once daily.    empagliflozin (JARDIANCE) 25 mg tablet Take 1 tablet (25 mg total) by mouth once daily.    ezetimibe (ZETIA) 10 mg tablet Take 10 mg by mouth.    famotidine (PEPCID) 20 MG tablet Take 20 mg by mouth 2 (two) times daily.    insulin glargine U-100, Lantus, (LANTUS SOLOSTAR U-100 INSULIN) 100 unit/mL (3 mL) InPn pen Inject 16 Units into the skin every evening.    JANUVIA 100 mg Tab Take 1 tablet (100 mg total) by mouth once daily.    levothyroxine (SYNTHROID) 75 MCG tablet Take 1 tablet (75 mcg total) by mouth every morning.    losartan (COZAAR) 100 MG tablet Take 1 tablet (100 mg total) by mouth once daily.    metFORMIN (GLUCOPHAGE) 1000 MG tablet Take 1 tablet (1,000 mg total) by mouth 2 (two) times daily with meals.    omega 3-dha-epa-fish oil (FISH OIL) 1,200 (144-216) mg Cap Take 1 capsule by mouth once daily.    spironolactone (ALDACTONE) 25 MG tablet Take 0.5 tablets  (12.5 mg total) by mouth once daily.    atorvastatin (LIPITOR) 80 MG tablet Take 1 tablet (80 mg total) by mouth every evening.    cholecalciferol, vitamin D3, 1,250 mcg (50,000 unit) capsule Take 50,000 Int'l Units by mouth.    ciclopirox (PENLAC) 8 % Soln Apply topically nightly.    EScitalopram oxalate (LEXAPRO) 10 MG tablet Take 1 tablet (10 mg total) by mouth once daily.    multivitamin (THERAGRAN) per tablet Take 1 tablet by mouth.     Family History       Problem Relation (Age of Onset)    Arthritis Mother          Tobacco Use    Smoking status: Some Days     Types: Cigarettes    Smokeless tobacco: Never   Substance and Sexual Activity    Alcohol use: Not on file    Drug use: Not on file    Sexual activity: Not on file     Review of Systems   Constitutional:  Negative for activity change and appetite change.   HENT:  Negative for congestion and dental problem.    Eyes:  Negative for discharge and itching.   Respiratory:  Negative for shortness of breath.    Cardiovascular:  Negative for chest pain.   Gastrointestinal:  Negative for abdominal distention and abdominal pain.   Endocrine: Negative for cold intolerance.   Genitourinary:  Negative for difficulty urinating and dysuria.   Musculoskeletal:  Negative for arthralgias and back pain.   Skin:  Negative for color change.   Neurological:  Positive for numbness. Negative for dizziness and facial asymmetry.   Hematological:  Negative for adenopathy.   Psychiatric/Behavioral:  Negative for agitation and behavioral problems.      Objective:     Vital Signs (Most Recent):  Temp: 97.6 °F (36.4 °C) (10/22/24 1115)  Pulse: 65 (10/22/24 1650)  Resp: 20 (10/22/24 1650)  BP: (!) 103/55 (10/22/24 1645)  SpO2: 96 % (10/22/24 1650) Vital Signs (24h Range):  Temp:  [97.6 °F (36.4 °C)] 97.6 °F (36.4 °C)  Pulse:  [55-67] 65  Resp:  [14-25] 20  SpO2:  [92 %-97 %] 96 %  BP: ()/(39-79) 103/55     Weight: 102.1 kg (225 lb)  Body mass index is 36.32 kg/m².     Physical  Exam  Vitals and nursing note reviewed.   Constitutional:       Appearance: He is well-developed.   HENT:      Head: Atraumatic.      Right Ear: External ear normal.      Left Ear: External ear normal.      Nose: Nose normal.      Mouth/Throat:      Mouth: Mucous membranes are dry.   Cardiovascular:      Rate and Rhythm: Normal rate.   Pulmonary:      Effort: Pulmonary effort is normal.   Abdominal:      General: There is distension.      Palpations: Abdomen is soft.   Musculoskeletal:      Cervical back: Full passive range of motion without pain and normal range of motion.      Comments: L BKA   Skin:     General: Skin is warm.   Neurological:      Mental Status: He is alert and oriented to person, place, and time.   Psychiatric:         Behavior: Behavior normal.                Significant Labs: All pertinent labs within the past 24 hours have been reviewed.  CBC:   Recent Labs   Lab 10/22/24  1144 10/22/24  1423 10/22/24  1548   WBC 20.63*  --  18.57*   HGB 10.2*  --  9.9*   HCT 33.0* 32* 32.9*     --  211     CMP:   Recent Labs   Lab 10/22/24  1144 10/22/24  1548   * 128*   K 5.7* 5.7*    100   CO2 12* 13*   * 208*   BUN 71* 64*   CREATININE 5.1* 4.5*   CALCIUM 8.0* 7.9*   PROT 5.9*  --    ALBUMIN 3.1*  --    BILITOT 0.6  --    ALKPHOS 68  --    AST 58*  --    ALT 69*  --    ANIONGAP 18* 15       Significant Imaging: I have reviewed all pertinent imaging results/findings within the past 24 hours.

## 2024-10-22 NOTE — H&P
[facility] - Emergency Dept  Hospital Medicine  History & Physical    Patient Name: Dhaval Hernández  MRN: [id number]  Patient Class: IP- Inpatient  Admission Date: 10/22/2024  Attending Physician: Caesar Gordon MD   Primary Care Provider: Jessie Jung MD         Patient information was obtained from patient, spouse/SO, ER records, and primary team.     Subjective:     Principal Problem:Septic shock    Chief Complaint:   Chief Complaint   Patient presents with    Numbness     Pt is complaining of left sided numbness and tingling in arm started at 9 am.          HPI: 64 year pt getting admitted with septic shock and hyperkalemia  Pt has been suffering from diarrhea/N&V for past 4 days  Stools very watery and vomitus was clear  Later he started having crampy abdominal pain/radiation to back. No aggravating/relieving factors   He acme to ER and was found to be in chock and got admitted       Past Medical History:   Diagnosis Date    Diabetes mellitus, type 2     GERD (gastroesophageal reflux disease)     Hyperlipidemia     Hypertension        Past Surgical History:   Procedure Laterality Date    FRACTURE SURGERY      INSERTION OF PACEMAKER      LEG AMPUTATION         Review of patient's allergies indicates:   Allergen Reactions    Fish containing products      Other reaction(s): .       No current facility-administered medications on file prior to encounter.     Current Outpatient Medications on File Prior to Encounter   Medication Sig    amiodarone (PACERONE) 200 MG Tab Take 1 tablet (200 mg total) by mouth once daily.    amLODIPine (NORVASC) 5 MG tablet Take 1 tablet (5 mg total) by mouth once daily.    aspirin (ECOTRIN) 81 MG EC tablet Take 81 mg by mouth once daily.    carvediloL (COREG) 12.5 MG tablet Take 1 tablet (12.5 mg total) by mouth 2 (two) times daily. (Patient taking differently: Take 25 mg by mouth once daily.)    cholecalciferol, vitamin D3, (VITAMIN D3) 125 mcg (5,000 unit) Tab  Take 5,000 Units by mouth once daily.    empagliflozin (JARDIANCE) 25 mg tablet Take 1 tablet (25 mg total) by mouth once daily.    ezetimibe (ZETIA) 10 mg tablet Take 10 mg by mouth.    famotidine (PEPCID) 20 MG tablet Take 20 mg by mouth 2 (two) times daily.    insulin glargine U-100, Lantus, (LANTUS SOLOSTAR U-100 INSULIN) 100 unit/mL (3 mL) InPn pen Inject 16 Units into the skin every evening.    JANUVIA 100 mg Tab Take 1 tablet (100 mg total) by mouth once daily.    levothyroxine (SYNTHROID) 75 MCG tablet Take 1 tablet (75 mcg total) by mouth every morning.    losartan (COZAAR) 100 MG tablet Take 1 tablet (100 mg total) by mouth once daily.    metFORMIN (GLUCOPHAGE) 1000 MG tablet Take 1 tablet (1,000 mg total) by mouth 2 (two) times daily with meals.    omega 3-dha-epa-fish oil (FISH OIL) 1,200 (144-216) mg Cap Take 1 capsule by mouth once daily.    spironolactone (ALDACTONE) 25 MG tablet Take 0.5 tablets (12.5 mg total) by mouth once daily.    atorvastatin (LIPITOR) 80 MG tablet Take 1 tablet (80 mg total) by mouth every evening.    cholecalciferol, vitamin D3, 1,250 mcg (50,000 unit) capsule Take 50,000 Int'l Units by mouth.    ciclopirox (PENLAC) 8 % Soln Apply topically nightly.    EScitalopram oxalate (LEXAPRO) 10 MG tablet Take 1 tablet (10 mg total) by mouth once daily.    multivitamin (THERAGRAN) per tablet Take 1 tablet by mouth.     Family History       Problem Relation (Age of Onset)    Arthritis Mother          Tobacco Use    Smoking status: Some Days     Types: Cigarettes    Smokeless tobacco: Never   Substance and Sexual Activity    Alcohol use: Not on file    Drug use: Not on file    Sexual activity: Not on file     Review of Systems   Constitutional:  Negative for activity change and appetite change.   HENT:  Negative for congestion and dental problem.    Eyes:  Negative for discharge and itching.   Respiratory:  Negative for shortness of breath.    Cardiovascular:  Negative for chest pain.    Gastrointestinal:  Negative for abdominal distention and abdominal pain.   Endocrine: Negative for cold intolerance.   Genitourinary:  Negative for difficulty urinating and dysuria.   Musculoskeletal:  Negative for arthralgias and back pain.   Skin:  Negative for color change.   Neurological:  Positive for numbness. Negative for dizziness and facial asymmetry.   Hematological:  Negative for adenopathy.   Psychiatric/Behavioral:  Negative for agitation and behavioral problems.      Objective:     Vital Signs (Most Recent):  Temp: 97.6 °F (36.4 °C) (10/22/24 1115)  Pulse: 65 (10/22/24 1650)  Resp: 20 (10/22/24 1650)  BP: (!) 103/55 (10/22/24 1645)  SpO2: 96 % (10/22/24 1650) Vital Signs (24h Range):  Temp:  [97.6 °F (36.4 °C)] 97.6 °F (36.4 °C)  Pulse:  [55-67] 65  Resp:  [14-25] 20  SpO2:  [92 %-97 %] 96 %  BP: ()/(39-79) 103/55     Weight: 102.1 kg (225 lb)  Body mass index is 36.32 kg/m².     Physical Exam  Vitals and nursing note reviewed.   Constitutional:       Appearance: He is well-developed.   HENT:      Head: Atraumatic.      Right Ear: External ear normal.      Left Ear: External ear normal.      Nose: Nose normal.      Mouth/Throat:      Mouth: Mucous membranes are dry.   Cardiovascular:      Rate and Rhythm: Normal rate.   Pulmonary:      Effort: Pulmonary effort is normal.   Abdominal:      General: There is distension.      Palpations: Abdomen is soft.   Musculoskeletal:      Cervical back: Full passive range of motion without pain and normal range of motion.      Comments: L BKA   Skin:     General: Skin is warm.   Neurological:      Mental Status: He is alert and oriented to person, place, and time.   Psychiatric:         Behavior: Behavior normal.                Significant Labs: All pertinent labs within the past 24 hours have been reviewed.  CBC:   Recent Labs   Lab 10/22/24  1144 10/22/24  1423 10/22/24  1548   WBC 20.63*  --  18.57*   HGB 10.2*  --  9.9*   HCT 33.0* 32* 32.9*      "--  211     CMP:   Recent Labs   Lab 10/22/24  1144 10/22/24  1548   * 128*   K 5.7* 5.7*    100   CO2 12* 13*   * 208*   BUN 71* 64*   CREATININE 5.1* 4.5*   CALCIUM 8.0* 7.9*   PROT 5.9*  --    ALBUMIN 3.1*  --    BILITOT 0.6  --    ALKPHOS 68  --    AST 58*  --    ALT 69*  --    ANIONGAP 18* 15       Significant Imaging: I have reviewed all pertinent imaging results/findings within the past 24 hours.    Assessment/Plan:     * Septic shock  Mostly gram negative shock  Source unknown  CXR showing questionable pneumonia  CT abdomen showed gas in GB with thickening  Follow up USS abdomen   Maintain iv meropenem  Received one dose if iv vancomycin in ER     SIRIA (acute kidney injury)  SIRIA in the background of septic/hypovolemic shock  Maintain iv fluids   Recent Labs     10/22/24  1144 10/22/24  1548   CREATININE 5.1* 4.5*   EGFRNORACEVR 11.7* 13.7*       High anion gap metabolic acidosis  In the background of lactic acidemia      Hyperkalemia  Ca gluconate/50% dextrose 25 g/iv insulin now  Serum K every 6 hrs     Recent Labs     10/22/24  1144 10/22/24  1548   K 5.7* 5.7*               Atrial fibrillation, chronic  Stable     Acute pneumonia  Suspected pneumonia  Maintain present regime    Antibiotics (From admission, onward)      Start     Stop Route Frequency Ordered    10/22/24 2100  mupirocin 2 % ointment         10/27/24 2059 Nasl 2 times daily 10/22/24 1650    10/22/24 1615  vancomycin in dextrose 5 % 1 gram/250 mL IVPB 1,000 mg        Placed in "Followed by" Linked Group    -- IV Once 10/22/24 1332    10/22/24 1445  vancomycin in dextrose 5 % 1 gram/250 mL IVPB 1,000 mg        Placed in "Followed by" Linked Group    -- IV Once 10/22/24 1332    10/22/24 1430  meropenem injection 500 mg         -- IV Every 12 hours (non-standard times) 10/22/24 1326            Microbiology Results (last 7 days)       Procedure Component Value Units Date/Time    Blood culture x two cultures. Draw prior to " antibiotics. [[id number]] Collected: 10/22/24 1136    Order Status: Sent Specimen: Blood from Peripheral, Hand, Left Updated: 10/22/24 1202    Narrative:      Collection has been rescheduled by [email or web address] at 10/22/2024 11:44 Reason:   Done  Collection has been rescheduled by [email or web address] at 10/22/2024 11:44 Reason:   Done    Blood culture x two cultures. Draw prior to antibiotics. [[id number]] Collected: 10/22/24 1143    Order Status: Sent Specimen: Blood from Peripheral, Hand, Right Updated: 10/22/24 1202    Narrative:      Collection has been rescheduled by [email or web address] at 10/22/2024 11:44 Reason:   Done  Collection has been rescheduled by [email or web address] at 10/22/2024 11:44 Reason:   Done    Stool culture **cannot be ordered stat** [[id number]]     Order Status: No result Specimen: Stool             Tobacco dependency  Aware     Severe obesity (BMI 35.0-39.9) with comorbidity  Body mass index is 36.32 kg/m². Morbid obesity complicates all aspects of disease management from diagnostic modalities to treatment.     Cardiac pacemaker  PPM insitu      Type 2 diabetes mellitus with stage 2 chronic kidney disease, without long-term current use of insulin  Maintain present insulin regime    Status post unilateral below-knee amputation  L BKA        VTE Risk Mitigation (From admission, onward)           Ordered     enoxaparin injection 30 mg  Daily         10/22/24 1641     IP VTE HIGH RISK PATIENT  Once         10/22/24 1641     Place sequential compression device  Until discontinued         10/22/24 1641                               Pharmacist Renal Dose Adjustment Note    Dhaval Hernández is a 64 y.o. male being treated with the medication Meropenem    Patient Data:    Vital Signs (Most Recent):  Temp: 97.6 °F (36.4 °C) (10/22/24 1115)  Pulse: (!) 56 (10/22/24 1245)  Resp: 18 (10/22/24 1245)  BP: (!) 70/45 (10/22/24 1245)  SpO2: 95 % (10/22/24 1245) Vital Signs (72h Range):  Temp:  [97.6 °F (36.4 °C)]   Pulse:  [55-60]   Resp:  [14-18]   BP:  (65-75)/(39-47)   SpO2:  [92 %-95 %]      Recent Labs   Lab 10/22/24  1144   CREATININE 5.1*     Serum creatinine: 5.1 mg/dL (H) 10/22/24 1144  Estimated creatinine clearance: 15.9 mL/min (A)    Meropenem 1g IV Q6H will be changed to Meropenem 500 mg IV Q12H for CrCl between 10 and 25 mL/min per pharmacy protocol.    Pharmacist's Name: Radha Gary  Pharmacist's Extension: [phone]      Caesar Gordon MD  Department of Hospital Medicine  Formerly [facility], later [facility] - Emergency Dept           normal... well appearing, well nourished, and in no apparent distress.

## 2024-10-22 NOTE — ASSESSMENT & PLAN NOTE
Body mass index is 36.32 kg/m². Morbid obesity complicates all aspects of disease management from diagnostic modalities to treatment.

## 2024-10-22 NOTE — ASSESSMENT & PLAN NOTE
SIRIA in the background of septic/hypovolemic shock  Maintain iv fluids   Recent Labs     10/22/24  1144 10/22/24  1548   CREATININE 5.1* 4.5*   EGFRNORACEVR 11.7* 13.7*

## 2024-10-22 NOTE — ASSESSMENT & PLAN NOTE
Mostly gram negative shock  Source unknown  CXR showing questionable pneumonia  CT abdomen showed gas in GB with thickening  Follow up USS abdomen   Maintain iv meropenem  Received one dose if iv vancomycin in ER

## 2024-10-23 ENCOUNTER — PATIENT MESSAGE (OUTPATIENT)
Dept: ADMINISTRATIVE | Facility: HOSPITAL | Age: 64
End: 2024-10-23
Payer: MEDICARE

## 2024-10-23 LAB
ACINETOBACTER CALCOACETICUS/BAUMANNII COMPLEX: NOT DETECTED
ALBUMIN SERPL BCP-MCNC: 2.9 G/DL (ref 3.5–5.2)
ALLENS TEST: ABNORMAL
ALP SERPL-CCNC: 74 U/L (ref 55–135)
ALT SERPL W/O P-5'-P-CCNC: 73 U/L (ref 10–44)
ANION GAP SERPL CALC-SCNC: 14 MMOL/L (ref 8–16)
AST SERPL-CCNC: 69 U/L (ref 10–40)
BACTEROIDES FRAGILIS: NOT DETECTED
BASOPHILS # BLD AUTO: 0.04 K/UL (ref 0–0.2)
BASOPHILS NFR BLD: 0.3 % (ref 0–1.9)
BILIRUB SERPL-MCNC: 0.6 MG/DL (ref 0.1–1)
BUN SERPL-MCNC: 63 MG/DL (ref 8–23)
CALCIUM SERPL-MCNC: 8.1 MG/DL (ref 8.7–10.5)
CANDIDA ALBICANS: NOT DETECTED
CANDIDA AURIS: NOT DETECTED
CANDIDA GLABRATA: NOT DETECTED
CANDIDA KRUSEI: NOT DETECTED
CANDIDA PARAPSILOSIS: NOT DETECTED
CANDIDA TROPICALIS: NOT DETECTED
CHLORIDE SERPL-SCNC: 101 MMOL/L (ref 95–110)
CO2 SERPL-SCNC: 17 MMOL/L (ref 23–29)
CREAT SERPL-MCNC: 3.8 MG/DL (ref 0.5–1.4)
CRYPTOCOCCUS NEOFORMANS/GATTII: NOT DETECTED
CTX-M GENE (ESBL PRODUCER): ABNORMAL
DELSYS: ABNORMAL
DIFFERENTIAL METHOD BLD: ABNORMAL
ENTEROBACTER CLOACAE COMPLEX: NOT DETECTED
ENTEROBACTERALES: NOT DETECTED
ENTEROCOCCUS FAECALIS: NOT DETECTED
ENTEROCOCCUS FAECIUM: NOT DETECTED
EOSINOPHIL # BLD AUTO: 0 K/UL (ref 0–0.5)
EOSINOPHIL NFR BLD: 0.3 % (ref 0–8)
ERYTHROCYTE [DISTWIDTH] IN BLOOD BY AUTOMATED COUNT: 18.4 % (ref 11.5–14.5)
ESCHERICHIA COLI: NOT DETECTED
EST. GFR  (NO RACE VARIABLE): 16.7 ML/MIN/1.73 M^2
FLOW: 3
GLUCOSE SERPL-MCNC: 126 MG/DL (ref 70–110)
GLUCOSE SERPL-MCNC: 127 MG/DL (ref 70–110)
HAEMOPHILUS INFLUENZAE: NOT DETECTED
HCO3 UR-SCNC: 18.7 MMOL/L (ref 24–28)
HCT VFR BLD AUTO: 32 % (ref 40–54)
HCT VFR BLD CALC: 31 %PCV (ref 36–54)
HGB BLD-MCNC: 10.1 G/DL (ref 14–18)
IMM GRANULOCYTES # BLD AUTO: 0.07 K/UL (ref 0–0.04)
IMM GRANULOCYTES NFR BLD AUTO: 0.6 % (ref 0–0.5)
IMP GENE (CARBAPENEM RESISTANT): ABNORMAL
KLEBSIELLA AEROGENES: NOT DETECTED
KLEBSIELLA OXYTOCA: NOT DETECTED
KLEBSIELLA PNEUMONIAE GROUP: NOT DETECTED
KPC RESISTANCE GENE (CARBAPENEM): ABNORMAL
LACTATE SERPL-SCNC: 0.9 MMOL/L (ref 0.5–1.9)
LACTATE SERPL-SCNC: 1.1 MMOL/L (ref 0.5–1.9)
LISTERIA MONOCYTOGENES: NOT DETECTED
LYMPHOCYTES # BLD AUTO: 0.7 K/UL (ref 1–4.8)
LYMPHOCYTES NFR BLD: 6.3 % (ref 18–48)
MAGNESIUM SERPL-MCNC: 1.4 MG/DL (ref 1.6–2.6)
MCH RBC QN AUTO: 26.1 PG (ref 27–31)
MCHC RBC AUTO-ENTMCNC: 31.6 G/DL (ref 32–36)
MCR-1: ABNORMAL
MCV RBC AUTO: 83 FL (ref 82–98)
MEC A/C AND MREJ (MRSA): NOT DETECTED
MEC A/C: ABNORMAL
MODE: ABNORMAL
MONOCYTES # BLD AUTO: 1.3 K/UL (ref 0.3–1)
MONOCYTES NFR BLD: 11.3 % (ref 4–15)
NDM GENE (CARBAPENEM RESISTANT): ABNORMAL
NEISSERIA MENINGITIDIS: NOT DETECTED
NEUTROPHILS # BLD AUTO: 9.6 K/UL (ref 1.8–7.7)
NEUTROPHILS NFR BLD: 81.2 % (ref 38–73)
NRBC BLD-RTO: 0 /100 WBC
OB PNL STL: POSITIVE
OXA-48-LIKE (CARBAPENEM RESISTANT): ABNORMAL
PCO2 BLDA: 32.2 MMHG (ref 35–45)
PH SMN: 7.37 [PH] (ref 7.35–7.45)
PLATELET # BLD AUTO: 201 K/UL (ref 150–450)
PMV BLD AUTO: 10.2 FL (ref 9.2–12.9)
PO2 BLDA: 61 MMHG (ref 80–100)
POC BE: -7 MMOL/L
POC IONIZED CALCIUM: 1.15 MMOL/L (ref 1.06–1.42)
POC SATURATED O2: 91 % (ref 95–100)
POC TCO2: 20 MMOL/L (ref 23–27)
POCT GLUCOSE: 174 MG/DL (ref 70–110)
POCT GLUCOSE: 211 MG/DL (ref 70–110)
POTASSIUM BLD-SCNC: 4.5 MMOL/L (ref 3.5–5.1)
POTASSIUM SERPL-SCNC: 4.6 MMOL/L (ref 3.5–5.1)
POTASSIUM SERPL-SCNC: 4.7 MMOL/L (ref 3.5–5.1)
PROT SERPL-MCNC: 5.9 G/DL (ref 6–8.4)
PROTEUS SPECIES: NOT DETECTED
PSEUDOMONAS AERUGINOSA: NOT DETECTED
RBC # BLD AUTO: 3.87 M/UL (ref 4.6–6.2)
SALMONELLA SP: NOT DETECTED
SAMPLE: ABNORMAL
SERRATIA MARCESCENS: NOT DETECTED
SITE: ABNORMAL
SODIUM BLD-SCNC: 133 MMOL/L (ref 136–145)
SODIUM SERPL-SCNC: 132 MMOL/L (ref 136–145)
SP02: 92
STAPHYLOCOCCUS AUREUS: DETECTED
STAPHYLOCOCCUS EPIDERMIDIS: NOT DETECTED
STAPHYLOCOCCUS LUGDUNESIS: NOT DETECTED
STAPHYLOCOCCUS SPECIES: ABNORMAL
STENOTROPHOMONAS MALTOPHILIA: NOT DETECTED
STREPTOCOCCUS AGALACTIAE: NOT DETECTED
STREPTOCOCCUS PNEUMONIAE: NOT DETECTED
STREPTOCOCCUS PYOGENES: NOT DETECTED
STREPTOCOCCUS SPECIES: NOT DETECTED
VAN A/B (VRE GENE): ABNORMAL
VIM GENE (CARBAPENEM RESISTANT): ABNORMAL
WBC # BLD AUTO: 11.77 K/UL (ref 3.9–12.7)

## 2024-10-23 PROCEDURE — 87177 OVA AND PARASITES SMEARS: CPT | Performed by: EMERGENCY MEDICINE

## 2024-10-23 PROCEDURE — 63600175 PHARM REV CODE 636 W HCPCS: Performed by: HOSPITALIST

## 2024-10-23 PROCEDURE — 63600175 PHARM REV CODE 636 W HCPCS: Performed by: EMERGENCY MEDICINE

## 2024-10-23 PROCEDURE — 84132 ASSAY OF SERUM POTASSIUM: CPT | Performed by: INTERNAL MEDICINE

## 2024-10-23 PROCEDURE — 82803 BLOOD GASES ANY COMBINATION: CPT

## 2024-10-23 PROCEDURE — 99222 1ST HOSP IP/OBS MODERATE 55: CPT | Mod: ,,, | Performed by: SURGERY

## 2024-10-23 PROCEDURE — 94761 N-INVAS EAR/PLS OXIMETRY MLT: CPT | Mod: XB

## 2024-10-23 PROCEDURE — 36415 COLL VENOUS BLD VENIPUNCTURE: CPT | Performed by: INTERNAL MEDICINE

## 2024-10-23 PROCEDURE — 83605 ASSAY OF LACTIC ACID: CPT | Performed by: INTERNAL MEDICINE

## 2024-10-23 PROCEDURE — 87046 STOOL CULTR AEROBIC BACT EA: CPT | Mod: 59 | Performed by: EMERGENCY MEDICINE

## 2024-10-23 PROCEDURE — 82330 ASSAY OF CALCIUM: CPT

## 2024-10-23 PROCEDURE — 80053 COMPREHEN METABOLIC PANEL: CPT | Performed by: INTERNAL MEDICINE

## 2024-10-23 PROCEDURE — 20000000 HC ICU ROOM

## 2024-10-23 PROCEDURE — 36600 WITHDRAWAL OF ARTERIAL BLOOD: CPT

## 2024-10-23 PROCEDURE — 87449 NOS EACH ORGANISM AG IA: CPT | Performed by: EMERGENCY MEDICINE

## 2024-10-23 PROCEDURE — 84295 ASSAY OF SERUM SODIUM: CPT

## 2024-10-23 PROCEDURE — 85025 COMPLETE CBC W/AUTO DIFF WBC: CPT | Performed by: INTERNAL MEDICINE

## 2024-10-23 PROCEDURE — 63600175 PHARM REV CODE 636 W HCPCS: Performed by: INTERNAL MEDICINE

## 2024-10-23 PROCEDURE — 83605 ASSAY OF LACTIC ACID: CPT | Mod: 91 | Performed by: INTERNAL MEDICINE

## 2024-10-23 PROCEDURE — 84132 ASSAY OF SERUM POTASSIUM: CPT

## 2024-10-23 PROCEDURE — 87045 FECES CULTURE AEROBIC BACT: CPT | Performed by: EMERGENCY MEDICINE

## 2024-10-23 PROCEDURE — 85014 HEMATOCRIT: CPT

## 2024-10-23 PROCEDURE — 25000003 PHARM REV CODE 250: Performed by: INTERNAL MEDICINE

## 2024-10-23 PROCEDURE — 25000003 PHARM REV CODE 250: Performed by: STUDENT IN AN ORGANIZED HEALTH CARE EDUCATION/TRAINING PROGRAM

## 2024-10-23 PROCEDURE — 83735 ASSAY OF MAGNESIUM: CPT | Performed by: INTERNAL MEDICINE

## 2024-10-23 PROCEDURE — 82272 OCCULT BLD FECES 1-3 TESTS: CPT | Performed by: EMERGENCY MEDICINE

## 2024-10-23 PROCEDURE — 89055 LEUKOCYTE ASSESSMENT FECAL: CPT | Performed by: EMERGENCY MEDICINE

## 2024-10-23 PROCEDURE — 99900035 HC TECH TIME PER 15 MIN (STAT)

## 2024-10-23 PROCEDURE — 99900031 HC PATIENT EDUCATION (STAT)

## 2024-10-23 PROCEDURE — 27000221 HC OXYGEN, UP TO 24 HOURS

## 2024-10-23 RX ORDER — LANOLIN ALCOHOL/MO/W.PET/CERES
800 CREAM (GRAM) TOPICAL
Status: DISCONTINUED | OUTPATIENT
Start: 2024-10-23 | End: 2024-10-31 | Stop reason: HOSPADM

## 2024-10-23 RX ORDER — LEVOTHYROXINE SODIUM 25 UG/1
75 TABLET ORAL
Status: DISCONTINUED | OUTPATIENT
Start: 2024-10-24 | End: 2024-10-31 | Stop reason: HOSPADM

## 2024-10-23 RX ORDER — INSULIN GLARGINE 100 [IU]/ML
16 INJECTION, SOLUTION SUBCUTANEOUS NIGHTLY
Status: DISCONTINUED | OUTPATIENT
Start: 2024-10-23 | End: 2024-10-28

## 2024-10-23 RX ORDER — SODIUM,POTASSIUM PHOSPHATES 280-250MG
2 POWDER IN PACKET (EA) ORAL
Status: DISCONTINUED | OUTPATIENT
Start: 2024-10-23 | End: 2024-10-31 | Stop reason: HOSPADM

## 2024-10-23 RX ADMIN — MEROPENEM 500 MG: 500 INJECTION, POWDER, FOR SOLUTION INTRAVENOUS at 02:10

## 2024-10-23 RX ADMIN — Medication 800 MG: at 12:10

## 2024-10-23 RX ADMIN — INSULIN ASPART 4 UNITS: 100 INJECTION, SOLUTION INTRAVENOUS; SUBCUTANEOUS at 07:10

## 2024-10-23 RX ADMIN — ACETAMINOPHEN 650 MG: 325 TABLET ORAL at 05:10

## 2024-10-23 RX ADMIN — ENOXAPARIN SODIUM 30 MG: 30 INJECTION SUBCUTANEOUS at 05:10

## 2024-10-23 RX ADMIN — SODIUM BICARBONATE: 84 INJECTION, SOLUTION INTRAVENOUS at 12:10

## 2024-10-23 RX ADMIN — Medication 800 MG: at 02:10

## 2024-10-23 RX ADMIN — MUPIROCIN 1 G: 20 OINTMENT TOPICAL at 09:10

## 2024-10-23 RX ADMIN — Medication 800 MG: at 06:10

## 2024-10-23 RX ADMIN — INSULIN GLARGINE 16 UNITS: 100 INJECTION, SOLUTION SUBCUTANEOUS at 09:10

## 2024-10-23 RX ADMIN — MUPIROCIN 1 G: 20 OINTMENT TOPICAL at 08:10

## 2024-10-23 RX ADMIN — PANTOPRAZOLE SODIUM 40 MG: 40 INJECTION, POWDER, LYOPHILIZED, FOR SOLUTION INTRAVENOUS at 08:10

## 2024-10-23 RX ADMIN — ACETAMINOPHEN 650 MG: 325 TABLET ORAL at 10:10

## 2024-10-23 RX ADMIN — SODIUM BICARBONATE: 84 INJECTION, SOLUTION INTRAVENOUS at 02:10

## 2024-10-23 NOTE — CONSULTS
GENERAL SURGERY  INPATIENT CONSULT    REASON FOR CONSULT: Cholecystitis    HPI: Dhaval Hernández is a 64 y.o. male with a history of hypertension, type 2 diabetes, left AKA, heart failure with preserved EF, pacemaker who presented with 4 day history of nausea, vomiting and diarrhea associated with abdominal cramping. He was found to be hypotensive and tachycardic concerning for sepsis. Lactic acid was 2.5. Had acute kidney injury and hyperkalemia and hyponatremia. Slight bumps in AST and ALT but bilirubin normal. Fluid resuscitated with improvement. Underwent CT imaging, ultrasound and subsequently HIDA scan. Concern for cholecystitis due to no uptake on HIDA scan.  Ultrasound showed a contracted gallbladder. Consulted for evaluation.  Patient was seen in ICU.  Hemodynamically stable.  Denies any abdominal pain or nausea at this time.  We would like to eat. It was making good urine. On 4 L nasal cannula.  Chest x-ray concerning for possible pneumonia. Cultures with gram-positive bacteria.    I have reviewed the patient's chart including prior progress notes, procedures and testing.     ROS:   Review of Systems    PROBLEM LIST:  Patient Active Problem List   Diagnosis    Hypertension    Status post unilateral below-knee amputation    Type 2 diabetes mellitus with stage 2 chronic kidney disease, without long-term current use of insulin    BMI 31.0-31.9,adult    Depression    Diabetes mellitus due to underlying condition with diabetic chronic kidney disease    Cardiac pacemaker    Atherosclerosis of aorta    ICD (implantable cardioverter-defibrillator) in place    Severe obesity (BMI 35.0-39.9) with comorbidity    History of stroke    Mixed hyperlipidemia    Encounter to discuss test results    Tobacco dependency    Septic shock    High anion gap metabolic acidosis    Acute pneumonia    SIRIA (acute kidney injury)    Hyperkalemia    Atrial fibrillation, chronic         HISTORY  Past Medical History:   Diagnosis Date     Diabetes mellitus, type 2     GERD (gastroesophageal reflux disease)     Hyperlipidemia     Hypertension        Past Surgical History:   Procedure Laterality Date    FRACTURE SURGERY      INSERTION OF PACEMAKER      LEG AMPUTATION         Social History     Tobacco Use    Smoking status: Some Days     Types: Cigarettes    Smokeless tobacco: Never       Family History   Problem Relation Name Age of Onset    Arthritis Mother           MEDS:  No current facility-administered medications on file prior to encounter.     Current Outpatient Medications on File Prior to Encounter   Medication Sig Dispense Refill    amiodarone (PACERONE) 200 MG Tab Take 1 tablet (200 mg total) by mouth once daily. 30 tablet 11    amLODIPine (NORVASC) 5 MG tablet Take 1 tablet (5 mg total) by mouth once daily. 90 tablet 1    aspirin (ECOTRIN) 81 MG EC tablet Take 81 mg by mouth once daily.      carvediloL (COREG) 12.5 MG tablet Take 1 tablet (12.5 mg total) by mouth 2 (two) times daily. (Patient taking differently: Take 25 mg by mouth once daily.) 180 tablet 3    cholecalciferol, vitamin D3, (VITAMIN D3) 125 mcg (5,000 unit) Tab Take 5,000 Units by mouth once daily.      empagliflozin (JARDIANCE) 25 mg tablet Take 1 tablet (25 mg total) by mouth once daily. 90 tablet 0    ezetimibe (ZETIA) 10 mg tablet Take 10 mg by mouth.      famotidine (PEPCID) 20 MG tablet Take 20 mg by mouth 2 (two) times daily.      insulin glargine U-100, Lantus, (LANTUS SOLOSTAR U-100 INSULIN) 100 unit/mL (3 mL) InPn pen Inject 16 Units into the skin every evening. 14.4 mL 1    JANUVIA 100 mg Tab Take 1 tablet (100 mg total) by mouth once daily. 90 tablet 1    levothyroxine (SYNTHROID) 75 MCG tablet Take 1 tablet (75 mcg total) by mouth every morning. 90 tablet 1    losartan (COZAAR) 100 MG tablet Take 1 tablet (100 mg total) by mouth once daily. 90 tablet 3    metFORMIN (GLUCOPHAGE) 1000 MG tablet Take 1 tablet (1,000 mg total) by mouth 2 (two) times daily with  meals. 180 tablet 1    omega 3-dha-epa-fish oil (FISH OIL) 1,200 (144-216) mg Cap Take 1 capsule by mouth once daily.      spironolactone (ALDACTONE) 25 MG tablet Take 0.5 tablets (12.5 mg total) by mouth once daily. 45 tablet 3    atorvastatin (LIPITOR) 80 MG tablet Take 1 tablet (80 mg total) by mouth every evening. 90 tablet 3    cholecalciferol, vitamin D3, 1,250 mcg (50,000 unit) capsule Take 50,000 Int'l Units by mouth.      ciclopirox (PENLAC) 8 % Soln Apply topically nightly. 6.6 mL 5    EScitalopram oxalate (LEXAPRO) 10 MG tablet Take 1 tablet (10 mg total) by mouth once daily. 90 tablet 3    multivitamin (THERAGRAN) per tablet Take 1 tablet by mouth.         ALLERGIES:  Review of patient's allergies indicates:   Allergen Reactions    Fish containing products      Other reaction(s): .         VITALS:  Temp:  [98.1 °F (36.7 °C)-98.9 °F (37.2 °C)] 98.9 °F (37.2 °C)  Pulse:  [56-73] 68  Resp:  [14-38] 22  SpO2:  [92 %-98 %] 92 %  BP: ()/(44-79) 110/56    I/O last 3 completed shifts:  In: 1998 [IV Piggyback:1998]  Out: 3250 [Urine:3250]      PHYSICAL EXAM:  Physical Exam  Vitals reviewed.   Constitutional:       General: He is not in acute distress.     Appearance: Normal appearance. He is well-developed.   HENT:      Head: Normocephalic and atraumatic.   Eyes:      General: No scleral icterus.  Neck:      Trachea: No tracheal deviation.   Cardiovascular:      Rate and Rhythm: Normal rate and regular rhythm.      Pulses: Normal pulses.   Pulmonary:      Effort: Pulmonary effort is normal. No respiratory distress.      Breath sounds: Normal breath sounds.   Abdominal:      General: There is no distension.      Palpations: Abdomen is soft.      Tenderness: There is no abdominal tenderness. There is no guarding or rebound.   Musculoskeletal:         General: Signs of injury (left elbow swelling and erythema) present. No swelling or tenderness. Normal range of motion.      Cervical back: Normal range of  motion and neck supple. No rigidity.      Comments: Left AKA well healed   Skin:     General: Skin is warm and dry.      Coloration: Skin is not jaundiced.      Findings: No erythema.   Neurological:      General: No focal deficit present.      Mental Status: He is alert and oriented to person, place, and time. He is not disoriented.      Motor: No weakness or abnormal muscle tone.   Psychiatric:         Mood and Affect: Mood normal.         Behavior: Behavior normal.         Thought Content: Thought content normal.         Judgment: Judgment normal.           LABS:  Lab Results   Component Value Date    WBC 11.77 10/23/2024    RBC 3.87 (L) 10/23/2024    HGB 10.1 (L) 10/23/2024    HCT 31 (L) 10/23/2024    HCT 32.0 (L) 10/23/2024     10/23/2024     Lab Results   Component Value Date     (H) 10/23/2024     (L) 10/23/2024    K 4.6 10/23/2024     10/23/2024    CO2 17 (L) 10/23/2024    BUN 63 (H) 10/23/2024    CREATININE 3.8 (H) 10/23/2024    CALCIUM 8.1 (L) 10/23/2024     Lab Results   Component Value Date    ALT 73 (H) 10/23/2024    AST 69 (H) 10/23/2024    GGT 89 04/07/2022    ALKPHOS 74 10/23/2024    BILITOT 0.6 10/23/2024     Lab Results   Component Value Date    MG 1.4 (L) 10/23/2024    PHOS 2.9 11/24/2017       STUDIES:  Images and reports were personally reviewed.    Abdominal ultrasound  FINDINGS:  The pancreas and midline retroperitoneum are obscured by bowel gas.  Visualization of the liver is also limited by bowel gas and body habitus.  No hepatic mass or contour abnormality is identified.  Hepatopetal flow is noted within the portal vein.     The common bile duct is normal in caliber at 5 mm.  The gallbladder is contracted markedly contracted.  Echogenic focus within its lumen could reflect a gallstone or gallbladder polyp.  There is no definite evidence of cholecystitis.     Benign-appearing cyst with thin internal septation is noted at the lower pole of the right kidney, 2.8 cm in  greatest dimension.  There is no right upper quadrant free fluid.     Impression:     1. Limited examination as above.  2. The gallbladder is markedly contracted.  Echogenic focus within its lumen is consistent with a gallstone or gallbladder polyp.  No definite evidence of cholecystitis.  3. Benign-appearing septated cyst at the lower pole of the right kidney.      HIDA  FINDINGS:  The early images demonstrate homogeneous uptake of the radiopharmaceutical by the liver with no focal hepatic abnormalities.     Normal activity is present in the common bile duct and small bowel.  Gallbladder is not visualized after 60 minutes.  No delayed imaging is available at time of dictation.     Impression:     Abnormal study.  Gallbladder is not visualized after 60 minutes.  No delayed imaging is available at time of dictation.  Correlate for acute or chronic cholecystitis.      ASSESSMENT & PLAN:  64 y.o. male with history of nausea, vomiting, diarrhea admitted sepsis acute kidney injury electrolyte abnormalities, acute kidney injury, concerns for cholecystitis  -the HIDA scan is concerning for cholecystitis though he is currently asymptomatic   -cultures showing Gram-positive bacteria, biliary source would be unlikely for this  -condition likely combination of severe intravascular volume depletion from multiple days of nausea and diarrhea with possible underlying infection  -continue resuscitation and conservative management  -will allow for diet, if tolerated no plans for surgical intervention, if not tolerated will consider cholecystectomy in 24-48 hours after additional clinical improvement  -wean O2 tolerated  -nephrology on board for acute kidney injury  -antibiotics per primary

## 2024-10-23 NOTE — ASSESSMENT & PLAN NOTE
Suspected pneumonia  Maintain present regime    Antibiotics (From admission, onward)      Start     Stop Route Frequency Ordered    10/22/24 2100  mupirocin 2 % ointment         10/27/24 2059 Nasl 2 times daily 10/22/24 1650    10/22/24 1430  meropenem injection 500 mg         -- IV Every 12 hours (non-standard times) 10/22/24 1326            Microbiology Results (last 7 days)       Procedure Component Value Units Date/Time    Stool culture **cannot be ordered stat** [[id number]] Collected: 10/23/24 1357    Order Status: Sent Specimen: Stool Updated: 10/23/24 1417    Rapid Organism ID by PCR (from Blood culture) [[id number]]  (Abnormal) Collected: 10/22/24 1143    Order Status: Completed Updated: 10/23/24 0918     Enterococcus faecalis Not Detected     Enterococcus faecium Not Detected     Listeria monocytogenes Not Detected     Staphylococcus spp. See species for ID     Staphylococcus aureus Detected     Staphylococcus epidermidis Not Detected     Staphylococcus lugdunensis Not Detected     Streptococcus species Not Detected     Streptococcus agalactiae Not Detected     Streptococcus pneumoniae Not Detected     Streptococcus pyogenes Not Detected     Acinetobacter calcoaceticus/baumannii complex Not Detected     Bacteroides fragilis Not Detected     Enterobacterales Not Detected     Enterobacter cloacae complex Not Detected     Escherichia coli Not Detected     Klebsiella aerogenes Not Detected     Klebsiella oxytoca Not Detected     Klebsiella pneumoniae group Not Detected     Proteus Not Detected     Salmonella sp Not Detected     Serratia marcescens Not Detected     Haemophilus influenzae Not Detected     Neisseria meningtidis Not Detected     Pseudomonas aeruginosa Not Detected     Stenotrophomonas maltophilia Not Detected     Candida albicans Not Detected     Candida auris Not Detected     Candida glabrata Not Detected     Candida krusei Not Detected     Candida parapsilosis Not Detected     Candida tropicalis  Not Detected     Cryptococcus neoformans/gattii Not Detected     CTX-M (ESBL ) Test not applicable     IMP (Carbapenem resistant) Test not applicable     KPC resistance gene (Carbapenem resistant) Test not applicable     mcr-1  Test not applicable     mec A/C  Test not applicable     mec A/C and MREJ (MRSA) gene Not Detected     NDM (Carbapenem resistant) Test not applicable     OXA-48-like (Carbapenem resistant) Test not applicable     van A/B (VRE gene) Test not applicable     VIM (Carbapenem resistant) Test not applicable    Narrative:      Aerobic and anaerobic    Blood culture x two cultures. Draw prior to antibiotics. [[id number]] Collected: 10/22/24 1136    Order Status: Completed Specimen: Blood from Peripheral, Hand, Left Updated: 10/23/24 0912     Blood Culture, Routine Gram stain aer bottle: Gram positive cocci      Positive results previously called on Order #[id number]    Narrative:      Aerobic and anaerobic  Collection has been rescheduled by ZCORBIN at 10/22/2024 11:44 Reason:   Done  Collection has been rescheduled by MAI at 10/22/2024 11:44 Reason:   Done    Blood culture x two cultures. Draw prior to antibiotics. [[id number]] Collected: 10/22/24 1143    Order Status: Completed Specimen: Blood from Peripheral, Hand, Right Updated: 10/23/24 0756     Blood Culture, Routine Gram stain aer bottle: Gram positive cocci      Results called to and read back by:Tiffani Moulton RN-1ICU;      10/23/2024  07:54 CJD    Narrative:      Aerobic and anaerobic  Collection has been rescheduled by ZCORBIN at 10/22/2024 11:44 Reason:   Done  Collection has been rescheduled by ZCORBIN at 10/22/2024 11:44 Reason:   Done    Urine culture [[id number]] Collected: 10/22/24 1514    Order Status: Completed Specimen: Urine Updated: 10/23/24 0745     Urine Culture, Routine No growth to date    Narrative:      Specimen Source->Urine        Repeat blood culture    98.1

## 2024-10-23 NOTE — PROGRESS NOTES
[facility] Medicine  Progress Note    Patient Name: Dhaval Hernández  MRN: [id number]  Patient Class: IP- Inpatient   Admission Date: 10/22/2024  Length of Stay: 1 days  Attending Physician: Pauline Rose MD  Primary Care Provider: Jessie Jung MD        Subjective:     Principal Problem:Septic shock        HPI:  64 year pt getting admitted with septic shock and hyperkalemia  Pt has been suffering from diarrhea/N&V for past 4 days  Stools very watery and vomitus was clear  Later he started having crampy abdominal pain/radiation to back. No aggravating/relieving factors   He acme to ER and was found to be in chock and got admitted       Overview/Hospital Course:  Patient admitted with septic shock/SIRIA/hyperK. Found to have GPC bacteremia, species pending. Possible gallbladder diease and UTI. No collected stools since admission but reported several days of N/V and diarrhea. WBC trending down.   Gallbladder study completed with findings:  Abnormal study.  Gallbladder is not visualized after 60 minutes.  No delayed imaging is available at time of dictation.  Correlate for acute or chronic cholecystitis.   Blood culture: GPC- Vanc dc'd. Continued on meropenem.        Interval History: seen and examined. Denies abdominal pain; c/o hunger     Review of Systems   Constitutional:  Positive for activity change and appetite change.   Respiratory:  Positive for shortness of breath.    Cardiovascular: Negative.    Gastrointestinal:  Positive for diarrhea, nausea and vomiting. Negative for abdominal pain.   Genitourinary: Negative.    Musculoskeletal:  Positive for arthralgias, back pain and gait problem.   Skin: Negative.    Neurological:  Positive for weakness.     Objective:     Vital Signs (Most Recent):  Temp: 98.8 °F (37.1 °C) (10/23/24 1515)  Pulse: 72 (10/23/24 1500)  Resp: 19 (10/23/24 1500)  BP: (!) 118/58 (10/23/24 1500)  SpO2: (!) 93 % (10/23/24 1500) Vital Signs (24h  Range):  Temp:  [98.1 °F (36.7 °C)-98.9 °F (37.2 °C)] 98.8 °F (37.1 °C)  Pulse:  [65-73] 72  Resp:  [14-38] 19  SpO2:  [92 %-98 %] 93 %  BP: ()/(49-65) 118/58     Weight: 102.1 kg (225 lb)  Body mass index is 36.32 kg/m².    Intake/Output Summary (Last 24 hours) at 10/23/2024 1604  Last data filed at 10/23/2024 1501  Gross per 24 hour   Intake --   Output 4775 ml   Net -4775 ml         Physical Exam  Constitutional:       General: He is not in acute distress.     Appearance: He is ill-appearing.   HENT:      Head: Normocephalic and atraumatic.      Mouth/Throat:      Mouth: Mucous membranes are dry.      Pharynx: Oropharynx is clear.   Eyes:      General: No scleral icterus.     Extraocular Movements: Extraocular movements intact.   Cardiovascular:      Rate and Rhythm: Normal rate.      Pulses: Normal pulses.   Abdominal:      General: Bowel sounds are normal. There is distension.      Tenderness: There is no abdominal tenderness. There is no guarding.   Musculoskeletal:         General: Deformity present. No swelling.      Cervical back: Normal range of motion and neck supple.   Skin:     General: Skin is warm and dry.      Capillary Refill: Capillary refill takes less than 2 seconds.      Coloration: Skin is pale.   Neurological:      General: No focal deficit present.      Mental Status: He is alert and oriented to person, place, and time.   Psychiatric:         Mood and Affect: Mood normal.         Behavior: Behavior normal.             Significant Labs: All pertinent labs within the past 24 hours have been reviewed.  Recent Lab Results  (Last 5 results in the past 24 hours)        10/23/24  0456   10/23/24  0443   10/23/24  0003   10/22/24  1822   10/22/24  1807        Albumin   2.9             ALP   74             Allens Test Pass               ALT   73             Anion Gap   14             AST   69             Baso #   0.04             Basophil %   0.3             BILIRUBIN TOTAL   0.6  Comment: For  infants and newborns, interpretation of results should be based  on gestational age, weight and in agreement with clinical  observations.    Premature Infant recommended reference ranges:  Up to 24 hours.............<8.0 mg/dL  Up to 48 hours............<12.0 mg/dL  3-5 days..................<15.0 mg/dL  6-29 days.................<15.0 mg/dL               Site RR               BUN   63             Calcium   8.1             Chloride   101             CO2   17             Creatinine   3.8             DelSys Nasal Can               Differential Method   Automated             eGFR   16.7             Eos #   0.0             Eos %   0.3             Flow 3               Glucose   126             Gran # (ANC)   9.6             Gran %   81.2             Hematocrit   32.0             Hemoglobin   10.1             Immature Grans (Abs)   0.07  Comment: Mild elevation in immature granulocytes is non specific and   can be seen in a variety of conditions including stress response,   acute inflammation, trauma and pregnancy. Correlation with other   laboratory and clinical findings is essential.               Immature Granulocytes   0.6             Lactic Acid Level   0.9  Comment: Falsely low lactic acid results can be found in samples   containing >=13.0 mg/dL total bilirubin and/or >=3.5 mg/dL   direct bilirubin.     1.1  Comment: Falsely low lactic acid results can be found in samples   containing >=13.0 mg/dL total bilirubin and/or >=3.5 mg/dL   direct bilirubin.     1.9  Comment: Falsely low lactic acid results can be found in samples   containing >=13.0 mg/dL total bilirubin and/or >=3.5 mg/dL   direct bilirubin.           Lymph #   0.7             Lymph %   6.3             Magnesium    1.4             MCH   26.1             MCHC   31.6             MCV   83             Mode SPONT               Mono #   1.3             Mono %   11.3             MPV   10.2             nRBC   0             Platelet Count   201             POC  BE -7               POC Glucose 127               POC HCO3 18.7               POC Hematocrit 31               POC Ionized Calcium 1.15               POC PCO2 32.2               POC PH 7.371               POC PO2 61               POC Potassium 4.5               POC SATURATED O2 91               POC Sodium 133               POC TCO2 20               POCT Glucose         240       Potassium   4.6   4.7   5.2         PROTEIN TOTAL   5.9             RBC   3.87             RDW   18.4             Sample ARTERIAL               Sodium   132             Sp02 92               WBC   11.77                                    Significant Imaging: I have reviewed all pertinent imaging results/findings within the past 24 hours.    Assessment/Plan:      * Septic shock  Mostly gram negative shock  Source unknown  CXR showing questionable pneumonia  CT abdomen showed gas in GB with thickening  Follow up USS abdomen   Maintain iv meropenem  Received one dose if iv vancomycin in ER     Atrial fibrillation, chronic  Stable     Hyperkalemia  Ca gluconate/50% dextrose 25 g/iv insulin now  Serum K every 6 hrs     Recent Labs     10/22/24  1144 10/22/24  1548   K 5.7* 5.7*               SIRIA (acute kidney injury)  SIRIA in the background of septic/hypovolemic shock  Maintain iv fluids   Recent Labs     10/22/24  1144 10/22/24  1548   CREATININE 5.1* 4.5*   EGFRNORACEVR 11.7* 13.7*       Acute pneumonia  Suspected pneumonia  Maintain present regime    Antibiotics (From admission, onward)      Start     Stop Route Frequency Ordered    10/22/24 2100  mupirocin 2 % ointment         10/27/24 2059 Nasl 2 times daily 10/22/24 1650    10/22/24 1430  meropenem injection 500 mg         -- IV Every 12 hours (non-standard times) 10/22/24 1326            Microbiology Results (last 7 days)       Procedure Component Value Units Date/Time    Stool culture **cannot be ordered stat** [[id number]] Collected: 10/23/24 1357    Order Status: Sent Specimen: Stool  Updated: 10/23/24 1417    Rapid Organism ID by PCR (from Blood culture) [[id number]]  (Abnormal) Collected: 10/22/24 1143    Order Status: Completed Updated: 10/23/24 0918     Enterococcus faecalis Not Detected     Enterococcus faecium Not Detected     Listeria monocytogenes Not Detected     Staphylococcus spp. See species for ID     Staphylococcus aureus Detected     Staphylococcus epidermidis Not Detected     Staphylococcus lugdunensis Not Detected     Streptococcus species Not Detected     Streptococcus agalactiae Not Detected     Streptococcus pneumoniae Not Detected     Streptococcus pyogenes Not Detected     Acinetobacter calcoaceticus/baumannii complex Not Detected     Bacteroides fragilis Not Detected     Enterobacterales Not Detected     Enterobacter cloacae complex Not Detected     Escherichia coli Not Detected     Klebsiella aerogenes Not Detected     Klebsiella oxytoca Not Detected     Klebsiella pneumoniae group Not Detected     Proteus Not Detected     Salmonella sp Not Detected     Serratia marcescens Not Detected     Haemophilus influenzae Not Detected     Neisseria meningtidis Not Detected     Pseudomonas aeruginosa Not Detected     Stenotrophomonas maltophilia Not Detected     Candida albicans Not Detected     Candida auris Not Detected     Candida glabrata Not Detected     Candida krusei Not Detected     Candida parapsilosis Not Detected     Candida tropicalis Not Detected     Cryptococcus neoformans/gattii Not Detected     CTX-M (ESBL ) Test not applicable     IMP (Carbapenem resistant) Test not applicable     KPC resistance gene (Carbapenem resistant) Test not applicable     mcr-1  Test not applicable     mec A/C  Test not applicable     mec A/C and MREJ (MRSA) gene Not Detected     NDM (Carbapenem resistant) Test not applicable     OXA-48-like (Carbapenem resistant) Test not applicable     van A/B (VRE gene) Test not applicable     VIM (Carbapenem resistant) Test not applicable     Narrative:      Aerobic and anaerobic    Blood culture x two cultures. Draw prior to antibiotics. [[id number]] Collected: 10/22/24 1136    Order Status: Completed Specimen: Blood from Peripheral, Hand, Left Updated: 10/23/24 0912     Blood Culture, Routine Gram stain aer bottle: Gram positive cocci      Positive results previously called on Order #[id number]    Narrative:      Aerobic and anaerobic  Collection has been rescheduled by ZCORBIN at 10/22/2024 11:44 Reason:   Done  Collection has been rescheduled by ZCORBIN at 10/22/2024 11:44 Reason:   Done    Blood culture x two cultures. Draw prior to antibiotics. [[id number]] Collected: 10/22/24 1143    Order Status: Completed Specimen: Blood from Peripheral, Hand, Right Updated: 10/23/24 0756     Blood Culture, Routine Gram stain aer bottle: Gram positive cocci      Results called to and read back by:Tiffani Moulton RN-1ICU;      10/23/2024  07:54 CJD    Narrative:      Aerobic and anaerobic  Collection has been rescheduled by ZCORBIN at 10/22/2024 11:44 Reason:   Done  Collection has been rescheduled by [email or web address] at 10/22/2024 11:44 Reason:   Done    Urine culture [[id number]] Collected: 10/22/24 1514    Order Status: Completed Specimen: Urine Updated: 10/23/24 0745     Urine Culture, Routine No growth to date    Narrative:      Specimen Source->Urine        Repeat blood culture     High anion gap metabolic acidosis  In the background of lactic acidemia      Tobacco dependency  Aware     Severe obesity (BMI 35.0-39.9) with comorbidity  Body mass index is 36.32 kg/m². Morbid obesity complicates all aspects of disease management from diagnostic modalities to treatment.     Cardiac pacemaker  PPM insitu      Type 2 diabetes mellitus with stage 2 chronic kidney disease, without long-term current use of insulin  Maintain present insulin regime    Status post unilateral below-knee amputation  L BKA        VTE Risk Mitigation (From admission, onward)           Ordered     enoxaparin  injection 30 mg  Daily         10/22/24 1641     IP VTE HIGH RISK PATIENT  Once         10/22/24 1641     Place sequential compression device  Until discontinued         10/22/24 1641                    Discharge Planning   JILLIAN: 10/26/2024     Code Status: Full Code   Is the patient medically ready for discharge?:     Reason for patient still in hospital (select all that apply): Patient trending condition  Discharge Plan A: Home with family                  Pauline Rose MD  Department of Hospital Medicine   [facility]

## 2024-10-23 NOTE — PLAN OF CARE
Problem: Infection  Goal: Absence of Infection Signs and Symptoms  Outcome: Progressing     Problem: Adult Inpatient Plan of Care  Goal: Plan of Care Review  Outcome: Progressing  Goal: Patient-Specific Goal (Individualized)  Outcome: Progressing  Goal: Absence of Hospital-Acquired Illness or Injury  Outcome: Progressing  Goal: Optimal Comfort and Wellbeing  Outcome: Progressing  Goal: Readiness for Transition of Care  Outcome: Progressing     Problem: Sepsis/Septic Shock  Goal: Optimal Coping  Outcome: Progressing  Goal: Absence of Bleeding  Outcome: Progressing  Goal: Blood Glucose Level Within Targeted Range  Outcome: Progressing  Goal: Absence of Infection Signs and Symptoms  Outcome: Progressing  Goal: Optimal Nutrition Intake  Outcome: Progressing     Problem: Acute Kidney Injury/Impairment  Goal: Fluid and Electrolyte Balance  Outcome: Progressing  Goal: Improved Oral Intake  Outcome: Progressing  Goal: Effective Renal Function  Outcome: Progressing     Problem: Pneumonia  Goal: Fluid Balance  Outcome: Progressing  Goal: Resolution of Infection Signs and Symptoms  Outcome: Progressing  Goal: Effective Oxygenation and Ventilation  Outcome: Progressing     Problem: Diabetes Comorbidity  Goal: Blood Glucose Level Within Targeted Range  Outcome: Progressing

## 2024-10-23 NOTE — PROGRESS NOTES
Nephrology Consult Note        Patient Name: Dhaval Hernández  MRN: [id number]    Patient Class: IP- Inpatient   Admission Date: 10/22/2024  Length of Stay: 1 days  Date of Service: 10/23/2024    Attending Physician: Pauline Rose MD  Primary Care Provider: Jessie Jung MD    Reason for Consult: siria    SUBJECTIVE:     HPI: 64M with DM, HTN, GERD, left BKA, pacemaker, HFpEF presents to the emergency department with generalized weakness, persistent loose watery diarrheal stools over last 4 days with associated abdominal cramping. Denied vomiting, hematochezia, melena, fever.     Upon arrival to the emergency department patient found with blood pressure 70/30. Heart rate was in the 110s. Received IVF promptly, giron was placed, labs obtained - notable for SIRIA, hyponatremia, hyperkalemia, acidosis, bump in LFTs, anemia with elevated WBC. ProCal 9, , A1c 6.5. POC ABG with pH 7.26, pCO2 26, iCa 1.14, K 5.7. CT scan without renal obstruction.    10/23 VSS. No new complains. Decrease labs to daily, monitor UO. Bicarb for another 24h.    ASSESSMENT/PLAN:     SIRIA  Metabolic acidosis  Hyperkalemia  Hyponatremia  Hypovolemia  Staph sepsis  Anemia  HTN  DM  CKD stage 2, eGFR > 60 ml/min in 10/2023  No NSAIDs, ACEI/ARB, IV contrast or other nephrotoxins.  Keep MAP > 60, SBP > 100.  Dose meds for GFR < 30 ml/min.  Renal diet - low K, low phos.  Bicarb drip for another 24h, no Lokelma, repeat labs BID -- consider diuretic, [id number] as needed to control hyperkalemia.  Control BG, holding Jardiance.  Broad abx, narrow based on Cx results, pressors as needed.  Hgb and HCT are acceptable. Monitor for now.  Tolerate asymptomatic HTN up to -160. HOLD home meds.    Thank you for allowing us to participate in the care of your patient!   We will follow the patient and provide recommendations as needed.         Laboratory:  Recent Labs   Lab 10/22/24  1144 10/22/24  1548 10/22/24  1822 10/23/24  0003  10/23/24  0443   * 128*  --   --  132*   K 5.7* 5.7* 5.2* 4.7 4.6    100  --   --  101   CO2 12* 13*  --   --  17*   BUN 71* 64*  --   --  63*   CREATININE 5.1* 4.5*  --   --  3.8*   * 208*  --   --  126*       Recent Labs   Lab 10/22/24  1144 10/22/24  1548 10/23/24  0443   CALCIUM 8.0* 7.9* 8.1*   ALBUMIN 3.1*  --  2.9*   MG 1.6  --  1.4*             Recent Labs   Lab 10/22/24  1117 10/22/24  1807   POCTGLUCOSE 204* 240*       Recent Labs   Lab 10/30/23  1205   Hemoglobin A1C 6.5 H       Recent Labs   Lab 10/22/24  1144 10/22/24  1423 10/22/24  1548 10/23/24  0443 10/23/24  0456   WBC 20.63*  --  18.57* 11.77  --    HGB 10.2*  --  9.9* 10.1*  --    HCT 33.0*   < > 32.9* 32.0* 31*     --  211 201  --    MCV 88  --  86 83  --    MCHC 30.9*  --  30.1* 31.6*  --    MONO 12.4  2.6*  --  9.0  1.7* 11.3  1.3*  --    EOSINOPHIL 0.4  --  0.2 0.3  --     < > = values in this interval not displayed.       Recent Labs   Lab 10/22/24  1144 10/23/24  0443   BILITOT 0.6 0.6   PROT 5.9* 5.9*   ALBUMIN 3.1* 2.9*   ALKPHOS 68 74   ALT 69* 73*   AST 58* 69*       Recent Labs   Lab 10/22/24  1514   Color, UA Yellow   Appearance, UA Clear   pH, UA 6.0   Specific Gravity, UA 1.010   Protein, UA Trace A   Glucose, UA 4+ A   Ketones, UA Negative   Urobilinogen, UA Negative   Bilirubin (UA) Negative   Occult Blood UA 1+ A   Nitrite, UA Negative   RBC, UA 10 H   WBC, UA 19 H   Bacteria None   Hyaline Casts, UA 49 A       Recent Labs   Lab 10/22/24  1345 10/22/24  1423 10/23/24  0456   POC PH  --  7.263 LL 7.371   POC PCO2  --  26.5 LL 32.2 L   POC HCO3  --  12.0 L 18.7 L   POC PO2  --  63 L 61 L   POC SATURATED O2  --  89 91   POC BE  --  -15 L -7 L   Sample VENOUS ARTERIAL ARTERIAL       Microbiology Results (last 7 days)       Procedure Component Value Units Date/Time    Rapid Organism ID by PCR (from Blood culture) [[id number]]  (Abnormal) Collected: 10/22/24 1143    Order Status: Completed Updated: 10/23/24  0918     Enterococcus faecalis Not Detected     Enterococcus faecium Not Detected     Listeria monocytogenes Not Detected     Staphylococcus spp. See species for ID     Staphylococcus aureus Detected     Staphylococcus epidermidis Not Detected     Staphylococcus lugdunensis Not Detected     Streptococcus species Not Detected     Streptococcus agalactiae Not Detected     Streptococcus pneumoniae Not Detected     Streptococcus pyogenes Not Detected     Acinetobacter calcoaceticus/baumannii complex Not Detected     Bacteroides fragilis Not Detected     Enterobacterales Not Detected     Enterobacter cloacae complex Not Detected     Escherichia coli Not Detected     Klebsiella aerogenes Not Detected     Klebsiella oxytoca Not Detected     Klebsiella pneumoniae group Not Detected     Proteus Not Detected     Salmonella sp Not Detected     Serratia marcescens Not Detected     Haemophilus influenzae Not Detected     Neisseria meningtidis Not Detected     Pseudomonas aeruginosa Not Detected     Stenotrophomonas maltophilia Not Detected     Candida albicans Not Detected     Candida auris Not Detected     Candida glabrata Not Detected     Candida krusei Not Detected     Candida parapsilosis Not Detected     Candida tropicalis Not Detected     Cryptococcus neoformans/gattii Not Detected     CTX-M (ESBL ) Test not applicable     IMP (Carbapenem resistant) Test not applicable     KPC resistance gene (Carbapenem resistant) Test not applicable     mcr-1  Test not applicable     mec A/C  Test not applicable     mec A/C and MREJ (MRSA) gene Not Detected     NDM (Carbapenem resistant) Test not applicable     OXA-48-like (Carbapenem resistant) Test not applicable     van A/B (VRE gene) Test not applicable     VIM (Carbapenem resistant) Test not applicable    Narrative:      Aerobic and anaerobic    Blood culture x two cultures. Draw prior to antibiotics. [[id number]] Collected: 10/22/24 1136    Order Status: Completed Specimen:  Blood from Peripheral, Hand, Left Updated: 10/23/24 0912     Blood Culture, Routine Gram stain aer bottle: Gram positive cocci      Positive results previously called on Order #[id number]    Narrative:      Aerobic and anaerobic  Collection has been rescheduled by ZCORBIN at 10/22/2024 11:44 Reason:   Done  Collection has been rescheduled by ZCORBIN at 10/22/2024 11:44 Reason:   Done    Blood culture x two cultures. Draw prior to antibiotics. [[id number]] Collected: 10/22/24 1143    Order Status: Completed Specimen: Blood from Peripheral, Hand, Right Updated: 10/23/24 0756     Blood Culture, Routine Gram stain aer bottle: Gram positive cocci      Results called to and read back by:Tiffani Moulton RN-1ICU;      10/23/2024  07:54 CJD    Narrative:      Aerobic and anaerobic  Collection has been rescheduled by MAI at 10/22/2024 11:44 Reason:   Done  Collection has been rescheduled by ZCORBIN at 10/22/2024 11:44 Reason:   Done    Urine culture [[id number]] Collected: 10/22/24 1514    Order Status: Completed Specimen: Urine Updated: 10/23/24 0745     Urine Culture, Routine No growth to date    Narrative:      Specimen Source->Urine    Stool culture **cannot be ordered stat** [[id number]]     Order Status: No result Specimen: Stool             Review of patient's allergies indicates:   Allergen Reactions    Fish containing products      Other reaction(s): .       Outpatient meds:  No current facility-administered medications on file prior to encounter.     Current Outpatient Medications on File Prior to Encounter   Medication Sig Dispense Refill    amiodarone (PACERONE) 200 MG Tab Take 1 tablet (200 mg total) by mouth once daily. 30 tablet 11    amLODIPine (NORVASC) 5 MG tablet Take 1 tablet (5 mg total) by mouth once daily. 90 tablet 1    aspirin (ECOTRIN) 81 MG EC tablet Take 81 mg by mouth once daily.      carvediloL (COREG) 12.5 MG tablet Take 1 tablet (12.5 mg total) by mouth 2 (two) times daily. (Patient taking differently:  Take 25 mg by mouth once daily.) 180 tablet 3    cholecalciferol, vitamin D3, (VITAMIN D3) 125 mcg (5,000 unit) Tab Take 5,000 Units by mouth once daily.      empagliflozin (JARDIANCE) 25 mg tablet Take 1 tablet (25 mg total) by mouth once daily. 90 tablet 0    ezetimibe (ZETIA) 10 mg tablet Take 10 mg by mouth.      famotidine (PEPCID) 20 MG tablet Take 20 mg by mouth 2 (two) times daily.      insulin glargine U-100, Lantus, (LANTUS SOLOSTAR U-100 INSULIN) 100 unit/mL (3 mL) InPn pen Inject 16 Units into the skin every evening. 14.4 mL 1    JANUVIA 100 mg Tab Take 1 tablet (100 mg total) by mouth once daily. 90 tablet 1    levothyroxine (SYNTHROID) 75 MCG tablet Take 1 tablet (75 mcg total) by mouth every morning. 90 tablet 1    losartan (COZAAR) 100 MG tablet Take 1 tablet (100 mg total) by mouth once daily. 90 tablet 3    metFORMIN (GLUCOPHAGE) 1000 MG tablet Take 1 tablet (1,000 mg total) by mouth 2 (two) times daily with meals. 180 tablet 1    omega 3-dha-epa-fish oil (FISH OIL) 1,200 (144-216) mg Cap Take 1 capsule by mouth once daily.      spironolactone (ALDACTONE) 25 MG tablet Take 0.5 tablets (12.5 mg total) by mouth once daily. 45 tablet 3    atorvastatin (LIPITOR) 80 MG tablet Take 1 tablet (80 mg total) by mouth every evening. 90 tablet 3    cholecalciferol, vitamin D3, 1,250 mcg (50,000 unit) capsule Take 50,000 Int'l Units by mouth.      ciclopirox (PENLAC) 8 % Soln Apply topically nightly. 6.6 mL 5    EScitalopram oxalate (LEXAPRO) 10 MG tablet Take 1 tablet (10 mg total) by mouth once daily. 90 tablet 3    multivitamin (THERAGRAN) per tablet Take 1 tablet by mouth.         Scheduled meds:   enoxparin  30 mg Subcutaneous Daily    meropenem IV (PEDS and ADULTS)  500 mg Intravenous Q12H    mupirocin   Nasal BID    pantoprazole  40 mg Intravenous Daily       Infusions:   NORepinephrine bitartrate-D5W  0-3 mcg/kg/min Intravenous Continuous   Stopped at 10/22/24 2030    sodium bicarbonate 100 mEq in 0.45%  NaCl 1,000 mL infusion   Intravenous Continuous 100 mL/hr at 10/23/24 0201 New Bag at 10/23/24 0201       PRN meds:    Current Facility-Administered Medications:     acetaminophen, 650 mg, Oral, Q8H PRN    acetaminophen, 650 mg, Oral, Q4H PRN    aluminum-magnesium hydroxide-simethicone, 30 mL, Oral, QID PRN    dextrose 50%, 12.5 g, Intravenous, PRN    dextrose 50%, 25 g, Intravenous, PRN    glucagon (human recombinant), 1 mg, Intramuscular, PRN    glucose, 16 g, Oral, PRN    glucose, 24 g, Oral, PRN    insulin aspart U-100, 0-10 Units, Subcutaneous, QID (AC + HS) PRN    magnesium oxide, 800 mg, Oral, PRN    magnesium oxide, 800 mg, Oral, PRN    melatonin, 6 mg, Oral, Nightly PRN    naloxone, 0.02 mg, Intravenous, PRN    ondansetron, 4 mg, Intravenous, Q6H PRN    potassium bicarbonate, 35 mEq, Oral, PRN    potassium bicarbonate, 50 mEq, Oral, PRN    potassium bicarbonate, 60 mEq, Oral, PRN    potassium, sodium phosphates, 2 packet, Oral, PRN    potassium, sodium phosphates, 2 packet, Oral, PRN    potassium, sodium phosphates, 2 packet, Oral, PRN    Past Medical History:   Diagnosis Date    Diabetes mellitus, type 2     GERD (gastroesophageal reflux disease)     Hyperlipidemia     Hypertension      Past Surgical History:   Procedure Laterality Date    FRACTURE SURGERY      INSERTION OF PACEMAKER      LEG AMPUTATION       Family History   Problem Relation Name Age of Onset    Arthritis Mother       Social History     Tobacco Use    Smoking status: Some Days     Types: Cigarettes    Smokeless tobacco: Never       OBJECTIVE:     Vital Signs and IO:  Temp:  [97.6 °F (36.4 °C)-98.9 °F (37.2 °C)]   Pulse:  [55-73]   Resp:  [14-38]   BP: ()/(39-79)   SpO2:  [92 %-98 %]   I/O last 3 completed shifts:  In: 1998 [IV Piggyback:1998]  Out: 3250 [Urine:3250]  Wt Readings from Last 5 Encounters:   10/22/24 102.1 kg (225 lb)   07/23/24 102.4 kg (225 lb 12 oz)   04/17/24 89.8 kg (197 lb 15.6 oz)   04/12/24 89.8 kg (198 lb)    12/13/23 89.8 kg (198 lb)     Body mass index is 36.32 kg/m².    Physical Exam  Constitutional:       General: Patient is not in acute distress.     Appearance: Patient is well-developed. She is not diaphoretic.   HENT:      Head: Normocephalic and atraumatic.      Mouth/Throat: Mucous membranes are moist.   Eyes:      General: No scleral icterus.     Pupils: Pupils are equal, round, and reactive to light.   Cardiovascular:      Rate and Rhythm: Normal rate and regular rhythm.   Pulmonary:      Effort: Pulmonary effort is normal. No respiratory distress.      Breath sounds: No stridor.   Abdominal:      General: There is no distension.      Palpations: Abdomen is soft.   Musculoskeletal:         General: No deformity. Normal range of motion.      Cervical back: Neck supple.   Skin:     General: Skin is warm and dry.      Findings: No rash present. No erythema.   Neurological:      Mental Status: Patient is alert and oriented to person, place, and time.      Cranial Nerves: No cranial nerve deficit.   Psychiatric:         Behavior: Behavior normal.          Patient care time was spent personally by me on the following activities:     Obtaining a history.  Examination of patient.  Providing medical care at the patients bedside.  Developing a treatment plan with patient or surrogate and bedside caregivers.  Ordering and reviewing laboratory studies, radiographic studies, pulse oximetry.  Ordering and performing treatments and interventions.  Evaluation of patient's response to treatment.  Discussions with consultants while on the unit and immediately available to the patient.  Re-evaluation of the patient's condition.  Documentation in the medical record.     Jabari Verma MD    [facility]  [address] [place] [address] [place] [address]    [phone] - tel  [phone] - fax    10/23/2024

## 2024-10-23 NOTE — HOSPITAL COURSE
Patient admitted with septic shock. Bacteremia with Staph +. Repeat b/c showed NGTD. Source likely from left elbow infection. Continue with antibiotics and ID is following patient, Patient is om cefazolin. Ortho consulted for possible left elbow abscess. GI recommends outpatient follow up for FOBT +.  Patient underwent debridement on 10/27 by orthopedics. Abscess was drained and fluid is also growing MSSA. Cardiology consulted as patient has pacemaker and may need ROSE. Patient was transferred to ICU post ROSE.  ROSE was negative. Final antibiotics recommendations as below. Cefazolin 6 weeks until 12/5. Rifampin PO from 11/1 until 12/5. Weekly labs. PICC line placed. Patient discharged to NH to complete antibiotics and continue rehab.

## 2024-10-23 NOTE — SUBJECTIVE & OBJECTIVE
Interval History: seen and examined. Denies abdominal pain; c/o hunger     Review of Systems   Constitutional:  Positive for activity change and appetite change.   Respiratory:  Positive for shortness of breath.    Cardiovascular: Negative.    Gastrointestinal:  Positive for diarrhea, nausea and vomiting. Negative for abdominal pain.   Genitourinary: Negative.    Musculoskeletal:  Positive for arthralgias, back pain and gait problem.   Skin: Negative.    Neurological:  Positive for weakness.     Objective:     Vital Signs (Most Recent):  Temp: 98.8 °F (37.1 °C) (10/23/24 1515)  Pulse: 72 (10/23/24 1500)  Resp: 19 (10/23/24 1500)  BP: (!) 118/58 (10/23/24 1500)  SpO2: (!) 93 % (10/23/24 1500) Vital Signs (24h Range):  Temp:  [98.1 °F (36.7 °C)-98.9 °F (37.2 °C)] 98.8 °F (37.1 °C)  Pulse:  [65-73] 72  Resp:  [14-38] 19  SpO2:  [92 %-98 %] 93 %  BP: ()/(49-65) 118/58     Weight: 102.1 kg (225 lb)  Body mass index is 36.32 kg/m².    Intake/Output Summary (Last 24 hours) at 10/23/2024 1604  Last data filed at 10/23/2024 1501  Gross per 24 hour   Intake --   Output 4775 ml   Net -4775 ml         Physical Exam  Constitutional:       General: He is not in acute distress.     Appearance: He is ill-appearing.   HENT:      Head: Normocephalic and atraumatic.      Mouth/Throat:      Mouth: Mucous membranes are dry.      Pharynx: Oropharynx is clear.   Eyes:      General: No scleral icterus.     Extraocular Movements: Extraocular movements intact.   Cardiovascular:      Rate and Rhythm: Normal rate.      Pulses: Normal pulses.   Abdominal:      General: Bowel sounds are normal. There is distension.      Tenderness: There is no abdominal tenderness. There is no guarding.   Musculoskeletal:         General: Deformity present. No swelling.      Cervical back: Normal range of motion and neck supple.   Skin:     General: Skin is warm and dry.      Capillary Refill: Capillary refill takes less than 2 seconds.      Coloration:  Skin is pale.   Neurological:      General: No focal deficit present.      Mental Status: He is alert and oriented to person, place, and time.   Psychiatric:         Mood and Affect: Mood normal.         Behavior: Behavior normal.             Significant Labs: All pertinent labs within the past 24 hours have been reviewed.  Recent Lab Results  (Last 5 results in the past 24 hours)        10/23/24  0456   10/23/24  0443   10/23/24  0003   10/22/24  1822   10/22/24  1807        Albumin   2.9             ALP   74             Allens Test Pass               ALT   73             Anion Gap   14             AST   69             Baso #   0.04             Basophil %   0.3             BILIRUBIN TOTAL   0.6  Comment: For infants and newborns, interpretation of results should be based  on gestational age, weight and in agreement with clinical  observations.    Premature Infant recommended reference ranges:  Up to 24 hours.............<8.0 mg/dL  Up to 48 hours............<12.0 mg/dL  3-5 days..................<15.0 mg/dL  6-29 days.................<15.0 mg/dL               Site RR               BUN   63             Calcium   8.1             Chloride   101             CO2   17             Creatinine   3.8             DelSys Nasal Can               Differential Method   Automated             eGFR   16.7             Eos #   0.0             Eos %   0.3             Flow 3               Glucose   126             Gran # (ANC)   9.6             Gran %   81.2             Hematocrit   32.0             Hemoglobin   10.1             Immature Grans (Abs)   0.07  Comment: Mild elevation in immature granulocytes is non specific and   can be seen in a variety of conditions including stress response,   acute inflammation, trauma and pregnancy. Correlation with other   laboratory and clinical findings is essential.               Immature Granulocytes   0.6             Lactic Acid Level   0.9  Comment: Falsely low lactic acid results can be found  in samples   containing >=13.0 mg/dL total bilirubin and/or >=3.5 mg/dL   direct bilirubin.     1.1  Comment: Falsely low lactic acid results can be found in samples   containing >=13.0 mg/dL total bilirubin and/or >=3.5 mg/dL   direct bilirubin.     1.9  Comment: Falsely low lactic acid results can be found in samples   containing >=13.0 mg/dL total bilirubin and/or >=3.5 mg/dL   direct bilirubin.           Lymph #   0.7             Lymph %   6.3             Magnesium    1.4             MCH   26.1             MCHC   31.6             MCV   83             Mode SPONT               Mono #   1.3             Mono %   11.3             MPV   10.2             nRBC   0             Platelet Count   201             POC BE -7               POC Glucose 127               POC HCO3 18.7               POC Hematocrit 31               POC Ionized Calcium 1.15               POC PCO2 32.2               POC PH 7.371               POC PO2 61               POC Potassium 4.5               POC SATURATED O2 91               POC Sodium 133               POC TCO2 20               POCT Glucose         240       Potassium   4.6   4.7   5.2         PROTEIN TOTAL   5.9             RBC   3.87             RDW   18.4             Sample ARTERIAL               Sodium   132             Sp02 92               WBC   11.77                                    Significant Imaging: I have reviewed all pertinent imaging results/findings within the past 24 hours.

## 2024-10-23 NOTE — PLAN OF CARE
Plan of care reviewed with patient. Patient verbalized complete understanding. Pt awake, alert, and oriented. PT not complaining of any pain to me. Pt does complain of some numbness to LUE, but mobility seems to be intact. Pts labs frequently monitored. Pt has not been on Levo since arriving to the floor, B/p soft but stable. Afebrile. NSR on monitor. Mercado draining clear yellow urine. IV CDI. All fall precautions maintained, bed in lowest position, locked, call light within reach. Side rails up times 2. Slip resistant socks maintained.

## 2024-10-23 NOTE — PLAN OF CARE
[facility] - Emergency Dept  Initial Discharge Assessment       Primary Care Provider: Jessie Jung MD    Admission Diagnosis: Septic shock [A41.9, R65.21]    Admission Date: 10/22/2024  Expected Discharge Date:      met with Pt at bedside to complete discharge assessment. Pt AAOx4s. Demographics, PCP, and insurance verified. No home health. No dialysis. Pt uses wheelchair and prosthetic leg to ambulate. Pt also uses shower chair for bathing.Pt reports ability to complete all other ADLs without assistance. Pt verbalized plan to discharge home via family transport. Pt has no other needs to be addressed at this time.     Transition of Care Barriers: None    Payor: [employer] [facility] / Plan: [employer] [facility] / Product Type: Medicare Advantage /     Extended Emergency Contact Information  Primary Emergency Contact: Wanda Hernández  Address: [id number]           [place] [address] [place]  Mobile Phone: [phone]  Relation: Spouse  Preferred language: English   needed? No    Discharge Plan A: Home with family  Discharge Plan B: Home      [facility] [place] - [address]  [address] [address]  Phone: [phone] Fax: [phone]      Initial Assessment (most recent)       Adult Discharge Assessment - 10/1960          Discharge Assessment    Assessment Type Discharge Planning Assessment     Confirmed/corrected address, phone number and insurance Yes     Confirmed Demographics Correct on Facesheet     Source of Information patient     When was your last doctors appointment? --   Pt reports a few weeks ago.    Communicated JILLIAN with patient/caregiver Date not available/Unable to determine     Reason For Admission Septic shock     People in Home alone     Facility Arrived From: Home     Do you expect to return to your current living situation? Yes     Do you have help at home or someone to  help you manage your care at home? Yes     Who are your caregiver(s) and their phone number(s)? Wife: Wanda Hernández: [phone]     Prior to hospitilization cognitive status: Alert/Oriented     Current cognitive status: Alert/Oriented     Walking or Climbing Stairs Difficulty yes     Walking or Climbing Stairs ambulation difficulty, requires equipment     Dressing/Bathing Difficulty yes     Dressing/Bathing bathing difficulty, requires equipment     Home Accessibility wheelchair accessible     Home Layout Able to live on 1st floor     Equipment Currently Used at Home none     Readmission within 30 days? No     Patient currently being followed by outpatient case management? No     Do you currently have service(s) that help you manage your care at home? No     Do you take prescription medications? Yes     Do you have prescription coverage? Yes     Coverage Payor: [employer] [facility] [place] [facility]"" -     Do you have any problems affording any of your prescribed medications? No     Is the patient taking medications as prescribed? yes     Who is going to help you get home at discharge? [employer]     How do you get to doctors appointments? public transportation     Are you on dialysis? No     Do you take coumadin? No     Discharge Plan A Home with family     Discharge Plan B Home     DME Needed Upon Discharge  none     Discharge Plan discussed with: Patient     Transition of Care Barriers None        Physical Activity    On average, how many days per week do you engage in moderate to strenuous exercise (like a brisk walk)? 0 days     On average, how many minutes do you engage in exercise at this level? 0 min        Financial Resource Strain    How hard is it for you to pay for the very basics like food, housing, medical care, and heating? Not hard at all        Housing Stability    In the last 12 months, was there a time when you were not able to pay the mortgage or rent on time? No      At any time in the past 12 months, were you homeless or living in a shelter (including now)? No        Transportation Needs    Has the lack of transportation kept you from medical appointments, meetings, work or from getting things needed for daily living? No        Food Insecurity    Within the past 12 months, you worried that your food would run out before you got the money to buy more. Never true     Within the past 12 months, the food you bought just didn't last and you didn't have money to get more. Never true        Stress    Do you feel stress - tense, restless, nervous, or anxious, or unable to sleep at night because your mind is troubled all the time - these days? Not at all        Social Isolation    How often do you feel lonely or isolated from those around you?  Never        Alcohol Use    Q1: How often do you have a drink containing alcohol? Never     Q2: How many drinks containing alcohol do you have on a typical day when you are drinking? Patient does not drink     Q3: How often do you have six or more drinks on one occasion? Never        Utilities    In the past 12 months has the electric, gas, oil, or water company threatened to shut off services in your home? No        Health Literacy    How often do you need to have someone help you when you read instructions, pamphlets, or other written material from your doctor or pharmacy? Never

## 2024-10-24 PROBLEM — M70.22 OLECRANON BURSITIS OF LEFT ELBOW: Status: ACTIVE | Noted: 2024-10-24

## 2024-10-24 LAB
ALBUMIN SERPL BCP-MCNC: 2.9 G/DL (ref 3.5–5.2)
ALP SERPL-CCNC: 76 U/L (ref 55–135)
ALT SERPL W/O P-5'-P-CCNC: 60 U/L (ref 10–44)
ANION GAP SERPL CALC-SCNC: 9 MMOL/L (ref 8–16)
ANION GAP SERPL CALC-SCNC: 9 MMOL/L (ref 8–16)
AST SERPL-CCNC: 63 U/L (ref 10–40)
BASOPHILS # BLD AUTO: 0.02 K/UL (ref 0–0.2)
BASOPHILS NFR BLD: 0.2 % (ref 0–1.9)
BILIRUB SERPL-MCNC: 0.7 MG/DL (ref 0.1–1)
BUN SERPL-MCNC: 38 MG/DL (ref 8–23)
BUN SERPL-MCNC: 45 MG/DL (ref 8–23)
CALCIUM SERPL-MCNC: 8 MG/DL (ref 8.7–10.5)
CALCIUM SERPL-MCNC: 8.3 MG/DL (ref 8.7–10.5)
CHLORIDE SERPL-SCNC: 101 MMOL/L (ref 95–110)
CHLORIDE SERPL-SCNC: 98 MMOL/L (ref 95–110)
CO2 SERPL-SCNC: 25 MMOL/L (ref 23–29)
CO2 SERPL-SCNC: 28 MMOL/L (ref 23–29)
CREAT SERPL-MCNC: 1.7 MG/DL (ref 0.5–1.4)
CREAT SERPL-MCNC: 2.3 MG/DL (ref 0.5–1.4)
CRP SERPL-MCNC: 25.3 MG/DL
DIFFERENTIAL METHOD BLD: ABNORMAL
EOSINOPHIL # BLD AUTO: 0.2 K/UL (ref 0–0.5)
EOSINOPHIL NFR BLD: 1.9 % (ref 0–8)
ERYTHROCYTE [DISTWIDTH] IN BLOOD BY AUTOMATED COUNT: 18.1 % (ref 11.5–14.5)
ERYTHROCYTE [SEDIMENTATION RATE] IN BLOOD BY WESTERGREN METHOD: 57 MM/HR (ref 0–10)
EST. GFR  (NO RACE VARIABLE): 30.6 ML/MIN/1.73 M^2
EST. GFR  (NO RACE VARIABLE): 43.9 ML/MIN/1.73 M^2
GLUCOSE SERPL-MCNC: 168 MG/DL (ref 70–110)
GLUCOSE SERPL-MCNC: 193 MG/DL (ref 70–110)
HCT VFR BLD AUTO: 31.4 % (ref 40–54)
HGB BLD-MCNC: 10.2 G/DL (ref 14–18)
IMM GRANULOCYTES # BLD AUTO: 0.14 K/UL (ref 0–0.04)
IMM GRANULOCYTES NFR BLD AUTO: 1.3 % (ref 0–0.5)
LYMPHOCYTES # BLD AUTO: 1.2 K/UL (ref 1–4.8)
LYMPHOCYTES NFR BLD: 10.6 % (ref 18–48)
MAGNESIUM SERPL-MCNC: 1.5 MG/DL (ref 1.6–2.6)
MCH RBC QN AUTO: 26 PG (ref 27–31)
MCHC RBC AUTO-ENTMCNC: 32.5 G/DL (ref 32–36)
MCV RBC AUTO: 80 FL (ref 82–98)
MONOCYTES # BLD AUTO: 1.1 K/UL (ref 0.3–1)
MONOCYTES NFR BLD: 10.1 % (ref 4–15)
NEUTROPHILS # BLD AUTO: 8.2 K/UL (ref 1.8–7.7)
NEUTROPHILS NFR BLD: 75.9 % (ref 38–73)
NRBC BLD-RTO: 0 /100 WBC
PLATELET # BLD AUTO: 188 K/UL (ref 150–450)
PMV BLD AUTO: 10.1 FL (ref 9.2–12.9)
POCT GLUCOSE: 132 MG/DL (ref 70–110)
POCT GLUCOSE: 169 MG/DL (ref 70–110)
POTASSIUM SERPL-SCNC: 4.1 MMOL/L (ref 3.5–5.1)
POTASSIUM SERPL-SCNC: 4.6 MMOL/L (ref 3.5–5.1)
PROCALCITONIN SERPL IA-MCNC: 4.13 NG/ML (ref 0–0.5)
PROT SERPL-MCNC: 5.7 G/DL (ref 6–8.4)
RBC # BLD AUTO: 3.93 M/UL (ref 4.6–6.2)
SODIUM SERPL-SCNC: 135 MMOL/L (ref 136–145)
SODIUM SERPL-SCNC: 135 MMOL/L (ref 136–145)
WBC # BLD AUTO: 10.85 K/UL (ref 3.9–12.7)
WBC #/AREA STL HPF: NORMAL /[HPF]

## 2024-10-24 PROCEDURE — 99231 SBSQ HOSP IP/OBS SF/LOW 25: CPT | Mod: ,,, | Performed by: SURGERY

## 2024-10-24 PROCEDURE — 36415 COLL VENOUS BLD VENIPUNCTURE: CPT | Performed by: INTERNAL MEDICINE

## 2024-10-24 PROCEDURE — 25000003 PHARM REV CODE 250: Performed by: INTERNAL MEDICINE

## 2024-10-24 PROCEDURE — 80053 COMPREHEN METABOLIC PANEL: CPT | Performed by: INTERNAL MEDICINE

## 2024-10-24 PROCEDURE — 99223 1ST HOSP IP/OBS HIGH 75: CPT | Mod: ,,, | Performed by: INTERNAL MEDICINE

## 2024-10-24 PROCEDURE — 83735 ASSAY OF MAGNESIUM: CPT | Performed by: INTERNAL MEDICINE

## 2024-10-24 PROCEDURE — 63600175 PHARM REV CODE 636 W HCPCS: Performed by: EMERGENCY MEDICINE

## 2024-10-24 PROCEDURE — 63600175 PHARM REV CODE 636 W HCPCS: Performed by: INTERNAL MEDICINE

## 2024-10-24 PROCEDURE — 25500020 PHARM REV CODE 255: Performed by: HOSPITALIST

## 2024-10-24 PROCEDURE — 97165 OT EVAL LOW COMPLEX 30 MIN: CPT

## 2024-10-24 PROCEDURE — 87040 BLOOD CULTURE FOR BACTERIA: CPT | Performed by: INTERNAL MEDICINE

## 2024-10-24 PROCEDURE — 25000003 PHARM REV CODE 250: Performed by: HOSPITALIST

## 2024-10-24 PROCEDURE — 12000002 HC ACUTE/MED SURGE SEMI-PRIVATE ROOM

## 2024-10-24 PROCEDURE — 97530 THERAPEUTIC ACTIVITIES: CPT

## 2024-10-24 PROCEDURE — 97162 PT EVAL MOD COMPLEX 30 MIN: CPT

## 2024-10-24 PROCEDURE — 94761 N-INVAS EAR/PLS OXIMETRY MLT: CPT

## 2024-10-24 PROCEDURE — 86140 C-REACTIVE PROTEIN: CPT | Performed by: INTERNAL MEDICINE

## 2024-10-24 PROCEDURE — 84145 PROCALCITONIN (PCT): CPT | Performed by: HOSPITALIST

## 2024-10-24 PROCEDURE — 85651 RBC SED RATE NONAUTOMATED: CPT | Performed by: INTERNAL MEDICINE

## 2024-10-24 PROCEDURE — 97535 SELF CARE MNGMENT TRAINING: CPT

## 2024-10-24 PROCEDURE — 87186 SC STD MICRODIL/AGAR DIL: CPT | Performed by: INTERNAL MEDICINE

## 2024-10-24 PROCEDURE — 80048 BASIC METABOLIC PNL TOTAL CA: CPT | Performed by: INTERNAL MEDICINE

## 2024-10-24 PROCEDURE — 87154 CUL TYP ID BLD PTHGN 6+ TRGT: CPT | Performed by: INTERNAL MEDICINE

## 2024-10-24 PROCEDURE — 85025 COMPLETE CBC W/AUTO DIFF WBC: CPT | Performed by: INTERNAL MEDICINE

## 2024-10-24 PROCEDURE — 99900031 HC PATIENT EDUCATION (STAT)

## 2024-10-24 PROCEDURE — 87147 CULTURE TYPE IMMUNOLOGIC: CPT | Performed by: INTERNAL MEDICINE

## 2024-10-24 PROCEDURE — 36415 COLL VENOUS BLD VENIPUNCTURE: CPT | Performed by: HOSPITALIST

## 2024-10-24 PROCEDURE — 25000003 PHARM REV CODE 250: Performed by: STUDENT IN AN ORGANIZED HEALTH CARE EDUCATION/TRAINING PROGRAM

## 2024-10-24 PROCEDURE — 87077 CULTURE AEROBIC IDENTIFY: CPT | Performed by: INTERNAL MEDICINE

## 2024-10-24 RX ORDER — CEFAZOLIN SODIUM 1 G/3ML
2 INJECTION, POWDER, FOR SOLUTION INTRAMUSCULAR; INTRAVENOUS
Status: DISCONTINUED | OUTPATIENT
Start: 2024-10-24 | End: 2024-10-25

## 2024-10-24 RX ORDER — CITALOPRAM 10 MG/1
10 TABLET ORAL DAILY
Status: DISCONTINUED | OUTPATIENT
Start: 2024-10-24 | End: 2024-10-31 | Stop reason: HOSPADM

## 2024-10-24 RX ORDER — SODIUM CHLORIDE 9 MG/ML
INJECTION, SOLUTION INTRAVENOUS CONTINUOUS
Status: ACTIVE | OUTPATIENT
Start: 2024-10-24 | End: 2024-10-25

## 2024-10-24 RX ORDER — CEFTRIAXONE 2 G/1
2 INJECTION, POWDER, FOR SOLUTION INTRAMUSCULAR; INTRAVENOUS
Status: DISCONTINUED | OUTPATIENT
Start: 2024-10-24 | End: 2024-10-24

## 2024-10-24 RX ADMIN — SODIUM CHLORIDE: 9 INJECTION, SOLUTION INTRAVENOUS at 10:10

## 2024-10-24 RX ADMIN — ENOXAPARIN SODIUM 30 MG: 30 INJECTION SUBCUTANEOUS at 04:10

## 2024-10-24 RX ADMIN — PANTOPRAZOLE SODIUM 40 MG: 40 INJECTION, POWDER, LYOPHILIZED, FOR SOLUTION INTRAVENOUS at 08:10

## 2024-10-24 RX ADMIN — INSULIN GLARGINE 16 UNITS: 100 INJECTION, SOLUTION SUBCUTANEOUS at 09:10

## 2024-10-24 RX ADMIN — Medication 800 MG: at 08:10

## 2024-10-24 RX ADMIN — CITALOPRAM HYDROBROMIDE 10 MG: 10 TABLET ORAL at 12:10

## 2024-10-24 RX ADMIN — Medication 6 MG: at 10:10

## 2024-10-24 RX ADMIN — Medication 800 MG: at 05:10

## 2024-10-24 RX ADMIN — MUPIROCIN 1 G: 20 OINTMENT TOPICAL at 09:10

## 2024-10-24 RX ADMIN — SODIUM BICARBONATE: 84 INJECTION, SOLUTION INTRAVENOUS at 01:10

## 2024-10-24 RX ADMIN — MEROPENEM 500 MG: 500 INJECTION, POWDER, FOR SOLUTION INTRAVENOUS at 03:10

## 2024-10-24 RX ADMIN — CEFAZOLIN 2 G: 330 INJECTION, POWDER, FOR SOLUTION INTRAMUSCULAR; INTRAVENOUS at 10:10

## 2024-10-24 RX ADMIN — IOHEXOL 100 ML: 350 INJECTION, SOLUTION INTRAVENOUS at 08:10

## 2024-10-24 RX ADMIN — CEFAZOLIN 2 G: 330 INJECTION, POWDER, FOR SOLUTION INTRAMUSCULAR; INTRAVENOUS at 12:10

## 2024-10-24 RX ADMIN — LEVOTHYROXINE SODIUM 75 MCG: 0.03 TABLET ORAL at 05:10

## 2024-10-24 RX ADMIN — MUPIROCIN 1 G: 20 OINTMENT TOPICAL at 08:10

## 2024-10-24 NOTE — PLAN OF CARE
Plan of care reviewed with patient. Patient/family voiced understanding. Care ongoing.      Problem: Adult Inpatient Plan of Care  Goal: Plan of Care Review  Outcome: Progressing  Goal: Patient-Specific Goal (Individualized)  Outcome: Progressing  Goal: Absence of Hospital-Acquired Illness or Injury  Outcome: Progressing  Goal: Optimal Comfort and Wellbeing  Outcome: Progressing  Goal: Readiness for Transition of Care  Outcome: Progressing     Problem: Infection  Goal: Absence of Infection Signs and Symptoms  Outcome: Progressing     Problem: Sepsis/Septic Shock  Goal: Optimal Coping  Outcome: Progressing  Goal: Absence of Bleeding  Outcome: Progressing  Goal: Blood Glucose Level Within Targeted Range  Outcome: Progressing  Goal: Absence of Infection Signs and Symptoms  Outcome: Progressing  Goal: Optimal Nutrition Intake  Outcome: Progressing

## 2024-10-24 NOTE — PT/OT/SLP EVAL
Occupational Therapy   Evaluation    Name: Dhaval Hernández  MRN: [id number]  Admitting Diagnosis: Septic shock  Recent Surgery: * No surgery found *      Recommendations:     Discharge Recommendations: Moderate Intensity Therapy  Discharge Equipment Recommendations:  to be determined by next level of care  Barriers to discharge:   (increased assist with ADLs and mobility)    Assessment:     Dhaval Hernández is a 64 y.o. male with a medical diagnosis of Septic shock. Pt agreeable to OT evaluation this AM. Performance deficits affecting function: weakness, impaired endurance, impaired self care skills, impaired functional mobility, gait instability, impaired balance, decreased upper extremity function, decreased lower extremity function, decreased safety awareness, pain, decreased ROM, edema, impaired cardiopulmonary response to activity.      Rehab Prognosis: Fair; patient would benefit from acute skilled OT services to address these deficits and reach maximum level of function.       Plan:     Patient to be seen 5 x/week to address the above listed problems via self-care/home management, therapeutic activities, therapeutic exercises  Plan of Care Expires: 11/24/24  Plan of Care Reviewed with: patient    Subjective     Chief Complaint: pain to L elbow and R shoulder   Patient/Family Comments/goals: none stated    Occupational Profile:  Living Environment: Pt lives with spouse, son, and DIL in a 1 story home with a ramp to enter. Pt has a tub/shower combo with a TTB and standard height toilet.   Previous level of function: Mod I with w/c transfers and ADLs, until a few weeks ago where pt injured his L elbow and R shldr during t/f to toilet and has required increased assist with ADLs and mobility since.  Roles and Routines: ; father  Equipment Used at Home: wheelchair, bedside commode, bath bench, walker, rolling, prosthesis  Assistance upon Discharge: yes, from facility    Pain/Comfort:  Pain Rating 1:  (not  rated)  Location - Side 1: Left  Location 1: elbow  Pain Addressed 1: Reposition, Distraction, Cessation of Activity  Pain Rating 2:  (not rated)  Location - Side 2: Right  Location 2: shoulder  Pain Addressed 2: Cessation of Activity, Distraction, Reposition    Patients cultural, spiritual, Temple conflicts given the current situation:      Objective:     Communicated with: nursing prior to session.  Patient found up in chair with telemetry, peripheral IV, giron catheter upon OT entry to room.    General Precautions: Standard, fall  Orthopedic Precautions: N/A  Braces: N/A  Respiratory Status: Nasal cannula, flow 4 L/min    Occupational Performance:    Activities of Daily Living:  Grooming: setup assistance seated in chair to brush teeth and to wash face; increased time needed due to pain with BUE's    Toileting: giron      Cognitive/Visual Perceptual:  Cognitive/Psychosocial Skills:     -       Oriented to: Person, Place, Time, and Situation   -       Follows Commands/attention:Follows two-step commands  -       Communication: clear/fluent  -       Memory: No Deficits noted  -       Mood/Affect/Coping skills/emotional control: Appropriate to situation, Cooperative, and Pleasant    Physical Exam:  Upper Extremity Range of Motion:     -       Right Upper Extremity: ~90 degrees shldr flex (due to pain); WFL distally  -       Left Upper Extremity: ~ 90 degrees shldr flex; very limited ROM distally due to pain  Upper Extremity Strength:    -       Right Upper Extremity: NT due to pain  -       Left Upper Extremity: NT due to pain   Strength:    -       Right Upper Extremity: WFL  -       Left Upper Extremity: poor  (due to pain per pt)  Fine Motor Coordination:    -       Intact RUE  Gross motor coordination:   WFL    AMPAC 6 Click ADL:  AMPAC Total Score: 14    Treatment & Education:  Pt educated on role of OT/POC, importance of OOB/EOB activity, use of call bell, and safety during ADLs, transfers, and  functional mobility.    Patient left up in chair with all lines intact and call button in reach    GOALS:   Multidisciplinary Problems       Occupational Therapy Goals          Problem: Occupational Therapy    Goal Priority Disciplines Outcome Interventions   Occupational Therapy Goal     OT, PT/OT     Description: Goals to be met by: 11/24/24     Patient will increase functional independence with ADLs by performing:    UE Dressing with Set-up Assistance.  LE Dressing with Set-up Assistance.  Grooming while seated with Supervision.  Toileting from bedside commode with Moderate Assistance for hygiene and clothing management.   Toilet transfer to bedside commode with Moderate Assistance.                         History:     Past Medical History:   Diagnosis Date    Diabetes mellitus, type 2     GERD (gastroesophageal reflux disease)     Hyperlipidemia     Hypertension          Past Surgical History:   Procedure Laterality Date    FRACTURE SURGERY      INSERTION OF PACEMAKER      LEG AMPUTATION         Time Tracking:     OT Date of Treatment: 10/24/24  OT Start Time: 1042  OT Stop Time: 1057  OT Total Time (min): 15 min    Billable Minutes:Evaluation 7  Self Care/Home Management 8    10/24/2024

## 2024-10-24 NOTE — PLAN OF CARE
Plan of care and education reviewed with patient. Verbalization of understanding expressed. Pt's citalopram resumed. Imaging studies of upper extremities orders per ID. Pt states that he continues to note improvement. IV antibiotic changed from merrem to 2g ceftriaxone per ID.   Problem: Infection  Goal: Absence of Infection Signs and Symptoms  Outcome: Progressing     Problem: Adult Inpatient Plan of Care  Goal: Plan of Care Review  Outcome: Progressing  Goal: Patient-Specific Goal (Individualized)  Outcome: Progressing  Goal: Absence of Hospital-Acquired Illness or Injury  Outcome: Progressing  Goal: Optimal Comfort and Wellbeing  Outcome: Progressing  Goal: Readiness for Transition of Care  Outcome: Progressing     Problem: Sepsis/Septic Shock  Goal: Optimal Coping  Outcome: Progressing  Goal: Absence of Bleeding  Outcome: Progressing  Goal: Blood Glucose Level Within Targeted Range  Outcome: Progressing  Goal: Absence of Infection Signs and Symptoms  Outcome: Progressing  Goal: Optimal Nutrition Intake  Outcome: Progressing     Problem: Acute Kidney Injury/Impairment  Goal: Fluid and Electrolyte Balance  Outcome: Progressing  Goal: Improved Oral Intake  Outcome: Progressing  Goal: Effective Renal Function  Outcome: Progressing     Problem: Pneumonia  Goal: Fluid Balance  Outcome: Progressing  Goal: Resolution of Infection Signs and Symptoms  Outcome: Progressing  Goal: Effective Oxygenation and Ventilation  Outcome: Progressing     Problem: Diabetes Comorbidity  Goal: Blood Glucose Level Within Targeted Range  Outcome: Progressing     Problem: Skin Injury Risk Increased  Goal: Skin Health and Integrity  Outcome: Progressing

## 2024-10-24 NOTE — CARE UPDATE
10/23/24 2032   Patient Assessment/Suction   Level of Consciousness (AVPU) alert   Respiratory Effort Unlabored   Expansion/Accessory Muscles/Retractions tracheal tugging   All Lung Fields Breath Sounds coarse;diminished   Rhythm/Pattern, Respiratory depth regular;pattern regular   Cough Frequency infrequent   Cough Type good;nonproductive   PRE-TX-O2   Device (Oxygen Therapy) nasal cannula   $ Is the patient on Low Flow Oxygen? Yes   Flow (L/min) (Oxygen Therapy) 2   SpO2 (!) 93 %   Pulse Oximetry Type Continuous   $ Pulse Oximetry - Multiple Charge Pulse Oximetry - Multiple   Pulse 70   Resp 14   BP (!) 108/58   Education   $ Education Oxygen;15 min

## 2024-10-24 NOTE — PLAN OF CARE
LOCET called in  PASSR faxed per protocol  Awaiting 142 from the state at this time     10/24/24 8097   Post-Acute Status   Post-Acute Authorization Placement

## 2024-10-24 NOTE — CONSULTS
GASTROENTEROLOGY INPATIENT CONSULT NOTE  Patient Name: Dhaval Hernández  Patient MRN: [id number]  Patient : 1960    Admit Date: 10/22/2024  Service date: 10/24/2024    Reason for Consult: diarrhea, heme occult stool    PCP: Jessie Jung MD    Chief Complaint   Patient presents with    Numbness     Pt is complaining of left sided numbness and tingling in arm started at 9 am.         HPI: Patient is a 64 y.o. maleadmitted with n/v/d.  Symptoms have resolved.  Found to have staph sepsis.  No complaints.    Past Medical History:  Past Medical History:   Diagnosis Date    Diabetes mellitus, type 2     GERD (gastroesophageal reflux disease)     Hyperlipidemia     Hypertension         Past Surgical History:  Past Surgical History:   Procedure Laterality Date    FRACTURE SURGERY      INSERTION OF PACEMAKER      LEG AMPUTATION          Home Medications:  Medications Prior to Admission   Medication Sig Dispense Refill Last Dose/Taking    amiodarone (PACERONE) 200 MG Tab Take 1 tablet (200 mg total) by mouth once daily. 30 tablet 11 10/22/2024 Morning    amLODIPine (NORVASC) 5 MG tablet Take 1 tablet (5 mg total) by mouth once daily. 90 tablet 1 10/22/2024 Morning    aspirin (ECOTRIN) 81 MG EC tablet Take 81 mg by mouth once daily.   10/22/2024 Morning    carvediloL (COREG) 12.5 MG tablet Take 1 tablet (12.5 mg total) by mouth 2 (two) times daily. (Patient taking differently: Take 25 mg by mouth once daily.) 180 tablet 3 10/22/2024 Morning    cholecalciferol, vitamin D3, (VITAMIN D3) 125 mcg (5,000 unit) Tab Take 5,000 Units by mouth once daily.   10/22/2024    empagliflozin (JARDIANCE) 25 mg tablet Take 1 tablet (25 mg total) by mouth once daily. 90 tablet 0 10/22/2024 Morning    ezetimibe (ZETIA) 10 mg tablet Take 10 mg by mouth.   10/22/2024    famotidine (PEPCID) 20 MG tablet Take 20 mg by mouth 2 (two) times daily.   10/22/2024 Morning    insulin glargine U-100, Lantus, (LANTUS SOLOSTAR U-100 INSULIN) 100  unit/mL (3 mL) InPn pen Inject 16 Units into the skin every evening. 14.4 mL 1 10/21/2024    JANUVIA 100 mg Tab Take 1 tablet (100 mg total) by mouth once daily. 90 tablet 1 10/22/2024 Morning    levothyroxine (SYNTHROID) 75 MCG tablet Take 1 tablet (75 mcg total) by mouth every morning. 90 tablet 1 10/22/2024 Morning    losartan (COZAAR) 100 MG tablet Take 1 tablet (100 mg total) by mouth once daily. 90 tablet 3 10/22/2024    metFORMIN (GLUCOPHAGE) 1000 MG tablet Take 1 tablet (1,000 mg total) by mouth 2 (two) times daily with meals. 180 tablet 1 10/22/2024 Morning    omega 3-dha-epa-fish oil (FISH OIL) 1,200 (144-216) mg Cap Take 1 capsule by mouth once daily.   10/22/2024 Morning    spironolactone (ALDACTONE) 25 MG tablet Take 0.5 tablets (12.5 mg total) by mouth once daily. 45 tablet 3 10/22/2024 Morning    atorvastatin (LIPITOR) 80 MG tablet Take 1 tablet (80 mg total) by mouth every evening. 90 tablet 3     cholecalciferol, vitamin D3, 1,250 mcg (50,000 unit) capsule Take 50,000 Int'l Units by mouth.       ciclopirox (PENLAC) 8 % Soln Apply topically nightly. 6.6 mL 5     EScitalopram oxalate (LEXAPRO) 10 MG tablet Take 1 tablet (10 mg total) by mouth once daily. 90 tablet 3     multivitamin (THERAGRAN) per tablet Take 1 tablet by mouth.          Inpatient Medications:   ceFAZolin  2 g Intravenous Q12H    citalopram  10 mg Oral Daily    enoxparin  30 mg Subcutaneous Daily    insulin glargine U-100  16 Units Subcutaneous QHS    levothyroxine  75 mcg Oral Before breakfast    mupirocin   Nasal BID    pantoprazole  40 mg Intravenous Daily       Current Facility-Administered Medications:     acetaminophen, 650 mg, Oral, Q8H PRN    acetaminophen, 650 mg, Oral, Q4H PRN    aluminum-magnesium hydroxide-simethicone, 30 mL, Oral, QID PRN    dextrose 50%, 12.5 g, Intravenous, PRN    dextrose 50%, 25 g, Intravenous, PRN    glucagon (human recombinant), 1 mg, Intramuscular, PRN    glucose, 16 g, Oral, PRN    glucose, 24 g,  "Oral, PRN    insulin aspart U-100, 0-10 Units, Subcutaneous, QID (AC + HS) PRN    magnesium oxide, 800 mg, Oral, PRN    magnesium oxide, 800 mg, Oral, PRN    melatonin, 6 mg, Oral, Nightly PRN    naloxone, 0.02 mg, Intravenous, PRN    ondansetron, 4 mg, Intravenous, Q6H PRN    potassium bicarbonate, 35 mEq, Oral, PRN    potassium bicarbonate, 50 mEq, Oral, PRN    potassium bicarbonate, 60 mEq, Oral, PRN    potassium, sodium phosphates, 2 packet, Oral, PRN    potassium, sodium phosphates, 2 packet, Oral, PRN    potassium, sodium phosphates, 2 packet, Oral, PRN    Review of patient's allergies indicates:   Allergen Reactions    Fish containing products      Other reaction(s): .       Social History:   Social History     Occupational History    Not on file   Tobacco Use    Smoking status: Some Days     Types: Cigarettes    Smokeless tobacco: Never   Substance and Sexual Activity    Alcohol use: Not on file    Drug use: Not on file    Sexual activity: Not on file       Family History:   Family History   Problem Relation Name Age of Onset    Arthritis Mother         Review of Systems:  A 10 point review of systems was performed and was normal, except as mentioned in the HPI, including constitutional, HEENT, heme, lymph, cardiovascular, respiratory, gastrointestinal, genitourinary, neurologic, endocrine, psychiatric and musculoskeletal.      OBJECTIVE:    Physical Exam:  24 Hour Vital Sign Ranges: Temp:  [98.2 °F (36.8 °C)-98.7 °F (37.1 °C)] 98.7 °F (37.1 °C)  Pulse:  [67-76] 76  Resp:  [11-30] 30  SpO2:  [86 %-97 %] 96 %  BP: (101-165)/(54-79) 146/64  Most recent vitals: BP (!) 146/64   Pulse 76   Temp 98.7 °F (37.1 °C) (Axillary)   Resp (!) 30   Ht 5' 6" (1.676 m)   Wt 104.6 kg (230 lb 9.6 oz)   SpO2 96%   BMI 37.22 kg/m²    GEN: well-developed, well-nourished, awake and alert, non-toxic appearing adult  HEENT: PERRL, sclera anicteric, oral mucosa pink and moist without lesion  NECK: trachea midline; Good " "ROM  CV: regular rate and rhythm, no murmurs or gallops  RESP: clear to auscultation bilaterally, no wheezes, rhonci or rales  ABD: soft, non-tender, non-distended, normal bowel sounds  EXT: no swelling or edema, 2+ pulses distally  SKIN: no rashes or jaundice  PSYCH: normal affect    Labs:   Recent Labs     10/22/24  1548 10/23/24  0443 10/24/24  0333   WBC 18.57* 11.77 10.85   MCV 86 83 80*    201 188     Recent Labs     10/23/24  0443 10/24/24  0333 10/24/24  1405   * 135* 135*   K 4.6 4.1 4.6    101 98   CO2 17* 25 28   BUN 63* 45* 38*   * 168* 193*     No results for input(s): "ALB" in the last 72 hours.    Invalid input(s): "ALKP", "SGOT", "SGPT", "TBIL", "DBIL", "TPRO"  No results for input(s): "PT", "INR", "PTT" in the last 72 hours.      Radiology Review:  X-ray Shoulder 2 or More Views Right   Final Result      No acute osseous abnormality.      If there is concern for rotator cuff injury, MRI could be performed.         Electronically signed by: Gagandeep Caban   Date:    10/24/2024   Time:    15:44      X-Ray Elbow 2 Views Left   Final Result      Postsurgical changes of left elbow as discussed above, with posterior left elbow and left forearm soft tissue swelling.         Electronically signed by: Bay Vance   Date:    10/24/2024   Time:    15:51      NM Hepatobiliary Scan (HIDA)   Final Result      Abnormal study.  Gallbladder is not visualized after 60 minutes.  No delayed imaging is available at time of dictation.  Correlate for acute or chronic cholecystitis.         Electronically signed by: Kevon Cordova   Date:    10/22/2024   Time:    21:05      US Abdomen Limited   Final Result      1. Limited examination as above.   2. The gallbladder is markedly contracted.  Echogenic focus within its lumen is consistent with a gallstone or gallbladder polyp.  No definite evidence of cholecystitis.   3. Benign-appearing septated cyst at the lower pole of the right kidney.       "   Electronically signed by: Walt Sullivan   Date:    10/22/2024   Time:    16:45      CT Abdomen Pelvis  Without Contrast   Final Result      Negative for hydronephrosis, urolithiasis, or other acute abnormality.      Mild cardiomegaly with multivessel coronary artery calcification      Cholelithiasis      Air within the gallbladder with Mercado catheter with mild wall thickening         Electronically signed by: Ros Carpenter   Date:    10/22/2024   Time:    15:28      X-Ray Chest AP Portable   Final Result      Hypoinflation with cardiomegaly and faint airspace disease in the lung bases suggestive of atelectasis versus infiltrate         Electronically signed by: Ros Carpenter   Date:    10/22/2024   Time:    12:23      CT Arm Elbow W W/O Contrast Left    (Results Pending)         IMPRESSION / RECOMMENDATIONS:  Heme occult positive stool  Septic arthritis  Resolved n/v/d  -follow up outpatient for colonoscopy for FOBT + stool  -pt has my contact information to make appt.    Thank you for this consult.    Dhruv Reyes  10/24/2024  4:37 PM

## 2024-10-24 NOTE — CONSULTS
[facility]   Department of Infectious Disease  Consult Note        PATIENT NAME: Dhaval Hernández  MRN: [id number]  TODAY'S DATE: 10/24/2024  ADMIT DATE: 10/22/2024  LOS: 2 days    CHIEF COMPLAINT: Numbness (Pt is complaining of left sided numbness and tingling in arm started at 9 am./)      PRINCIPLE PROBLEM: Septic shock    REASON FOR CONSULT:     ASSESSMENT and PLAN     MSSA bacteremia.  Repeat blood cultures x2 sets.  Check TTE.  DC vancomycin and ertapenem and start cefazolin 2 g Q 12 hours adjusted for his GFR.      2.   SIRIA on CKD.  Improving.  Management as per nephrologist     3.   DM    4. Left elbow abscess.  Concern for underlying osteomyelitis in the patient who has a hardware.  Check x-ray and CT scan.  Consult Orthopedic surgery to evaluate for I&D.    5. Right shoulder pain.  This is chronic associated with weakness.  May have arthritis and need to keep in mind rotator cuff injury.  Check x-ray for now.  Orthopedic surgery to address as well.    RECOMMENDATIONS:   Repeat blood cultures x2 sets   Check TTE  DC vancomycin and meropenem   Start cefazolin 2 g Q 24  Check x-ray and CT scan left elbow   Also check x-ray right shoulder    Thank you for this consult. Please send [employer] secure chat with any questions.    Antibiotics (From admission, onward)      Start     Stop Route Frequency Ordered    10/24/24 1000  cefTRIAXone injection 2 g         -- IV Every 24 hours (non-standard times) 10/24/24 0856    10/22/24 2100  mupirocin 2 % ointment         10/27/24 2059 Nasl 2 times daily 10/22/24 1650          Antifungals (From admission, onward)      None           Antivirals (From admission, onward)      None            HPI      Dhaval Hernández is a 64 y.o. male with history of diabetes mellitus, hypertension, hyperlipidemia, GERD and previous left BKA.  Also with history of AICD.  He presents to the ER 10/22/2024 with 4 day history of nausea, vomiting and diarrhea.  He had no fever.  In the  ER vitals were abnormal with blood pressure 75/39, pulse 60, respiratory 18, temperature 97.6° oxygen saturation 92%.  WBC was 20 K with left shift.  Creatinine 5.1.  UA abnormal with 19 WBC and 4+ glucose.  Chest x-ray with no clear acute infiltrate.  CT abdomen and pelvis showed mild cardiomegaly and also cholelithiasis.  He was commenced on IV fluid and antibiotics.    Leukocytosis resolved and WBC down to 8.0.  Blood cultures have grown MSSA in 2/2 bottles.  HIDA scan was abnormal.  Surgery was consulted to evaluate for possible cholecystitis.  Notes reviewed.  No plan for surgical intervention at this time since patient had no abdominal pain and appears to be improving.  ID asked to assist with his care.    States his symptoms started with his left elbow hurting.  He has been leaning on the left elbow to transfer from bed to wheelchair.  Has a elbow ORIF and is fixed in a flexion position.  Has had persistent pain for the last 1 week with swelling.  His other symptoms followed after the elbow pain started.      Antibiotic history:  Vancomycin: 10/22/2024-  Ertapenem: 10/22/2024 the      Microbiology:    Blood culture 10/22/2024: MSSA 2/2    Social History  Marital Status:   Alcohol History:  has no history on file for alcohol use.  Tobacco History:  reports that he has been smoking cigarettes. He has never used smokeless tobacco.  Drug History:  has no history on file for drug use.      Review of patient's allergies indicates:   Allergen Reactions    Fish containing products      Other reaction(s): .     Past Medical History:   Diagnosis Date    Diabetes mellitus, type 2     GERD (gastroesophageal reflux disease)     Hyperlipidemia     Hypertension      Past Surgical History:   Procedure Laterality Date    FRACTURE SURGERY      INSERTION OF PACEMAKER      LEG AMPUTATION       Family History   Problem Relation Name Age of Onset    Arthritis Mother         SUBJECTIVE     Review of systems: 10 system review  unremarkable.  As in HPI.     OBJECTIVE   Temp:  [98.2 °F (36.8 °C)-98.8 °F (37.1 °C)] 98.2 °F (36.8 °C)  Pulse:  [66-74] 70  Resp:  [11-22] 22  SpO2:  [86 %-98 %] 93 %  BP: (101-159)/(53-71) 159/71  Temp:  [98.2 °F (36.8 °C)-98.8 °F (37.1 °C)]   Temp: 98.2 °F (36.8 °C) (10/24/24 0400)  Pulse: 70 (10/24/24 0727)  Resp: (!) 22 (10/24/24 0727)  BP: (!) 159/71 (10/24/24 0600)  SpO2: (!) 93 % (10/24/24 0727)    Intake/Output Summary (Last 24 hours) at 10/24/2024 0917  Last data filed at 10/24/2024 0633  Gross per 24 hour   Intake 4806.67 ml   Output 3780 ml   Net 1026.67 ml       Physical Exam  General:  Elderly man sitting in chair.  Left upper extremity:  Flexion contracture of the elbow.  Erythema with fluctuance and tenderness of elbow.    CVS: S1 and 2 heard, no murmurs appreciated   Respiratory: Clear to auscultation   Abdomen: Full, soft, nontender, no palpable organomegaly   Skin: No rash appreciated   CNS: No focal deficits   Musculoskeletal system: No joint or bony abnormalities appreciated  Psych: Good mood, normal affect.     VAD:  ISOLATION:  Now    Wounds:  None    Significant Labs: All pertinent labs within the past 24 hours have been reviewed.    CBC LAST 7  Recent Labs   Lab 10/22/24  1144 10/22/24  1423 10/22/24  1548 10/23/24  0443 10/23/24  0456 10/24/24  0333   WBC 20.63*  --  18.57* 11.77  --  10.85   RBC 3.76*  --  3.81* 3.87*  --  3.93*   HGB 10.2*  --  9.9* 10.1*  --  10.2*   HCT 33.0* 32* 32.9* 32.0* 31* 31.4*   MCV 88  --  86 83  --  80*   MCH 27.1  --  26.0* 26.1*  --  26.0*   MCHC 30.9*  --  30.1* 31.6*  --  32.5   RDW 18.6*  --  18.6* 18.4*  --  18.1*     --  211 201  --  188   MPV 10.5  --  10.9 10.2  --  10.1   GRAN 78.7*  16.2*  --  82.7*  15.4* 81.2*  9.6*  --  75.9*  8.2*   LYMPH 6.2*  1.3  --  4.8*  0.9* 6.3*  0.7*  --  10.6*  1.2   MONO 12.4  2.6*  --  9.0  1.7* 11.3  1.3*  --  10.1  1.1*   BASO 0.04  --  0.04 0.04  --  0.02   NRBC 0  --  0 0  --  0       CHEMISTRY  "LAST 7  Recent Labs   Lab 10/22/24  1144 10/22/24  1423 10/22/24  1548 10/22/24  1822 10/23/24  0003 10/23/24  0443 10/23/24  0456 10/24/24  0333   *  --  128*  --   --  132*  --  135*   K 5.7*  --  5.7* 5.2* 4.7 4.6  --  4.1     --  100  --   --  101  --  101   CO2 12*  --  13*  --   --  17*  --  25   ANIONGAP 18*  --  15  --   --  14  --  9   BUN 71*  --  64*  --   --  63*  --  45*   CREATININE 5.1*  --  4.5*  --   --  3.8*  --  2.3*   *  --  208*  --   --  126*  --  168*   CALCIUM 8.0*  --  7.9*  --   --  8.1*  --  8.0*   PH  --  7.263*  --   --   --   --  7.371  --    MG 1.6  --   --   --   --  1.4*  --  1.5*   ALBUMIN 3.1*  --   --   --   --  2.9*  --  2.9*   PROT 5.9*  --   --   --   --  5.9*  --  5.7*   ALKPHOS 68  --   --   --   --  74  --  76   ALT 69*  --   --   --   --  73*  --  60*   AST 58*  --   --   --   --  69*  --  63*   BILITOT 0.6  --   --   --   --  0.6  --  0.7       Estimated Creatinine Clearance: 35.8 mL/min (A) (based on SCr of 2.3 mg/dL (H)).    INFLAMMATORY/PROCAL  LAST 7  Recent Labs   Lab 10/22/24  1146 10/24/24  0333   PROCAL 9.377* 4.129*     No results found for: "ESR"  No results found for: "CRP"    PRIOR  MICROBIOLOGY:  Reviewed    Susceptibility data from last 90 days.  Collected Specimen Info Organism   10/22/24 Blood from Peripheral, Hand, Right Staphylococcus aureus   10/22/24 Blood from Peripheral, Hand, Left Staphylococcus aureus       LAST 7 DAYS MICROBIOLOGY   Microbiology Results (last 7 days)       Procedure Component Value Units Date/Time    Urine culture [[id number]] Collected: 10/22/24 1514    Order Status: Completed Specimen: Urine Updated: 10/24/24 0808     Urine Culture, Routine No growth to date    Narrative:      Specimen Source->Urine    Blood culture x two cultures. Draw prior to antibiotics. [[id number]]  (Abnormal) Collected: 10/22/24 1136    Order Status: Completed Specimen: Blood from Peripheral, Hand, Left Updated: 10/24/24 0716     Blood " Culture, Routine Gram stain aer bottle: Gram positive cocci      Positive results previously called on Order #[id number]      STAPHYLOCOCCUS AUREUS  ID consult required for Staph aureus bacteremia.  For susceptibility see order #[id number]      Narrative:      Aerobic and anaerobic  Collection has been rescheduled by [email or web address] at 10/22/2024 11:44 Reason:   Done  Collection has been rescheduled by [email or web address] at 10/22/2024 11:44 Reason:   Done    Blood culture x two cultures. Draw prior to antibiotics. [[id number]]  (Abnormal) Collected: 10/22/24 1143    Order Status: Completed Specimen: Blood from Peripheral, Hand, Right Updated: 10/24/24 0715     Blood Culture, Routine Gram stain aer bottle: Gram positive cocci      Results called to and read back by:Tiffani Moulton RN-1ICU;      10/23/2024  07:54 CJD      STAPHYLOCOCCUS AUREUS  ID consult required for Staph aureus bacteremia.  susceptibility pending      Narrative:      Aerobic and anaerobic  Collection has been rescheduled by ZCORBIN at 10/22/2024 11:44 Reason:   Done  Collection has been rescheduled by Z at 10/22/2024 11:44 Reason:   Done    Stool culture **cannot be ordered stat** [[id number]] Collected: 10/23/24 1357    Order Status: Sent Specimen: Stool Updated: 10/23/24 1417    Rapid Organism ID by PCR (from Blood culture) [[id number]]  (Abnormal) Collected: 10/22/24 1143    Order Status: Completed Updated: 10/23/24 0918     Enterococcus faecalis Not Detected     Enterococcus faecium Not Detected     Listeria monocytogenes Not Detected     Staphylococcus spp. See species for ID     Staphylococcus aureus Detected     Staphylococcus epidermidis Not Detected     Staphylococcus lugdunensis Not Detected     Streptococcus species Not Detected     Streptococcus agalactiae Not Detected     Streptococcus pneumoniae Not Detected     Streptococcus pyogenes Not Detected     Acinetobacter calcoaceticus/baumannii complex Not Detected     Bacteroides fragilis Not Detected      Enterobacterales Not Detected     Enterobacter cloacae complex Not Detected     Escherichia coli Not Detected     Klebsiella aerogenes Not Detected     Klebsiella oxytoca Not Detected     Klebsiella pneumoniae group Not Detected     Proteus Not Detected     Salmonella sp Not Detected     Serratia marcescens Not Detected     Haemophilus influenzae Not Detected     Neisseria meningtidis Not Detected     Pseudomonas aeruginosa Not Detected     Stenotrophomonas maltophilia Not Detected     Candida albicans Not Detected     Candida auris Not Detected     Candida glabrata Not Detected     Candida krusei Not Detected     Candida parapsilosis Not Detected     Candida tropicalis Not Detected     Cryptococcus neoformans/gattii Not Detected     CTX-M (ESBL ) Test not applicable     IMP (Carbapenem resistant) Test not applicable     KPC resistance gene (Carbapenem resistant) Test not applicable     mcr-1  Test not applicable     mec A/C  Test not applicable     mec A/C and MREJ (MRSA) gene Not Detected     NDM (Carbapenem resistant) Test not applicable     OXA-48-like (Carbapenem resistant) Test not applicable     van A/B (VRE gene) Test not applicable     VIM (Carbapenem resistant) Test not applicable    Narrative:      Aerobic and anaerobic          CURRENT/PREVIOUS VISIT EKG  Results for orders placed or performed during the hospital encounter of 10/22/24   EKG 12-lead    Collection Time: 10/22/24 11:45 AM   Result Value Ref Range    QRS Duration 90 ms    OHS QTC Calculation 428 ms    Narrative    Test Reason : I95.9,    Vent. Rate : 056 BPM     Atrial Rate : 056 BPM     P-R Int : 306 ms          QRS Dur : 090 ms      QT Int : 444 ms       P-R-T Axes : 031 -27 034 degrees     QTc Int : 428 ms    Sinus bradycardia with 1st degree A-V block  Low voltage QRS  Inferior infarct (cited on or before 17-JAN-2023)  Possible Anterolateral infarct (cited on or before 12-APR-2024)  Abnormal ECG  When compared with ECG of  12-APR-2024 09:00,  No significant change was found    Referred By: [email or web address]   SELF           Confirmed By:        Significant Imaging: I have reviewed all relevant and available imaging results/findings within the past 24 hours.    I spent a total of 70 minutes on the day of the visit.This includes face to face time and non-face to face time preparing to see the patient (eg, review of tests), obtaining and/or reviewing separately obtained history, documenting clinical information in the electronic or other health record, independently interpreting results and communicating results to the patient/family/caregiver, or care coordinator.    Migel Watson MD  Date of Service: 10/24/2024      This note was created using [email or web address] voice recognition software that occasionally misinterpreted phrases or words.

## 2024-10-24 NOTE — PLAN OF CARE
SW met with pt at bedside to discuss placement at a skilled nursing facility. Pt stated that he will need to check with his spouse. SW offered to send spouse an electronic list via [employer].    4:24p: List sent to pt's wife. Pt's spouse informed.     10/24/24 1616   Post-Acute Status   Post-Acute Authorization Placement   Post-Acute Placement Status Patient List Provided   Patient choice form signed by patient/caregiver List with quality metrics by geographic area provided   Discharge Delays (!) Post-Acute Set-up   Discharge Plan   Discharge Plan A Skilled Nursing Facility   Discharge Plan B Skilled Nursing Facility

## 2024-10-24 NOTE — CARE UPDATE
10/23/24 2032   Patient Assessment/Suction   Level of Consciousness (AVPU) alert   Respiratory Effort Unlabored   Expansion/Accessory Muscles/Retractions expansion symmetric   All Lung Fields Breath Sounds coarse;diminished   Rhythm/Pattern, Respiratory depth regular;pattern regular   Cough Frequency infrequent   Cough Type good;nonproductive   PRE-TX-O2   Device (Oxygen Therapy) nasal cannula   $ Is the patient on Low Flow Oxygen? Yes   Flow (L/min) (Oxygen Therapy) 2   SpO2 (!) 93 %   Pulse Oximetry Type Continuous   $ Pulse Oximetry - Multiple Charge Pulse Oximetry - Multiple   Pulse 70   Resp 14   BP (!) 108/58   Education   $ Education Oxygen;15 min

## 2024-10-24 NOTE — PROGRESS NOTES
General Surgery Progress Note    Admit Date: 10/22/2024  S/P: * No surgery found *    Post-operative Day:      Hospital Day: 3    SUBJECTIVE:   Tolerated diet without abdominal pain, nausea or vomiting.  Diarrhea has improved.  Afebrile. Cultures showing staph aureus. Urine output stable and renal function improved.     OBJECTIVE:     Vital Signs (Most Recent)  Temp:  [98.2 °F (36.8 °C)-98.8 °F (37.1 °C)] 98.6 °F (37 °C)  Pulse:  [67-74] 74  Resp:  [11-27] 26  SpO2:  [86 %-96 %] 92 %  BP: (101-159)/(53-79) 155/68    I&Os:  I/O last 3 completed shifts:  In: 5106.7 [P.O.:1265; I.V.:3841.7]  Out: 6480 [Urine:6480]    Physical Exam:  Gen: NAD, AAOx3  HEENT: Anicteric sclera  Pulm: unlabored, symmetrical   Abd: Soft, nontender, nondistended    Laboratory:  CBC:   Recent Labs   Lab 10/24/24  0333   WBC 10.85   RBC 3.93*   HGB 10.2*   HCT 31.4*      MCV 80*   MCH 26.0*   MCHC 32.5     CMP:   Recent Labs   Lab 10/24/24  0333   *   CALCIUM 8.0*   ALBUMIN 2.9*   PROT 5.7*   *   K 4.1   CO2 25      BUN 45*   CREATININE 2.3*   ALKPHOS 76   ALT 60*   AST 63*   BILITOT 0.7     Labs within the past 24 hours have been reviewed.    ASSESSMENT/PLAN:     Patient Active Problem List    Diagnosis Date Noted    Tobacco dependency 10/22/2024    Septic shock 10/22/2024    High anion gap metabolic acidosis 10/22/2024    Acute pneumonia 10/22/2024    SIRIA (acute kidney injury) 10/22/2024    Hyperkalemia 10/22/2024    Atrial fibrillation, chronic 10/22/2024    ICD (implantable cardioverter-defibrillator) in place 10/01/2024    Severe obesity (BMI 35.0-39.9) with comorbidity 10/01/2024    History of stroke 10/01/2024    Mixed hyperlipidemia 10/01/2024    Encounter to discuss test results 10/01/2024    Cardiac pacemaker 12/11/2023    Atherosclerosis of aorta 12/11/2023    Status post unilateral below-knee amputation 10/30/2023    Type 2 diabetes mellitus with stage 2 chronic kidney disease, without long-term current use  of insulin 10/30/2023    BMI 31.0-31.9,adult 10/30/2023    Depression 10/30/2023    Diabetes mellitus due to underlying condition with diabetic chronic kidney disease 10/30/2023    Hypertension          64 y.o. male with a history of nausea, vomiting, diarrhea admitted with sepsis, acute kidney injury concerns for cholecystitis  -cultures growing staph aureus, possible source is left elbow, imaging being obtained, may need orthopedic consult  -low concern for biliary source for his current situation, continue diet as tolerated

## 2024-10-24 NOTE — PT/OT/SLP EVAL
"Physical Therapy Evaluation    Patient Name:  Dhaval Hernández   MRN:  [id number]    Recommendations:     Discharge Recommendations: Moderate Intensity Therapy   Discharge Equipment Recommendations: to be determined by next level of care   Barriers to discharge:  Increased caregiver burden of care    Assessment:     Dhaval Hernández is a 64 y.o. male admitted with a medical diagnosis of Septic shock.  He presents with the following impairments/functional limitations: weakness, impaired endurance, impaired self care skills, impaired functional mobility, decreased upper extremity function, decreased lower extremity function, pain, decreased safety awareness, decreased ROM .    Pt presented in supine and was eager to t/f OOB. He appeared depressed about his decline in functional mobility. At his baseline he t/f'ed bed to  with Mod I  ,but a few weeks before this admit "My son picked me up and put me in the chair." Pt's L UE rested in elbow flexion and shoulder IR. Pt requires Max A x2 for bed mobility and for t/f bed to chair. Pt requested to attempt t/f to chair with stand pivot maneuver, but pt was unable to t/f to stand on his weak R LE. Pt had L AKA in 2017 and  was fitted for  a prosthesis but "Nobody ever  worked with me ," for training with  prosthesis..    Rehab Prognosis: Fair; patient would benefit from acute skilled PT services to address these deficits and reach maximum level of function.    Recent Surgery: * No surgery found *      Plan:     During this hospitalization, patient to be seen 5 x/week to address the identified rehab impairments via therapeutic activities, therapeutic exercises and progress toward the following goals:    Plan of Care Expires:  11/24/24    Subjective     Chief Complaint: decline in functional mobility due to weakness  Patient/Family Comments/goals: Return to PLOF  Pain/Comfort:  Pain Rating 1: 4/10  Location - Side 1: Left  Location 1: elbow  Pain Addressed 1: Reposition, " Distraction, Cessation of Activity  Pain Rating 2: 3/10  Location - Side 2: Right  Location 2: shoulder  Pain Addressed 2: Reposition, Distraction, Cessation of Activity    Patients cultural, spiritual, [employer]ec[employer]alism conflicts given the current situation:      Living Environment:  Pt lives at home with his son  and daughter -in-law in a [facility] with ramp access.  Prior to admission, patients level of function was Mod I for t/f bed<>WC and Mod I for bathing and dressing. However pt  was  requiring assistance  2 weeks PTA..  Equipment used at home: wheelchair, shower chair.  DME owned (not currently used): rolling walker and bedside commode.  Upon discharge, patient will have assistance from facility /his family.    Objective:     Communicated with nurse prior to session.  Patient found supine with bed alarm, PureWick, telemetry  upon PT entry to room.    General Precautions: Standard, fall  Orthopedic Precautions:    Braces:    Respiratory Status: Room air    Exams:  Cognitive Exam:  Patient is oriented to Person, Place, and Situation  RLE ROM: WFL  RLE Strength: 3+/5  LLE ROM: WFL  LLE Strength: WFL    Functional Mobility:  Bed Mobility:     Supine to Sit: maximal assistance and of 2 persons  Transfers:     Bed to Chair: maximal assistance and of 2 persons with  no AD  using  Scoot Pivot  Balance: unsupported sitting balance      AM-PAC 6 CLICK MOBILITY  Total Score:        Treatment & Education:  Pt was educated on safety, use of call light, PT POC/DC recommendation    Patient left up in chair with all lines intact and call button in reach.    GOALS:   Multidisciplinary Problems       Physical Therapy Goals          Problem: Physical Therapy    Goal Priority Disciplines Outcome Interventions   Physical Therapy Goal     PT, PT/OT     Description: Goals to be met by: 2024     Patient will increase functional independence with mobility by performin. Supine to sit with MInimal Assistance  2. Sit to supine with  MInimal Assistance  3. Bed to chair transfer with Moderate Assistance using No Assistive Device                         History:     Past Medical History:   Diagnosis Date    Diabetes mellitus, type 2     GERD (gastroesophageal reflux disease)     Hyperlipidemia     Hypertension        Past Surgical History:   Procedure Laterality Date    FRACTURE SURGERY      INSERTION OF PACEMAKER      LEG AMPUTATION         Time Tracking:     PT Received On: 10/24/24  PT Start Time: 0947     PT Stop Time: 1015  PT Total Time (min): 28 min     Billable Minutes: Evaluation 5 minutes  and Therapeutic Activity 23 minutes      10/24/2024

## 2024-10-24 NOTE — PROGRESS NOTES
Nephrology Consult Note        Patient Name: Dhaval Hernández  MRN: [id number]    Patient Class: IP- Inpatient   Admission Date: 10/22/2024  Length of Stay: 2 days  Date of Service: 10/24/2024    Attending Physician: Pauline Rose MD  Primary Care Provider: Jessie Jung MD    Reason for Consult: siria    SUBJECTIVE:     HPI: 64M with DM, HTN, GERD, left BKA, pacemaker, HFpEF presents to the emergency department with generalized weakness, persistent loose watery diarrheal stools over last 4 days with associated abdominal cramping. Denied vomiting, hematochezia, melena, fever.     Upon arrival to the emergency department patient found with blood pressure 70/30. Heart rate was in the 110s. Received IVF promptly, giron was placed, labs obtained - notable for SIRIA, hyponatremia, hyperkalemia, acidosis, bump in LFTs, anemia with elevated WBC. ProCal 9, , A1c 6.5. POC ABG with pH 7.26, pCO2 26, iCa 1.14, K 5.7. CT scan without renal obstruction.    10/23 VSS. No new complains. Decrease labs to daily, monitor UO. Bicarb for another 24h.  10/24 VSS, good UO, switch to NS from bicarb gtt.    ASSESSMENT/PLAN:     SIRIA  Metabolic acidosis  Hyperkalemia  Hyponatremia  Hypovolemia  Staph sepsis  Anemia  HTN  DM  CKD stage 2, eGFR > 60 ml/min in 10/2023  No NSAIDs, ACEI/ARB, IV contrast or other nephrotoxins.  Keep MAP > 60, SBP > 100.  Dose meds for GFR < 30 ml/min.  Renal diet - low K, low phos.  Switch to NS from bicarb gtt., repeat labs BID  Control BG, holding Jardiance.  Broad abx, narrow based on Cx results, pressors as needed.  Hgb and HCT are acceptable. Monitor for now.  Tolerate asymptomatic HTN up to -160. HOLD home meds.    Thank you for allowing us to participate in the care of your patient!   We will follow the patient and provide recommendations as needed.         Laboratory:  Recent Labs   Lab 10/22/24  1548 10/22/24  1822 10/23/24  0003 10/23/24  0443 10/24/24  0333   *  --   --  132*  We'll talk soon!   135*   K 5.7*   < > 4.7 4.6 4.1     --   --  101 101   CO2 13*  --   --  17* 25   BUN 64*  --   --  63* 45*   CREATININE 4.5*  --   --  3.8* 2.3*   *  --   --  126* 168*    < > = values in this interval not displayed.       Recent Labs   Lab 10/22/24  1144 10/22/24  1548 10/23/24  0443 10/24/24  0333   CALCIUM 8.0* 7.9* 8.1* 8.0*   ALBUMIN 3.1*  --  2.9* 2.9*   MG 1.6  --  1.4* 1.5*             Recent Labs   Lab 10/22/24  1117 10/22/24  1807 10/23/24  1725 10/23/24  2103   POCTGLUCOSE 204* 240* 211* 174*       Recent Labs   Lab 10/30/23  1205   Hemoglobin A1C 6.5 H       Recent Labs   Lab 10/22/24  1548 10/23/24  0443 10/23/24  0456 10/24/24  0333   WBC 18.57* 11.77  --  10.85   HGB 9.9* 10.1*  --  10.2*   HCT 32.9* 32.0* 31* 31.4*    201  --  188   MCV 86 83  --  80*   MCHC 30.1* 31.6*  --  32.5   MONO 9.0  1.7* 11.3  1.3*  --  10.1  1.1*   EOSINOPHIL 0.2 0.3  --  1.9       Recent Labs   Lab 10/22/24  1144 10/23/24  0443 10/24/24  0333   BILITOT 0.6 0.6 0.7   PROT 5.9* 5.9* 5.7*   ALBUMIN 3.1* 2.9* 2.9*   ALKPHOS 68 74 76   ALT 69* 73* 60*   AST 58* 69* 63*       Recent Labs   Lab 10/22/24  1514   Color, UA Yellow   Appearance, UA Clear   pH, UA 6.0   Specific Gravity, UA 1.010   Protein, UA Trace A   Glucose, UA 4+ A   Ketones, UA Negative   Urobilinogen, UA Negative   Bilirubin (UA) Negative   Occult Blood UA 1+ A   Nitrite, UA Negative   RBC, UA 10 H   WBC, UA 19 H   Bacteria None   Hyaline Casts, UA 49 A       Recent Labs   Lab 10/22/24  1345 10/22/24  1423 10/23/24  0456   POC PH  --  7.263 LL 7.371   POC PCO2  --  26.5 LL 32.2 L   POC HCO3  --  12.0 L 18.7 L   POC PO2  --  63 L 61 L   POC SATURATED O2  --  89 91   POC BE  --  -15 L -7 L   Sample VENOUS ARTERIAL ARTERIAL       Microbiology Results (last 7 days)       Procedure Component Value Units Date/Time    Urine culture [[id number]] Collected: 10/22/24 1514    Order Status: Completed Specimen: Urine Updated: 10/24/24 0808     Urine  Culture, Routine No growth to date    Narrative:      Specimen Source->Urine    Blood culture x two cultures. Draw prior to antibiotics. [[id number]]  (Abnormal) Collected: 10/22/24 1136    Order Status: Completed Specimen: Blood from Peripheral, Hand, Left Updated: 10/24/24 0716     Blood Culture, Routine Gram stain aer bottle: Gram positive cocci      Positive results previously called on Order #[id number]      STAPHYLOCOCCUS AUREUS  ID consult required for Staph aureus bacteremia.  For susceptibility see order #[id number]      Narrative:      Aerobic and anaerobic  Collection has been rescheduled by [facility] at 10/22/2024 11:44 Reason:   Done  Collection has been rescheduled by [facility] at 10/22/2024 11:44 Reason:   Done    Blood culture x two cultures. Draw prior to antibiotics. [[id number]]  (Abnormal) Collected: 10/22/24 1143    Order Status: Completed Specimen: Blood from Peripheral, Hand, Right Updated: 10/24/24 0715     Blood Culture, Routine Gram stain aer bottle: Gram positive cocci      Results called to and read back by:Tiffani Moulton RN-1ICU;      10/23/2024  07:54 CJD      STAPHYLOCOCCUS AUREUS  ID consult required for Staph aureus bacteremia.  susceptibility pending      Narrative:      Aerobic and anaerobic  Collection has been rescheduled by [facility]I at 10/22/2024 11:44 Reason:   Done  Collection has been rescheduled by [facility] at 10/22/2024 11:44 Reason:   Done    Stool culture **cannot be ordered stat** [[id number]] Collected: 10/23/24 1357    Order Status: Sent Specimen: Stool Updated: 10/23/24 1417    Rapid Organism ID by PCR (from Blood culture) [[id number]]  (Abnormal) Collected: 10/22/24 1143    Order Status: Completed Updated: 10/23/24 0918     Enterococcus faecalis Not Detected     Enterococcus faecium Not Detected     Listeria monocytogenes Not Detected     Staphylococcus spp. See species for ID     Staphylococcus aureus Detected     Staphylococcus epidermidis Not Detected     Staphylococcus lugdunensis  Not Detected     Streptococcus species Not Detected     Streptococcus agalactiae Not Detected     Streptococcus pneumoniae Not Detected     Streptococcus pyogenes Not Detected     Acinetobacter calcoaceticus/baumannii complex Not Detected     Bacteroides fragilis Not Detected     Enterobacterales Not Detected     Enterobacter cloacae complex Not Detected     Escherichia coli Not Detected     Klebsiella aerogenes Not Detected     Klebsiella oxytoca Not Detected     Klebsiella pneumoniae group Not Detected     Proteus Not Detected     Salmonella sp Not Detected     Serratia marcescens Not Detected     Haemophilus influenzae Not Detected     Neisseria meningtidis Not Detected     Pseudomonas aeruginosa Not Detected     Stenotrophomonas maltophilia Not Detected     Candida albicans Not Detected     Candida auris Not Detected     Candida glabrata Not Detected     Candida krusei Not Detected     Candida parapsilosis Not Detected     Candida tropicalis Not Detected     Cryptococcus neoformans/gattii Not Detected     CTX-M (ESBL ) Test not applicable     IMP (Carbapenem resistant) Test not applicable     KPC resistance gene (Carbapenem resistant) Test not applicable     mcr-1  Test not applicable     mec A/C  Test not applicable     mec A/C and MREJ (MRSA) gene Not Detected     NDM (Carbapenem resistant) Test not applicable     OXA-48-like (Carbapenem resistant) Test not applicable     van A/B (VRE gene) Test not applicable     VIM (Carbapenem resistant) Test not applicable    Narrative:      Aerobic and anaerobic            Review of patient's allergies indicates:   Allergen Reactions    Fish containing products      Other reaction(s): .       Outpatient meds:  No current facility-administered medications on file prior to encounter.     Current Outpatient Medications on File Prior to Encounter   Medication Sig Dispense Refill    amiodarone (PACERONE) 200 MG Tab Take 1 tablet (200 mg total) by mouth once daily. 30  tablet 11    amLODIPine (NORVASC) 5 MG tablet Take 1 tablet (5 mg total) by mouth once daily. 90 tablet 1    aspirin (ECOTRIN) 81 MG EC tablet Take 81 mg by mouth once daily.      carvediloL (COREG) 12.5 MG tablet Take 1 tablet (12.5 mg total) by mouth 2 (two) times daily. (Patient taking differently: Take 25 mg by mouth once daily.) 180 tablet 3    cholecalciferol, vitamin D3, (VITAMIN D3) 125 mcg (5,000 unit) Tab Take 5,000 Units by mouth once daily.      empagliflozin (JARDIANCE) 25 mg tablet Take 1 tablet (25 mg total) by mouth once daily. 90 tablet 0    ezetimibe (ZETIA) 10 mg tablet Take 10 mg by mouth.      famotidine (PEPCID) 20 MG tablet Take 20 mg by mouth 2 (two) times daily.      insulin glargine U-100, Lantus, (LANTUS SOLOSTAR U-100 INSULIN) 100 unit/mL (3 mL) InPn pen Inject 16 Units into the skin every evening. 14.4 mL 1    JANUVIA 100 mg Tab Take 1 tablet (100 mg total) by mouth once daily. 90 tablet 1    levothyroxine (SYNTHROID) 75 MCG tablet Take 1 tablet (75 mcg total) by mouth every morning. 90 tablet 1    losartan (COZAAR) 100 MG tablet Take 1 tablet (100 mg total) by mouth once daily. 90 tablet 3    metFORMIN (GLUCOPHAGE) 1000 MG tablet Take 1 tablet (1,000 mg total) by mouth 2 (two) times daily with meals. 180 tablet 1    omega 3-dha-epa-fish oil (FISH OIL) 1,200 (144-216) mg Cap Take 1 capsule by mouth once daily.      spironolactone (ALDACTONE) 25 MG tablet Take 0.5 tablets (12.5 mg total) by mouth once daily. 45 tablet 3    atorvastatin (LIPITOR) 80 MG tablet Take 1 tablet (80 mg total) by mouth every evening. 90 tablet 3    cholecalciferol, vitamin D3, 1,250 mcg (50,000 unit) capsule Take 50,000 Int'l Units by mouth.      ciclopirox (PENLAC) 8 % Soln Apply topically nightly. 6.6 mL 5    EScitalopram oxalate (LEXAPRO) 10 MG tablet Take 1 tablet (10 mg total) by mouth once daily. 90 tablet 3    multivitamin (THERAGRAN) per tablet Take 1 tablet by mouth.         Scheduled meds:   cefTRIAXone  (Rocephin) IV (PEDS and ADULTS)  2 g Intravenous Q24H    enoxparin  30 mg Subcutaneous Daily    insulin glargine U-100  16 Units Subcutaneous QHS    levothyroxine  75 mcg Oral Before breakfast    mupirocin   Nasal BID    pantoprazole  40 mg Intravenous Daily       Infusions:   NORepinephrine bitartrate-D5W  0-3 mcg/kg/min Intravenous Continuous   Stopped at 10/22/24 2030    sodium bicarbonate 100 mEq in 0.45% NaCl 1,000 mL infusion   Intravenous Continuous 100 mL/hr at 10/24/24 0158 New Bag at 10/24/24 0158       PRN meds:    Current Facility-Administered Medications:     acetaminophen, 650 mg, Oral, Q8H PRN    acetaminophen, 650 mg, Oral, Q4H PRN    aluminum-magnesium hydroxide-simethicone, 30 mL, Oral, QID PRN    dextrose 50%, 12.5 g, Intravenous, PRN    dextrose 50%, 25 g, Intravenous, PRN    glucagon (human recombinant), 1 mg, Intramuscular, PRN    glucose, 16 g, Oral, PRN    glucose, 24 g, Oral, PRN    insulin aspart U-100, 0-10 Units, Subcutaneous, QID (AC + HS) PRN    magnesium oxide, 800 mg, Oral, PRN    magnesium oxide, 800 mg, Oral, PRN    melatonin, 6 mg, Oral, Nightly PRN    naloxone, 0.02 mg, Intravenous, PRN    ondansetron, 4 mg, Intravenous, Q6H PRN    potassium bicarbonate, 35 mEq, Oral, PRN    potassium bicarbonate, 50 mEq, Oral, PRN    potassium bicarbonate, 60 mEq, Oral, PRN    potassium, sodium phosphates, 2 packet, Oral, PRN    potassium, sodium phosphates, 2 packet, Oral, PRN    potassium, sodium phosphates, 2 packet, Oral, PRN    Past Medical History:   Diagnosis Date    Diabetes mellitus, type 2     GERD (gastroesophageal reflux disease)     Hyperlipidemia     Hypertension      Past Surgical History:   Procedure Laterality Date    FRACTURE SURGERY      INSERTION OF PACEMAKER      LEG AMPUTATION       Family History   Problem Relation Name Age of Onset    Arthritis Mother       Social History     Tobacco Use    Smoking status: Some Days     Types: Cigarettes    Smokeless tobacco: Never        OBJECTIVE:     Vital Signs and IO:  Temp:  [98.2 °F (36.8 °C)-98.8 °F (37.1 °C)]   Pulse:  [66-74]   Resp:  [11-22]   BP: (101-159)/(53-71)   SpO2:  [86 %-98 %]   I/O last 3 completed shifts:  In: 5106.7 [P.O.:1265; I.V.:3841.7]  Out: 6480 [Urine:6480]  Wt Readings from Last 5 Encounters:   10/24/24 104.6 kg (230 lb 9.6 oz)   07/23/24 102.4 kg (225 lb 12 oz)   04/17/24 89.8 kg (197 lb 15.6 oz)   04/12/24 89.8 kg (198 lb)   12/13/23 89.8 kg (198 lb)     Body mass index is 37.22 kg/m².    Physical Exam  Constitutional:       General: Patient is not in acute distress.     Appearance: Patient is well-developed. She is not diaphoretic.   HENT:      Head: Normocephalic and atraumatic.      Mouth/Throat: Mucous membranes are moist.   Eyes:      General: No scleral icterus.     Pupils: Pupils are equal, round, and reactive to light.   Cardiovascular:      Rate and Rhythm: Normal rate and regular rhythm.   Pulmonary:      Effort: Pulmonary effort is normal. No respiratory distress.      Breath sounds: No stridor.   Abdominal:      General: There is no distension.      Palpations: Abdomen is soft.   Musculoskeletal:         General: No deformity. Normal range of motion.      Cervical back: Neck supple.   Skin:     General: Skin is warm and dry.      Findings: No rash present. No erythema.   Neurological:      Mental Status: Patient is alert and oriented to person, place, and time.      Cranial Nerves: No cranial nerve deficit.   Psychiatric:         Behavior: Behavior normal.          Patient care time was spent personally by me on the following activities:     Obtaining a history.  Examination of patient.  Providing medical care at the patients bedside.  Developing a treatment plan with patient or surrogate and bedside caregivers.  Ordering and reviewing laboratory studies, radiographic studies, pulse oximetry.  Ordering and performing treatments and interventions.  Evaluation of patient's response to  treatment.  Discussions with consultants while on the unit and immediately available to the patient.  Re-evaluation of the patient's condition.  Documentation in the medical record.     Jabari Verma MD    [facility]  [address] [place] [id number]    [phone] - tel  [phone] - fax    10/24/2024

## 2024-10-24 NOTE — CARE UPDATE
10/24/24 0727   Patient Assessment/Suction   Level of Consciousness (AVPU) alert   Respiratory Effort Normal;Unlabored   Expansion/Accessory Muscles/Retractions no use of accessory muscles;no retractions;expansion symmetric   All Lung Fields Breath Sounds coarse   PRE-TX-O2   Device (Oxygen Therapy) nasal cannula with humidification   Flow (L/min) (Oxygen Therapy) 4   SpO2 (!) 93 %   Pulse Oximetry Type Continuous   $ Pulse Oximetry - Multiple Charge Pulse Oximetry - Multiple   Pulse 70   Resp (!) 22

## 2024-10-24 NOTE — PLAN OF CARE
Met with pt to discuss discharge needs. Pt reports that he transfers into his wheelchair but has never really learned to walk with prosthetic leg.  Had BKA until AKA in 2016 and states that this is when he became unable to ambulate.  Discussed with pt having his son bring in his prosthetic leg and he states that he will have this brought to work with therapy.

## 2024-10-25 ENCOUNTER — CLINICAL SUPPORT (OUTPATIENT)
Dept: CARDIOLOGY | Facility: HOSPITAL | Age: 64
End: 2024-10-25
Attending: FAMILY MEDICINE
Payer: MEDICARE

## 2024-10-25 VITALS — WEIGHT: 230 LBS | HEIGHT: 70 IN | BODY MASS INDEX: 32.93 KG/M2

## 2024-10-25 LAB
ALBUMIN SERPL BCP-MCNC: 2.9 G/DL (ref 3.5–5.2)
ALP SERPL-CCNC: 85 U/L (ref 55–135)
ALT SERPL W/O P-5'-P-CCNC: 39 U/L (ref 10–44)
ANION GAP SERPL CALC-SCNC: 9 MMOL/L (ref 8–16)
AORTIC ROOT ANNULUS: 3.4 CM
AORTIC VALVE CUSP SEPERATION: 1.3 CM
AST SERPL-CCNC: 46 U/L (ref 10–40)
AV INDEX (PROSTH): 0.84
AV MEAN GRADIENT: 2 MMHG
AV PEAK GRADIENT: 3.2 MMHG
AV VALVE AREA BY VELOCITY RATIO: 3.1 CM²
AV VALVE AREA: 2.6 CM²
AV VELOCITY RATIO: 1
BACTERIA BLD CULT: ABNORMAL
BACTERIA STL CULT: NORMAL
BACTERIA STL CULT: NORMAL
BACTERIA UR CULT: NO GROWTH
BASOPHILS # BLD AUTO: 0.04 K/UL (ref 0–0.2)
BASOPHILS NFR BLD: 0.3 % (ref 0–1.9)
BILIRUB SERPL-MCNC: 0.9 MG/DL (ref 0.1–1)
BSA FOR ECHO PROCEDURE: 2.27 M2
BUN SERPL-MCNC: 25 MG/DL (ref 8–23)
CALCIUM SERPL-MCNC: 8.3 MG/DL (ref 8.7–10.5)
CHLORIDE SERPL-SCNC: 100 MMOL/L (ref 95–110)
CO2 SERPL-SCNC: 27 MMOL/L (ref 23–29)
CREAT SERPL-MCNC: 1.2 MG/DL (ref 0.5–1.4)
CV ECHO LV RWT: 0.33 CM
DIFFERENTIAL METHOD BLD: ABNORMAL
DOP CALC AO PEAK VEL: 0.9 M/S
DOP CALC AO VTI: 24.1 CM
DOP CALC LVOT AREA: 3.1 CM2
DOP CALC LVOT DIAMETER: 2 CM
DOP CALC LVOT PEAK VEL: 0.9 M/S
DOP CALC LVOT STROKE VOLUME: 63.4 CM3
DOP CALC MV VTI: 25.2 CM
DOP CALCLVOT PEAK VEL VTI: 20.2 CM
E WAVE DECELERATION TIME: 215 MSEC
E/A RATIO: 0.99
E/E' RATIO: 11.43 M/S
ECHO LV POSTERIOR WALL: 0.8 CM (ref 0.6–1.1)
EOSINOPHIL # BLD AUTO: 0.2 K/UL (ref 0–0.5)
EOSINOPHIL NFR BLD: 1.6 % (ref 0–8)
ERYTHROCYTE [DISTWIDTH] IN BLOOD BY AUTOMATED COUNT: 18.4 % (ref 11.5–14.5)
EST. GFR  (NO RACE VARIABLE): >60 ML/MIN/1.73 M^2
FRACTIONAL SHORTENING: 42.9 % (ref 28–44)
GLUCOSE SERPL-MCNC: 114 MG/DL (ref 70–110)
HCT VFR BLD AUTO: 35.3 % (ref 40–54)
HGB BLD-MCNC: 11.3 G/DL (ref 14–18)
IMM GRANULOCYTES # BLD AUTO: 0.18 K/UL (ref 0–0.04)
IMM GRANULOCYTES NFR BLD AUTO: 1.6 % (ref 0–0.5)
INTERVENTRICULAR SEPTUM: 1.1 CM (ref 0.6–1.1)
IVC DIAMETER: 1.03 CM
LEFT INTERNAL DIMENSION IN SYSTOLE: 2.8 CM (ref 2.1–4)
LEFT VENTRICLE DIASTOLIC VOLUME INDEX: 51.8 ML/M2
LEFT VENTRICLE DIASTOLIC VOLUME: 115 ML
LEFT VENTRICLE MASS INDEX: 74 G/M2
LEFT VENTRICLE SYSTOLIC VOLUME INDEX: 13.4 ML/M2
LEFT VENTRICLE SYSTOLIC VOLUME: 29.8 ML
LEFT VENTRICULAR INTERNAL DIMENSION IN DIASTOLE: 4.9 CM (ref 3.5–6)
LEFT VENTRICULAR MASS: 164.3 G
LV LATERAL E/E' RATIO: 11.43 M/S
LV SEPTAL E/E' RATIO: 11.43 M/S
LVED V (TEICH): 115 ML
LVES V (TEICH): 29.8 ML
LVOT MG: 2 MMHG
LVOT MV: 0.58 CM/S
LYMPHOCYTES # BLD AUTO: 1.8 K/UL (ref 1–4.8)
LYMPHOCYTES NFR BLD: 15.6 % (ref 18–48)
MAGNESIUM SERPL-MCNC: 1.4 MG/DL (ref 1.6–2.6)
MCH RBC QN AUTO: 26 PG (ref 27–31)
MCHC RBC AUTO-ENTMCNC: 32 G/DL (ref 32–36)
MCV RBC AUTO: 81 FL (ref 82–98)
MONOCYTES # BLD AUTO: 1.4 K/UL (ref 0.3–1)
MONOCYTES NFR BLD: 12.3 % (ref 4–15)
MV MEAN GRADIENT: 2 MMHG
MV PEAK A VEL: 0.81 M/S
MV PEAK E VEL: 0.8 M/S
MV PEAK GRADIENT: 3 MMHG
MV STENOSIS PRESSURE HALF TIME: 78 MS
MV VALVE AREA BY CONTINUITY EQUATION: 2.52 CM2
MV VALVE AREA P 1/2 METHOD: 2.82 CM2
NEUTROPHILS # BLD AUTO: 7.9 K/UL (ref 1.8–7.7)
NEUTROPHILS NFR BLD: 68.6 % (ref 38–73)
NRBC BLD-RTO: 0 /100 WBC
PISA MRMAX VEL: 3 M/S
PISA TR MAX VEL: 2.89 M/S
PLATELET # BLD AUTO: 207 K/UL (ref 150–450)
PMV BLD AUTO: 9.7 FL (ref 9.2–12.9)
POCT GLUCOSE: 110 MG/DL (ref 70–110)
POCT GLUCOSE: 183 MG/DL (ref 70–110)
POCT GLUCOSE: 198 MG/DL (ref 70–110)
POCT GLUCOSE: 268 MG/DL (ref 70–110)
POTASSIUM SERPL-SCNC: 4.3 MMOL/L (ref 3.5–5.1)
PROT SERPL-MCNC: 6 G/DL (ref 6–8.4)
PV MV: 0.53 M/S
PV PEAK GRADIENT: 2 MMHG
PV PEAK VELOCITY: 0.77 M/S
RA PRESSURE ESTIMATED: 3 MMHG
RBC # BLD AUTO: 4.34 M/UL (ref 4.6–6.2)
RV TB RVSP: 6 MMHG
SODIUM SERPL-SCNC: 136 MMOL/L (ref 136–145)
TDI LATERAL: 0.07 M/S
TDI SEPTAL: 0.07 M/S
TDI: 0.07 M/S
TR MAX PG: 33 MMHG
TRICUSPID ANNULAR PLANE SYSTOLIC EXCURSION: 2.75 CM
TV REST PULMONARY ARTERY PRESSURE: 36 MMHG
WBC # BLD AUTO: 11.58 K/UL (ref 3.9–12.7)
Z-SCORE OF LEFT VENTRICULAR DIMENSION IN END DIASTOLE: -4.59
Z-SCORE OF LEFT VENTRICULAR DIMENSION IN END SYSTOLE: -4.1

## 2024-10-25 PROCEDURE — 80053 COMPREHEN METABOLIC PANEL: CPT | Performed by: INTERNAL MEDICINE

## 2024-10-25 PROCEDURE — 12000002 HC ACUTE/MED SURGE SEMI-PRIVATE ROOM

## 2024-10-25 PROCEDURE — 93306 TTE W/DOPPLER COMPLETE: CPT | Mod: 26,,, | Performed by: GENERAL PRACTICE

## 2024-10-25 PROCEDURE — 25000003 PHARM REV CODE 250: Performed by: FAMILY MEDICINE

## 2024-10-25 PROCEDURE — 99223 1ST HOSP IP/OBS HIGH 75: CPT | Mod: ,,, | Performed by: ORTHOPAEDIC SURGERY

## 2024-10-25 PROCEDURE — 99233 SBSQ HOSP IP/OBS HIGH 50: CPT | Mod: ,,, | Performed by: INTERNAL MEDICINE

## 2024-10-25 PROCEDURE — 25000003 PHARM REV CODE 250: Performed by: INTERNAL MEDICINE

## 2024-10-25 PROCEDURE — 97110 THERAPEUTIC EXERCISES: CPT

## 2024-10-25 PROCEDURE — 85025 COMPLETE CBC W/AUTO DIFF WBC: CPT | Performed by: INTERNAL MEDICINE

## 2024-10-25 PROCEDURE — 94761 N-INVAS EAR/PLS OXIMETRY MLT: CPT

## 2024-10-25 PROCEDURE — 63600175 PHARM REV CODE 636 W HCPCS: Performed by: INTERNAL MEDICINE

## 2024-10-25 PROCEDURE — 36415 COLL VENOUS BLD VENIPUNCTURE: CPT | Performed by: INTERNAL MEDICINE

## 2024-10-25 PROCEDURE — 99900031 HC PATIENT EDUCATION (STAT)

## 2024-10-25 PROCEDURE — 63600175 PHARM REV CODE 636 W HCPCS: Performed by: FAMILY MEDICINE

## 2024-10-25 PROCEDURE — 83735 ASSAY OF MAGNESIUM: CPT | Performed by: INTERNAL MEDICINE

## 2024-10-25 PROCEDURE — 27000221 HC OXYGEN, UP TO 24 HOURS

## 2024-10-25 PROCEDURE — 97535 SELF CARE MNGMENT TRAINING: CPT

## 2024-10-25 PROCEDURE — 97530 THERAPEUTIC ACTIVITIES: CPT

## 2024-10-25 PROCEDURE — 25000003 PHARM REV CODE 250: Performed by: HOSPITALIST

## 2024-10-25 PROCEDURE — 93306 TTE W/DOPPLER COMPLETE: CPT

## 2024-10-25 RX ORDER — ENOXAPARIN SODIUM 100 MG/ML
40 INJECTION SUBCUTANEOUS EVERY 24 HOURS
Status: DISCONTINUED | OUTPATIENT
Start: 2024-10-25 | End: 2024-10-31 | Stop reason: HOSPADM

## 2024-10-25 RX ORDER — CEFAZOLIN SODIUM 1 G/3ML
2 INJECTION, POWDER, FOR SOLUTION INTRAMUSCULAR; INTRAVENOUS
Status: DISCONTINUED | OUTPATIENT
Start: 2024-10-25 | End: 2024-10-31 | Stop reason: HOSPADM

## 2024-10-25 RX ORDER — OXYCODONE AND ACETAMINOPHEN 5; 325 MG/1; MG/1
1 TABLET ORAL EVERY 4 HOURS PRN
Status: DISCONTINUED | OUTPATIENT
Start: 2024-10-25 | End: 2024-10-31 | Stop reason: HOSPADM

## 2024-10-25 RX ORDER — MAGNESIUM SULFATE HEPTAHYDRATE 40 MG/ML
2 INJECTION, SOLUTION INTRAVENOUS ONCE
Status: COMPLETED | OUTPATIENT
Start: 2024-10-25 | End: 2024-10-25

## 2024-10-25 RX ORDER — METRONIDAZOLE 500 MG/100ML
500 INJECTION, SOLUTION INTRAVENOUS
Status: DISCONTINUED | OUTPATIENT
Start: 2024-10-25 | End: 2024-10-26

## 2024-10-25 RX ADMIN — MUPIROCIN 1 G: 20 OINTMENT TOPICAL at 08:10

## 2024-10-25 RX ADMIN — ENOXAPARIN SODIUM 40 MG: 40 INJECTION SUBCUTANEOUS at 04:10

## 2024-10-25 RX ADMIN — METRONIDAZOLE 500 MG: 5 INJECTION, SOLUTION INTRAVENOUS at 05:10

## 2024-10-25 RX ADMIN — LEVOTHYROXINE SODIUM 75 MCG: 0.03 TABLET ORAL at 05:10

## 2024-10-25 RX ADMIN — MAGNESIUM SULFATE HEPTAHYDRATE 2 G: 40 INJECTION, SOLUTION INTRAVENOUS at 10:10

## 2024-10-25 RX ADMIN — PANTOPRAZOLE SODIUM 40 MG: 40 INJECTION, POWDER, LYOPHILIZED, FOR SOLUTION INTRAVENOUS at 10:10

## 2024-10-25 RX ADMIN — INSULIN ASPART 3 UNITS: 100 INJECTION, SOLUTION INTRAVENOUS; SUBCUTANEOUS at 08:10

## 2024-10-25 RX ADMIN — INSULIN GLARGINE 16 UNITS: 100 INJECTION, SOLUTION SUBCUTANEOUS at 08:10

## 2024-10-25 RX ADMIN — CITALOPRAM HYDROBROMIDE 10 MG: 10 TABLET ORAL at 10:10

## 2024-10-25 RX ADMIN — CEFAZOLIN 2 G: 330 INJECTION, POWDER, FOR SOLUTION INTRAMUSCULAR; INTRAVENOUS at 10:10

## 2024-10-25 RX ADMIN — OXYCODONE HYDROCHLORIDE AND ACETAMINOPHEN 1 TABLET: 5; 325 TABLET ORAL at 12:10

## 2024-10-25 RX ADMIN — CEFAZOLIN 2 G: 330 INJECTION, POWDER, FOR SOLUTION INTRAMUSCULAR; INTRAVENOUS at 05:10

## 2024-10-25 RX ADMIN — MUPIROCIN 1 G: 20 OINTMENT TOPICAL at 10:10

## 2024-10-25 RX ADMIN — OXYCODONE HYDROCHLORIDE AND ACETAMINOPHEN 1 TABLET: 5; 325 TABLET ORAL at 05:10

## 2024-10-25 RX ADMIN — METRONIDAZOLE 500 MG: 5 INJECTION, SOLUTION INTRAVENOUS at 12:10

## 2024-10-25 NOTE — ASSESSMENT & PLAN NOTE
Suspected pneumonia  Maintain present regime    Antibiotics (From admission, onward)      Start     Stop Route Frequency Ordered    10/24/24 1030  ceFAZolin injection 2 g         -- IV Every 12 hours (non-standard times) 10/24/24 0929    10/22/24 2100  mupirocin 2 % ointment         10/27/24 2059 Nasl 2 times daily 10/22/24 1650            Microbiology Results (last 7 days)       Procedure Component Value Units Date/Time    Blood culture [[id number]] Collected: 10/24/24 1018    Order Status: Completed Specimen: Blood from Peripheral, Hand, Right Updated: 10/24/24 1717     Blood Culture, Routine No Growth to date    Blood culture [[id number]] Collected: 10/24/24 1019    Order Status: Completed Specimen: Blood from Peripheral, Hand, Left Updated: 10/24/24 1717     Blood Culture, Routine No Growth to date    Stool culture **cannot be ordered stat** [[id number]] Collected: 10/23/24 1357    Order Status: Completed Specimen: Stool Updated: 10/24/24 1131     Stool Culture Nothing significant to date    Urine culture [[id number]] Collected: 10/22/24 1514    Order Status: Completed Specimen: Urine Updated: 10/24/24 0808     Urine Culture, Routine No growth to date    Narrative:      Specimen Source->Urine    Blood culture x two cultures. Draw prior to antibiotics. [[id number]]  (Abnormal) Collected: 10/22/24 1136    Order Status: Completed Specimen: Blood from Peripheral, Hand, Left Updated: 10/24/24 0716     Blood Culture, Routine Gram stain aer bottle: Gram positive cocci      Positive results previously called on Order #[id number]      STAPHYLOCOCCUS AUREUS  ID consult required for Staph aureus bacteremia.  For susceptibility see order #[id number]      Narrative:      Aerobic and anaerobic  Collection has been rescheduled by MAI at 10/22/2024 11:44 Reason:   Done  Collection has been rescheduled by MAI at 10/22/2024 11:44 Reason:   Done    Blood culture x two cultures. Draw prior to antibiotics. [[id number]]  (Abnormal)  Collected: 10/22/24 1143    Order Status: Completed Specimen: Blood from Peripheral, Hand, Right Updated: 10/24/24 0715     Blood Culture, Routine Gram stain aer bottle: Gram positive cocci      Results called to and read back by:Tiffani Moulton RN-1ICU;      10/23/2024  07:54 CJD      STAPHYLOCOCCUS AUREUS  ID consult required for Staph aureus bacteremia.  susceptibility pending      Narrative:      Aerobic and anaerobic  Collection has been rescheduled by MAI at 10/22/2024 11:44 Reason:   Done  Collection has been rescheduled by MAI at 10/22/2024 11:44 Reason:   Done    Rapid Organism ID by PCR (from Blood culture) [[id number]]  (Abnormal) Collected: 10/22/24 1143    Order Status: Completed Updated: 10/23/24 0918     Enterococcus faecalis Not Detected     Enterococcus faecium Not Detected     Listeria monocytogenes Not Detected     Staphylococcus spp. See species for ID     Staphylococcus aureus Detected     Staphylococcus epidermidis Not Detected     Staphylococcus lugdunensis Not Detected     Streptococcus species Not Detected     Streptococcus agalactiae Not Detected     Streptococcus pneumoniae Not Detected     Streptococcus pyogenes Not Detected     Acinetobacter calcoaceticus/baumannii complex Not Detected     Bacteroides fragilis Not Detected     Enterobacterales Not Detected     Enterobacter cloacae complex Not Detected     Escherichia coli Not Detected     Klebsiella aerogenes Not Detected     Klebsiella oxytoca Not Detected     Klebsiella pneumoniae group Not Detected     Proteus Not Detected     Salmonella sp Not Detected     Serratia marcescens Not Detected     Haemophilus influenzae Not Detected     Neisseria meningtidis Not Detected     Pseudomonas aeruginosa Not Detected     Stenotrophomonas maltophilia Not Detected     Candida albicans Not Detected     Candida auris Not Detected     Candida glabrata Not Detected     Candida krusei Not Detected     Candida parapsilosis Not Detected     Candida  tropicalis Not Detected     Cryptococcus neoformans/gattii Not Detected     CTX-M (ESBL ) Test not applicable     IMP (Carbapenem resistant) Test not applicable     KPC resistance gene (Carbapenem resistant) Test not applicable     mcr-1  Test not applicable     mec A/C  Test not applicable     mec A/C and MREJ (MRSA) gene Not Detected     NDM (Carbapenem resistant) Test not applicable     OXA-48-like (Carbapenem resistant) Test not applicable     van A/B (VRE gene) Test not applicable     VIM (Carbapenem resistant) Test not applicable    Narrative:      Aerobic and anaerobic        Repeat blood culture completed

## 2024-10-25 NOTE — PROGRESS NOTES
Nephrology Consult Note        Patient Name: Dhaval Hernández  MRN: [id number]    Patient Class: IP- Inpatient   Admission Date: 10/22/2024  Length of Stay: 3 days  Date of Service: 10/25/2024    Attending Physician: Bill Kerns DO  Primary Care Provider: Jessie Jung MD    Reason for Consult: siria    SUBJECTIVE:     HPI: 64M with DM, HTN, GERD, left BKA, pacemaker, HFpEF presents to the emergency department with generalized weakness, persistent loose watery diarrheal stools over last 4 days with associated abdominal cramping. Denied vomiting, hematochezia, melena, fever.     Upon arrival to the emergency department patient found with blood pressure 70/30. Heart rate was in the 110s. Received IVF promptly, giron was placed, labs obtained - notable for SIRIA, hyponatremia, hyperkalemia, acidosis, bump in LFTs, anemia with elevated WBC. ProCal 9, , A1c 6.5. POC ABG with pH 7.26, pCO2 26, iCa 1.14, K 5.7. CT scan without renal obstruction.    10/23 VSS. No new complains. Decrease labs to daily, monitor UO. Bicarb for another 24h.  10/24 VSS, good UO, switch to NS from bicarb gtt.  10/25 VSS. Appreciate input from GI, GEN SURG, Orhto, ID and agree with plan.    ASSESSMENT/PLAN:     SIRIA  Staph bacteremia  Anemia  HTN  DM  CKD stage 2, eGFR > 60 ml/min in 10/2023  Keep MAP > 60, SBP > 100.  Regular diabetic diet now  Control BG, holding Jardiance.  Abx per ID.  Hgb and HCT are acceptable. Monitor for now.  Tolerate asymptomatic HTN up to -160.    Thank you for allowing us to participate in the care of your patient!   We will follow the patient and provide recommendations as needed.         Laboratory:  Recent Labs   Lab 10/24/24  0333 10/24/24  1405 10/25/24  0512   * 135* 136   K 4.1 4.6 4.3    98 100   CO2 25 28 27   BUN 45* 38* 25*   CREATININE 2.3* 1.7* 1.2   * 193* 114*       Recent Labs   Lab 10/23/24  0443 10/24/24  0333 10/24/24  1405 10/25/24  0512   CALCIUM 8.1* 8.0* 8.3*  8.3*   ALBUMIN 2.9* 2.9*  --  2.9*   MG 1.4* 1.5*  --  1.4*             Recent Labs   Lab 10/22/24  1117 10/22/24  1807 10/23/24  1725 10/23/24  2103 10/24/24  1743 10/24/24  2144 10/25/24  0611   POCTGLUCOSE 204* 240* 211* 174* 169* 132* 110       Recent Labs   Lab 10/30/23  1205   Hemoglobin A1C 6.5 H       Recent Labs   Lab 10/23/24  0443 10/23/24  0456 10/24/24  0333 10/25/24  0513   WBC 11.77  --  10.85 11.58   HGB 10.1*  --  10.2* 11.3*   HCT 32.0* 31* 31.4* 35.3*     --  188 207   MCV 83  --  80* 81*   MCHC 31.6*  --  32.5 32.0   MONO 11.3  1.3*  --  10.1  1.1* 12.3  1.4*   EOSINOPHIL 0.3  --  1.9 1.6       Recent Labs   Lab 10/23/24  0443 10/24/24  0333 10/25/24  0512   BILITOT 0.6 0.7 0.9   PROT 5.9* 5.7* 6.0   ALBUMIN 2.9* 2.9* 2.9*   ALKPHOS 74 76 85   ALT 73* 60* 39   AST 69* 63* 46*       Recent Labs   Lab 10/22/24  1514   Color, UA Yellow   Appearance, UA Clear   pH, UA 6.0   Specific Gravity, UA 1.010   Protein, UA Trace A   Glucose, UA 4+ A   Ketones, UA Negative   Urobilinogen, UA Negative   Bilirubin (UA) Negative   Occult Blood UA 1+ A   Nitrite, UA Negative   RBC, UA 10 H   WBC, UA 19 H   Bacteria None   Hyaline Casts, UA 49 A       Recent Labs   Lab 10/22/24  1345 10/22/24  1423 10/23/24  0456   POC PH  --  7.263 LL 7.371   POC PCO2  --  26.5 LL 32.2 L   POC HCO3  --  12.0 L 18.7 L   POC PO2  --  63 L 61 L   POC SATURATED O2  --  89 91   POC BE  --  -15 L -7 L   Sample VENOUS ARTERIAL ARTERIAL       Microbiology Results (last 7 days)       Procedure Component Value Units Date/Time    Urine culture [[id number]] Collected: 10/22/24 1514    Order Status: Completed Specimen: Urine Updated: 10/25/24 0715     Urine Culture, Routine No growth    Narrative:      Specimen Source->Urine    Blood culture x two cultures. Draw prior to antibiotics. [[id number]]  (Abnormal) Collected: 10/22/24 1136    Order Status: Completed Specimen: Blood from Peripheral, Hand, Left Updated: 10/25/24 0645      Blood Culture, Routine Gram stain aer bottle: Gram positive cocci      Positive results previously called on Order #[id number]      STAPHYLOCOCCUS AUREUS  ID consult required for Staph aureus bacteremia.  For susceptibility see order #[id number]      Narrative:      Aerobic and anaerobic  Collection has been rescheduled by [email or web address] at 10/22/2024 11:44 Reason:   Done  Collection has been rescheduled by [email or web address] at 10/22/2024 11:44 Reason:   Done    Blood culture x two cultures. Draw prior to antibiotics. [[id number]]  (Abnormal)  (Susceptibility) Collected: 10/22/24 1143    Order Status: Completed Specimen: Blood from Peripheral, Hand, Right Updated: 10/25/24 0645     Blood Culture, Routine Gram stain aer bottle: Gram positive cocci      Results called to and read back by:Tiffani Moulton RN-1ICU;      10/23/2024  07:54 CJD      STAPHYLOCOCCUS AUREUS  ID consult required for Staph aureus bacteremia.      Narrative:      Aerobic and anaerobic  Collection has been rescheduled by [email or web address] at 10/22/2024 11:44 Reason:   Done  Collection has been rescheduled by [email or web address] at 10/22/2024 11:44 Reason:   Done    Blood culture [[id number]] Collected: 10/24/24 1018    Order Status: Completed Specimen: Blood from Peripheral, Hand, Right Updated: 10/24/24 1717     Blood Culture, Routine No Growth to date    Blood culture [[id number]] Collected: 10/24/24 1019    Order Status: Completed Specimen: Blood from Peripheral, Hand, Left Updated: 10/24/24 1717     Blood Culture, Routine No Growth to date    Stool culture **cannot be ordered stat** [[id number]] Collected: 10/23/24 1357    Order Status: Completed Specimen: Stool Updated: 10/24/24 1131     Stool Culture Nothing significant to date    Rapid Organism ID by PCR (from Blood culture) [[id number]]  (Abnormal) Collected: 10/22/24 1143    Order Status: Completed Updated: 10/23/24 0918     Enterococcus faecalis Not Detected     Enterococcus faecium Not Detected     Listeria monocytogenes Not Detected      Staphylococcus spp. See species for ID     Staphylococcus aureus Detected     Staphylococcus epidermidis Not Detected     Staphylococcus lugdunensis Not Detected     Streptococcus species Not Detected     Streptococcus agalactiae Not Detected     Streptococcus pneumoniae Not Detected     Streptococcus pyogenes Not Detected     Acinetobacter calcoaceticus/baumannii complex Not Detected     Bacteroides fragilis Not Detected     Enterobacterales Not Detected     Enterobacter cloacae complex Not Detected     Escherichia coli Not Detected     Klebsiella aerogenes Not Detected     Klebsiella oxytoca Not Detected     Klebsiella pneumoniae group Not Detected     Proteus Not Detected     Salmonella sp Not Detected     Serratia marcescens Not Detected     Haemophilus influenzae Not Detected     Neisseria meningtidis Not Detected     Pseudomonas aeruginosa Not Detected     Stenotrophomonas maltophilia Not Detected     Candida albicans Not Detected     Candida auris Not Detected     Candida glabrata Not Detected     Candida krusei Not Detected     Candida parapsilosis Not Detected     Candida tropicalis Not Detected     Cryptococcus neoformans/gattii Not Detected     CTX-M (ESBL ) Test not applicable     IMP (Carbapenem resistant) Test not applicable     KPC resistance gene (Carbapenem resistant) Test not applicable     mcr-1  Test not applicable     mec A/C  Test not applicable     mec A/C and MREJ (MRSA) gene Not Detected     NDM (Carbapenem resistant) Test not applicable     OXA-48-like (Carbapenem resistant) Test not applicable     van A/B (VRE gene) Test not applicable     VIM (Carbapenem resistant) Test not applicable    Narrative:      Aerobic and anaerobic            Review of patient's allergies indicates:   Allergen Reactions    Fish containing products      Other reaction(s): .       Outpatient meds:  No current facility-administered medications on file prior to encounter.     Current Outpatient Medications  on File Prior to Encounter   Medication Sig Dispense Refill    amiodarone (PACERONE) 200 MG Tab Take 1 tablet (200 mg total) by mouth once daily. 30 tablet 11    amLODIPine (NORVASC) 5 MG tablet Take 1 tablet (5 mg total) by mouth once daily. 90 tablet 1    aspirin (ECOTRIN) 81 MG EC tablet Take 81 mg by mouth once daily.      carvediloL (COREG) 12.5 MG tablet Take 1 tablet (12.5 mg total) by mouth 2 (two) times daily. (Patient taking differently: Take 25 mg by mouth once daily.) 180 tablet 3    cholecalciferol, vitamin D3, (VITAMIN D3) 125 mcg (5,000 unit) Tab Take 5,000 Units by mouth once daily.      empagliflozin (JARDIANCE) 25 mg tablet Take 1 tablet (25 mg total) by mouth once daily. 90 tablet 0    ezetimibe (ZETIA) 10 mg tablet Take 10 mg by mouth.      famotidine (PEPCID) 20 MG tablet Take 20 mg by mouth 2 (two) times daily.      insulin glargine U-100, Lantus, (LANTUS SOLOSTAR U-100 INSULIN) 100 unit/mL (3 mL) InPn pen Inject 16 Units into the skin every evening. 14.4 mL 1    JANUVIA 100 mg Tab Take 1 tablet (100 mg total) by mouth once daily. 90 tablet 1    levothyroxine (SYNTHROID) 75 MCG tablet Take 1 tablet (75 mcg total) by mouth every morning. 90 tablet 1    losartan (COZAAR) 100 MG tablet Take 1 tablet (100 mg total) by mouth once daily. 90 tablet 3    metFORMIN (GLUCOPHAGE) 1000 MG tablet Take 1 tablet (1,000 mg total) by mouth 2 (two) times daily with meals. 180 tablet 1    omega 3-dha-epa-fish oil (FISH OIL) 1,200 (144-216) mg Cap Take 1 capsule by mouth once daily.      spironolactone (ALDACTONE) 25 MG tablet Take 0.5 tablets (12.5 mg total) by mouth once daily. 45 tablet 3    atorvastatin (LIPITOR) 80 MG tablet Take 1 tablet (80 mg total) by mouth every evening. 90 tablet 3    cholecalciferol, vitamin D3, 1,250 mcg (50,000 unit) capsule Take 50,000 Int'l Units by mouth.      ciclopirox (PENLAC) 8 % Soln Apply topically nightly. 6.6 mL 5    EScitalopram oxalate (LEXAPRO) 10 MG tablet Take 1  tablet (10 mg total) by mouth once daily. 90 tablet 3    multivitamin (THERAGRAN) per tablet Take 1 tablet by mouth.         Scheduled meds:   ceFAZolin  2 g Intravenous Q12H    citalopram  10 mg Oral Daily    enoxparin  30 mg Subcutaneous Daily    insulin glargine U-100  16 Units Subcutaneous QHS    levothyroxine  75 mcg Oral Before breakfast    magnesium sulfate IVPB  2 g Intravenous Once    metroNIDAZOLE IV (PEDS and ADULTS)  500 mg Intravenous Q8H    mupirocin   Nasal BID    pantoprazole  40 mg Intravenous Daily       Infusions:   0.9% NaCl   Intravenous Continuous 50 mL/hr at 10/24/24 2240 New Bag at 10/24/24 2240       PRN meds:    Current Facility-Administered Medications:     acetaminophen, 650 mg, Oral, Q8H PRN    acetaminophen, 650 mg, Oral, Q4H PRN    aluminum-magnesium hydroxide-simethicone, 30 mL, Oral, QID PRN    dextrose 50%, 12.5 g, Intravenous, PRN    dextrose 50%, 25 g, Intravenous, PRN    glucagon (human recombinant), 1 mg, Intramuscular, PRN    glucose, 16 g, Oral, PRN    glucose, 24 g, Oral, PRN    insulin aspart U-100, 0-10 Units, Subcutaneous, QID (AC + HS) PRN    magnesium oxide, 800 mg, Oral, PRN    magnesium oxide, 800 mg, Oral, PRN    melatonin, 6 mg, Oral, Nightly PRN    naloxone, 0.02 mg, Intravenous, PRN    ondansetron, 4 mg, Intravenous, Q6H PRN    potassium bicarbonate, 35 mEq, Oral, PRN    potassium bicarbonate, 50 mEq, Oral, PRN    potassium bicarbonate, 60 mEq, Oral, PRN    potassium, sodium phosphates, 2 packet, Oral, PRN    potassium, sodium phosphates, 2 packet, Oral, PRN    potassium, sodium phosphates, 2 packet, Oral, PRN    Past Medical History:   Diagnosis Date    Diabetes mellitus, type 2     GERD (gastroesophageal reflux disease)     Hyperlipidemia     Hypertension      Past Surgical History:   Procedure Laterality Date    FRACTURE SURGERY      INSERTION OF PACEMAKER      LEG AMPUTATION       Family History   Problem Relation Name Age of Onset    Arthritis Mother        Social History     Tobacco Use    Smoking status: Some Days     Types: Cigarettes    Smokeless tobacco: Never       OBJECTIVE:     Vital Signs and IO:  Temp:  [97.9 °F (36.6 °C)-99.4 °F (37.4 °C)]   Pulse:  [63-81]   Resp:  [12-37]   BP: (135-177)/(61-96)   SpO2:  [93 %-97 %]   I/O last 3 completed shifts:  In: 5669.2 [P.O.:1200; I.V.:4469.2]  Out: 6225 [Urine:6225]  Wt Readings from Last 5 Encounters:   10/24/24 104.6 kg (230 lb 9.6 oz)   07/23/24 102.4 kg (225 lb 12 oz)   04/17/24 89.8 kg (197 lb 15.6 oz)   04/12/24 89.8 kg (198 lb)   12/13/23 89.8 kg (198 lb)     Body mass index is 37.22 kg/m².    Physical Exam  Constitutional:       General: Patient is not in acute distress.     Appearance: Patient is well-developed. She is not diaphoretic.   HENT:      Head: Normocephalic and atraumatic.      Mouth/Throat: Mucous membranes are moist.   Eyes:      General: No scleral icterus.     Pupils: Pupils are equal, round, and reactive to light.   Cardiovascular:      Rate and Rhythm: Normal rate and regular rhythm.   Pulmonary:      Effort: Pulmonary effort is normal. No respiratory distress.      Breath sounds: No stridor.   Abdominal:      General: There is no distension.      Palpations: Abdomen is soft.   Musculoskeletal:         General: No deformity. Normal range of motion.      Cervical back: Neck supple.   Skin:     General: Skin is warm and dry.      Findings: No rash present. No erythema.   Neurological:      Mental Status: Patient is alert and oriented to person, place, and time.      Cranial Nerves: No cranial nerve deficit.   Psychiatric:         Behavior: Behavior normal.          Patient care time was spent personally by me on the following activities:     Obtaining a history.  Examination of patient.  Providing medical care at the patients bedside.  Developing a treatment plan with patient or surrogate and bedside caregivers.  Ordering and reviewing laboratory studies, radiographic studies, pulse  oximetry.  Ordering and performing treatments and interventions.  Evaluation of patient's response to treatment.  Discussions with consultants while on the unit and immediately available to the patient.  Re-evaluation of the patient's condition.  Documentation in the medical record.     Jabari Verma MD    [facility]  [address] [place] [address] [place] [id number]    [phone] - tel  [phone] - fax    10/25/2024

## 2024-10-25 NOTE — PROGRESS NOTES
[facility] Medicine  Progress Note    Patient Name: Dhaval Hernández  MRN: [id number]  Patient Class: IP- Inpatient   Admission Date: 10/22/2024  Length of Stay: 2 days  Attending Physician: Pauline Rose MD  Primary Care Provider: Jessie Jung MD        Subjective:     Principal Problem:Septic shock        HPI:  64 year pt getting admitted with septic shock and hyperkalemia  Pt has been suffering from diarrhea/N&V for past 4 days  Stools very watery and vomitus was clear  Later he started having crampy abdominal pain/radiation to back. No aggravating/relieving factors   He acme to ER and was found to be in chock and got admitted       Overview/Hospital Course:  Patient admitted with septic shock/SIRIA/hyperK. Found to have GPC bacteremia, species pending. Possible gallbladder diease and UTI. No collected stools since admission but reported several days of N/V and diarrhea. WBC trending down.   Gallbladder study completed with findings:  Abnormal study.  Gallbladder is not visualized after 60 minutes.  No delayed imaging is available at time of dictation.  Correlate for acute or chronic cholecystitis.   Blood culture: GPC- staph.  Antibiotics changed to Cefazolin. ID following, CRP 25.30, ESR 50.  WBC normal range.  PLAn for CT of left elbow and ortho consulted for aspiration.   Stool occult blood positive but H/H stable.   Renal function near normal range.    Ok to transfer out ICU        Interval History: seen and examined. Denies abdominal pain; c/o elbow and shoulder pain     Review of Systems   Constitutional:  Positive for activity change and appetite change.   Respiratory:  Positive for shortness of breath.    Cardiovascular: Negative.    Gastrointestinal:  Positive for diarrhea, nausea and vomiting. Negative for abdominal pain.   Genitourinary: Negative.    Musculoskeletal:  Positive for arthralgias, back pain and gait problem.   Skin: Negative.    Neurological:  Positive  for weakness.     Objective:     Vital Signs (Most Recent):  Temp: 98.7 °F (37.1 °C) (10/24/24 1901)  Pulse: 76 (10/24/24 2000)  Resp: 20 (10/24/24 2000)  BP: (!) 141/62 (10/24/24 2000)  SpO2: 95 % (10/24/24 2000) Vital Signs (24h Range):  Temp:  [98.2 °F (36.8 °C)-98.7 °F (37.1 °C)] 98.7 °F (37.1 °C)  Pulse:  [67-81] 76  Resp:  [11-37] 20  SpO2:  [92 %-97 %] 95 %  BP: (101-177)/(54-81) 141/62     Weight: 104.6 kg (230 lb 9.6 oz)  Body mass index is 37.22 kg/m².    Intake/Output Summary (Last 24 hours) at 10/24/2024 2017  Last data filed at 10/24/2024 2001  Gross per 24 hour   Intake 2974.17 ml   Output 5025 ml   Net -2050.83 ml         Physical Exam  Constitutional:       General: He is not in acute distress.     Appearance: He is ill-appearing.   HENT:      Head: Normocephalic and atraumatic.      Mouth/Throat:      Mouth: Mucous membranes are dry.      Pharynx: Oropharynx is clear.   Eyes:      General: No scleral icterus.     Extraocular Movements: Extraocular movements intact.   Cardiovascular:      Rate and Rhythm: Normal rate.      Pulses: Normal pulses.   Abdominal:      General: Bowel sounds are normal. There is distension.      Tenderness: There is no abdominal tenderness. There is no guarding.   Musculoskeletal:         General: Deformity present. No swelling.      Cervical back: Normal range of motion and neck supple.   Skin:     General: Skin is warm and dry.      Capillary Refill: Capillary refill takes less than 2 seconds.      Coloration: Skin is pale.   Neurological:      General: No focal deficit present.      Mental Status: He is alert and oriented to person, place, and time.   Psychiatric:         Mood and Affect: Mood normal.         Behavior: Behavior normal.             Significant Labs: All pertinent labs within the past 24 hours have been reviewed.  Recent Lab Results  (Last 5 results in the past 24 hours)        10/24/24  1743   10/24/24  1405   10/24/24  1019   10/24/24  1018    10/24/24  0333        Procalcitonin         4.129  Comment: The Procalcitonin test is intended to aid in the risk assessment of   critically ill patients on their first day of ICU admission for   progression   to severe sepsis and septic shock.    A result of <0.50 ng/mL is associated with a low risk of severe   sepsis   and/or septic shock.  This does not exclude localized infection.    A result between 0.50 ng/mL and 2.0 ng/mL should be interpreted with   consideration of the patient's history and is recommended to retest   within 6   to 24 hours.        A result of >2.0 ng/mL is associated with a high risk of severe   sepsis   and/or septic shock.    Procalcitonin may not be accurate among patients with   localized  infection, recent trauma or major surgery, immunosuppressed   state,  invasive fungal infection, renal dysfunction. Decisions   regarding  initiation or continuation of antibiotic therapy should not be   based  solely on procalcitonin levels.         Albumin         2.9       ALP         76       ALT         60       Anion Gap   9       9       AST         63       Baso #         0.02       Basophil %         0.2       BILIRUBIN TOTAL         0.7  Comment: For infants and newborns, interpretation of results should be based  on gestational age, weight and in agreement with clinical  observations.    Premature Infant recommended reference ranges:  Up to 24 hours.............<8.0 mg/dL  Up to 48 hours............<12.0 mg/dL  3-5 days..................<15.0 mg/dL  6-29 days.................<15.0 mg/dL         Blood Culture, Routine     No Growth to date  [P]   No Growth to date  [P]         BUN   38       45       Calcium   8.3       8.0       Chloride   98       101       CO2   28       25       Creatinine   1.7       2.3       CRP       25.30  Comment: CRP-Normal Application expected values:   <1.0        mg/dL   Normal Range  1.0 - 5.0  mg/dL   Indicates mild inflammation  5.0 - 10.0 mg/dL   Indicates  severe inflammation  >10.0        mg/dL   Represents serious processes and   frequently         indicates the presence of a bacterial   infection.            Differential Method         Automated       eGFR   43.9       30.6       Eos #         0.2       Eos %         1.9       Glucose   193       168       Gran # (ANC)         8.2       Gran %         75.9       Hematocrit         31.4       Hemoglobin         10.2       Immature Grans (Abs)         0.14  Comment: Mild elevation in immature granulocytes is non specific and   can be seen in a variety of conditions including stress response,   acute inflammation, trauma and pregnancy. Correlation with other   laboratory and clinical findings is essential.         Immature Granulocytes         1.3       Lymph #         1.2       Lymph %         10.6       Magnesium          1.5       MCH         26.0       MCHC         32.5       MCV         80       Mono #         1.1       Mono %         10.1       MPV         10.1       nRBC         0       Platelet Count         188       POCT Glucose 169               Potassium   4.6       4.1       PROTEIN TOTAL         5.7       RBC         3.93       RDW         18.1       Sed Rate       57         Sodium   135       135       WBC         10.85                               [P] - Preliminary Result               Significant Imaging: I have reviewed all pertinent imaging results/findings within the past 24 hours.    Assessment/Plan:      * Septic shock  Mostly gram negative shock  Source unknown  CXR showing questionable pneumonia  CT abdomen showed gas in GB with thickening  Follow up USS abdomen   Maintain iv meropenem  Received one dose if iv vancomycin in ER     Resolved     Olecranon bursitis of left elbow    CT pending  Cefazolin   Ortho consulted for aspiration     Atrial fibrillation, chronic  Stable     Hyperkalemia  Ca gluconate/50% dextrose 25 g/iv insulin now  Serum K every 6 hrs     Recent Labs     10/22/24  1145  10/22/24  1548   K 5.7* 5.7*               SIRIA (acute kidney injury)  SIRIA in the background of septic/hypovolemic shock  Maintain iv fluids   Recent Labs     10/22/24  1144 10/22/24  1548   CREATININE 5.1* 4.5*   EGFRNORACEVR 11.7* 13.7*       Acute pneumonia  Suspected pneumonia  Maintain present regime    Antibiotics (From admission, onward)      Start     Stop Route Frequency Ordered    10/24/24 1030  ceFAZolin injection 2 g         -- IV Every 12 hours (non-standard times) 10/24/24 0929    10/22/24 2100  mupirocin 2 % ointment         10/27/24 2059 Nasl 2 times daily 10/22/24 1650            Microbiology Results (last 7 days)       Procedure Component Value Units Date/Time    Blood culture [[id number]] Collected: 10/24/24 1018    Order Status: Completed Specimen: Blood from Peripheral, Hand, Right Updated: 10/24/24 1717     Blood Culture, Routine No Growth to date    Blood culture [[id number]] Collected: 10/24/24 1019    Order Status: Completed Specimen: Blood from Peripheral, Hand, Left Updated: 10/24/24 1717     Blood Culture, Routine No Growth to date    Stool culture **cannot be ordered stat** [[id number]] Collected: 10/23/24 1357    Order Status: Completed Specimen: Stool Updated: 10/24/24 1131     Stool Culture Nothing significant to date    Urine culture [[id number]] Collected: 10/22/24 1514    Order Status: Completed Specimen: Urine Updated: 10/24/24 0808     Urine Culture, Routine No growth to date    Narrative:      Specimen Source->Urine    Blood culture x two cultures. Draw prior to antibiotics. [[id number]]  (Abnormal) Collected: 10/22/24 1136    Order Status: Completed Specimen: Blood from Peripheral, Hand, Left Updated: 10/24/24 0716     Blood Culture, Routine Gram stain aer bottle: Gram positive cocci      Positive results previously called on Order #[id number]      STAPHYLOCOCCUS AUREUS  ID consult required for Staph aureus bacteremia.  For susceptibility see order #[id number]      Narrative:       Aerobic and anaerobic  Collection has been rescheduled by [email or web address] at 10/22/2024 11:44 Reason:   Done  Collection has been rescheduled by [email or web address] at 10/22/2024 11:44 Reason:   Done    Blood culture x two cultures. Draw prior to antibiotics. [[id number]]  (Abnormal) Collected: 10/22/24 1143    Order Status: Completed Specimen: Blood from Peripheral, Hand, Right Updated: 10/24/24 0715     Blood Culture, Routine Gram stain aer bottle: Gram positive cocci      Results called to and read back by:Tiffani Moulton RN-1ICU;      10/23/2024  07:54 CJD      STAPHYLOCOCCUS AUREUS  ID consult required for Staph aureus bacteremia.  susceptibility pending      Narrative:      Aerobic and anaerobic  Collection has been rescheduled by [facility] at 10/22/2024 11:44 Reason:   Done  Collection has been rescheduled by [facility] at 10/22/2024 11:44 Reason:   Done    Rapid Organism ID by PCR (from Blood culture) [[id number]]  (Abnormal) Collected: 10/22/24 1143    Order Status: Completed Updated: 10/23/24 0918     Enterococcus faecalis Not Detected     Enterococcus faecium Not Detected     Listeria monocytogenes Not Detected     Staphylococcus spp. See species for ID     Staphylococcus aureus Detected     Staphylococcus epidermidis Not Detected     Staphylococcus lugdunensis Not Detected     Streptococcus species Not Detected     Streptococcus agalactiae Not Detected     Streptococcus pneumoniae Not Detected     Streptococcus pyogenes Not Detected     Acinetobacter calcoaceticus/baumannii complex Not Detected     Bacteroides fragilis Not Detected     Enterobacterales Not Detected     Enterobacter cloacae complex Not Detected     Escherichia coli Not Detected     Klebsiella aerogenes Not Detected     Klebsiella oxytoca Not Detected     Klebsiella pneumoniae group Not Detected     Proteus Not Detected     Salmonella sp Not Detected     Serratia marcescens Not Detected     Haemophilus influenzae Not Detected     Neisseria meningtidis Not Detected      Pseudomonas aeruginosa Not Detected     Stenotrophomonas maltophilia Not Detected     Candida albicans Not Detected     Candida auris Not Detected     Candida glabrata Not Detected     Candida krusei Not Detected     Candida parapsilosis Not Detected     Candida tropicalis Not Detected     Cryptococcus neoformans/gattii Not Detected     CTX-M (ESBL ) Test not applicable     IMP (Carbapenem resistant) Test not applicable     KPC resistance gene (Carbapenem resistant) Test not applicable     mcr-1  Test not applicable     mec A/C  Test not applicable     mec A/C and MREJ (MRSA) gene Not Detected     NDM (Carbapenem resistant) Test not applicable     OXA-48-like (Carbapenem resistant) Test not applicable     van A/B (VRE gene) Test not applicable     VIM (Carbapenem resistant) Test not applicable    Narrative:      Aerobic and anaerobic        Repeat blood culture completed    High anion gap metabolic acidosis  In the background of lactic acidemia  Resolved     Tobacco dependency  Aware     Severe obesity (BMI 35.0-39.9) with comorbidity  Body mass index is 36.32 kg/m². Morbid obesity complicates all aspects of disease management from diagnostic modalities to treatment.     Cardiac pacemaker  PPM insitu      Type 2 diabetes mellitus with stage 2 chronic kidney disease, without long-term current use of insulin  Maintain present insulin regime    Status post unilateral below-knee amputation  L BKA        VTE Risk Mitigation (From admission, onward)           Ordered     enoxaparin injection 30 mg  Daily         10/22/24 1641     IP VTE HIGH RISK PATIENT  Once         10/22/24 1641     Place sequential compression device  Until discontinued         10/22/24 1641                    Discharge Planning   JILLIAN: 10/28/2024     Code Status: Full Code   Is the patient medically ready for discharge?:     Reason for patient still in hospital (select all that apply): Patient trending condition  Discharge Plan A: Skilled  Nursing Facility   Discharge Delays: (!) Post-Acute Set-up              Pauline Rose MD  Department of Hospital Medicine   [facility]

## 2024-10-25 NOTE — SUBJECTIVE & OBJECTIVE
Interval History: Patient said he feels better.     Review of Systems   All other systems reviewed and are negative.    Objective:     Vital Signs (Most Recent):  Temp: 98.1 °F (36.7 °C) (10/25/24 1642)  Pulse: 64 (10/25/24 1642)  Resp: 17 (10/25/24 1725)  BP: 125/63 (10/25/24 1642)  SpO2: (!) 93 % (10/25/24 1642) Vital Signs (24h Range):  Temp:  [97.9 °F (36.6 °C)-99.4 °F (37.4 °C)] 98.1 °F (36.7 °C)  Pulse:  [63-81] 64  Resp:  [16-37] 17  SpO2:  [93 %-97 %] 93 %  BP: (125-177)/(62-96) 125/63     Weight: 104.6 kg (230 lb 9.6 oz)  Body mass index is 37.22 kg/m².    Intake/Output Summary (Last 24 hours) at 10/25/2024 1734  Last data filed at 10/25/2024 1654  Gross per 24 hour   Intake 290 ml   Output 2200 ml   Net -1910 ml         Physical Exam  Cardiovascular:      Rate and Rhythm: Normal rate.      Pulses: Normal pulses.   Pulmonary:      Effort: Pulmonary effort is normal.   Neurological:      Mental Status: He is alert and oriented to person, place, and time.             Significant Labs: All pertinent labs within the past 24 hours have been reviewed.    Significant Imaging: I have reviewed all pertinent imaging results/findings within the past 24 hours.

## 2024-10-25 NOTE — PT/OT/SLP PROGRESS
Physical Therapy Treatment    Patient Name:  Dhaval Hernández   MRN:  [id number]    Recommendations:     Discharge Recommendations: Moderate Intensity Therapy  Discharge Equipment Recommendations: to be determined by next level of care  Barriers to discharge:  Increased caregiver burden of care, impaired functional mobility, pain     Assessment:     Dhaval Hernández is a 64 y.o. male admitted with a medical diagnosis of Septic shock.  He presents with the following impairments/functional limitations: weakness, impaired endurance, impaired self care skills, impaired functional mobility, impaired balance, decreased upper extremity function, decreased lower extremity function, decreased safety awareness, pain, impaired skin, impaired cardiopulmonary response to activity. Patient is agreeable to participation with PT treatment. He requires MaxA x2 for supine to sit and reports lightheadedness upon sitting that improved with time and /91. He requires MaxA x2 for squat pivot to WC on the right. PT unable to return to assist patient back to bed and RN requests patient not be left up in WC. He returned to bed with MaxA x2 for squat pivot to the right. Bed alarm on and all needs met. Patient reports 6/10 L elbow pain and requests pain medication with PCT notified and will inform RN when she returns to the floor.     Rehab Prognosis: Fair; patient would benefit from acute skilled PT services to address these deficits and reach maximum level of function.    Recent Surgery: * No surgery found *      Plan:     During this hospitalization, patient to be seen 6 x/week to address the identified rehab impairments via therapeutic activities, therapeutic exercises and progress toward the following goals:    Plan of Care Expires:  11/24/24    Subjective   Patient reports he has not used his LLE prosthesis in about a year and PT encouraged transfer training to be the primary focus of therapy rather than attempting to use  prosthesis     PT frequency increased to QD6 based on patient's motivation for recovery and spouse's hesitancy about SNF in [place] as she cannot drive and it would be difficult for her to visit  Chief Complaint: L elbow pain  Patient/Family Comments/goals: agrees to SNF for transfer training in hopes he can perform WC transfer independently again   Pain/Comfort:  Pain Rating 1: 6/10  Location - Side 1: Left  Location 1: elbow  Pain Addressed 1:  (PCT notified and will inform RN who is currently off the unit)      Objective:     Communicated with ALYSSIA Soliz prior to session.  Patient found HOB elevated with bed alarm, oxygen, peripheral IV, telemetry upon PT entry to room.     General Precautions: Standard, fall, respiratory  Orthopedic Precautions: LUE weight bearing as tolerated (per ortho note)  Braces: N/A  Respiratory Status: Nasal cannula, flow 4 L/min     Functional Mobility:  Bed Mobility:     Scooting: maximal assistance and of 2 persons  Supine to Sit: maximal assistance and of 2 persons  Transfers:     Bed to Wheelchair: maximal assistance and of 2 persons with  no AD  using  Squat Pivot      AM-PAC 6 CLICK MOBILITY          Treatment & Education:  Patient was educated on the importance of OOB activity and functional mobility to negate negative effects of prolonged bed rest during hospitalization, safe transfers, and D/C planning     Patient left HOB elevated with all lines intact, call button in reach, bed alarm on, PCT notified, and spouse present..    GOALS:   Multidisciplinary Problems       Physical Therapy Goals          Problem: Physical Therapy    Goal Priority Disciplines Outcome Interventions   Physical Therapy Goal     PT, PT/OT Progressing    Description: Goals to be met by: 2024     Patient will increase functional independence with mobility by performin. Supine to sit with MInimal Assistance  2. Sit to supine with MInimal Assistance  3. Bed to chair transfer with Moderate Assistance  using No Assistive Device                         Time Tracking:     PT Received On: 10/25/24  PT Start Time: 1042     PT Stop Time: 1124  PT Total Time (min): 42 min     Billable Minutes: Therapeutic Activity 42    Treatment Type: Treatment  PT/PTA: PT     Number of PTA visits since last PT visit: 0     10/25/2024

## 2024-10-25 NOTE — SUBJECTIVE & OBJECTIVE
Interval History: seen and examined. Denies abdominal pain; c/o elbow and shoulder pain     Review of Systems   Constitutional:  Positive for activity change and appetite change.   Respiratory:  Positive for shortness of breath.    Cardiovascular: Negative.    Gastrointestinal:  Positive for diarrhea, nausea and vomiting. Negative for abdominal pain.   Genitourinary: Negative.    Musculoskeletal:  Positive for arthralgias, back pain and gait problem.   Skin: Negative.    Neurological:  Positive for weakness.     Objective:     Vital Signs (Most Recent):  Temp: 98.7 °F (37.1 °C) (10/24/24 1901)  Pulse: 76 (10/24/24 2000)  Resp: 20 (10/24/24 2000)  BP: (!) 141/62 (10/24/24 2000)  SpO2: 95 % (10/24/24 2000) Vital Signs (24h Range):  Temp:  [98.2 °F (36.8 °C)-98.7 °F (37.1 °C)] 98.7 °F (37.1 °C)  Pulse:  [67-81] 76  Resp:  [11-37] 20  SpO2:  [92 %-97 %] 95 %  BP: (101-177)/(54-81) 141/62     Weight: 104.6 kg (230 lb 9.6 oz)  Body mass index is 37.22 kg/m².    Intake/Output Summary (Last 24 hours) at 10/24/2024 2017  Last data filed at 10/24/2024 2001  Gross per 24 hour   Intake 2974.17 ml   Output 5025 ml   Net -2050.83 ml         Physical Exam  Constitutional:       General: He is not in acute distress.     Appearance: He is ill-appearing.   HENT:      Head: Normocephalic and atraumatic.      Mouth/Throat:      Mouth: Mucous membranes are dry.      Pharynx: Oropharynx is clear.   Eyes:      General: No scleral icterus.     Extraocular Movements: Extraocular movements intact.   Cardiovascular:      Rate and Rhythm: Normal rate.      Pulses: Normal pulses.   Abdominal:      General: Bowel sounds are normal. There is distension.      Tenderness: There is no abdominal tenderness. There is no guarding.   Musculoskeletal:         General: Deformity present. No swelling.      Cervical back: Normal range of motion and neck supple.   Skin:     General: Skin is warm and dry.      Capillary Refill: Capillary refill takes less  than 2 seconds.      Coloration: Skin is pale.   Neurological:      General: No focal deficit present.      Mental Status: He is alert and oriented to person, place, and time.   Psychiatric:         Mood and Affect: Mood normal.         Behavior: Behavior normal.             Significant Labs: All pertinent labs within the past 24 hours have been reviewed.  Recent Lab Results  (Last 5 results in the past 24 hours)        10/24/24  1743   10/24/24  1405   10/24/24  1019   10/24/24  1018   10/24/24  0333        Procalcitonin         4.129  Comment: The Procalcitonin test is intended to aid in the risk assessment of   critically ill patients on their first day of ICU admission for   progression   to severe sepsis and septic shock.    A result of <0.50 ng/mL is associated with a low risk of severe   sepsis   and/or septic shock.  This does not exclude localized infection.    A result between 0.50 ng/mL and 2.0 ng/mL should be interpreted with   consideration of the patient's history and is recommended to retest   within 6   to 24 hours.        A result of >2.0 ng/mL is associated with a high risk of severe   sepsis   and/or septic shock.    Procalcitonin may not be accurate among patients with   localized  infection, recent trauma or major surgery, immunosuppressed   state,  invasive fungal infection, renal dysfunction. Decisions   regarding  initiation or continuation of antibiotic therapy should not be   based  solely on procalcitonin levels.         Albumin         2.9       ALP         76       ALT         60       Anion Gap   9       9       AST         63       Baso #         0.02       Basophil %         0.2       BILIRUBIN TOTAL         0.7  Comment: For infants and newborns, interpretation of results should be based  on gestational age, weight and in agreement with clinical  observations.    Premature Infant recommended reference ranges:  Up to 24 hours.............<8.0 mg/dL  Up to 48 hours............<12.0  mg/dL  3-5 days..................<15.0 mg/dL  6-29 days.................<15.0 mg/dL         Blood Culture, Routine     No Growth to date  [P]   No Growth to date  [P]         BUN   38       45       Calcium   8.3       8.0       Chloride   98       101       CO2   28       25       Creatinine   1.7       2.3       CRP       25.30  Comment: CRP-Normal Application expected values:   <1.0        mg/dL   Normal Range  1.0 - 5.0  mg/dL   Indicates mild inflammation  5.0 - 10.0 mg/dL   Indicates severe inflammation  >10.0        mg/dL   Represents serious processes and   frequently         indicates the presence of a bacterial   infection.            Differential Method         Automated       eGFR   43.9       30.6       Eos #         0.2       Eos %         1.9       Glucose   193       168       Gran # (ANC)         8.2       Gran %         75.9       Hematocrit         31.4       Hemoglobin         10.2       Immature Grans (Abs)         0.14  Comment: Mild elevation in immature granulocytes is non specific and   can be seen in a variety of conditions including stress response,   acute inflammation, trauma and pregnancy. Correlation with other   laboratory and clinical findings is essential.         Immature Granulocytes         1.3       Lymph #         1.2       Lymph %         10.6       Magnesium          1.5       MCH         26.0       MCHC         32.5       MCV         80       Mono #         1.1       Mono %         10.1       MPV         10.1       nRBC         0       Platelet Count         188       POCT Glucose 169               Potassium   4.6       4.1       PROTEIN TOTAL         5.7       RBC         3.93       RDW         18.1       Sed Rate       57         Sodium   135       135       WBC         10.85                               [P] - Preliminary Result               Significant Imaging: I have reviewed all pertinent imaging results/findings within the past 24 hours.

## 2024-10-25 NOTE — ASSESSMENT & PLAN NOTE
Mostly gram negative shock  Source unknown  CXR showing questionable pneumonia  CT abdomen showed gas in GB with thickening  Follow up USS abdomen   Maintain iv meropenem  Received one dose if iv vancomycin in ER     Resolved

## 2024-10-25 NOTE — PROGRESS NOTES
[facility] Medicine  Progress Note    Patient Name: Dhaval Hernández  MRN: [id number]  Patient Class: IP- Inpatient   Admission Date: 10/22/2024  Length of Stay: 3 days  Attending Physician: Bill Kerns DO  Primary Care Provider: Jessie Jugn MD        Subjective:     Principal Problem:Septic shock        HPI:  64 year pt getting admitted with septic shock and hyperkalemia  Pt has been suffering from diarrhea/N&V for past 4 days  Stools very watery and vomitus was clear  Later he started having crampy abdominal pain/radiation to back. No aggravating/relieving factors   He acme to ER and was found to be in chock and got admitted       Overview/Hospital Course:  Patient admitted with septic shock. Bacteremia with Staph +. Repeat b/c showed NGTD. Source likely from left elbow infection. Continue with antibiotics and ID is following patient. Follow up on echo.  GI recommends outpatient follow up for FOBT +.     Interval History: Patient said he feels better.     Review of Systems   All other systems reviewed and are negative.    Objective:     Vital Signs (Most Recent):  Temp: 98.1 °F (36.7 °C) (10/25/24 1642)  Pulse: 64 (10/25/24 1642)  Resp: 17 (10/25/24 1725)  BP: 125/63 (10/25/24 1642)  SpO2: (!) 93 % (10/25/24 1642) Vital Signs (24h Range):  Temp:  [97.9 °F (36.6 °C)-99.4 °F (37.4 °C)] 98.1 °F (36.7 °C)  Pulse:  [63-81] 64  Resp:  [16-37] 17  SpO2:  [93 %-97 %] 93 %  BP: (125-177)/(62-96) 125/63     Weight: 104.6 kg (230 lb 9.6 oz)  Body mass index is 37.22 kg/m².    Intake/Output Summary (Last 24 hours) at 10/25/2024 1734  Last data filed at 10/25/2024 1654  Gross per 24 hour   Intake 290 ml   Output 2200 ml   Net -1910 ml         Physical Exam  Cardiovascular:      Rate and Rhythm: Normal rate.      Pulses: Normal pulses.   Pulmonary:      Effort: Pulmonary effort is normal.   Neurological:      Mental Status: He is alert and oriented to person, place, and time.              Significant Labs: All pertinent labs within the past 24 hours have been reviewed.    Significant Imaging: I have reviewed all pertinent imaging results/findings within the past 24 hours.    Assessment/Plan:      * Septic shock  Resolved  Bacteremia with Staph +. Repeat b/c showed NGTD.   Source likely from left elbow infection.   Follow up on echo  Continue with antibiotics and ID is following patient.    Olecranon bursitis of left elbow    CT pending  Cefazolin   Ortho consulted for aspiration     Atrial fibrillation, chronic  Stable     Hyperkalemia  Ca gluconate/50% dextrose 25 g/iv insulin now  Serum K every 6 hrs     Recent Labs     10/22/24  1144 10/22/24  1548   K 5.7* 5.7*               SIRIA (acute kidney injury)  SIRIA in the background of septic/hypovolemic shock  Maintain iv fluids   Recent Labs     10/22/24  1144 10/22/24  1548   CREATININE 5.1* 4.5*   EGFRNORACEVR 11.7* 13.7*       Acute pneumonia  Suspected pneumonia  Maintain present regime    Antibiotics (From admission, onward)      Start     Stop Route Frequency Ordered    10/24/24 1030  ceFAZolin injection 2 g         -- IV Every 12 hours (non-standard times) 10/24/24 0929    10/22/24 2100  mupirocin 2 % ointment         10/27/24 2059 Nasl 2 times daily 10/22/24 1650            Microbiology Results (last 7 days)       Procedure Component Value Units Date/Time    Blood culture [[id number]] Collected: 10/24/24 1018    Order Status: Completed Specimen: Blood from Peripheral, Hand, Right Updated: 10/24/24 1717     Blood Culture, Routine No Growth to date    Blood culture [[id number]] Collected: 10/24/24 1019    Order Status: Completed Specimen: Blood from Peripheral, Hand, Left Updated: 10/24/24 1717     Blood Culture, Routine No Growth to date    Stool culture **cannot be ordered stat** [[id number]] Collected: 10/23/24 1357    Order Status: Completed Specimen: Stool Updated: 10/24/24 1131     Stool Culture Nothing significant to date    Urine  culture [[id number]] Collected: 10/22/24 1514    Order Status: Completed Specimen: Urine Updated: 10/24/24 0808     Urine Culture, Routine No growth to date    Narrative:      Specimen Source->Urine    Blood culture x two cultures. Draw prior to antibiotics. [[id number]]  (Abnormal) Collected: 10/22/24 1136    Order Status: Completed Specimen: Blood from Peripheral, Hand, Left Updated: 10/24/24 0716     Blood Culture, Routine Gram stain aer bottle: Gram positive cocci      Positive results previously called on Order #[id number]      STAPHYLOCOCCUS AUREUS  ID consult required for Staph aureus bacteremia.  For susceptibility see order #[id number]      Narrative:      Aerobic and anaerobic  Collection has been rescheduled by ZCORBIN at 10/22/2024 11:44 Reason:   Done  Collection has been rescheduled by CORBIN at 10/22/2024 11:44 Reason:   Done    Blood culture x two cultures. Draw prior to antibiotics. [[id number]]  (Abnormal) Collected: 10/22/24 1143    Order Status: Completed Specimen: Blood from Peripheral, Hand, Right Updated: 10/24/24 0715     Blood Culture, Routine Gram stain aer bottle: Gram positive cocci      Results called to and read back by:Tiffani Moulton RN-1ICU;      10/23/2024  07:54 CJD      STAPHYLOCOCCUS AUREUS  ID consult required for Staph aureus bacteremia.  susceptibility pending      Narrative:      Aerobic and anaerobic  Collection has been rescheduled by Z at 10/22/2024 11:44 Reason:   Done  Collection has been rescheduled by [facility] at 10/22/2024 11:44 Reason:   Done    Rapid Organism ID by PCR (from Blood culture) [[id number]]  (Abnormal) Collected: 10/22/24 1143    Order Status: Completed Updated: 10/23/24 0918     Enterococcus faecalis Not Detected     Enterococcus faecium Not Detected     Listeria monocytogenes Not Detected     Staphylococcus spp. See species for ID     Staphylococcus aureus Detected     Staphylococcus epidermidis Not Detected     Staphylococcus lugdunensis Not Detected      Streptococcus species Not Detected     Streptococcus agalactiae Not Detected     Streptococcus pneumoniae Not Detected     Streptococcus pyogenes Not Detected     Acinetobacter calcoaceticus/baumannii complex Not Detected     Bacteroides fragilis Not Detected     Enterobacterales Not Detected     Enterobacter cloacae complex Not Detected     Escherichia coli Not Detected     Klebsiella aerogenes Not Detected     Klebsiella oxytoca Not Detected     Klebsiella pneumoniae group Not Detected     Proteus Not Detected     Salmonella sp Not Detected     Serratia marcescens Not Detected     Haemophilus influenzae Not Detected     Neisseria meningtidis Not Detected     Pseudomonas aeruginosa Not Detected     Stenotrophomonas maltophilia Not Detected     Candida albicans Not Detected     Candida auris Not Detected     Candida glabrata Not Detected     Candida krusei Not Detected     Candida parapsilosis Not Detected     Candida tropicalis Not Detected     Cryptococcus neoformans/gattii Not Detected     CTX-M (ESBL ) Test not applicable     IMP (Carbapenem resistant) Test not applicable     KPC resistance gene (Carbapenem resistant) Test not applicable     mcr-1  Test not applicable     mec A/C  Test not applicable     mec A/C and MREJ (MRSA) gene Not Detected     NDM (Carbapenem resistant) Test not applicable     OXA-48-like (Carbapenem resistant) Test not applicable     van A/B (VRE gene) Test not applicable     VIM (Carbapenem resistant) Test not applicable    Narrative:      Aerobic and anaerobic        Repeat blood culture completed    High anion gap metabolic acidosis  In the background of lactic acidemia  Resolved     Tobacco dependency  Aware     Severe obesity (BMI 35.0-39.9) with comorbidity  Body mass index is 36.32 kg/m². Morbid obesity complicates all aspects of disease management from diagnostic modalities to treatment.     Cardiac pacemaker  PPM insitu      Type 2 diabetes mellitus with stage 2 chronic  kidney disease, without long-term current use of insulin  Maintain present insulin regime    Status post unilateral below-knee amputation  L BKA        VTE Risk Mitigation (From admission, onward)           Ordered     enoxaparin injection 40 mg  Daily         10/25/24 1431     IP VTE HIGH RISK PATIENT  Once         10/22/24 1641     Place sequential compression device  Until discontinued         10/22/24 1641                    Discharge Planning   JILLIAN:      Code Status: Full Code   Is the patient medically ready for discharge?:     Reason for patient still in hospital (select all that apply): Patient trending condition  Discharge Plan A: Skilled Nursing Facility   Discharge Delays: (!) Post-Acute Set-up              Bill Kerns DO  Department of Hospital Medicine   [facility]

## 2024-10-25 NOTE — ASSESSMENT & PLAN NOTE
Resolved  Bacteremia with Staph +. Repeat b/c showed NGTD.   Source likely from left elbow infection.   Follow up on echo  Continue with antibiotics and ID is following patient.

## 2024-10-25 NOTE — PROGRESS NOTES
Pharmacist Renal Dose Adjustment Note    Dhaval Hernández is a 64 y.o. male being treated with the medication Enoxaparin    Patient Data:    Vital Signs (Most Recent):  Temp: 98.5 °F (36.9 °C) (10/25/24 1137)  Pulse: 65 (10/25/24 1137)  Resp: 17 (10/25/24 1207)  BP: (!) 159/69 (10/25/24 1137)  SpO2: 97 % (10/25/24 1137) Vital Signs (72h Range):  Temp:  [97.9 °F (36.6 °C)-99.4 °F (37.4 °C)]   Pulse:  [62-81]   Resp:  [11-38]   BP: ()/(44-96)   SpO2:  [86 %-98 %]      Recent Labs   Lab 10/24/24  0333 10/24/24  1405 10/25/24  0512   CREATININE 2.3* 1.7* 1.2     Serum creatinine: 1.2 mg/dL 10/25/24 0512  Estimated creatinine clearance: 68.6 mL/min    Enoxaparin 30 mg daily will be changed to Enoxaparin 40 mg daily per renal dose adjustment policy.    Pharmacist's Name: Patrice Arizmendi  Pharmacist's Extension: [phone]

## 2024-10-25 NOTE — PLAN OF CARE
10:23  SW met with Pt and spouse at bedside to discuss discharge plans. Pt and spouse are agreeble to SNF. They named their preferences as [facility], [facility], #3 [facility].     Preferences were noted on   Pt choice form which was obtained and scanned into media. Referrals sent.    11:26  SW checked [facility] to find that the three named preferences has not responded. SW contacted Liaisons of all three and asked for a response. [facility] did accept. SW talked to Pt and spouse about this. Pt wants to wait and see what the liaisons from each facility in [place] say. If they all decline, He said he would go to [facility]. Awaiting responses from [place] facilities.    12:16  Malaika from [facility] stated Pt is clinically accepted. She said once he is medically stable, she will put it in [facility].    4:20  Jenifer remarked in [facility]port that as soon as Pt is medically stable, he will be medically accepted. She added, she will call family to set up a time to sign admission paperwork. SW contacted her to follow up and see if she had scheduled the meeting with family. Waiting for reply.

## 2024-10-25 NOTE — RESPIRATORY THERAPY
10/25/24 0718   Patient Assessment/Suction   Level of Consciousness (AVPU) alert   Respiratory Effort Unlabored   Rhythm/Pattern, Respiratory unlabored   PRE-TX-O2   Device (Oxygen Therapy) nasal cannula   $ Is the patient on Low Flow Oxygen? Yes   Flow (L/min) (Oxygen Therapy) 4   Pulse Oximetry Type Intermittent   $ Pulse Oximetry - Multiple Charge Pulse Oximetry - Multiple   Pulse 65   Resp 16

## 2024-10-25 NOTE — CONSULTS
[facility]  Orthopedics  Consult Note    Patient Name: Dhaval Hernández  MRN: [id number]  Admission Date: 10/22/2024  Hospital Length of Stay: 3 days  Attending Provider: Bill Kerns DO  Primary Care Provider: Jessie Jung MD    Patient information was obtained from patient, ER records, and primary team.     Inpatient consult to Orthopedic Surgery  Consult performed by: Gokul Troy MD  Consult ordered by: Migel Watson MD        Subjective:     Principal Problem:Septic shock    Chief Complaint:   Chief Complaint   Patient presents with    Numbness     Pt is complaining of left sided numbness and tingling in arm started at 9 am.          HPI:   64-year-old male admitted on 10/22 for septic shock, generalized weakness, and Left elbow pain.  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  Surgical history is significant for high-speed half-way in 1979.  Underwent left elbow ORIF and left-sided below-knee amputation (later converted to an above-knee amputation in 2019).  Patient states that left elbow has been stiff essentially since time of surgery.  Frequently used the left forearm to help transfer given his history of left lower extremity amputation.  Subjectively his left hand and wrist have become weaker over the last several weeks causing him to use his elbow more frequently.  Endorses worsening pain at this site.  Denies any drainage, fever, chills.    He did have worsening N/V/D leading up to presentation to the ED.  He is right-hand dominant.  Wheelchair primarily for ambulation.  Sometimes uses his prosthesis to get around the house.      Past Medical History:   Diagnosis Date    Diabetes mellitus, type 2     GERD (gastroesophageal reflux disease)     Hyperlipidemia     Hypertension        Past Surgical History:   Procedure Laterality Date    FRACTURE SURGERY      INSERTION OF PACEMAKER       LEG AMPUTATION         Review of patient's allergies indicates:   Allergen Reactions    Fish containing products      Other reaction(s): .       Current Facility-Administered Medications   Medication    acetaminophen tablet 650 mg    acetaminophen tablet 650 mg    aluminum-magnesium hydroxide-simethicone 200-200-20 mg/5 mL suspension 30 mL    ceFAZolin injection 2 g    citalopram tablet 10 mg    dextrose 50% injection 12.5 g    dextrose 50% injection 25 g    enoxaparin injection 40 mg    glucagon (human recombinant) injection 1 mg    glucose chewable tablet 16 g    glucose chewable tablet 24 g    insulin aspart U-100 pen 0-10 Units    insulin glargine U-100 (Lantus) pen 16 Units    levothyroxine tablet 75 mcg    magnesium oxide tablet 800 mg    magnesium oxide tablet 800 mg    melatonin tablet 6 mg    metronidazole IVPB 500 mg    mupirocin 2 % ointment    naloxone 0.4 mg/mL injection 0.02 mg    ondansetron injection 4 mg    oxyCODONE-acetaminophen 5-325 mg per tablet 1 tablet    pantoprazole injection 40 mg    potassium bicarbonate disintegrating tablet 35 mEq    potassium bicarbonate disintegrating tablet 50 mEq    potassium bicarbonate disintegrating tablet 60 mEq    potassium, sodium phosphates 280-160-250 mg packet 2 packet    potassium, sodium phosphates 280-160-250 mg packet 2 packet    potassium, sodium phosphates 280-160-250 mg packet 2 packet     Family History       Problem Relation (Age of Onset)    Arthritis Mother          Tobacco Use    Smoking status: Some Days     Types: Cigarettes    Smokeless tobacco: Never   Substance and Sexual Activity    Alcohol use: Not on file    Drug use: Not on file    Sexual activity: Not on file     ROS  Objective:     Vital Signs (Most Recent):  Temp: 98.1 °F (36.7 °C) (10/25/24 1642)  Pulse: 64 (10/25/24 1642)  Resp: 16 (10/25/24 1642)  BP: 125/63 (10/25/24 1642)  SpO2: (!) 93 % (10/25/24 1642) Vital Signs (24h Range):  Temp:  [97.9 °F (36.6 °C)-99.4 °F (37.4 °C)]  "98.1 °F (36.7 °C)  Pulse:  [63-81] 64  Resp:  [16-37] 16  SpO2:  [93 %-97 %] 93 %  BP: (125-177)/(62-96) 125/63     Weight: 104.6 kg (230 lb 9.6 oz)  Height: 5' 6" (167.6 cm)  Body mass index is 37.22 kg/m².      Intake/Output Summary (Last 24 hours) at 10/25/2024 1705  Last data filed at 10/25/2024 1654  Gross per 24 hour   Intake 290 ml   Output 2200 ml   Net -1910 ml       Ortho/SPM Exam  Gen: NAD; A&Ox3.  CV: RR.  Resp: no increased WOB. On oxygen via NC.  Left UE:  Previous posterior/posterolateral surgical scar over the elbow is well healed.  Small area of redness along the posteromedial elbow.  Questionable fluctuance.  No active drainage.  No prominent hardware, skin irritation, skin breakdown, etc. noted.  Patient with baseline 90 degree flexion contracture/ankylosed elbow joint.  30° supination and 45° pronation.  No suggestion of septic arthritis although difficult to assess given baseline ankylosed joint.  First dorsal interossei atrophy.  Sensation intact to light touch distally.  Mild weakness with finger flexion and extension but intact.  Palpable radial pulse.      Significant Labs: A1C: No results for input(s): "HGBA1C" in the last 4320 hours.  Blood Culture:   Recent Labs   Lab 10/24/24  1018 10/24/24  1019   LABBLOO No Growth to date  No Growth to date No Growth to date  No Growth to date     BMP:   Recent Labs   Lab 10/25/24  0512   *      K 4.3      CO2 27   BUN 25*   CREATININE 1.2   CALCIUM 8.3*   MG 1.4*     CBC:   Recent Labs   Lab 10/24/24  0333 10/25/24  0513   WBC 10.85 11.58   HGB 10.2* 11.3*   HCT 31.4* 35.3*    207     CMP:   Recent Labs   Lab 10/24/24  0333 10/24/24  1405 10/25/24  0512   * 135* 136   K 4.1 4.6 4.3    98 100   CO2 25 28 27   * 193* 114*   BUN 45* 38* 25*   CREATININE 2.3* 1.7* 1.2   CALCIUM 8.0* 8.3* 8.3*   PROT 5.7*  --  6.0   ALBUMIN 2.9*  --  2.9*   BILITOT 0.7  --  0.9   ALKPHOS 76  --  85   AST 63*  --  46*   ALT 60* " " --  39   ANIONGAP 9 9 9     Coagulation: No results for input(s): "LABPROT", "INR", "APTT" in the last 48 hours.  CRP:   Recent Labs   Lab 10/24/24  1018   CRP 25.30*     Lactic Acid: No results for input(s): "LACTATE" in the last 48 hours.  All pertinent labs within the past 24 hours have been reviewed.    Significant Imaging:  AP and lateral left elbow and CT left elbow with and without contrast: Evidence of previous distal humerus fracture ORIF and olecranon fracture versus olecranon osteotomy ORIF.  Distal humerus fracture well healed.  Persistent lucency at the olecranon.  Broken olecranon screw with tension band construct.  Significant heterotopic ossification with ankylosis of the ulnohumeral and radiocapitellar joints.  No evidence of bone erosion to suggest osteomyelitis.  No drainable fluid collection noted on CT.    Assessment/Plan:     65yo M admitted on 10/22 admitted for septic shock, generalized weakness, and Left elbow pain.  PMH significant for T2DM, A1c of 6.5; HTN; Left above knee amputation; and high speed care home (1979) with Left elbow ORIF -- now ankylosed.     --WBAT LUE although patient with fixed elbow flexion at 90 degrees and ankylosis/autofusion on x-ray.  --CT with no obvious fluid collection/abscess. No indication for surgical intervention at this time.  --IV abx per ID and primary team.  --May require further neurological (possible C/sp) workup with patient-reported increased LUE weakness over the last several weeks.  --Will cont to monitor. Call with questions.         Gokul Troy MD  Orthopedics  [facility]        "

## 2024-10-25 NOTE — PLAN OF CARE
Problem: Physical Therapy  Goal: Physical Therapy Goal  Description: Goals to be met by: 2024     Patient will increase functional independence with mobility by performin. Supine to sit with MInimal Assistance  2. Sit to supine with MInimal Assistance  3. Bed to chair transfer with Moderate Assistance using No Assistive Device    Outcome: Progressing

## 2024-10-25 NOTE — PROGRESS NOTES
Former[facility]   Department of Infectious Disease  Progress Note        PATIENT NAME: Dhaval Hernández  MRN: [id number]  TODAY'S DATE: 10/25/2024  ADMIT DATE: 10/22/2024  LOS: 3 days    CHIEF COMPLAINT: Numbness (Pt is complaining of left sided numbness and tingling in arm started at 9 am./)      PRINCIPLE PROBLEM: Septic shock    INTERVAL HISTORY      10/25/2024: Out of ICU and now on medical floor.  X-ray shows hardware left elbow.  CT without contrast with the abscess.  X-ray right shoulder with mild DJD.  Repeat blood culture 10/24/2024 so far negative.      Antibiotics (From admission, onward)      Start     Stop Route Frequency Ordered    10/25/24 1000  metronidazole IVPB 500 mg         -- IV Every 8 hours (non-standard times) 10/25/24 0859    10/24/24 1030  ceFAZolin injection 2 g         -- IV Every 12 hours (non-standard times) 10/24/24 0929    10/22/24 2100  mupirocin 2 % ointment         10/27/24 2059 Nasl 2 times daily 10/22/24 1650          Antifungals (From admission, onward)      None           Antivirals (From admission, onward)      None            ASSESSMENT and PLAN      MSSA bacteremia.  This is most likely from left elbow infection.  Follow up repeat blood culture and TTE report.  We will plan to repeat blood cultures again 10/26/2024.  Continue cefazolin       2.   SIRIA on CKD.  Improving.  Creatinine down to 1.2.      3.   Left elbow abscess.  He may also have septic bursitis.  He has a extensive hardware in the elbow and there was concern for associated osteomyelitis.  CT was a noncontrast study and did not show any fluid collection.  He clinically has fluctuant fluid even though it is better.  I believe he will benefit from I&D.  We will discuss with orthopedic surgeon.  May get a contrast study now that SIRIA resolved.  Check ultrasound for now.    4. Right shoulder pain.  This is chronic associated with weakness.  Has mild DJD on x-ray.    5. Diabetes mellitus.  Management as per  hospitalist.    RECOMMENDATIONS:    Increase cefazolin to 2 g q.8 hours   Repeat blood culture x2 sets 10/26/2024   Follow TTE report   Check ultrasound left elbow and maybe follow with repeat CT with IV contrast  Why he was on metronidazole.  Consider DC.    Please send Epic secure chat with any questions.      SUBJECTIVE    Dhaval Hernández is a 64 y.o. male with history of diabetes mellitus, hypertension, hyperlipidemia, GERD and previous left BKA.  Also with history of AICD.  He presents to the ER 10/22/2024 with 4 day history of nausea, vomiting and diarrhea.  He had no fever.  In the ER vitals were abnormal with blood pressure 75/39, pulse 60, respiratory 18, temperature 97.6° oxygen saturation 92%.  WBC was 20 K with left shift.  Creatinine 5.1.  UA abnormal with 19 WBC and 4+ glucose.  Chest x-ray with no clear acute infiltrate.  CT abdomen and pelvis showed mild cardiomegaly and also cholelithiasis.  He was commenced on IV fluid and antibiotics.     Leukocytosis resolved and WBC down to 8.0.  Blood cultures have grown MSSA in 2/2 bottles.  HIDA scan was abnormal.  Surgery was consulted to evaluate for possible cholecystitis.  Notes reviewed.  No plan for surgical intervention at this time since patient had no abdominal pain and appears to be improving.  ID asked to assist with his care.     States his symptoms started with his left elbow hurting.  He has been leaning on the left elbow to transfer from bed to wheelchair.  Has a elbow ORIF and is fixed in a flexion position.  Has had persistent pain for the last 1 week with swelling.  His other symptoms followed after the elbow pain started.        Antibiotic history:  Vancomycin: 10/22/2024-  Ertapenem: 10/22/2024 the       Microbiology:    Blood culture 10/22/2024: MSSA 2/    Review of Systems  Negative except as stated above in Interval History     OBJECTIVE   Temp:  [97.9 °F (36.6 °C)-99.4 °F (37.4 °C)] 98.5 °F (36.9 °C)  Pulse:  [63-81] 65  Resp:   [12-37] 17  SpO2:  [93 %-97 %] 97 %  BP: (135-177)/(61-96) 159/69  Temp:  [97.9 °F (36.6 °C)-99.4 °F (37.4 °C)]   Temp: 98.5 °F (36.9 °C) (10/25/24 1137)  Pulse: 65 (10/25/24 1137)  Resp: 17 (10/25/24 1207)  BP: (!) 159/69 (10/25/24 1137)  SpO2: 97 % (10/25/24 1137)    Intake/Output Summary (Last 24 hours) at 10/25/2024 1353  Last data filed at 10/25/2024 0752  Gross per 24 hour   Intake 610 ml   Output 3050 ml   Net -2440 ml       Physical Exam  General:  Elderly man sitting in chair.  Left upper extremity:  Flexion contracture of the elbow.  Elbow erythema and edema better.    CVS: S1 and 2 heard, no murmurs appreciated   Respiratory: Clear to auscultation   Abdomen: Full, soft, nontender, no palpable organomegaly   Skin: No rash appreciated   CNS: No focal deficits   Musculoskeletal system: No joint or bony abnormalities appreciated  Psych: Good mood, normal affect.      VAD:  ISOLATION:  Now     Wounds:  None    Significant Labs: All pertinent labs within the past 24 hours have been reviewed.    CBC LAST 7 DAYS  Recent Labs   Lab 10/22/24  1144 10/22/24  1423 10/22/24  1548 10/23/24  0443 10/23/24  0456 10/24/24  0333 10/25/24  0513   WBC 20.63*  --  18.57* 11.77  --  10.85 11.58   RBC 3.76*  --  3.81* 3.87*  --  3.93* 4.34*   HGB 10.2*  --  9.9* 10.1*  --  10.2* 11.3*   HCT 33.0* 32* 32.9* 32.0* 31* 31.4* 35.3*   MCV 88  --  86 83  --  80* 81*   MCH 27.1  --  26.0* 26.1*  --  26.0* 26.0*   MCHC 30.9*  --  30.1* 31.6*  --  32.5 32.0   RDW 18.6*  --  18.6* 18.4*  --  18.1* 18.4*     --  211 201  --  188 207   MPV 10.5  --  10.9 10.2  --  10.1 9.7   GRAN 78.7*  16.2*  --  82.7*  15.4* 81.2*  9.6*  --  75.9*  8.2* 68.6  7.9*   LYMPH 6.2*  1.3  --  4.8*  0.9* 6.3*  0.7*  --  10.6*  1.2 15.6*  1.8   MONO 12.4  2.6*  --  9.0  1.7* 11.3  1.3*  --  10.1  1.1* 12.3  1.4*   BASO 0.04  --  0.04 0.04  --  0.02 0.04   NRBC 0  --  0 0  --  0 0       CHEMISTRY LAST 7 DAYS  Recent Labs   Lab 10/22/24  2442  "10/22/24  1423 10/22/24  1548 10/22/24  1822 10/23/24  0003 10/23/24  0443 10/23/24  0456 10/24/24  0333 10/24/24  1405 10/25/24  0512   *  --  128*  --   --  132*  --  135* 135* 136   K 5.7*  --  5.7* 5.2* 4.7 4.6  --  4.1 4.6 4.3     --  100  --   --  101  --  101 98 100   CO2 12*  --  13*  --   --  17*  --  25 28 27   ANIONGAP 18*  --  15  --   --  14  --  9 9 9   BUN 71*  --  64*  --   --  63*  --  45* 38* 25*   CREATININE 5.1*  --  4.5*  --   --  3.8*  --  2.3* 1.7* 1.2   *  --  208*  --   --  126*  --  168* 193* 114*   CALCIUM 8.0*  --  7.9*  --   --  8.1*  --  8.0* 8.3* 8.3*   PH  --  7.263*  --   --   --   --  7.371  --   --   --    MG 1.6  --   --   --   --  1.4*  --  1.5*  --  1.4*   ALBUMIN 3.1*  --   --   --   --  2.9*  --  2.9*  --  2.9*   PROT 5.9*  --   --   --   --  5.9*  --  5.7*  --  6.0   ALKPHOS 68  --   --   --   --  74  --  76  --  85   ALT 69*  --   --   --   --  73*  --  60*  --  39   AST 58*  --   --   --   --  69*  --  63*  --  46*   BILITOT 0.6  --   --   --   --  0.6  --  0.7  --  0.9       Estimated Creatinine Clearance: 68.6 mL/min (based on SCr of 1.2 mg/dL).    INFLAMMATORY/PROCAL  LAST 7 DAYS  Recent Labs   Lab 10/22/24  1146 10/24/24  0333 10/24/24  1018   PROCAL 9.377* 4.129*  --    CRP  --   --  25.30*     No results found for: "ESR"  CRP   Date Value Ref Range Status   10/24/2024 25.30 (H) <1.00 mg/dL Final     Comment:     CRP-Normal Application expected values:   <1.0        mg/dL   Normal Range  1.0 - 5.0  mg/dL   Indicates mild inflammation  5.0 - 10.0 mg/dL   Indicates severe inflammation  >10.0        mg/dL   Represents serious processes and   frequently         indicates the presence of a bacterial   infection.          PRIOR  MICROBIOLOGY:    Susceptibility data from last 90 days.  Collected Specimen Info Organism Ceftriaxone Clindamycin Erythromycin Oxacillin Penicillin Tetracycline Trimeth/Sulfa Vancomycin   10/22/24 Blood from Peripheral, Hand, Right " Staphylococcus aureus  S  S  S  S  R  S  S  S   10/22/24 Blood from Peripheral, Hand, Left Staphylococcus aureus               LAST 7 DAYS MICROBIOLOGY   Microbiology Results (last 7 days)       Procedure Component Value Units Date/Time    Blood culture [[id number]] Collected: 10/24/24 1018    Order Status: Completed Specimen: Blood from Peripheral, Hand, Right Updated: 10/25/24 1232     Blood Culture, Routine No Growth to date      No Growth to date    Blood culture [[id number]] Collected: 10/24/24 1019    Order Status: Completed Specimen: Blood from Peripheral, Hand, Left Updated: 10/25/24 1232     Blood Culture, Routine No Growth to date      No Growth to date    Stool culture **cannot be ordered stat** [[id number]] Collected: 10/23/24 1357    Order Status: Completed Specimen: Stool Updated: 10/25/24 1101     Stool Culture No Salmonella,Shigella,Vibrio,Campylobacter.      No E coli 0157:H7 isolated.    Urine culture [[id number]] Collected: 10/22/24 1514    Order Status: Completed Specimen: Urine Updated: 10/25/24 0715     Urine Culture, Routine No growth    Narrative:      Specimen Source->Urine    Blood culture x two cultures. Draw prior to antibiotics. [[id number]]  (Abnormal) Collected: 10/22/24 1136    Order Status: Completed Specimen: Blood from Peripheral, Hand, Left Updated: 10/25/24 0645     Blood Culture, Routine Gram stain aer bottle: Gram positive cocci      Positive results previously called on Order #[id number]      STAPHYLOCOCCUS AUREUS  ID consult required for Staph aureus bacteremia.  For susceptibility see order #[id number]      Narrative:      Aerobic and anaerobic  Collection has been rescheduled by MAI at 10/22/2024 11:44 Reason:   Done  Collection has been rescheduled by MAI at 10/22/2024 11:44 Reason:   Done    Blood culture x two cultures. Draw prior to antibiotics. [[id number]]  (Abnormal)  (Susceptibility) Collected: 10/22/24 1143    Order Status: Completed Specimen: Blood from Peripheral,  Hand, Right Updated: 10/25/24 0645     Blood Culture, Routine Gram stain aer bottle: Gram positive cocci      Results called to and read back by:Tiffani Moulton RN-1ICU;      10/23/2024  07:54 CJD      STAPHYLOCOCCUS AUREUS  ID consult required for Staph aureus bacteremia.      Narrative:      Aerobic and anaerobic  Collection has been rescheduled by MIA at 10/22/2024 11:44 Reason:   Done  Collection has been rescheduled by MAI at 10/22/2024 11:44 Reason:   Done    Rapid Organism ID by PCR (from Blood culture) [[id number]]  (Abnormal) Collected: 10/22/24 1143    Order Status: Completed Updated: 10/23/24 0918     Enterococcus faecalis Not Detected     Enterococcus faecium Not Detected     Listeria monocytogenes Not Detected     Staphylococcus spp. See species for ID     Staphylococcus aureus Detected     Staphylococcus epidermidis Not Detected     Staphylococcus lugdunensis Not Detected     Streptococcus species Not Detected     Streptococcus agalactiae Not Detected     Streptococcus pneumoniae Not Detected     Streptococcus pyogenes Not Detected     Acinetobacter calcoaceticus/baumannii complex Not Detected     Bacteroides fragilis Not Detected     Enterobacterales Not Detected     Enterobacter cloacae complex Not Detected     Escherichia coli Not Detected     Klebsiella aerogenes Not Detected     Klebsiella oxytoca Not Detected     Klebsiella pneumoniae group Not Detected     Proteus Not Detected     Salmonella sp Not Detected     Serratia marcescens Not Detected     Haemophilus influenzae Not Detected     Neisseria meningtidis Not Detected     Pseudomonas aeruginosa Not Detected     Stenotrophomonas maltophilia Not Detected     Candida albicans Not Detected     Candida auris Not Detected     Candida glabrata Not Detected     Candida krusei Not Detected     Candida parapsilosis Not Detected     Candida tropicalis Not Detected     Cryptococcus neoformans/gattii Not Detected     CTX-M (ESBL ) Test not  applicable     IMP (Carbapenem resistant) Test not applicable     KPC resistance gene (Carbapenem resistant) Test not applicable     mcr-1  Test not applicable     mec A/C  Test not applicable     mec A/C and MREJ (MRSA) gene Not Detected     NDM (Carbapenem resistant) Test not applicable     OXA-48-like (Carbapenem resistant) Test not applicable     van A/B (VRE gene) Test not applicable     VIM (Carbapenem resistant) Test not applicable    Narrative:      Aerobic and anaerobic          CURRENT/PREVIOUS VISIT EKG  Results for orders placed or performed during the hospital encounter of 10/22/24   EKG 12-lead    Collection Time: 10/22/24 11:45 AM   Result Value Ref Range    QRS Duration 90 ms    [facility]HS QTC Calculation 428 ms    Narrative    Test Reason : I95.9,    Vent. Rate : 056 BPM     Atrial Rate : 056 BPM     P-R Int : 306 ms          QRS Dur : 090 ms      QT Int : 444 ms       P-R-T Axes : 031 -27 034 degrees     QTc Int : 428 ms    Sinus bradycardia with 1st degree A-V block  Low voltage QRS  Inferior infarct (cited on or before 17-JAN-2023)  Possible Anterolateral infarct (cited on or before 12-APR-2024)  Abnormal ECG  When compared with ECG of 12-APR-2024 09:00,  No significant change was found    Referred By: [email or web address]   SELF           Confirmed By:      Significant Imaging: I have reviewed all relevant and available imaging results/findings within the past 24 hours.    I spent a total of 55 minutes on the day of the visit.This includes face to face time and non-face to face time preparing to see the patient (eg, review of tests), obtaining and/or reviewing separately obtained history, documenting clinical information in the electronic or other health record, independently interpreting results and communicating results to the patient/family/caregiver, or care coordinator.    Migel Watson MD  Date of Service: 10/25/2024      This note was created using [email or web address] voice recognition software that occasionally  misinterpreted phrases or words.

## 2024-10-25 NOTE — PT/OT/SLP PROGRESS
Occupational Therapy   Treatment    Name: Dhaval Hernández  MRN: [id number]  Admitting Diagnosis:  Septic shock     The primary encounter diagnosis was Hypotension. Diagnoses of Acute renal failure, unspecified acute renal failure type, Dehydration, Leukocytosis, unspecified type, Calculus of gallbladder with cholecystitis without biliary obstruction, unspecified cholecystitis acuity, Sepsis, due to unspecified organism, unspecified whether acute organ dysfunction present, Septic shock, Chest pain, and Bacteremia due to Staphylococcus were also pertinent to this visit.    Recommendations:     Discharge Recommendations: Moderate Intensity Therapy  Discharge Equipment Recommendations:  to be determined by next level of care  Barriers to discharge:   (increased level of assist with ADL and fx mobilty)    Assessment:     Dhaval Hernández is a 64 y.o. male with a medical diagnosis of Septic shock.  He presents with L shld and elbow pain 6/10 and limited jt ROM. Pt sat EOB w/ SBA, performed g/hygiene SBA to brush teeth and wash face. He perform LUE AAAROM table slides and AROM scapula ex. HEP issued for pt to perform ex daily. Pt reporting L shld pain better and jt stiffness better after exercises. Continue with OT POC. Performance deficits affecting function are weakness, impaired endurance, impaired self care skills, impaired functional mobility, gait instability, impaired balance, decreased upper extremity function, decreased lower extremity function, decreased safety awareness, pain, decreased ROM, edema, impaired cardiopulmonary response to activity.     Rehab Prognosis:  Good and Fair; patient would benefit from acute skilled OT services to address these deficits and reach maximum level of function.       Plan:     Patient to be seen 5 x/week to address the above listed problems via self-care/home management, therapeutic activities, therapeutic exercises  Plan of Care Expires: 11/24/24  Plan of Care Reviewed with:  patient    Subjective     Chief Complaint: L shld and elbow pain.  Patient/Family Comments/goals: pt agreeable.  Pain/Comfort:  Pain Rating 1: 6/10  Location - Side 1: Left  Location - Orientation 1: generalized  Location 1: elbow (and shld)  Pain Addressed 1: Pre-medicate for activity, Reposition, Distraction, Cessation of Activity  Pain Rating Post-Intervention 1:  (my shld feel s better after exercises)  Pain Rating 2:  (not rated)  Location - Side 2: Right  Location - Orientation 2: generalized  Location 2: shoulder  Pain Addressed 2: Pre-medicate for activity, Reposition, Distraction, Cessation of Activity  Pain Rating Post-Intervention 2:  (not rated)    Objective:     Communicated with: nurse prior to session.  Patient found HOB elevated with bed alarm, oxygen, peripheral IV, telemetry upon OT entry to room.    General Precautions: Standard, fall    Orthopedic Precautions:LUE weight bearing as tolerated  Braces: N/A (hx L AKA)  Respiratory Status: Nasal cannula, flow 3 L/min     Occupational Performance:     Bed Mobility:    Patient completed Scooting/Bridging with moderate assistance and overhead rail used with RUE; draw sheet assist on L side.  Patient completed Supine to Sit with moderate assistance, with side rail, and HOB elevated  Patient completed Sit to Supine with minimum assistance and with side rail     Activities of Daily Living:  Grooming: stand by assistance brushed teeth and washed face seated unsupported EOB. Pt used B hands to unscrew toothpaste and apply paste to brush.    Treatment & Education:  Purpose of OT and POC  Pt seen for safe self care and fx mobility as stated above.  Pt performed L shld  flexion AAROM table slides ex x 10 reps with pain; jt ROM ~50-60 degrees with instruction to flex trunk forward to increase shld stretch. 10 reps AROM ex for shld shrugs and  scapula retraction with tc and vc for correctness of ex and deep breathing for pain control during ex while seated EOB.    Pt issued HEP with modification to perform scapula ex in bed and for shld flexion to use RUE to assist LUE clasping fingers  together. He demonstrated Indedp return.  All questions/concerns addressed within scope.   Call don't fall. He acknowledged.     Patient left HOB elevated with all lines intact, call button in reach, and bed alarm on    GOALS:   Multidisciplinary Problems       Occupational Therapy Goals          Problem: Occupational Therapy    Goal Priority Disciplines Outcome Interventions   Occupational Therapy Goal     OT, PT/OT     Description: Goals to be met by: 11/24/24     Patient will increase functional independence with ADLs by performing:    UE Dressing with Set-up Assistance.  LE Dressing with Set-up Assistance.  Grooming while seated with Supervision.  Toileting from bedside commode with Moderate Assistance for hygiene and clothing management.   Toilet transfer to bedside commode with Moderate Assistance.                         Time Tracking:     OT Date of Treatment: 10/25/24  OT Start Time: 1335  OT Stop Time: 1403  OT Total Time (min): 28 min    Billable Minutes:Self Care/Home Management 12  Therapeutic Exercise 16  Total Time 28    OT/MONIK: OT          10/25/2024

## 2024-10-25 NOTE — PROGRESS NOTES
65yo M admitted on 10/22 admitted for septic shock, generalized weakness, and Left elbow pain.  PMH significant for T2DM, A1c of 6.5; HTN; Left above knee amputation; and [place] (1979[place] with Left elbow ORIF -- now ankylosed.    --WBAT LUE although patient with fixed elbow flexion at 90 degrees and ankylosis/autofusion on x-ray.  --CT with no obvious fluid collection/abscess. No indication for surgical intervention at this time.  --May require further neurological (possible C/sp) workup with patient-reported increased LUE weakness over the last several weeks.  --Full consult note to follow. Will cont to monitor.    Gokul Troy MD

## 2024-10-26 ENCOUNTER — ANESTHESIA EVENT (OUTPATIENT)
Dept: SURGERY | Facility: HOSPITAL | Age: 64
End: 2024-10-26
Payer: MEDICARE

## 2024-10-26 PROBLEM — J18.9 ACUTE PNEUMONIA: Status: RESOLVED | Noted: 2024-10-22 | Resolved: 2024-10-26

## 2024-10-26 LAB
ACINETOBACTER CALCOACETICUS/BAUMANNII COMPLEX: NOT DETECTED
ALBUMIN SERPL BCP-MCNC: 2.9 G/DL (ref 3.5–5.2)
ALP SERPL-CCNC: 85 U/L (ref 55–135)
ALT SERPL W/O P-5'-P-CCNC: 25 U/L (ref 10–44)
ANION GAP SERPL CALC-SCNC: 9 MMOL/L (ref 8–16)
AST SERPL-CCNC: 37 U/L (ref 10–40)
BACTEROIDES FRAGILIS: NOT DETECTED
BASOPHILS # BLD AUTO: 0.03 K/UL (ref 0–0.2)
BASOPHILS NFR BLD: 0.3 % (ref 0–1.9)
BILIRUB SERPL-MCNC: 0.5 MG/DL (ref 0.1–1)
BUN SERPL-MCNC: 21 MG/DL (ref 8–23)
CALCIUM SERPL-MCNC: 8.1 MG/DL (ref 8.7–10.5)
CANDIDA ALBICANS: NOT DETECTED
CANDIDA AURIS: NOT DETECTED
CANDIDA GLABRATA: NOT DETECTED
CANDIDA KRUSEI: NOT DETECTED
CANDIDA PARAPSILOSIS: NOT DETECTED
CANDIDA TROPICALIS: NOT DETECTED
CHLORIDE SERPL-SCNC: 100 MMOL/L (ref 95–110)
CO2 SERPL-SCNC: 26 MMOL/L (ref 23–29)
CREAT SERPL-MCNC: 1 MG/DL (ref 0.5–1.4)
CRYPTOCOCCUS NEOFORMANS/GATTII: NOT DETECTED
CTX-M GENE (ESBL PRODUCER): ABNORMAL
DIFFERENTIAL METHOD BLD: ABNORMAL
ENTEROBACTER CLOACAE COMPLEX: NOT DETECTED
ENTEROBACTERALES: NOT DETECTED
ENTEROCOCCUS FAECALIS: NOT DETECTED
ENTEROCOCCUS FAECIUM: NOT DETECTED
EOSINOPHIL # BLD AUTO: 0.5 K/UL (ref 0–0.5)
EOSINOPHIL NFR BLD: 4.5 % (ref 0–8)
ERYTHROCYTE [DISTWIDTH] IN BLOOD BY AUTOMATED COUNT: 18.4 % (ref 11.5–14.5)
ESCHERICHIA COLI: NOT DETECTED
EST. GFR  (NO RACE VARIABLE): >60 ML/MIN/1.73 M^2
GLUCOSE SERPL-MCNC: 173 MG/DL (ref 70–110)
HAEMOPHILUS INFLUENZAE: NOT DETECTED
HCT VFR BLD AUTO: 33.6 % (ref 40–54)
HGB BLD-MCNC: 11 G/DL (ref 14–18)
IMM GRANULOCYTES # BLD AUTO: 0.23 K/UL (ref 0–0.04)
IMM GRANULOCYTES NFR BLD AUTO: 2.2 % (ref 0–0.5)
IMP GENE (CARBAPENEM RESISTANT): ABNORMAL
KLEBSIELLA AEROGENES: NOT DETECTED
KLEBSIELLA OXYTOCA: NOT DETECTED
KLEBSIELLA PNEUMONIAE GROUP: NOT DETECTED
KPC RESISTANCE GENE (CARBAPENEM): ABNORMAL
LISTERIA MONOCYTOGENES: NOT DETECTED
LYMPHOCYTES # BLD AUTO: 2 K/UL (ref 1–4.8)
LYMPHOCYTES NFR BLD: 18.3 % (ref 18–48)
MAGNESIUM SERPL-MCNC: 1.7 MG/DL (ref 1.6–2.6)
MCH RBC QN AUTO: 26.6 PG (ref 27–31)
MCHC RBC AUTO-ENTMCNC: 32.7 G/DL (ref 32–36)
MCR-1: ABNORMAL
MCV RBC AUTO: 81 FL (ref 82–98)
MEC A/C AND MREJ (MRSA): NOT DETECTED
MEC A/C: ABNORMAL
MONOCYTES # BLD AUTO: 1.4 K/UL (ref 0.3–1)
MONOCYTES NFR BLD: 12.9 % (ref 4–15)
NDM GENE (CARBAPENEM RESISTANT): ABNORMAL
NEISSERIA MENINGITIDIS: NOT DETECTED
NEUTROPHILS # BLD AUTO: 6.6 K/UL (ref 1.8–7.7)
NEUTROPHILS NFR BLD: 61.8 % (ref 38–73)
NRBC BLD-RTO: 0 /100 WBC
OXA-48-LIKE (CARBAPENEM RESISTANT): ABNORMAL
PLATELET # BLD AUTO: 228 K/UL (ref 150–450)
PMV BLD AUTO: 9.9 FL (ref 9.2–12.9)
POCT GLUCOSE: 156 MG/DL (ref 70–110)
POCT GLUCOSE: 167 MG/DL (ref 70–110)
POCT GLUCOSE: 187 MG/DL (ref 70–110)
POCT GLUCOSE: 223 MG/DL (ref 70–110)
POTASSIUM SERPL-SCNC: 4.2 MMOL/L (ref 3.5–5.1)
PROT SERPL-MCNC: 6.1 G/DL (ref 6–8.4)
PROTEUS SPECIES: NOT DETECTED
PSEUDOMONAS AERUGINOSA: NOT DETECTED
RBC # BLD AUTO: 4.13 M/UL (ref 4.6–6.2)
SALMONELLA SP: NOT DETECTED
SERRATIA MARCESCENS: NOT DETECTED
SODIUM SERPL-SCNC: 135 MMOL/L (ref 136–145)
STAPHYLOCOCCUS AUREUS: DETECTED
STAPHYLOCOCCUS EPIDERMIDIS: NOT DETECTED
STAPHYLOCOCCUS LUGDUNESIS: NOT DETECTED
STAPHYLOCOCCUS SPECIES: ABNORMAL
STENOTROPHOMONAS MALTOPHILIA: NOT DETECTED
STREPTOCOCCUS AGALACTIAE: NOT DETECTED
STREPTOCOCCUS PNEUMONIAE: NOT DETECTED
STREPTOCOCCUS PYOGENES: NOT DETECTED
STREPTOCOCCUS SPECIES: NOT DETECTED
VAN A/B (VRE GENE): ABNORMAL
VIM GENE (CARBAPENEM RESISTANT): ABNORMAL
WBC # BLD AUTO: 10.66 K/UL (ref 3.9–12.7)

## 2024-10-26 PROCEDURE — 83735 ASSAY OF MAGNESIUM: CPT | Performed by: INTERNAL MEDICINE

## 2024-10-26 PROCEDURE — 36415 COLL VENOUS BLD VENIPUNCTURE: CPT | Performed by: INTERNAL MEDICINE

## 2024-10-26 PROCEDURE — 63600175 PHARM REV CODE 636 W HCPCS: Performed by: INTERNAL MEDICINE

## 2024-10-26 PROCEDURE — 87147 CULTURE TYPE IMMUNOLOGIC: CPT | Performed by: INTERNAL MEDICINE

## 2024-10-26 PROCEDURE — 80053 COMPREHEN METABOLIC PANEL: CPT | Performed by: INTERNAL MEDICINE

## 2024-10-26 PROCEDURE — 25000003 PHARM REV CODE 250: Performed by: FAMILY MEDICINE

## 2024-10-26 PROCEDURE — 87186 SC STD MICRODIL/AGAR DIL: CPT | Performed by: INTERNAL MEDICINE

## 2024-10-26 PROCEDURE — 87070 CULTURE OTHR SPECIMN AEROBIC: CPT | Performed by: INTERNAL MEDICINE

## 2024-10-26 PROCEDURE — 87077 CULTURE AEROBIC IDENTIFY: CPT | Performed by: INTERNAL MEDICINE

## 2024-10-26 PROCEDURE — 25000003 PHARM REV CODE 250: Performed by: INTERNAL MEDICINE

## 2024-10-26 PROCEDURE — 99233 SBSQ HOSP IP/OBS HIGH 50: CPT | Mod: ,,, | Performed by: INTERNAL MEDICINE

## 2024-10-26 PROCEDURE — 27000221 HC OXYGEN, UP TO 24 HOURS

## 2024-10-26 PROCEDURE — 97530 THERAPEUTIC ACTIVITIES: CPT | Mod: CQ

## 2024-10-26 PROCEDURE — 12000002 HC ACUTE/MED SURGE SEMI-PRIVATE ROOM

## 2024-10-26 PROCEDURE — 63600175 PHARM REV CODE 636 W HCPCS: Mod: JZ | Performed by: FAMILY MEDICINE

## 2024-10-26 PROCEDURE — 85025 COMPLETE CBC W/AUTO DIFF WBC: CPT | Performed by: INTERNAL MEDICINE

## 2024-10-26 PROCEDURE — 25000003 PHARM REV CODE 250: Performed by: HOSPITALIST

## 2024-10-26 PROCEDURE — 97110 THERAPEUTIC EXERCISES: CPT | Mod: CQ

## 2024-10-26 PROCEDURE — 94761 N-INVAS EAR/PLS OXIMETRY MLT: CPT

## 2024-10-26 PROCEDURE — 87040 BLOOD CULTURE FOR BACTERIA: CPT | Mod: 59 | Performed by: INTERNAL MEDICINE

## 2024-10-26 RX ADMIN — LEVOTHYROXINE SODIUM 75 MCG: 0.03 TABLET ORAL at 05:10

## 2024-10-26 RX ADMIN — OXYCODONE HYDROCHLORIDE AND ACETAMINOPHEN 1 TABLET: 5; 325 TABLET ORAL at 02:10

## 2024-10-26 RX ADMIN — CEFAZOLIN 2 G: 330 INJECTION, POWDER, FOR SOLUTION INTRAMUSCULAR; INTRAVENOUS at 10:10

## 2024-10-26 RX ADMIN — OXYCODONE HYDROCHLORIDE AND ACETAMINOPHEN 1 TABLET: 5; 325 TABLET ORAL at 04:10

## 2024-10-26 RX ADMIN — OXYCODONE HYDROCHLORIDE AND ACETAMINOPHEN 1 TABLET: 5; 325 TABLET ORAL at 09:10

## 2024-10-26 RX ADMIN — PANTOPRAZOLE SODIUM 40 MG: 40 INJECTION, POWDER, LYOPHILIZED, FOR SOLUTION INTRAVENOUS at 10:10

## 2024-10-26 RX ADMIN — INSULIN ASPART 2 UNITS: 100 INJECTION, SOLUTION INTRAVENOUS; SUBCUTANEOUS at 10:10

## 2024-10-26 RX ADMIN — CITALOPRAM HYDROBROMIDE 10 MG: 10 TABLET ORAL at 08:10

## 2024-10-26 RX ADMIN — INSULIN GLARGINE 16 UNITS: 100 INJECTION, SOLUTION SUBCUTANEOUS at 08:10

## 2024-10-26 RX ADMIN — MUPIROCIN 1 G: 20 OINTMENT TOPICAL at 08:10

## 2024-10-26 RX ADMIN — CEFAZOLIN 2 G: 330 INJECTION, POWDER, FOR SOLUTION INTRAMUSCULAR; INTRAVENOUS at 02:10

## 2024-10-26 RX ADMIN — ENOXAPARIN SODIUM 40 MG: 40 INJECTION SUBCUTANEOUS at 04:10

## 2024-10-26 RX ADMIN — CEFAZOLIN 2 G: 330 INJECTION, POWDER, FOR SOLUTION INTRAMUSCULAR; INTRAVENOUS at 08:10

## 2024-10-26 RX ADMIN — METRONIDAZOLE 500 MG: 5 INJECTION, SOLUTION INTRAVENOUS at 02:10

## 2024-10-26 RX ADMIN — INSULIN ASPART 2 UNITS: 100 INJECTION, SOLUTION INTRAVENOUS; SUBCUTANEOUS at 04:10

## 2024-10-26 RX ADMIN — INSULIN ASPART 2 UNITS: 100 INJECTION, SOLUTION INTRAVENOUS; SUBCUTANEOUS at 08:10

## 2024-10-26 NOTE — ASSESSMENT & PLAN NOTE
SIRIA in the background of septic/hypovolemic shock  Resolved with IVF  Recent Labs     10/24/24  1405 10/25/24  0512 10/26/24  0456   CREATININE 1.7* 1.2 1.0   EGFRNORACEVR 43.9* >60.0 >60.0

## 2024-10-26 NOTE — RESPIRATORY THERAPY
10/26/24 0658   Patient Assessment/Suction   Level of Consciousness (AVPU) alert   Respiratory Effort Normal;Unlabored   Expansion/Accessory Muscles/Retractions no use of accessory muscles   All Lung Fields Breath Sounds Posterior:   MARTHA Breath Sounds clear   LLL Breath Sounds clear   RUL Breath Sounds clear   RML Breath Sounds clear   RLL Breath Sounds clear   Rhythm/Pattern, Respiratory unlabored   PRE-TX-O2   Device (Oxygen Therapy) nasal cannula   $ Is the patient on Low Flow Oxygen? Yes   Flow (L/min) (Oxygen Therapy) 2   SpO2 (!) 94 %   Pulse Oximetry Type Intermittent   $ Pulse Oximetry - Multiple Charge Pulse Oximetry - Multiple   Pulse 65   Resp 18

## 2024-10-26 NOTE — PROGRESS NOTES
Pending sale to [facility]  Progress Note    Patient Name: Dhaval Hernández  MRN: [id number]  Patient Class: IP- Inpatient   Admission Date: 10/22/2024  Length of Stay: 4 days  Attending Physician: Evin Courtney MD  Primary Care Provider: Jessie Jung MD        Subjective:     Principal Problem:Septic shock        HPI:  64 year pt getting admitted with septic shock and hyperkalemia  Pt has been suffering from diarrhea/N&V for past 4 days  Stools very watery and vomitus was clear  Later he started having crampy abdominal pain/radiation to back. No aggravating/relieving factors   He acme to ER and was found to be in chock and got admitted       Overview/Hospital Course:  Patient admitted with septic shock. Bacteremia with Staph +. Repeat b/c showed NGTD. Source likely from left elbow infection. Continue with antibiotics and ID is following patient, Patient is om cefazolin. Ortho consulted for possible left elbow abscess. GI recommends outpatient follow up for FOBT +.     Interval History: Patient complains of pain on the left elbow. It is swollen and draining. There is pus discharge on the sheets, He is afebrile. Rpt blood culture negative so far.     Review of Systems  Objective:     Vital Signs (Most Recent):  Temp: 97.6 °F (36.4 °C) (10/26/24 1103)  Pulse: 62 (10/26/24 1103)  Resp: 18 (10/26/24 1103)  BP: (!) 146/68 (10/26/24 1103)  SpO2: 95 % (10/26/24 1103) Vital Signs (24h Range):  Temp:  [97.6 °F (36.4 °C)-98.5 °F (36.9 °C)] 97.6 °F (36.4 °C)  Pulse:  [61-68] 62  Resp:  [16-18] 18  SpO2:  [93 %-96 %] 95 %  BP: (125-157)/(63-82) 146/68     Weight: 104.6 kg (230 lb 9.6 oz)  Body mass index is 37.22 kg/m².    Intake/Output Summary (Last 24 hours) at 10/26/2024 1341  Last data filed at 10/26/2024 1103  Gross per 24 hour   Intake 440 ml   Output 900 ml   Net -460 ml         Physical Exam  Vitals and nursing note reviewed.   Constitutional:       General: He is not in acute  distress.     Appearance: He is obese. He is not toxic-appearing.   HENT:      Head: Atraumatic.      Mouth/Throat:      Mouth: Mucous membranes are moist.      Pharynx: Oropharynx is clear.   Eyes:      General: No scleral icterus.     Conjunctiva/sclera: Conjunctivae normal.      Pupils: Pupils are equal, round, and reactive to light.   Cardiovascular:      Rate and Rhythm: Normal rate and regular rhythm.      Heart sounds: No murmur heard.  Pulmonary:      Effort: No respiratory distress.      Breath sounds: No wheezing, rhonchi or rales.   Abdominal:      General: Abdomen is flat. Bowel sounds are normal.      Palpations: Abdomen is soft.   Musculoskeletal:         General: No swelling or deformity.      Cervical back: No rigidity or tenderness.      Comments: Left BKA   There is redness on the left elbow it is swollen and draining pus.    Skin:     Coloration: Skin is not jaundiced or pale.      Findings: No bruising, erythema or rash.   Neurological:      General: No focal deficit present.      Mental Status: He is alert and oriented to person, place, and time.      Cranial Nerves: No cranial nerve deficit.      Sensory: No sensory deficit.      Motor: No weakness.   Psychiatric:         Mood and Affect: Mood normal.         Behavior: Behavior normal.             Significant Labs: All pertinent labs within the past 24 hours have been reviewed.  BMP:   Recent Labs   Lab 10/26/24  0456   *   *   K 4.2      CO2 26   BUN 21   CREATININE 1.0   CALCIUM 8.1*   MG 1.7     CBC:   Recent Labs   Lab 10/25/24  0513 10/26/24  0456   WBC 11.58 10.66   HGB 11.3* 11.0*   HCT 35.3* 33.6*    228       Significant Imaging: I have reviewed all pertinent imaging results/findings within the past 24 hours.    Assessment/Plan:      * Septic shock  Secondary to MSSA bacteremia  Left elbow abscess, bursitis   Septic shock is resolved   Organ dysfunction: SIRIA and hypotension   Rpt blood culture negative so far    Echo shows no vegetations   ID following   Cont Cefazolin, DC Flagyl   Ortho consulted, planned for I&D AM       Olecranon bursitis of left elbow  With abscess  See above     Hyperkalemia  Resolved with shifting agents    Recent Labs     10/24/24  1405 10/25/24  0512 10/26/24  0456   K 4.6 4.3 4.2                 SIRIA (acute kidney injury)  SIRIA in the background of septic/hypovolemic shock  Resolved with IVF  Recent Labs     10/24/24  1405 10/25/24  0512 10/26/24  0456   CREATININE 1.7* 1.2 1.0   EGFRNORACEVR 43.9* >60.0 >60.0         Atrial fibrillation, chronic  Rate controlled with Amiodarone and Coreg   Patient not on anticoagulation from home     High anion gap metabolic acidosis  In the background of lactic acidemia  Resolved     Tobacco dependency  Aware     Severe obesity (BMI 35.0-39.9) with comorbidity  Body mass index is 36.32 kg/m². Morbid obesity complicates all aspects of disease management from diagnostic modalities to treatment.     Cardiac pacemaker  PPM insitu      Type 2 diabetes mellitus with stage 2 chronic kidney disease, without long-term current use of insulin  Maintain present insulin regime    Status post unilateral below-knee amputation  L BKA        VTE Risk Mitigation (From admission, onward)           Ordered     enoxaparin injection 40 mg  Daily         10/25/24 1431     IP VTE HIGH RISK PATIENT  Once         10/22/24 1641     Place sequential compression device  Until discontinued         10/22/24 1641                    Discharge Planning   JILLIAN:      Code Status: Full Code   Is the patient medically ready for discharge?:     Reason for patient still in hospital (select all that apply): Treatment  Discharge Plan A: Skilled Nursing Facility   Discharge Delays: (!) Post-Acute Set-up              Evin Courtney MD  Department of Hospital Medicine   [facility]

## 2024-10-26 NOTE — PROGRESS NOTES
[facility]   Department of Infectious Disease  Progress Note        PATIENT NAME: Dhaval Hernández  MRN: [id number]  TODAY'S DATE: 10/26/2024  ADMIT DATE: 10/22/2024  LOS: 4 days    CHIEF COMPLAINT: Numbness (Pt is complaining of left sided numbness and tingling in arm started at 9 am./)      PRINCIPLE PROBLEM: Septic shock    INTERVAL HISTORY      10/25/2024: Out of ICU and now on medical floor.  X-ray shows hardware left elbow.  CT without contrast with the abscess.  X-ray right shoulder with mild DJD.  Repeat blood culture 10/24/2024 so far negative.     10/26/2024: He started draining abscess spontaneously from the left elbow today.  Repeat blood cultures remain negative.      Antibiotics (From admission, onward)      Start     Stop Route Frequency Ordered    10/25/24 1830  ceFAZolin injection 2 g         -- IV Every 8 hours (non-standard times) 10/25/24 1400    10/22/24 2100  mupirocin 2 % ointment         10/27/24 2059 Nasl 2 times daily 10/22/24 1650          Antifungals (From admission, onward)      None           Antivirals (From admission, onward)      None            ASSESSMENT and PLAN      MSSA bacteremia.  This is most likely from left elbow infection.  TTE with no vegetation.  Follow repeat blood cultures are in progress.    SIRIA on CKD.  Improving.  Creatinine down to 1.2.      3.   Left elbow abscess.  He may also have septic bursitis.  He has a extensive hardware in the elbow and there was concern for associated osteomyelitis.  Lower draining spontaneously.  Needs I&D.  Await further input from orthopedic surgeon.    4. Right shoulder pain.  This is chronic associated with weakness.  Has mild DJD on x-ray.    5. Diabetes mellitus.  Management as per hospitalist.    RECOMMENDATIONS:    Await I&D of left elbow   Continue cefazolin   Follow up blood cultures    Please send Epic secure chat with any questions.      SUBJECTIVE    Dhaval Hernández is a 64 y.o. male with history of diabetes  mellitus, hypertension, hyperlipidemia, GERD and previous left BKA.  Also with history of AICD.  He presents to the ER 10/22/2024 with 4 day history of nausea, vomiting and diarrhea.  He had no fever.  In the ER vitals were abnormal with blood pressure 75/39, pulse 60, respiratory 18, temperature 97.6° oxygen saturation 92%.  WBC was 20 K with left shift.  Creatinine 5.1.  UA abnormal with 19 WBC and 4+ glucose.  Chest x-ray with no clear acute infiltrate.  CT abdomen and pelvis showed mild cardiomegaly and also cholelithiasis.  He was commenced on IV fluid and antibiotics.     Leukocytosis resolved and WBC down to 8.0.  Blood cultures have grown MSSA in 2/2 bottles.  HIDA scan was abnormal.  Surgery was consulted to evaluate for possible cholecystitis.  Notes reviewed.  No plan for surgical intervention at this time since patient had no abdominal pain and appears to be improving.  ID asked to assist with his care.     States his symptoms started with his left elbow hurting.  He has been leaning on the left elbow to transfer from bed to wheelchair.  Has a elbow ORIF and is fixed in a flexion position.  Has had persistent pain for the last 1 week with swelling.  His other symptoms followed after the elbow pain started.        Antibiotic history:  Vancomycin: 10/22/2024-  Ertapenem: 10/22/2024 the       Microbiology:    Blood culture 10/22/2024: MSSA 2/    Review of Systems  Negative except as stated above in Interval History     OBJECTIVE   Temp:  [97.6 °F (36.4 °C)-98.5 °F (36.9 °C)] 97.6 °F (36.4 °C)  Pulse:  [61-68] 62  Resp:  [16-18] 18  SpO2:  [93 %-97 %] 95 %  BP: (125-159)/(63-82) 146/68  Temp:  [97.6 °F (36.4 °C)-98.5 °F (36.9 °C)]   Temp: 97.6 °F (36.4 °C) (10/26/24 1103)  Pulse: 62 (10/26/24 1103)  Resp: 18 (10/26/24 1103)  BP: (!) 146/68 (10/26/24 1103)  SpO2: 95 % (10/26/24 1103)    Intake/Output Summary (Last 24 hours) at 10/26/2024 1119  Last data filed at 10/26/2024 1103  Gross per 24 hour   Intake 440  ml   Output 900 ml   Net -460 ml       Physical Exam  General:  Elderly man sitting in chair.  Left upper extremity:  Flexion contracture of the elbow.  Purulent drainage from elbow CVS: S1 and 2 heard, no murmurs appreciated   Respiratory: Clear to auscultation   Abdomen: Full, soft, nontender, no palpable organomegaly   Skin: No rash appreciated   CNS: No focal deficits   Musculoskeletal system: No joint or bony abnormalities appreciated  Psych: Good mood, normal affect.      VAD:  ISOLATION:  Now     Wounds:  None    Significant Labs: All pertinent labs within the past 24 hours have been reviewed.    CBC LAST 7 DAYS  Recent Labs   Lab 10/22/24  1144 10/22/24  1423 10/22/24  1548 10/23/24  0443 10/23/24  0456 10/24/24  0333 10/25/24  0513 10/26/24  0456   WBC 20.63*  --  18.57* 11.77  --  10.85 11.58 10.66   RBC 3.76*  --  3.81* 3.87*  --  3.93* 4.34* 4.13*   HGB 10.2*  --  9.9* 10.1*  --  10.2* 11.3* 11.0*   HCT 33.0* 32* 32.9* 32.0* 31* 31.4* 35.3* 33.6*   MCV 88  --  86 83  --  80* 81* 81*   MCH 27.1  --  26.0* 26.1*  --  26.0* 26.0* 26.6*   MCHC 30.9*  --  30.1* 31.6*  --  32.5 32.0 32.7   RDW 18.6*  --  18.6* 18.4*  --  18.1* 18.4* 18.4*     --  211 201  --  188 207 228   MPV 10.5  --  10.9 10.2  --  10.1 9.7 9.9   GRAN 78.7*  16.2*  --  82.7*  15.4* 81.2*  9.6*  --  75.9*  8.2* 68.6  7.9* 61.8  6.6   LYMPH 6.2*  1.3  --  4.8*  0.9* 6.3*  0.7*  --  10.6*  1.2 15.6*  1.8 18.3  2.0   MONO 12.4  2.6*  --  9.0  1.7* 11.3  1.3*  --  10.1  1.1* 12.3  1.4* 12.9  1.4*   BASO 0.04  --  0.04 0.04  --  0.02 0.04 0.03   NRBC 0  --  0 0  --  0 0 0       CHEMISTRY LAST 7 DAYS  Recent Labs   Lab 10/22/24  1144 10/22/24  1423 10/22/24  1548 10/22/24  1822 10/23/24  0003 10/23/24  0443 10/23/24  0456 10/24/24  0333 10/24/24  1405 10/25/24  0512 10/26/24  0456   *  --  128*  --   --  132*  --  135* 135* 136 135*   K 5.7*  --  5.7* 5.2* 4.7 4.6  --  4.1 4.6 4.3 4.2     --  100  --   --  101   "--  101 98 100 100   CO2 12*  --  13*  --   --  17*  --  25 28 27 26   ANIONGAP 18*  --  15  --   --  14  --  9 9 9 9   BUN 71*  --  64*  --   --  63*  --  45* 38* 25* 21   CREATININE 5.1*  --  4.5*  --   --  3.8*  --  2.3* 1.7* 1.2 1.0   *  --  208*  --   --  126*  --  168* 193* 114* 173*   CALCIUM 8.0*  --  7.9*  --   --  8.1*  --  8.0* 8.3* 8.3* 8.1*   PH  --  7.263*  --   --   --   --  7.371  --   --   --   --    MG 1.6  --   --   --   --  1.4*  --  1.5*  --  1.4* 1.7   ALBUMIN 3.1*  --   --   --   --  2.9*  --  2.9*  --  2.9* 2.9*   PROT 5.9*  --   --   --   --  5.9*  --  5.7*  --  6.0 6.1   ALKPHOS 68  --   --   --   --  74  --  76  --  85 85   ALT 69*  --   --   --   --  73*  --  60*  --  39 25   AST 58*  --   --   --   --  69*  --  63*  --  46* 37   BILITOT 0.6  --   --   --   --  0.6  --  0.7  --  0.9 0.5       Estimated Creatinine Clearance: 82.3 mL/min (based on SCr of 1 mg/dL).    INFLAMMATORY/PROCAL  LAST 7 DAYS  Recent Labs   Lab 10/22/24  1146 10/24/24  0333 10/24/24  1018   PROCAL 9.377* 4.129*  --    CRP  --   --  25.30*     No results found for: "ESR"  CRP   Date Value Ref Range Status   10/24/2024 25.30 (H) <1.00 mg/dL Final     Comment:     CRP-Normal Application expected values:   <1.0        mg/dL   Normal Range  1.0 - 5.0  mg/dL   Indicates mild inflammation  5.0 - 10.0 mg/dL   Indicates severe inflammation  >10.0        mg/dL   Represents serious processes and   frequently         indicates the presence of a bacterial   infection.          PRIOR  MICROBIOLOGY:    Susceptibility data from last 90 days.  Collected Specimen Info Organism Ceftriaxone Clindamycin Erythromycin Oxacillin Penicillin Tetracycline Trimeth/Sulfa Vancomycin   10/22/24 Blood from Peripheral, Hand, Right Staphylococcus aureus  S  S  S  S  R  S  S  S   10/22/24 Blood from Peripheral, Hand, Left Staphylococcus aureus               LAST 7 DAYS MICROBIOLOGY   Microbiology Results (last 7 days)       Procedure Component " Value Units Date/Time    Aerobic culture [[id number]]     Order Status: No result Specimen: Abscess from Elbow, Left     Gram stain [[id number]]     Order Status: No result Specimen: Abscess from Elbow, Left     Blood culture [[id number]] Collected: 10/26/24 0012    Order Status: Completed Specimen: Blood from Peripheral, Hand, Right Updated: 10/26/24 0717     Blood Culture, Routine No Growth to date    Blood culture [[id number]] Collected: 10/26/24 0456    Order Status: Sent Specimen: Blood Updated: 10/26/24 0541    Narrative:      Collection has been rescheduled by 2MB at 10/26/2024 00:49 Reason:   Draw with am labs per rn Fernando   Collection has been rescheduled by 2MB at 10/26/2024 00:49 Reason:   Draw with am labs per rn Fernando     Blood culture [[id number]] Collected: 10/24/24 1018    Order Status: Completed Specimen: Blood from Peripheral, Hand, Right Updated: 10/25/24 1232     Blood Culture, Routine No Growth to date      No Growth to date    Blood culture [[id number]] Collected: 10/24/24 1019    Order Status: Completed Specimen: Blood from Peripheral, Hand, Left Updated: 10/25/24 1232     Blood Culture, Routine No Growth to date      No Growth to date    Stool culture **cannot be ordered stat** [[id number]] Collected: 10/23/24 1357    Order Status: Completed Specimen: Stool Updated: 10/25/24 1101     Stool Culture No Salmonella,Shigella,Vibrio,Campylobacter.      No E coli 0157:H7 isolated.    Urine culture [[id number]] Collected: 10/22/24 1514    Order Status: Completed Specimen: Urine Updated: 10/25/24 0715     Urine Culture, Routine No growth    Narrative:      Specimen Source->Urine    Blood culture x two cultures. Draw prior to antibiotics. [[id number]]  (Abnormal) Collected: 10/22/24 1136    Order Status: Completed Specimen: Blood from Peripheral, Hand, Left Updated: 10/25/24 0645     Blood Culture, Routine Gram stain aer bottle: Gram positive cocci      Positive results previously called on Order  #[id number]      STAPHYLOCOCCUS AUREUS  ID consult required for Staph aureus bacteremia.  For susceptibility see order #[id number]      Narrative:      Aerobic and anaerobic  Collection has been rescheduled by [email or web address] at 10/22/2024 11:44 Reason:   Done  Collection has been rescheduled by [email or web address] at 10/22/2024 11:44 Reason:   Done    Blood culture x two cultures. Draw prior to antibiotics. [[id number]]  (Abnormal)  (Susceptibility) Collected: 10/22/24 1143    Order Status: Completed Specimen: Blood from Peripheral, Hand, Right Updated: 10/25/24 0645     Blood Culture, Routine Gram stain aer bottle: Gram positive cocci      Results called to and read back by:Tiffani Moulton RN-1ICU;      10/23/2024  07:54 CJD      STAPHYLOCOCCUS AUREUS  ID consult required for Staph aureus bacteremia.      Narrative:      Aerobic and anaerobic  Collection has been rescheduled by [email or web address] at 10/22/2024 11:44 Reason:   Done  Collection has been rescheduled by [email or web address] at 10/22/2024 11:44 Reason:   Done    Rapid Organism ID by PCR (from Blood culture) [[id number]]  (Abnormal) Collected: 10/22/24 1143    Order Status: Completed Updated: 10/23/24 0918     Enterococcus faecalis Not Detected     Enterococcus faecium Not Detected     Listeria monocytogenes Not Detected     Staphylococcus spp. See species for ID     Staphylococcus aureus Detected     Staphylococcus epidermidis Not Detected     Staphylococcus lugdunensis Not Detected     Streptococcus species Not Detected     Streptococcus agalactiae Not Detected     Streptococcus pneumoniae Not Detected     Streptococcus pyogenes Not Detected     Acinetobacter calcoaceticus/baumannii complex Not Detected     Bacteroides fragilis Not Detected     Enterobacterales Not Detected     Enterobacter cloacae complex Not Detected     Escherichia coli Not Detected     Klebsiella aerogenes Not Detected     Klebsiella oxytoca Not Detected     Klebsiella pneumoniae group Not Detected     Proteus Not Detected     Salmonella  sp Not Detected     Serratia marcescens Not Detected     Haemophilus influenzae Not Detected     Neisseria meningtidis Not Detected     Pseudomonas aeruginosa Not Detected     Stenotrophomonas maltophilia Not Detected     Candida albicans Not Detected     Candida auris Not Detected     Candida glabrata Not Detected     Candida krusei Not Detected     Candida parapsilosis Not Detected     Candida tropicalis Not Detected     Cryptococcus neoformans/gattii Not Detected     CTX-M (ESBL ) Test not applicable     IMP (Carbapenem resistant) Test not applicable     KPC resistance gene (Carbapenem resistant) Test not applicable     mcr-1  Test not applicable     mec A/C  Test not applicable     mec A/C and MREJ (MRSA) gene Not Detected     NDM (Carbapenem resistant) Test not applicable     OXA-48-like (Carbapenem resistant) Test not applicable     van A/B (VRE gene) Test not applicable     VIM (Carbapenem resistant) Test not applicable    Narrative:      Aerobic and anaerobic          CURRENT/PREVIOUS VISIT EKG  Results for orders placed or performed during the hospital encounter of 10/22/24   EKG 12-lead    Collection Time: 10/22/24 11:45 AM   Result Value Ref Range    QRS Duration 90 ms    OHS QTC Calculation 428 ms    Narrative    Test Reason : I95.9,    Vent. Rate : 056 BPM     Atrial Rate : 056 BPM     P-R Int : 306 ms          QRS Dur : 090 ms      QT Int : 444 ms       P-R-T Axes : 031 -27 034 degrees     QTc Int : 428 ms    Sinus bradycardia with 1st degree A-V block  Low voltage QRS  Inferior infarct (cited on or before 17-JAN-2023)  Possible Anterolateral infarct (cited on or before 12-APR-2024)  Abnormal ECG  When compared with ECG of 12-APR-2024 09:00,  No significant change was found    Referred By: [email or web address]   SELF           Confirmed By:      Significant Imaging: I have reviewed all relevant and available imaging results/findings within the past 24 hours.    I spent a total of 50 minutes on the day of  the visit.This includes face to face time and non-face to face time preparing to see the patient (eg, review of tests), obtaining and/or reviewing separately obtained history, documenting clinical information in the electronic or other health record, independently interpreting results and communicating results to the patient/family/caregiver, or care coordinator.    Migel Watson MD  Date of Service: 10/26/2024      This note was created using [employer] voice recognition software that occasionally misinterpreted phrases or words.

## 2024-10-26 NOTE — PT/OT/SLP PROGRESS
Physical Therapy Treatment    Patient Name:  Dhaval Hernández   MRN:  [id number]    Recommendations:     Discharge Recommendations: Moderate Intensity Therapy  Discharge Equipment Recommendations: to be determined by next level of care  Barriers to discharge:  elbow pain and drainage from abscess; decreased mobility from baseline    Assessment:     Dhaval Hernández is a 64 y.o. male admitted with a medical diagnosis of Septic shock.  He presents with the following impairments/functional limitations: weakness, impaired endurance, impaired self care skills, impaired functional mobility, decreased upper extremity function, decreased lower extremity function, pain.    No chair transfer performed or attempted today due to inability of therapist to return for assist back to bed.     Pt presents in bed with dark-colored drainage from L elbow, and nurse entering to clean and wrap elbow. Pt reported improved motivation to participate with elbow wrapped and with mild compression in place.     Bed mobility improved to modA. Pt participated in exercises both seated EOB and in supine with good participation and tolerance. Was left with HOB raised, LUE supported by pillows for comfort, and lunch tray set up.     Rehab Prognosis: Good; patient would benefit from acute skilled PT services to address these deficits and reach maximum level of function.    Recent Surgery: * No surgery found *      Plan:     During this hospitalization, patient to be seen 6 x/week to address the identified rehab impairments via therapeutic activities, therapeutic exercises and progress toward the following goals:    Plan of Care Expires:  11/24/24    Subjective     Chief Complaint: elbow pain  Patient/Family Comments/goals: I used to be able to do all this by myself (referring to bed mobility)  Pain/Comfort:  Pain Rating 1: other (see comments) (did not rate)  Location - Side 1: Left  Location 1: elbow  Pain Rating Post-Intervention 1: other (see  comments) (unrated)      Objective:     Communicated with nurse prior to session.  Patient found HOB elevated with bed alarm, oxygen, peripheral IV, telemetry upon PT entry to room.     General Precautions: Standard, fall  Orthopedic Precautions: LUE weight bearing as tolerated (Hx LLE AKA)  Braces: N/A  Respiratory Status: Nasal cannula, flow 2 L/min     Functional Mobility:  Bed Mobility:     Scooting: seated scoots at EOB, CGA and VC  Supine to Sit: moderate assistance  Sit to Supine: moderate assistance        AM-PAC 6 CLICK MOBILITY          Treatment & Education:  -Call light, fall prevention, upright for all meals  -Seated exercises: glut sets x 10 w/ 3-ct hold  -Supine exercises: modified bridges (pillow under thighs) with VC for 3/4 of work to be performed by LLE to improve L glut contraction and hip extension. 10 reps    Patient left with bed in chair position with all lines intact, call button in reach, bed alarm on, nurse notified, and lunch tray set up ..    GOALS:   Multidisciplinary Problems       Physical Therapy Goals          Problem: Physical Therapy    Goal Priority Disciplines Outcome Interventions   Physical Therapy Goal     PT, PT/OT Progressing    Description: Goals to be met by: 2024     Patient will increase functional independence with mobility by performin. Supine to sit with MInimal Assistance  2. Sit to supine with MInimal Assistance  3. Bed to chair transfer with Moderate Assistance using No Assistive Device                         Time Tracking:     PT Received On: 10/26/24  PT Start Time: 1137     PT Stop Time: 1200  PT Total Time (min): 23 min     Billable Minutes: Therapeutic Activity 10 and Therapeutic Exercise 13    Treatment Type: Treatment  PT/PTA: PTA     Number of PTA visits since last PT visit: 1     10/26/2024

## 2024-10-26 NOTE — PROGRESS NOTES
[facility]  Orthopedics  Progress Note    Patient Name: Dhaval Hernández  MRN: [id number]  Admission Date: 10/22/2024  Hospital Length of Stay: 4 days  Attending Provider: Evin Courtney MD  Primary Care Provider: Jessie Jung MD    Subjective:     Principal Problem:Septic shock    Principal Orthopedic Problem: Left elbow abscess    Interval History:   New onset drainage from area of concern posterior elbow starting early this AM. Pain mildly improved due to decrease swelling/tightness. AFVSS.      Review of patient's allergies indicates:   Allergen Reactions    Fish containing products      Other reaction(s): .       Current Facility-Administered Medications   Medication    acetaminophen tablet 650 mg    acetaminophen tablet 650 mg    aluminum-magnesium hydroxide-simethicone 200-200-20 mg/5 mL suspension 30 mL    ceFAZolin injection 2 g    citalopram tablet 10 mg    dextrose 50% injection 12.5 g    dextrose 50% injection 25 g    enoxaparin injection 40 mg    glucagon (human recombinant) injection 1 mg    glucose chewable tablet 16 g    glucose chewable tablet 24 g    insulin aspart U-100 pen 0-10 Units    insulin glargine U-100 (Lantus) pen 16 Units    levothyroxine tablet 75 mcg    magnesium oxide tablet 800 mg    magnesium oxide tablet 800 mg    melatonin tablet 6 mg    mupirocin 2 % ointment    naloxone 0.4 mg/mL injection 0.02 mg    ondansetron injection 4 mg    oxyCODONE-acetaminophen 5-325 mg per tablet 1 tablet    pantoprazole injection 40 mg    potassium bicarbonate disintegrating tablet 35 mEq    potassium bicarbonate disintegrating tablet 50 mEq    potassium bicarbonate disintegrating tablet 60 mEq    potassium, sodium phosphates 280-160-250 mg packet 2 packet    potassium, sodium phosphates 280-160-250 mg packet 2 packet    potassium, sodium phosphates 280-160-250 mg packet 2 packet     Objective:     Vital Signs (Most Recent):  Temp: 97.6 °F (36.4 °C) (10/26/24  "1103)  Pulse: 62 (10/26/24 1103)  Resp: 18 (10/26/24 1103)  BP: (!) 146/68 (10/26/24 1103)  SpO2: 95 % (10/26/24 1103) Vital Signs (24h Range):  Temp:  [97.6 °F (36.4 °C)-98.5 °F (36.9 °C)] 97.6 °F (36.4 °C)  Pulse:  [61-68] 62  Resp:  [16-18] 18  SpO2:  [93 %-96 %] 95 %  BP: (125-157)/(63-82) 146/68     Weight: 104.6 kg (230 lb 9.6 oz)  Height: 5' 6" (167.6 cm)  Body mass index is 37.22 kg/m².      Intake/Output Summary (Last 24 hours) at 10/26/2024 1510  Last data filed at 10/26/2024 1412  Gross per 24 hour   Intake 200 ml   Output 1020 ml   Net -820 ml       Ortho/SPM Exam  Gen: NAD; A&Ox3.  CV: RR.  Resp: no increased WOB. On oxygen via NC.  Left UE:  Previous posterior/posterolateral surgical scar over the elbow is well healed.    Now with moderate amount of purulent drainage over the posterior/posteromedial elbow adjacent to the olecranon.  Baseline 90 degree flexion contracture/ankylosed elbow joint.  30° supination and 45° pronation.  No suggestion of septic arthritis although difficult to assess given baseline ankylosed joint.  First dorsal interossei atrophy.  On repeat exam patient has to numbness and paresthesias in the ulnar nerve distribution.   Mild weakness with finger flexion and extension but intact.  Palpable radial pulse.    Significant Labs: A1C: No results for input(s): "HGBA1C" in the last 4320 hours.  Blood Culture:   Recent Labs   Lab 10/26/24  0012 10/26/24  0456   LABBLOO No Growth to date No Growth to date     BMP:   Recent Labs   Lab 10/26/24  0456   *   *   K 4.2      CO2 26   BUN 21   CREATININE 1.0   CALCIUM 8.1*   MG 1.7     CBC:   Recent Labs   Lab 10/25/24  0513 10/26/24  0456   WBC 11.58 10.66   HGB 11.3* 11.0*   HCT 35.3* 33.6*    228     CMP:   Recent Labs   Lab 10/25/24  0512 10/26/24  0456    135*   K 4.3 4.2    100   CO2 27 26   * 173*   BUN 25* 21   CREATININE 1.2 1.0   CALCIUM 8.3* 8.1*   PROT 6.0 6.1   ALBUMIN 2.9* 2.9* " "  BILITOT 0.9 0.5   ALKPHOS 85 85   AST 46* 37   ALT 39 25   ANIONGAP 9 9     Coagulation: No results for input(s): "LABPROT", "INR", "APTT" in the last 48 hours.  CRP: No results for input(s): "CRP" in the last 48 hours.  Lactic Acid: No results for input(s): "LACTATE" in the last 48 hours.  Urine Culture: No results for input(s): "LABURIN" in the last 48 hours.  Urine Studies: No results for input(s): "COLORU", "APPEARANCEUA", "PHUR", "SPECGRAV", "PROTEINUA", "GLUCUA", "KETONESU", "BILIRUBINUA", "OCCULTUA", "NITRITE", "UROBILINOGEN", "LEUKOCYTESUR", "RBCUA", "WBCUA", "BACTERIA", "SQUAMEPITHEL", "HYALINECASTS" in the last 48 hours.    Invalid input(s): "WRIGHTSUR"  Wound Culture: No results for input(s): "LABAERO" in the last 48 hours.  All pertinent labs within the past 24 hours have been reviewed.        Assessment/Plan:     65yo M admitted on 10/22 admitted for septic shock, generalized weakness, and Left elbow pain.  PMH significant for T2DM, A1c of 6.5; HTN; Left above knee amputation; and high speed [facility] (1979) with Left elbow ORIF -- now ankylosed.     --New onset purulent drainage over posterior elbow near olecranon.  --To OR tomorrow for formal debridement of posterior left elbow. Specifically discussed proximity to ulnar nerve; possible need for repeat debridement, etc.  --NPO@MN.  --WBAT LUE although patient with fixed elbow flexion at 90 degrees and ankylosis/autofusion on x-ray.  --Hold anticoags in anticipation for surgery tomorrow.  --IV abx per ID and primary team.  --May require further neurological (possible C/sp) workup with patient-reported increased LUE weakness over the last several weeks.  --Will cont to monitor. Call with questions.      Gokul Troy MD  Orthopedics  [facility]  "

## 2024-10-26 NOTE — ASSESSMENT & PLAN NOTE
Resolved with shifting agents    Recent Labs     10/24/24  1405 10/25/24  0512 10/26/24  0456   K 4.6 4.3 4.2

## 2024-10-26 NOTE — ASSESSMENT & PLAN NOTE
Secondary to MSSA bacteremia  Left elbow abscess, bursitis   Septic shock is resolved   Organ dysfunction: SIRIA and hypotension   Rpt blood culture negative so far   Echo shows no vegetations   ID following   Cont Cefazolin, DC Flagyl   Ortho consulted, planned for I&D AM

## 2024-10-26 NOTE — PROGRESS NOTES
Nephrology Consult Note        Patient Name: Dhaval Hernández  MRN: [id number]    Patient Class: IP- Inpatient   Admission Date: 10/22/2024  Length of Stay: 4 days  Date of Service: 10/26/2024    Attending Physician: Evin Courtney MD  Primary Care Provider: Jessie Jung MD    Reason for Consult: siria    SUBJECTIVE:     HPI: 64M with DM, HTN, GERD, left BKA, pacemaker, HFpEF presents to the emergency department with generalized weakness, persistent loose watery diarrheal stools over last 4 days with associated abdominal cramping. Denied vomiting, hematochezia, melena, fever.     Upon arrival to the emergency department patient found with blood pressure 70/30. Heart rate was in the 110s. Received IVF promptly, giron was placed, labs obtained - notable for SIRIA, hyponatremia, hyperkalemia, acidosis, bump in LFTs, anemia with elevated WBC. ProCal 9, , A1c 6.5. POC ABG with pH 7.26, pCO2 26, iCa 1.14, K 5.7. CT scan without renal obstruction.    10/23 VSS. No new complains. Decrease labs to daily, monitor UO. Bicarb for another 24h.  10/24 VSS, good UO, switch to NS from bicarb gtt.  10/25 VSS. Appreciate input from GI, GEN SURG, Orhto, ID and agree with plan.  10/26 VSS. OK to dc from renal stand point.    ASSESSMENT/PLAN:     Hyponatremia  Staph bacteremia  Anemia  HTN  DM  CKD stage 2, eGFR > 60 ml/min in 10/2023  Keep MAP > 60, SBP > 100.  Regular diabetic diet  Control BG, holding Jardiance.  Abx per ID.  Hgb and HCT are acceptable. Monitor for now.  Tolerate asymptomatic HTN up to -160.    Thank you for allowing us to participate in the care of your patient!   We will follow the patient and provide recommendations as needed.         Laboratory:  Recent Labs   Lab 10/24/24  1405 10/25/24  0512 10/26/24  0456   * 136 135*   K 4.6 4.3 4.2   CL 98 100 100   CO2 28 27 26   BUN 38* 25* 21   CREATININE 1.7* 1.2 1.0   * 114* 173*       Recent Labs   Lab 10/24/24  0333 10/24/24  2335  10/25/24  0512 10/26/24  0456   CALCIUM 8.0* 8.3* 8.3* 8.1*   ALBUMIN 2.9*  --  2.9* 2.9*   MG 1.5*  --  1.4* 1.7             Recent Labs   Lab 10/22/24  1807 10/23/24  1725 10/23/24  2103 10/24/24  1743 10/24/24  2144 10/25/24  0611 10/25/24  1151 10/25/24  1638 10/25/24  2018 10/26/24  0609   POCTGLUCOSE 240* 211* 174* 169* 132* 110 183* 198* 268* 167*       Recent Labs   Lab 10/30/23  1205   Hemoglobin A1C 6.5 H       Recent Labs   Lab 10/24/24  0333 10/25/24  0513 10/26/24  0456   WBC 10.85 11.58 10.66   HGB 10.2* 11.3* 11.0*   HCT 31.4* 35.3* 33.6*    207 228   MCV 80* 81* 81*   MCHC 32.5 32.0 32.7   MONO 10.1  1.1* 12.3  1.4* 12.9  1.4*   EOSINOPHIL 1.9 1.6 4.5       Recent Labs   Lab 10/24/24  0333 10/25/24  0512 10/26/24  0456   BILITOT 0.7 0.9 0.5   PROT 5.7* 6.0 6.1   ALBUMIN 2.9* 2.9* 2.9*   ALKPHOS 76 85 85   ALT 60* 39 25   AST 63* 46* 37       Recent Labs   Lab 10/22/24  1514   Color, UA Yellow   Appearance, UA Clear   pH, UA 6.0   Specific Gravity, UA 1.010   Protein, UA Trace A   Glucose, UA 4+ A   Ketones, UA Negative   Urobilinogen, UA Negative   Bilirubin (UA) Negative   Occult Blood UA 1+ A   Nitrite, UA Negative   RBC, UA 10 H   WBC, UA 19 H   Bacteria None   Hyaline Casts, UA 49 A       Recent Labs   Lab 10/22/24  1345 10/22/24  1423 10/23/24  0456   POC PH  --  7.263 LL 7.371   POC PCO2  --  26.5 LL 32.2 L   POC HCO3  --  12.0 L 18.7 L   POC PO2  --  63 L 61 L   POC SATURATED O2  --  89 91   POC BE  --  -15 L -7 L   Sample VENOUS ARTERIAL ARTERIAL       Microbiology Results (last 7 days)       Procedure Component Value Units Date/Time    Blood culture [[id number]] Collected: 10/26/24 0012    Order Status: Completed Specimen: Blood from Peripheral, Hand, Right Updated: 10/26/24 0717     Blood Culture, Routine No Growth to date    Blood culture [[id number]] Collected: 10/26/24 0456    Order Status: Sent Specimen: Blood Updated: 10/26/24 0541    Narrative:      Collection has been  rescheduled by 2MB at 10/26/2024 00:49 Reason:   Draw with am labs per rn Fernando   Collection has been rescheduled by 2MB at 10/26/2024 00:49 Reason:   Draw with am labs per rn Fernando     Blood culture [[id number]] Collected: 10/24/24 1018    Order Status: Completed Specimen: Blood from Peripheral, Hand, Right Updated: 10/25/24 1232     Blood Culture, Routine No Growth to date      No Growth to date    Blood culture [[id number]] Collected: 10/24/24 1019    Order Status: Completed Specimen: Blood from Peripheral, Hand, Left Updated: 10/25/24 1232     Blood Culture, Routine No Growth to date      No Growth to date    Stool culture **cannot be ordered stat** [[id number]] Collected: 10/23/24 1357    Order Status: Completed Specimen: Stool Updated: 10/25/24 1101     Stool Culture No Salmonella,Shigella,Vibrio,Campylobacter.      No E coli 0157:H7 isolated.    Urine culture [[id number]] Collected: 10/22/24 1514    Order Status: Completed Specimen: Urine Updated: 10/25/24 0715     Urine Culture, Routine No growth    Narrative:      Specimen Source->Urine    Blood culture x two cultures. Draw prior to antibiotics. [[id number]]  (Abnormal) Collected: 10/22/24 1136    Order Status: Completed Specimen: Blood from Peripheral, Hand, Left Updated: 10/25/24 0645     Blood Culture, Routine Gram stain aer bottle: Gram positive cocci      Positive results previously called on Order #[id number]      STAPHYLOCOCCUS AUREUS  ID consult required for Staph aureus bacteremia.  For susceptibility see order #[id number]      Narrative:      Aerobic and anaerobic  Collection has been rescheduled by [email or web address] at 10/22/2024 11:44 Reason:   Done  Collection has been rescheduled by [email or web address] at 10/22/2024 11:44 Reason:   Done    Blood culture x two cultures. Draw prior to antibiotics. [[id number]]  (Abnormal)  (Susceptibility) Collected: 10/22/24 1143    Order Status: Completed Specimen: Blood from Peripheral, Hand, Right Updated: 10/25/24 0645     Blood  Culture, Routine Gram stain aer bottle: Gram positive cocci      Results called to and read back by:Tiffani Moulton RN-1ICU;      10/23/2024  07:54 CJD      STAPHYLOCOCCUS AUREUS  ID consult required for Staph aureus bacteremia.      Narrative:      Aerobic and anaerobic  Collection has been rescheduled by MAI at 10/22/2024 11:44 Reason:   Done  Collection has been rescheduled by MAI at 10/22/2024 11:44 Reason:   Done    Rapid Organism ID by PCR (from Blood culture) [[id number]]  (Abnormal) Collected: 10/22/24 1143    Order Status: Completed Updated: 10/23/24 0918     Enterococcus faecalis Not Detected     Enterococcus faecium Not Detected     Listeria monocytogenes Not Detected     Staphylococcus spp. See species for ID     Staphylococcus aureus Detected     Staphylococcus epidermidis Not Detected     Staphylococcus lugdunensis Not Detected     Streptococcus species Not Detected     Streptococcus agalactiae Not Detected     Streptococcus pneumoniae Not Detected     Streptococcus pyogenes Not Detected     Acinetobacter calcoaceticus/baumannii complex Not Detected     Bacteroides fragilis Not Detected     Enterobacterales Not Detected     Enterobacter cloacae complex Not Detected     Escherichia coli Not Detected     Klebsiella aerogenes Not Detected     Klebsiella oxytoca Not Detected     Klebsiella pneumoniae group Not Detected     Proteus Not Detected     Salmonella sp Not Detected     Serratia marcescens Not Detected     Haemophilus influenzae Not Detected     Neisseria meningtidis Not Detected     Pseudomonas aeruginosa Not Detected     Stenotrophomonas maltophilia Not Detected     Candida albicans Not Detected     Candida auris Not Detected     Candida glabrata Not Detected     Candida krusei Not Detected     Candida parapsilosis Not Detected     Candida tropicalis Not Detected     Cryptococcus neoformans/gattii Not Detected     CTX-M (ESBL ) Test not applicable     IMP (Carbapenem resistant) Test not  applicable     KPC resistance gene (Carbapenem resistant) Test not applicable     mcr-1  Test not applicable     mec A/C  Test not applicable     mec A/C and MREJ (MRSA) gene Not Detected     NDM (Carbapenem resistant) Test not applicable     OXA-48-like (Carbapenem resistant) Test not applicable     van A/B (VRE gene) Test not applicable     VIM (Carbapenem resistant) Test not applicable    Narrative:      Aerobic and anaerobic            Review of patient's allergies indicates:   Allergen Reactions    Fish containing products      Other reaction(s): .       Outpatient meds:  No current facility-administered medications on file prior to encounter.     Current Outpatient Medications on File Prior to Encounter   Medication Sig Dispense Refill    amiodarone (PACERONE) 200 MG Tab Take 1 tablet (200 mg total) by mouth once daily. 30 tablet 11    amLODIPine (NORVASC) 5 MG tablet Take 1 tablet (5 mg total) by mouth once daily. 90 tablet 1    aspirin (ECOTRIN) 81 MG EC tablet Take 81 mg by mouth once daily.      carvediloL (COREG) 12.5 MG tablet Take 1 tablet (12.5 mg total) by mouth 2 (two) times daily. (Patient taking differently: Take 25 mg by mouth once daily.) 180 tablet 3    cholecalciferol, vitamin D3, (VITAMIN D3) 125 mcg (5,000 unit) Tab Take 5,000 Units by mouth once daily.      empagliflozin (JARDIANCE) 25 mg tablet Take 1 tablet (25 mg total) by mouth once daily. 90 tablet 0    ezetimibe (ZETIA) 10 mg tablet Take 10 mg by mouth.      famotidine (PEPCID) 20 MG tablet Take 20 mg by mouth 2 (two) times daily.      insulin glargine U-100, Lantus, (LANTUS SOLOSTAR U-100 INSULIN) 100 unit/mL (3 mL) InPn pen Inject 16 Units into the skin every evening. 14.4 mL 1    JANUVIA 100 mg Tab Take 1 tablet (100 mg total) by mouth once daily. 90 tablet 1    levothyroxine (SYNTHROID) 75 MCG tablet Take 1 tablet (75 mcg total) by mouth every morning. 90 tablet 1    losartan (COZAAR) 100 MG tablet Take 1 tablet (100 mg total) by  mouth once daily. 90 tablet 3    metFORMIN (GLUCOPHAGE) 1000 MG tablet Take 1 tablet (1,000 mg total) by mouth 2 (two) times daily with meals. 180 tablet 1    omega 3-dha-epa-fish oil (FISH OIL) 1,200 (144-216) mg Cap Take 1 capsule by mouth once daily.      spironolactone (ALDACTONE) 25 MG tablet Take 0.5 tablets (12.5 mg total) by mouth once daily. 45 tablet 3    atorvastatin (LIPITOR) 80 MG tablet Take 1 tablet (80 mg total) by mouth every evening. 90 tablet 3    cholecalciferol, vitamin D3, 1,250 mcg (50,000 unit) capsule Take 50,000 Int'l Units by mouth.      ciclopirox (PENLAC) 8 % Soln Apply topically nightly. 6.6 mL 5    EScitalopram oxalate (LEXAPRO) 10 MG tablet Take 1 tablet (10 mg total) by mouth once daily. 90 tablet 3    multivitamin (THERAGRAN) per tablet Take 1 tablet by mouth.         Scheduled meds:   ceFAZolin  2 g Intravenous Q8H    citalopram  10 mg Oral Daily    enoxparin  40 mg Subcutaneous Daily    insulin glargine U-100  16 Units Subcutaneous QHS    levothyroxine  75 mcg Oral Before breakfast    mupirocin   Nasal BID    pantoprazole  40 mg Intravenous Daily       Infusions:        PRN meds:    Current Facility-Administered Medications:     acetaminophen, 650 mg, Oral, Q8H PRN    acetaminophen, 650 mg, Oral, Q4H PRN    aluminum-magnesium hydroxide-simethicone, 30 mL, Oral, QID PRN    dextrose 50%, 12.5 g, Intravenous, PRN    dextrose 50%, 25 g, Intravenous, PRN    glucagon (human recombinant), 1 mg, Intramuscular, PRN    glucose, 16 g, Oral, PRN    glucose, 24 g, Oral, PRN    insulin aspart U-100, 0-10 Units, Subcutaneous, QID (AC + HS) PRN    magnesium oxide, 800 mg, Oral, PRN    magnesium oxide, 800 mg, Oral, PRN    melatonin, 6 mg, Oral, Nightly PRN    naloxone, 0.02 mg, Intravenous, PRN    ondansetron, 4 mg, Intravenous, Q6H PRN    oxyCODONE-acetaminophen, 1 tablet, Oral, Q4H PRN    potassium bicarbonate, 35 mEq, Oral, PRN    potassium bicarbonate, 50 mEq, Oral, PRN    potassium  bicarbonate, 60 mEq, Oral, PRN    potassium, sodium phosphates, 2 packet, Oral, PRN    potassium, sodium phosphates, 2 packet, Oral, PRN    potassium, sodium phosphates, 2 packet, Oral, PRN    Past Medical History:   Diagnosis Date    Diabetes mellitus, type 2     GERD (gastroesophageal reflux disease)     Hyperlipidemia     Hypertension      Past Surgical History:   Procedure Laterality Date    FRACTURE SURGERY      INSERTION OF PACEMAKER      LEG AMPUTATION       Family History   Problem Relation Name Age of Onset    Arthritis Mother       Social History     Tobacco Use    Smoking status: Some Days     Types: Cigarettes    Smokeless tobacco: Never       OBJECTIVE:     Vital Signs and IO:  Temp:  [97.8 °F (36.6 °C)-98.5 °F (36.9 °C)]   Pulse:  [61-68]   Resp:  [16-18]   BP: (125-159)/(63-82)   SpO2:  [93 %-97 %]   I/O last 3 completed shifts:  In: 240 [P.O.:240]  Out: 2450 [Urine:2450]  Wt Readings from Last 5 Encounters:   10/24/24 104.6 kg (230 lb 9.6 oz)   10/25/24 104.3 kg (230 lb)   07/23/24 102.4 kg (225 lb 12 oz)   04/17/24 89.8 kg (197 lb 15.6 oz)   04/12/24 89.8 kg (198 lb)     Body mass index is 37.22 kg/m².    Physical Exam  Constitutional:       General: Patient is not in acute distress.     Appearance: Patient is well-developed. She is not diaphoretic.   HENT:      Head: Normocephalic and atraumatic.      Mouth/Throat: Mucous membranes are moist.   Eyes:      General: No scleral icterus.     Pupils: Pupils are equal, round, and reactive to light.   Cardiovascular:      Rate and Rhythm: Normal rate and regular rhythm.   Pulmonary:      Effort: Pulmonary effort is normal. No respiratory distress.      Breath sounds: No stridor.   Abdominal:      General: There is no distension.      Palpations: Abdomen is soft.   Musculoskeletal:         General: No deformity. Normal range of motion.      Cervical back: Neck supple.   Skin:     General: Skin is warm and dry.      Findings: No rash present. No erythema.    Neurological:      Mental Status: Patient is alert and oriented to person, place, and time.      Cranial Nerves: No cranial nerve deficit.   Psychiatric:         Behavior: Behavior normal.          Patient care time was spent personally by me on the following activities:     Obtaining a history.  Examination of patient.  Providing medical care at the patients bedside.  Developing a treatment plan with patient or surrogate and bedside caregivers.  Ordering and reviewing laboratory studies, radiographic studies, pulse oximetry.  Ordering and performing treatments and interventions.  Evaluation of patient's response to treatment.  Discussions with consultants while on the unit and immediately available to the patient.  Re-evaluation of the patient's condition.  Documentation in the medical record.     Jabari Verma MD    [facility]  [address] [place] [id number] [phone] - tel  [phone] - fax    10/26/2024

## 2024-10-26 NOTE — SUBJECTIVE & OBJECTIVE
Interval History: Patient complains of pain on the left elbow. It is swollen and draining. There is pus discharge on the sheets, He is afebrile. Rpt blood culture negative so far.     Review of Systems  Objective:     Vital Signs (Most Recent):  Temp: 97.6 °F (36.4 °C) (10/26/24 1103)  Pulse: 62 (10/26/24 1103)  Resp: 18 (10/26/24 1103)  BP: (!) 146/68 (10/26/24 1103)  SpO2: 95 % (10/26/24 1103) Vital Signs (24h Range):  Temp:  [97.6 °F (36.4 °C)-98.5 °F (36.9 °C)] 97.6 °F (36.4 °C)  Pulse:  [61-68] 62  Resp:  [16-18] 18  SpO2:  [93 %-96 %] 95 %  BP: (125-157)/(63-82) 146/68     Weight: 104.6 kg (230 lb 9.6 oz)  Body mass index is 37.22 kg/m².    Intake/Output Summary (Last 24 hours) at 10/26/2024 1341  Last data filed at 10/26/2024 1103  Gross per 24 hour   Intake 440 ml   Output 900 ml   Net -460 ml         Physical Exam  Vitals and nursing note reviewed.   Constitutional:       General: He is not in acute distress.     Appearance: He is obese. He is not toxic-appearing.   HENT:      Head: Atraumatic.      Mouth/Throat:      Mouth: Mucous membranes are moist.      Pharynx: Oropharynx is clear.   Eyes:      General: No scleral icterus.     Conjunctiva/sclera: Conjunctivae normal.      Pupils: Pupils are equal, round, and reactive to light.   Cardiovascular:      Rate and Rhythm: Normal rate and regular rhythm.      Heart sounds: No murmur heard.  Pulmonary:      Effort: No respiratory distress.      Breath sounds: No wheezing, rhonchi or rales.   Abdominal:      General: Abdomen is flat. Bowel sounds are normal.      Palpations: Abdomen is soft.   Musculoskeletal:         General: No swelling or deformity.      Cervical back: No rigidity or tenderness.      Comments: Left BKA   There is redness on the left elbow it is swollen and draining pus.    Skin:     Coloration: Skin is not jaundiced or pale.      Findings: No bruising, erythema or rash.   Neurological:      General: No focal deficit present.      Mental  Status: He is alert and oriented to person, place, and time.      Cranial Nerves: No cranial nerve deficit.      Sensory: No sensory deficit.      Motor: No weakness.   Psychiatric:         Mood and Affect: Mood normal.         Behavior: Behavior normal.             Significant Labs: All pertinent labs within the past 24 hours have been reviewed.  BMP:   Recent Labs   Lab 10/26/24  0456   *   *   K 4.2      CO2 26   BUN 21   CREATININE 1.0   CALCIUM 8.1*   MG 1.7     CBC:   Recent Labs   Lab 10/25/24  0513 10/26/24  0456   WBC 11.58 10.66   HGB 11.3* 11.0*   HCT 35.3* 33.6*    228       Significant Imaging: I have reviewed all pertinent imaging results/findings within the past 24 hours.

## 2024-10-27 ENCOUNTER — ANESTHESIA (OUTPATIENT)
Dept: SURGERY | Facility: HOSPITAL | Age: 64
End: 2024-10-27
Payer: MEDICARE

## 2024-10-27 LAB
ALBUMIN SERPL BCP-MCNC: 3 G/DL (ref 3.5–5.2)
ALP SERPL-CCNC: 112 U/L (ref 55–135)
ALT SERPL W/O P-5'-P-CCNC: 16 U/L (ref 10–44)
ANION GAP SERPL CALC-SCNC: 9 MMOL/L (ref 8–16)
AST SERPL-CCNC: 46 U/L (ref 10–40)
BASOPHILS # BLD AUTO: 0.07 K/UL (ref 0–0.2)
BASOPHILS NFR BLD: 0.6 % (ref 0–1.9)
BILIRUB SERPL-MCNC: 0.6 MG/DL (ref 0.1–1)
BUN SERPL-MCNC: 18 MG/DL (ref 8–23)
CALCIUM SERPL-MCNC: 8.3 MG/DL (ref 8.7–10.5)
CHLORIDE SERPL-SCNC: 101 MMOL/L (ref 95–110)
CO2 SERPL-SCNC: 23 MMOL/L (ref 23–29)
CREAT SERPL-MCNC: 0.9 MG/DL (ref 0.5–1.4)
DIFFERENTIAL METHOD BLD: ABNORMAL
EOSINOPHIL # BLD AUTO: 0.5 K/UL (ref 0–0.5)
EOSINOPHIL NFR BLD: 4.2 % (ref 0–8)
ERYTHROCYTE [DISTWIDTH] IN BLOOD BY AUTOMATED COUNT: 18.6 % (ref 11.5–14.5)
EST. GFR  (NO RACE VARIABLE): >60 ML/MIN/1.73 M^2
GLUCOSE SERPL-MCNC: 155 MG/DL (ref 70–110)
HCT VFR BLD AUTO: 37.7 % (ref 40–54)
HGB BLD-MCNC: 12.2 G/DL (ref 14–18)
IMM GRANULOCYTES # BLD AUTO: 0.24 K/UL (ref 0–0.04)
IMM GRANULOCYTES NFR BLD AUTO: 2 % (ref 0–0.5)
LYMPHOCYTES # BLD AUTO: 1.8 K/UL (ref 1–4.8)
LYMPHOCYTES NFR BLD: 14.7 % (ref 18–48)
MAGNESIUM SERPL-MCNC: 1.5 MG/DL (ref 1.6–2.6)
MCH RBC QN AUTO: 27.1 PG (ref 27–31)
MCHC RBC AUTO-ENTMCNC: 32.4 G/DL (ref 32–36)
MCV RBC AUTO: 84 FL (ref 82–98)
MONOCYTES # BLD AUTO: 1.2 K/UL (ref 0.3–1)
MONOCYTES NFR BLD: 9.7 % (ref 4–15)
NEUTROPHILS # BLD AUTO: 8.3 K/UL (ref 1.8–7.7)
NEUTROPHILS NFR BLD: 68.8 % (ref 38–73)
NRBC BLD-RTO: 0 /100 WBC
PLATELET # BLD AUTO: 238 K/UL (ref 150–450)
PLATELET BLD QL SMEAR: ABNORMAL
PMV BLD AUTO: 10 FL (ref 9.2–12.9)
POCT GLUCOSE: 127 MG/DL (ref 70–110)
POCT GLUCOSE: 178 MG/DL (ref 70–110)
POCT GLUCOSE: 183 MG/DL (ref 70–110)
POCT GLUCOSE: 187 MG/DL (ref 70–110)
POCT GLUCOSE: 270 MG/DL (ref 70–110)
POTASSIUM SERPL-SCNC: 4.7 MMOL/L (ref 3.5–5.1)
PROT SERPL-MCNC: 6.6 G/DL (ref 6–8.4)
RBC # BLD AUTO: 4.5 M/UL (ref 4.6–6.2)
SODIUM SERPL-SCNC: 133 MMOL/L (ref 136–145)
WBC # BLD AUTO: 12.01 K/UL (ref 3.9–12.7)

## 2024-10-27 PROCEDURE — 87040 BLOOD CULTURE FOR BACTERIA: CPT | Performed by: INTERNAL MEDICINE

## 2024-10-27 PROCEDURE — 36000705 HC OR TIME LEV I EA ADD 15 MIN: Performed by: ORTHOPAEDIC SURGERY

## 2024-10-27 PROCEDURE — 63600175 PHARM REV CODE 636 W HCPCS: Performed by: STUDENT IN AN ORGANIZED HEALTH CARE EDUCATION/TRAINING PROGRAM

## 2024-10-27 PROCEDURE — 87116 MYCOBACTERIA CULTURE: CPT | Performed by: ORTHOPAEDIC SURGERY

## 2024-10-27 PROCEDURE — 87102 FUNGUS ISOLATION CULTURE: CPT | Performed by: ORTHOPAEDIC SURGERY

## 2024-10-27 PROCEDURE — 25000003 PHARM REV CODE 250: Performed by: HOSPITALIST

## 2024-10-27 PROCEDURE — 99233 SBSQ HOSP IP/OBS HIGH 50: CPT | Mod: ,,, | Performed by: INTERNAL MEDICINE

## 2024-10-27 PROCEDURE — 25000003 PHARM REV CODE 250: Performed by: STUDENT IN AN ORGANIZED HEALTH CARE EDUCATION/TRAINING PROGRAM

## 2024-10-27 PROCEDURE — D9220A PRA ANESTHESIA: Mod: ANES,,, | Performed by: ANESTHESIOLOGY

## 2024-10-27 PROCEDURE — 12000002 HC ACUTE/MED SURGE SEMI-PRIVATE ROOM

## 2024-10-27 PROCEDURE — 87205 SMEAR GRAM STAIN: CPT | Performed by: ORTHOPAEDIC SURGERY

## 2024-10-27 PROCEDURE — 0RPM04Z REMOVAL OF INTERNAL FIXATION DEVICE FROM LEFT ELBOW JOINT, OPEN APPROACH: ICD-10-PCS | Performed by: ORTHOPAEDIC SURGERY

## 2024-10-27 PROCEDURE — 94761 N-INVAS EAR/PLS OXIMETRY MLT: CPT

## 2024-10-27 PROCEDURE — 87206 SMEAR FLUORESCENT/ACID STAI: CPT | Performed by: ORTHOPAEDIC SURGERY

## 2024-10-27 PROCEDURE — 37000008 HC ANESTHESIA 1ST 15 MINUTES: Performed by: ORTHOPAEDIC SURGERY

## 2024-10-27 PROCEDURE — 25000003 PHARM REV CODE 250: Performed by: INTERNAL MEDICINE

## 2024-10-27 PROCEDURE — 63600175 PHARM REV CODE 636 W HCPCS: Performed by: FAMILY MEDICINE

## 2024-10-27 PROCEDURE — 36415 COLL VENOUS BLD VENIPUNCTURE: CPT | Performed by: INTERNAL MEDICINE

## 2024-10-27 PROCEDURE — 36000704 HC OR TIME LEV I 1ST 15 MIN: Performed by: ORTHOPAEDIC SURGERY

## 2024-10-27 PROCEDURE — 71000033 HC RECOVERY, INTIAL HOUR: Performed by: ORTHOPAEDIC SURGERY

## 2024-10-27 PROCEDURE — 80053 COMPREHEN METABOLIC PANEL: CPT | Performed by: INTERNAL MEDICINE

## 2024-10-27 PROCEDURE — 87070 CULTURE OTHR SPECIMN AEROBIC: CPT | Performed by: ORTHOPAEDIC SURGERY

## 2024-10-27 PROCEDURE — 63600175 PHARM REV CODE 636 W HCPCS: Performed by: INTERNAL MEDICINE

## 2024-10-27 PROCEDURE — 94760 N-INVAS EAR/PLS OXIMETRY 1: CPT

## 2024-10-27 PROCEDURE — D9220A PRA ANESTHESIA: Mod: CRNA,,, | Performed by: STUDENT IN AN ORGANIZED HEALTH CARE EDUCATION/TRAINING PROGRAM

## 2024-10-27 PROCEDURE — 25000003 PHARM REV CODE 250: Performed by: FAMILY MEDICINE

## 2024-10-27 PROCEDURE — 37000009 HC ANESTHESIA EA ADD 15 MINS: Performed by: ORTHOPAEDIC SURGERY

## 2024-10-27 PROCEDURE — 99900031 HC PATIENT EDUCATION (STAT)

## 2024-10-27 PROCEDURE — 20680 REMOVAL OF IMPLANT DEEP: CPT | Mod: ,,, | Performed by: ORTHOPAEDIC SURGERY

## 2024-10-27 PROCEDURE — 87075 CULTR BACTERIA EXCEPT BLOOD: CPT | Performed by: ORTHOPAEDIC SURGERY

## 2024-10-27 PROCEDURE — 0R9M0ZX DRAINAGE OF LEFT ELBOW JOINT, OPEN APPROACH, DIAGNOSTIC: ICD-10-PCS | Performed by: ORTHOPAEDIC SURGERY

## 2024-10-27 PROCEDURE — 85025 COMPLETE CBC W/AUTO DIFF WBC: CPT | Performed by: INTERNAL MEDICINE

## 2024-10-27 PROCEDURE — 27000221 HC OXYGEN, UP TO 24 HOURS

## 2024-10-27 PROCEDURE — 83735 ASSAY OF MAGNESIUM: CPT | Performed by: INTERNAL MEDICINE

## 2024-10-27 RX ORDER — HYDROMORPHONE HYDROCHLORIDE 1 MG/ML
0.2 INJECTION, SOLUTION INTRAMUSCULAR; INTRAVENOUS; SUBCUTANEOUS EVERY 5 MIN PRN
Status: DISCONTINUED | OUTPATIENT
Start: 2024-10-27 | End: 2024-10-31 | Stop reason: HOSPADM

## 2024-10-27 RX ORDER — FAMOTIDINE 10 MG/ML
INJECTION INTRAVENOUS
Status: DISCONTINUED | OUTPATIENT
Start: 2024-10-27 | End: 2024-10-27

## 2024-10-27 RX ORDER — PROPOFOL 10 MG/ML
VIAL (ML) INTRAVENOUS
Status: DISCONTINUED | OUTPATIENT
Start: 2024-10-27 | End: 2024-10-27

## 2024-10-27 RX ORDER — MIDAZOLAM HYDROCHLORIDE 1 MG/ML
INJECTION INTRAMUSCULAR; INTRAVENOUS
Status: DISCONTINUED | OUTPATIENT
Start: 2024-10-27 | End: 2024-10-27

## 2024-10-27 RX ORDER — LIDOCAINE HYDROCHLORIDE 20 MG/ML
INJECTION, SOLUTION EPIDURAL; INFILTRATION; INTRACAUDAL; PERINEURAL
Status: DISCONTINUED | OUTPATIENT
Start: 2024-10-27 | End: 2024-10-27

## 2024-10-27 RX ORDER — DIPHENHYDRAMINE HYDROCHLORIDE 50 MG/ML
12.5 INJECTION INTRAMUSCULAR; INTRAVENOUS EVERY 6 HOURS PRN
Status: DISCONTINUED | OUTPATIENT
Start: 2024-10-27 | End: 2024-10-31 | Stop reason: HOSPADM

## 2024-10-27 RX ORDER — GLUCAGON 1 MG
1 KIT INJECTION
Status: DISCONTINUED | OUTPATIENT
Start: 2024-10-27 | End: 2024-10-31 | Stop reason: HOSPADM

## 2024-10-27 RX ORDER — MEPERIDINE HYDROCHLORIDE 50 MG/ML
12.5 INJECTION INTRAMUSCULAR; INTRAVENOUS; SUBCUTANEOUS EVERY 10 MIN PRN
Status: ACTIVE | OUTPATIENT
Start: 2024-10-27 | End: 2024-10-27

## 2024-10-27 RX ORDER — SUCCINYLCHOLINE CHLORIDE 20 MG/ML
INJECTION INTRAMUSCULAR; INTRAVENOUS
Status: DISCONTINUED | OUTPATIENT
Start: 2024-10-27 | End: 2024-10-27

## 2024-10-27 RX ORDER — CEFAZOLIN SODIUM 1 G/3ML
INJECTION, POWDER, FOR SOLUTION INTRAMUSCULAR; INTRAVENOUS
Status: DISCONTINUED | OUTPATIENT
Start: 2024-10-27 | End: 2024-10-27

## 2024-10-27 RX ORDER — ACETAMINOPHEN 10 MG/ML
INJECTION, SOLUTION INTRAVENOUS
Status: DISCONTINUED | OUTPATIENT
Start: 2024-10-27 | End: 2024-10-27

## 2024-10-27 RX ORDER — ROCURONIUM BROMIDE 10 MG/ML
INJECTION, SOLUTION INTRAVENOUS
Status: DISCONTINUED | OUTPATIENT
Start: 2024-10-27 | End: 2024-10-27

## 2024-10-27 RX ORDER — LIDOCAINE HYDROCHLORIDE 20 MG/ML
JELLY TOPICAL
Status: DISCONTINUED | OUTPATIENT
Start: 2024-10-27 | End: 2024-10-27

## 2024-10-27 RX ORDER — ONDANSETRON HYDROCHLORIDE 2 MG/ML
INJECTION, SOLUTION INTRAVENOUS
Status: DISCONTINUED | OUTPATIENT
Start: 2024-10-27 | End: 2024-10-27

## 2024-10-27 RX ORDER — OXYCODONE HYDROCHLORIDE 5 MG/1
5 TABLET ORAL
Status: DISCONTINUED | OUTPATIENT
Start: 2024-10-27 | End: 2024-10-31 | Stop reason: HOSPADM

## 2024-10-27 RX ORDER — ONDANSETRON HYDROCHLORIDE 2 MG/ML
4 INJECTION, SOLUTION INTRAVENOUS DAILY PRN
Status: DISCONTINUED | OUTPATIENT
Start: 2024-10-27 | End: 2024-10-31 | Stop reason: HOSPADM

## 2024-10-27 RX ORDER — FENTANYL CITRATE 50 UG/ML
INJECTION, SOLUTION INTRAMUSCULAR; INTRAVENOUS
Status: DISCONTINUED | OUTPATIENT
Start: 2024-10-27 | End: 2024-10-27

## 2024-10-27 RX ADMIN — FAMOTIDINE 20 MG: 10 INJECTION, SOLUTION INTRAVENOUS at 07:10

## 2024-10-27 RX ADMIN — CEFAZOLIN 1 G: 330 INJECTION, POWDER, FOR SOLUTION INTRAMUSCULAR; INTRAVENOUS at 07:10

## 2024-10-27 RX ADMIN — LIDOCAINE HYDROCHLORIDE 100 MG: 20 INJECTION, SOLUTION INTRAVENOUS at 07:10

## 2024-10-27 RX ADMIN — ENOXAPARIN SODIUM 40 MG: 40 INJECTION SUBCUTANEOUS at 04:10

## 2024-10-27 RX ADMIN — CEFAZOLIN 2 G: 330 INJECTION, POWDER, FOR SOLUTION INTRAMUSCULAR; INTRAVENOUS at 08:10

## 2024-10-27 RX ADMIN — OXYCODONE HYDROCHLORIDE AND ACETAMINOPHEN 1 TABLET: 5; 325 TABLET ORAL at 04:10

## 2024-10-27 RX ADMIN — INSULIN ASPART 2 UNITS: 100 INJECTION, SOLUTION INTRAVENOUS; SUBCUTANEOUS at 11:10

## 2024-10-27 RX ADMIN — ACETAMINOPHEN 1000 MG: 10 INJECTION, SOLUTION INTRAVENOUS at 07:10

## 2024-10-27 RX ADMIN — LEVOTHYROXINE SODIUM 75 MCG: 0.03 TABLET ORAL at 05:10

## 2024-10-27 RX ADMIN — PROPOFOL 150 MG: 10 INJECTION, EMULSION INTRAVENOUS at 07:10

## 2024-10-27 RX ADMIN — INSULIN ASPART 3 UNITS: 100 INJECTION, SOLUTION INTRAVENOUS; SUBCUTANEOUS at 08:10

## 2024-10-27 RX ADMIN — MUPIROCIN 1 G: 20 OINTMENT TOPICAL at 10:10

## 2024-10-27 RX ADMIN — SODIUM CHLORIDE: 0.9 INJECTION, SOLUTION INTRAVENOUS at 07:10

## 2024-10-27 RX ADMIN — CEFAZOLIN 2 G: 330 INJECTION, POWDER, FOR SOLUTION INTRAMUSCULAR; INTRAVENOUS at 03:10

## 2024-10-27 RX ADMIN — ROCURONIUM BROMIDE 5 MG: 10 INJECTION, SOLUTION INTRAVENOUS at 07:10

## 2024-10-27 RX ADMIN — Medication 200 MG: at 07:10

## 2024-10-27 RX ADMIN — ONDANSETRON 4 MG: 2 INJECTION INTRAMUSCULAR; INTRAVENOUS at 07:10

## 2024-10-27 RX ADMIN — FENTANYL CITRATE 100 MCG: 50 INJECTION, SOLUTION INTRAMUSCULAR; INTRAVENOUS at 07:10

## 2024-10-27 RX ADMIN — CITALOPRAM HYDROBROMIDE 10 MG: 10 TABLET ORAL at 10:10

## 2024-10-27 RX ADMIN — INSULIN ASPART 2 UNITS: 100 INJECTION, SOLUTION INTRAVENOUS; SUBCUTANEOUS at 06:10

## 2024-10-27 RX ADMIN — PANTOPRAZOLE SODIUM 40 MG: 40 INJECTION, POWDER, LYOPHILIZED, FOR SOLUTION INTRAVENOUS at 11:10

## 2024-10-27 RX ADMIN — MIDAZOLAM HYDROCHLORIDE 2 MG: 1 INJECTION, SOLUTION INTRAMUSCULAR; INTRAVENOUS at 07:10

## 2024-10-27 RX ADMIN — OXYCODONE HYDROCHLORIDE AND ACETAMINOPHEN 1 TABLET: 5; 325 TABLET ORAL at 10:10

## 2024-10-27 RX ADMIN — CEFAZOLIN 2 G: 330 INJECTION, POWDER, FOR SOLUTION INTRAMUSCULAR; INTRAVENOUS at 11:10

## 2024-10-27 RX ADMIN — INSULIN GLARGINE 16 UNITS: 100 INJECTION, SOLUTION SUBCUTANEOUS at 08:10

## 2024-10-27 RX ADMIN — LIDOCAINE HYDROCHLORIDE 4 ML: 20 JELLY TOPICAL at 07:10

## 2024-10-27 NOTE — RESPIRATORY THERAPY
10/27/24 1020   Patient Assessment/Suction   Level of Consciousness (AVPU) alert   Respiratory Effort Normal;Unlabored   Expansion/Accessory Muscles/Retractions no use of accessory muscles   PRE-TX-O2   Device (Oxygen Therapy) nasal cannula   $ Is the patient on Low Flow Oxygen? Yes   Flow (L/min) (Oxygen Therapy) 3   SpO2 97 %   Pulse Oximetry Type Intermittent   $ Pulse Oximetry - Multiple Charge Pulse Oximetry - Multiple   Pulse 62   Resp 16

## 2024-10-27 NOTE — ASSESSMENT & PLAN NOTE
SIRIA in the background of septic/hypovolemic shock  Resolved with IVF  Recent Labs     10/25/24  0512 10/26/24  0456 10/27/24  0551   CREATININE 1.2 1.0 0.9   EGFRNORACEVR >60.0 >60.0 >60.0
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
## 2024-10-27 NOTE — PROGRESS NOTES
Nephrology Consult Note        Patient Name: Dhaval Hernández  MRN: [id number]    Patient Class: IP- Inpatient   Admission Date: 10/22/2024  Length of Stay: 5 days  Date of Service: 10/27/2024    Attending Physician: Evin Courtney MD  Primary Care Provider: Jessie Jung MD    Reason for Consult: siria    SUBJECTIVE:     HPI: 64M with DM, HTN, GERD, left BKA, pacemaker, HFpEF presents to the emergency department with generalized weakness, persistent loose watery diarrheal stools over last 4 days with associated abdominal cramping. Denied vomiting, hematochezia, melena, fever.     Upon arrival to the emergency department patient found with blood pressure 70/30. Heart rate was in the 110s. Received IVF promptly, giron was placed, labs obtained - notable for SIRIA, hyponatremia, hyperkalemia, acidosis, bump in LFTs, anemia with elevated WBC. ProCal 9, , A1c 6.5. POC ABG with pH 7.26, pCO2 26, iCa 1.14, K 5.7. CT scan without renal obstruction.    10/23 VSS. No new complains. Decrease labs to daily, monitor UO. Bicarb for another 24h.  10/24 VSS, good UO, switch to NS from bicarb gtt.  10/25 VSS. Appreciate input from GI, GEN SURG, Orhto, ID and agree with plan.  10/26 VSS. OK to dc from renal stand point.  10/27 VSS. S/p I&D of left elbow abscess and hardware removal by ortho today.    ASSESSMENT/PLAN:     Hyponatremia  Staph bacteremia and left elbow abscess s/p I&D and hardware removal on 10/27  Anemia  HTN  DM  CKD stage 2, eGFR > 60 ml/min in 10/2023  Keep MAP > 60, SBP > 100.  Regular diabetic diet  Control BG, holding Jardiance.  Abx per ID.  Hgb and HCT are acceptable. Monitor for now.  Tolerate asymptomatic HTN up to -160.    Thank you for allowing us to participate in the care of your patient!   We will follow the patient and provide recommendations as needed.         Laboratory:  Recent Labs   Lab 10/25/24  0512 10/26/24  0456 10/27/24  0551    135* 133*   K 4.3 4.2 4.7     100 101   CO2 27 26 23   BUN 25* 21 18   CREATININE 1.2 1.0 0.9   * 173* 155*       Recent Labs   Lab 10/25/24  0512 10/26/24  0456 10/27/24  0551   CALCIUM 8.3* 8.1* 8.3*   ALBUMIN 2.9* 2.9* 3.0*   MG 1.4* 1.7 1.5*             Recent Labs   Lab 10/25/24  0611 10/25/24  1151 10/25/24  1638 10/25/24  2018 10/26/24  0609 10/26/24  1147 10/26/24  1657 10/26/24  1900 10/27/24  0612 10/27/24  0942   POCTGLUCOSE 110 183* 198* 268* 167* 156* 187* 223* 187* 183*       Recent Labs   Lab 10/30/23  1205   Hemoglobin A1C 6.5 H       Recent Labs   Lab 10/25/24  0513 10/26/24  0456 10/27/24  0551   WBC 11.58 10.66 12.01   HGB 11.3* 11.0* 12.2*   HCT 35.3* 33.6* 37.7*    228 238   MCV 81* 81* 84   MCHC 32.0 32.7 32.4   MONO 12.3  1.4* 12.9  1.4* 9.7  1.2*   EOSINOPHIL 1.6 4.5 4.2       Recent Labs   Lab 10/25/24  0512 10/26/24  0456 10/27/24  0551   BILITOT 0.9 0.5 0.6   PROT 6.0 6.1 6.6   ALBUMIN 2.9* 2.9* 3.0*   ALKPHOS 85 85 112   ALT 39 25 16   AST 46* 37 46*       Recent Labs   Lab 10/22/24  1514   Color, UA Yellow   Appearance, UA Clear   pH, UA 6.0   Specific Gravity, UA 1.010   Protein, UA Trace A   Glucose, UA 4+ A   Ketones, UA Negative   Urobilinogen, UA Negative   Bilirubin (UA) Negative   Occult Blood UA 1+ A   Nitrite, UA Negative   RBC, UA 10 H   WBC, UA 19 H   Bacteria None   Hyaline Casts, UA 49 A       Recent Labs   Lab 10/22/24  1345 10/22/24  1423 10/23/24  0456   POC PH  --  7.263 LL 7.371   POC PCO2  --  26.5 LL 32.2 L   POC HCO3  --  12.0 L 18.7 L   POC PO2  --  63 L 61 L   POC SATURATED O2  --  89 91   POC BE  --  -15 L -7 L   Sample VENOUS ARTERIAL ARTERIAL       Microbiology Results (last 7 days)       Procedure Component Value Units Date/Time    Aerobic culture [[id number]] Collected: 10/27/24 0805    Order Status: Sent Specimen: Abscess from Elbow, Left Updated: 10/27/24 1007    Culture, Anaerobe [[id number]] Collected: 10/27/24 0805    Order Status: Sent Specimen: Abscess from Elbow,  Left Updated: 10/27/24 0959    AFB Culture & Smear [[id number]] Collected: 10/27/24 0805    Order Status: Sent Specimen: Abscess from Elbow, Left Updated: 10/27/24 0957    Gram stain [[id number]] Collected: 10/27/24 0805    Order Status: Sent Specimen: Abscess from Elbow, Left Updated: 10/27/24 0957    Fungus culture [[id number]] Collected: 10/27/24 0805    Order Status: Sent Specimen: Abscess from Elbow, Left Updated: 10/27/24 0956    Aerobic culture [[id number]]  (Abnormal) Collected: 10/26/24 1552    Order Status: Completed Specimen: Abscess from Elbow, Left Updated: 10/27/24 0838     Aerobic Bacterial Culture STAPHYLOCOCCUS AUREUS  Moderate  Susceptibility pending      Blood culture [[id number]] Collected: 10/26/24 0456    Order Status: Completed Specimen: Blood Updated: 10/27/24 0632     Blood Culture, Routine No Growth to date      No Growth to date    Narrative:      can be drawn with am labs per rn Fernando  Collection has been rescheduled by 2MB at 10/26/2024 00:49 Reason:   Draw with am labs per rn Fernando   Collection has been rescheduled by 2MB at 10/26/2024 00:49 Reason:   Draw with am labs per rn Fernando     Blood culture [[id number]]  (Abnormal) Collected: 10/24/24 1018    Order Status: Completed Specimen: Blood from Peripheral, Hand, Right Updated: 10/27/24 0631     Blood Culture, Routine Gram stain aer bottle: Gram positive cocci in clusters resembling Staph      Results called to and read back by:Krista Lobato Charge Nurse 10/26/2024      15:26 ncb      STAPHYLOCOCCUS AUREUS  Susceptibility pending  ID consult required for Staph aureus bacteremia.      Blood culture [[id number]] Collected: 10/26/24 0012    Order Status: Completed Specimen: Blood from Peripheral, Hand, Right Updated: 10/27/24 0232     Blood Culture, Routine No Growth to date      No Growth to date    Rapid Organism ID by PCR (from Blood culture) [[id number]]  (Abnormal) Collected: 10/24/24 0927    Order Status: Completed Updated: 10/26/24  1637     Enterococcus faecalis Not Detected     Enterococcus faecium Not Detected     Listeria monocytogenes Not Detected     Staphylococcus spp. See species for ID     Staphylococcus aureus Detected     Staphylococcus epidermidis Not Detected     Staphylococcus lugdunensis Not Detected     Streptococcus species Not Detected     Streptococcus agalactiae Not Detected     Streptococcus pneumoniae Not Detected     Streptococcus pyogenes Not Detected     Acinetobacter calcoaceticus/baumannii complex Not Detected     Bacteroides fragilis Not Detected     Enterobacterales Not Detected     Enterobacter cloacae complex Not Detected     Escherichia coli Not Detected     Klebsiella aerogenes Not Detected     Klebsiella oxytoca Not Detected     Klebsiella pneumoniae group Not Detected     Proteus Not Detected     Salmonella sp Not Detected     Serratia marcescens Not Detected     Haemophilus influenzae Not Detected     Neisseria meningtidis Not Detected     Pseudomonas aeruginosa Not Detected     Stenotrophomonas maltophilia Not Detected     Candida albicans Not Detected     Candida auris Not Detected     Candida glabrata Not Detected     Candida krusei Not Detected     Candida parapsilosis Not Detected     Candida tropicalis Not Detected     Cryptococcus neoformans/gattii Not Detected     CTX-M (ESBL ) Test not applicable     IMP (Carbapenem resistant) Test not applicable     KPC resistance gene (Carbapenem resistant) Test not applicable     mcr-1  Test not applicable     mec A/C  Test not applicable     mec A/C and MREJ (MRSA) gene Not Detected     NDM (Carbapenem resistant) Test not applicable     OXA-48-like (Carbapenem resistant) Test not applicable     van A/B (VRE gene) Test not applicable     VIM (Carbapenem resistant) Test not applicable    Blood culture [[id number]] Collected: 10/24/24 1019    Order Status: Completed Specimen: Blood from Peripheral, Hand, Left Updated: 10/26/24 1232     Blood Culture, Routine  No Growth to date      No Growth to date      No Growth to date    Gram stain [[id number]]     Order Status: No result Specimen: Abscess from Elbow, Left     Stool culture **cannot be ordered stat** [[id number]] Collected: 10/23/24 1357    Order Status: Completed Specimen: Stool Updated: 10/25/24 1101     Stool Culture No Salmonella,Shigella,Vibrio,Campylobacter.      No E coli 0157:H7 isolated.    Urine culture [[id number]] Collected: 10/22/24 1514    Order Status: Completed Specimen: Urine Updated: 10/25/24 0715     Urine Culture, Routine No growth    Narrative:      Specimen Source->Urine    Blood culture x two cultures. Draw prior to antibiotics. [[id number]]  (Abnormal) Collected: 10/22/24 1136    Order Status: Completed Specimen: Blood from Peripheral, Hand, Left Updated: 10/25/24 0645     Blood Culture, Routine Gram stain aer bottle: Gram positive cocci      Positive results previously called on Order #[id number]      STAPHYLOCOCCUS AUREUS  ID consult required for Staph aureus bacteremia.  For susceptibility see order #[id number]      Narrative:      Aerobic and anaerobic  Collection has been rescheduled by MAI at 10/22/2024 11:44 Reason:   Done  Collection has been rescheduled by MAI at 10/22/2024 11:44 Reason:   Done    Blood culture x two cultures. Draw prior to antibiotics. [[id number]]  (Abnormal)  (Susceptibility) Collected: 10/22/24 1143    Order Status: Completed Specimen: Blood from Peripheral, Hand, Right Updated: 10/25/24 0645     Blood Culture, Routine Gram stain aer bottle: Gram positive cocci      Results called to and read back by:Tiffani Moulton RN-1ICU;      10/23/2024  07:54 CJD      STAPHYLOCOCCUS AUREUS  ID consult required for Staph aureus bacteremia.      Narrative:      Aerobic and anaerobic  Collection has been rescheduled by MAI at 10/22/2024 11:44 Reason:   Done  Collection has been rescheduled by ZCORBIN at 10/22/2024 11:44 Reason:   Done    Rapid Organism ID by PCR (from Blood  culture) [[id number]]  (Abnormal) Collected: 10/22/24 1143    Order Status: Completed Updated: 10/23/24 0918     Enterococcus faecalis Not Detected     Enterococcus faecium Not Detected     Listeria monocytogenes Not Detected     Staphylococcus spp. See species for ID     Staphylococcus aureus Detected     Staphylococcus epidermidis Not Detected     Staphylococcus lugdunensis Not Detected     Streptococcus species Not Detected     Streptococcus agalactiae Not Detected     Streptococcus pneumoniae Not Detected     Streptococcus pyogenes Not Detected     Acinetobacter calcoaceticus/baumannii complex Not Detected     Bacteroides fragilis Not Detected     Enterobacterales Not Detected     Enterobacter cloacae complex Not Detected     Escherichia coli Not Detected     Klebsiella aerogenes Not Detected     Klebsiella oxytoca Not Detected     Klebsiella pneumoniae group Not Detected     Proteus Not Detected     Salmonella sp Not Detected     Serratia marcescens Not Detected     Haemophilus influenzae Not Detected     Neisseria meningtidis Not Detected     Pseudomonas aeruginosa Not Detected     Stenotrophomonas maltophilia Not Detected     Candida albicans Not Detected     Candida auris Not Detected     Candida glabrata Not Detected     Candida krusei Not Detected     Candida parapsilosis Not Detected     Candida tropicalis Not Detected     Cryptococcus neoformans/gattii Not Detected     CTX-M (ESBL ) Test not applicable     IMP (Carbapenem resistant) Test not applicable     KPC resistance gene (Carbapenem resistant) Test not applicable     mcr-1  Test not applicable     mec A/C  Test not applicable     mec A/C and MREJ (MRSA) gene Not Detected     NDM (Carbapenem resistant) Test not applicable     OXA-48-like (Carbapenem resistant) Test not applicable     van A/B (VRE gene) Test not applicable     VIM (Carbapenem resistant) Test not applicable    Narrative:      Aerobic and anaerobic            Review of patient's  allergies indicates:   Allergen Reactions    Fish containing products      Other reaction(s): .       Outpatient meds:  No current facility-administered medications on file prior to encounter.     Current Outpatient Medications on File Prior to Encounter   Medication Sig Dispense Refill    amiodarone (PACERONE) 200 MG Tab Take 1 tablet (200 mg total) by mouth once daily. 30 tablet 11    amLODIPine (NORVASC) 5 MG tablet Take 1 tablet (5 mg total) by mouth once daily. 90 tablet 1    aspirin (ECOTRIN) 81 MG EC tablet Take 81 mg by mouth once daily.      carvediloL (COREG) 12.5 MG tablet Take 1 tablet (12.5 mg total) by mouth 2 (two) times daily. (Patient taking differently: Take 25 mg by mouth once daily.) 180 tablet 3    cholecalciferol, vitamin D3, (VITAMIN D3) 125 mcg (5,000 unit) Tab Take 5,000 Units by mouth once daily.      empagliflozin (JARDIANCE) 25 mg tablet Take 1 tablet (25 mg total) by mouth once daily. 90 tablet 0    ezetimibe (ZETIA) 10 mg tablet Take 10 mg by mouth.      famotidine (PEPCID) 20 MG tablet Take 20 mg by mouth 2 (two) times daily.      insulin glargine U-100, Lantus, (LANTUS SOLOSTAR U-100 INSULIN) 100 unit/mL (3 mL) InPn pen Inject 16 Units into the skin every evening. 14.4 mL 1    JANUVIA 100 mg Tab Take 1 tablet (100 mg total) by mouth once daily. 90 tablet 1    levothyroxine (SYNTHROID) 75 MCG tablet Take 1 tablet (75 mcg total) by mouth every morning. 90 tablet 1    losartan (COZAAR) 100 MG tablet Take 1 tablet (100 mg total) by mouth once daily. 90 tablet 3    metFORMIN (GLUCOPHAGE) 1000 MG tablet Take 1 tablet (1,000 mg total) by mouth 2 (two) times daily with meals. 180 tablet 1    omega 3-dha-epa-fish oil (FISH OIL) 1,200 (144-216) mg Cap Take 1 capsule by mouth once daily.      spironolactone (ALDACTONE) 25 MG tablet Take 0.5 tablets (12.5 mg total) by mouth once daily. 45 tablet 3    atorvastatin (LIPITOR) 80 MG tablet Take 1 tablet (80 mg total) by mouth every evening. 90 tablet  3    cholecalciferol, vitamin D3, 1,250 mcg (50,000 unit) capsule Take 50,000 Int'l Units by mouth.      ciclopirox (PENLAC) 8 % Soln Apply topically nightly. 6.6 mL 5    EScitalopram oxalate (LEXAPRO) 10 MG tablet Take 1 tablet (10 mg total) by mouth once daily. 90 tablet 3    multivitamin (THERAGRAN) per tablet Take 1 tablet by mouth.         Scheduled meds:   ceFAZolin  2 g Intravenous Q8H    citalopram  10 mg Oral Daily    enoxparin  40 mg Subcutaneous Daily    insulin glargine U-100  16 Units Subcutaneous QHS    levothyroxine  75 mcg Oral Before breakfast    pantoprazole  40 mg Intravenous Daily       Infusions:        PRN meds:    Current Facility-Administered Medications:     acetaminophen, 650 mg, Oral, Q8H PRN    acetaminophen, 650 mg, Oral, Q4H PRN    aluminum-magnesium hydroxide-simethicone, 30 mL, Oral, QID PRN    dextrose 50%, 12.5 g, Intravenous, PRN    dextrose 50%, 12.5 g, Intravenous, PRN    dextrose 50%, 25 g, Intravenous, PRN    diphenhydrAMINE, 12.5 mg, Intravenous, Q6H PRN    glucagon (human recombinant), 1 mg, Intramuscular, PRN    glucagon (human recombinant), 1 mg, Intramuscular, PRN    glucose, 16 g, Oral, PRN    glucose, 24 g, Oral, PRN    HYDROmorphone, 0.2 mg, Intravenous, Q5 Min PRN    insulin aspart U-100, 0-10 Units, Subcutaneous, QID (AC + HS) PRN    magnesium oxide, 800 mg, Oral, PRN    magnesium oxide, 800 mg, Oral, PRN    melatonin, 6 mg, Oral, Nightly PRN    meperidine, 12.5 mg, Intravenous, Q10 Min PRN    naloxone, 0.02 mg, Intravenous, PRN    ondansetron, 4 mg, Intravenous, Q6H PRN    ondansetron, 4 mg, Intravenous, Daily PRN    oxyCODONE, 5 mg, Oral, PRN    oxyCODONE-acetaminophen, 1 tablet, Oral, Q4H PRN    potassium bicarbonate, 35 mEq, Oral, PRN    potassium bicarbonate, 50 mEq, Oral, PRN    potassium bicarbonate, 60 mEq, Oral, PRN    potassium, sodium phosphates, 2 packet, Oral, PRN    potassium, sodium phosphates, 2 packet, Oral, PRN    potassium, sodium phosphates, 2  packet, Oral, PRN    Past Medical History:   Diagnosis Date    Diabetes mellitus, type 2     GERD (gastroesophageal reflux disease)     Hyperlipidemia     Hypertension      Past Surgical History:   Procedure Laterality Date    FRACTURE SURGERY      INSERTION OF PACEMAKER      LEG AMPUTATION       Family History   Problem Relation Name Age of Onset    Arthritis Mother       Social History     Tobacco Use    Smoking status: Some Days     Types: Cigarettes    Smokeless tobacco: Never       OBJECTIVE:     Vital Signs and IO:  Temp:  [97.6 °F (36.4 °C)-98.7 °F (37.1 °C)]   Pulse:  [61-73]   Resp:  [13-20]   BP: (106-183)/(58-90)   SpO2:  [92 %-97 %]   I/O last 3 completed shifts:  In: 500 [P.O.:500]  Out: 1720 [Urine:1720]  Wt Readings from Last 5 Encounters:   10/24/24 104.6 kg (230 lb 9.6 oz)   10/25/24 104.3 kg (230 lb)   07/23/24 102.4 kg (225 lb 12 oz)   04/17/24 89.8 kg (197 lb 15.6 oz)   04/12/24 89.8 kg (198 lb)     Body mass index is 37.22 kg/m².    Physical Exam  Constitutional:       General: Patient is not in acute distress.     Appearance: Patient is well-developed. She is not diaphoretic.   HENT:      Head: Normocephalic and atraumatic.      Mouth/Throat: Mucous membranes are moist.   Eyes:      General: No scleral icterus.     Pupils: Pupils are equal, round, and reactive to light.   Cardiovascular:      Rate and Rhythm: Normal rate and regular rhythm.   Pulmonary:      Effort: Pulmonary effort is normal. No respiratory distress.      Breath sounds: No stridor.   Abdominal:      General: There is no distension.      Palpations: Abdomen is soft.   Musculoskeletal:         General: No deformity. Normal range of motion.      Cervical back: Neck supple.   Skin:     General: Skin is warm and dry.      Findings: No rash present. No erythema.   Neurological:      Mental Status: Patient is alert and oriented to person, place, and time.      Cranial Nerves: No cranial nerve deficit.   Psychiatric:         Behavior:  Behavior normal.          Patient care time was spent personally by me on the following activities:     Obtaining a history.  Examination of patient.  Providing medical care at the patients bedside.  Developing a treatment plan with patient or surrogate and bedside caregivers.  Ordering and reviewing laboratory studies, radiographic studies, pulse oximetry.  Ordering and performing treatments and interventions.  Evaluation of patient's response to treatment.  Discussions with consultants while on the unit and immediately available to the patient.  Re-evaluation of the patient's condition.  Documentation in the medical record.     Jabari Verma MD    [facility]  [address] [place] [id number]    [phone] - tel  [phone] - fax    10/27/2024

## 2024-10-27 NOTE — PROGRESS NOTES
[facility]   Department of Infectious Disease  Progress Note        PATIENT NAME: Dhaval Hernández  MRN: [id number]  TODAY'S DATE: 10/27/2024  ADMIT DATE: 10/22/2024  LOS: 5 days    CHIEF COMPLAINT: Numbness (Pt is complaining of left sided numbness and tingling in arm started at 9 am./)      PRINCIPLE PROBLEM: Septic shock    INTERVAL HISTORY      10/25/2024: Out of ICU and now on medical floor.  X-ray shows hardware left elbow.  CT without contrast with the abscess.  X-ray right shoulder with mild DJD.  Repeat blood culture 10/24/2024 so far negative.     10/26/2024: He started draining abscess spontaneously from the left elbow today.  Repeat blood cultures remain negative.      10/27/2024:  He had I and D of left elbow today with removal of olecranon screw.  Abscess culture from yesterday growing Staphylococcus aureus.  Repeat blood culture 10/24/2024 hospital and positive.    Antibiotics (From admission, onward)      Start     Stop Route Frequency Ordered    10/25/24 1830  ceFAZolin injection 2 g         -- IV Every 8 hours (non-standard times) 10/25/24 1400          Antifungals (From admission, onward)      None           Antivirals (From admission, onward)      None            ASSESSMENT and PLAN      MSSA bacteremia.  This is most likely from left elbow infection.  TTE with no vegetation.  Repeat blood cultures so far negative.  He has a pacemaker.  Repeat blood cultures x2 sets today.  May need ROSE.    SIRIA.  Resolved      3.   Left elbow abscess.  He had I&D today.  Abscess culture growing Staphylococcus aureus.  He has retained hardware with probable clinical osteomyelitis.  We will treat for 6 weeks for this indication.    4. Right shoulder pain.  This is chronic associated with weakness.  Has mild DJD on x-ray.    5. Diabetes mellitus.  Management as per hospitalist.    RECOMMENDATIONS:    Continue cefazolin   Repeat blood cultures x2 sets  Plan to add oral rifampin in about 3-4 days      Please send Epic secure chat with any questions.      SUBJECTIVE    Dhaval Hernández is a 64 y.o. male with history of diabetes mellitus, hypertension, hyperlipidemia, GERD and previous left BKA.  Also with history of AICD.  He presents to the ER 10/22/2024 with 4 day history of nausea, vomiting and diarrhea.  He had no fever.  In the ER vitals were abnormal with blood pressure 75/39, pulse 60, respiratory 18, temperature 97.6° oxygen saturation 92%.  WBC was 20 K with left shift.  Creatinine 5.1.  UA abnormal with 19 WBC and 4+ glucose.  Chest x-ray with no clear acute infiltrate.  CT abdomen and pelvis showed mild cardiomegaly and also cholelithiasis.  He was commenced on IV fluid and antibiotics.     Leukocytosis resolved and WBC down to 8.0.  Blood cultures have grown MSSA in 2/2 bottles.  HIDA scan was abnormal.  Surgery was consulted to evaluate for possible cholecystitis.  Notes reviewed.  No plan for surgical intervention at this time since patient had no abdominal pain and appears to be improving.  ID asked to assist with his care.     States his symptoms started with his left elbow hurting.  He has been leaning on the left elbow to transfer from bed to wheelchair.  Has a elbow ORIF and is fixed in a flexion position.  Has had persistent pain for the last 1 week with swelling.  His other symptoms followed after the elbow pain started.        Antibiotic history:  Vancomycin: 10/22/2024 x 1 day  Meropenem: 10/22/2024-10/23/2024  Cefazolin:  10/24/2024-     Microbiology:    Blood culture 10/22/2024, 10/24/2024: MSSA 2/2  Blood culture 10/24/2024: NGTD      Review of Systems  Negative except as stated above in Interval History     OBJECTIVE   Temp:  [97.6 °F (36.4 °C)-98.7 °F (37.1 °C)] 97.7 °F (36.5 °C)  Pulse:  [61-73] 65  Resp:  [13-20] 16  SpO2:  [92 %-97 %] 96 %  BP: (106-183)/(58-90) 165/77  Temp:  [97.6 °F (36.4 °C)-98.7 °F (37.1 °C)]   Temp: 97.7 °F (36.5 °C) (10/27/24 1117)  Pulse: 65 (10/27/24  1117)  Resp: 16 (10/27/24 1117)  BP: (!) 165/77 (10/27/24 1117)  SpO2: 96 % (10/27/24 1117)    Intake/Output Summary (Last 24 hours) at 10/27/2024 1435  Last data filed at 10/27/2024 1121  Gross per 24 hour   Intake 400 ml   Output 1050 ml   Net -650 ml       Physical Exam  General:  Elderly man sitting in chair.  Left upper extremity:  Flexion contracture of the elbow.  Purulent drainage from elbow CVS: S1 and 2 heard, no murmurs appreciated   Respiratory: Clear to auscultation   Abdomen: Full, soft, nontender, no palpable organomegaly   Skin: No rash appreciated   CNS: No focal deficits   Musculoskeletal system: No joint or bony abnormalities appreciated  Psych: Good mood, normal affect.      VAD:  ISOLATION:  Now     Wounds:  None    Significant Labs: All pertinent labs within the past 24 hours have been reviewed.    CBC LAST 7 DAYS  Recent Labs   Lab 10/22/24  1144 10/22/24  1423 10/22/24  1548 10/23/24  0443 10/23/24  0456 10/24/24  0333 10/25/24  0513 10/26/24  0456 10/27/24  0551   WBC 20.63*  --  18.57* 11.77  --  10.85 11.58 10.66 12.01   RBC 3.76*  --  3.81* 3.87*  --  3.93* 4.34* 4.13* 4.50*   HGB 10.2*  --  9.9* 10.1*  --  10.2* 11.3* 11.0* 12.2*   HCT 33.0*   < > 32.9* 32.0* 31* 31.4* 35.3* 33.6* 37.7*   MCV 88  --  86 83  --  80* 81* 81* 84   MCH 27.1  --  26.0* 26.1*  --  26.0* 26.0* 26.6* 27.1   MCHC 30.9*  --  30.1* 31.6*  --  32.5 32.0 32.7 32.4   RDW 18.6*  --  18.6* 18.4*  --  18.1* 18.4* 18.4* 18.6*     --  211 201  --  188 207 228 238   MPV 10.5  --  10.9 10.2  --  10.1 9.7 9.9 10.0   GRAN 78.7*  16.2*  --  82.7*  15.4* 81.2*  9.6*  --  75.9*  8.2* 68.6  7.9* 61.8  6.6 68.8  8.3*   LYMPH 6.2*  1.3  --  4.8*  0.9* 6.3*  0.7*  --  10.6*  1.2 15.6*  1.8 18.3  2.0 14.7*  1.8   MONO 12.4  2.6*  --  9.0  1.7* 11.3  1.3*  --  10.1  1.1* 12.3  1.4* 12.9  1.4* 9.7  1.2*   BASO 0.04  --  0.04 0.04  --  0.02 0.04 0.03 0.07   NRBC 0  --  0 0  --  0 0 0 0    < > = values in this  "interval not displayed.       CHEMISTRY LAST 7 DAYS  Recent Labs   Lab 10/22/24  1144 10/22/24  1423 10/22/24  1548 10/22/24  1822 10/23/24  0003 10/23/24  0443 10/23/24  0456 10/24/24  0333 10/24/24  1405 10/25/24  0512 10/26/24  0456 10/27/24  0551   *  --  128*  --   --  132*  --  135* 135* 136 135* 133*   K 5.7*  --  5.7*   < > 4.7 4.6  --  4.1 4.6 4.3 4.2 4.7     --  100  --   --  101  --  101 98 100 100 101   CO2 12*  --  13*  --   --  17*  --  25 28 27 26 23   ANIONGAP 18*  --  15  --   --  14  --  9 9 9 9 9   BUN 71*  --  64*  --   --  63*  --  45* 38* 25* 21 18   CREATININE 5.1*  --  4.5*  --   --  3.8*  --  2.3* 1.7* 1.2 1.0 0.9   *  --  208*  --   --  126*  --  168* 193* 114* 173* 155*   CALCIUM 8.0*  --  7.9*  --   --  8.1*  --  8.0* 8.3* 8.3* 8.1* 8.3*   PH  --  7.263*  --   --   --   --  7.371  --   --   --   --   --    MG 1.6  --   --   --   --  1.4*  --  1.5*  --  1.4* 1.7 1.5*   ALBUMIN 3.1*  --   --   --   --  2.9*  --  2.9*  --  2.9* 2.9* 3.0*   PROT 5.9*  --   --   --   --  5.9*  --  5.7*  --  6.0 6.1 6.6   ALKPHOS 68  --   --   --   --  74  --  76  --  85 85 112   ALT 69*  --   --   --   --  73*  --  60*  --  39 25 16   AST 58*  --   --   --   --  69*  --  63*  --  46* 37 46*   BILITOT 0.6  --   --   --   --  0.6  --  0.7  --  0.9 0.5 0.6    < > = values in this interval not displayed.       Estimated Creatinine Clearance: 91.5 mL/min (based on SCr of 0.9 mg/dL).    INFLAMMATORY/PROCAL  LAST 7 DAYS  Recent Labs   Lab 10/22/24  1146 10/24/24  0333 10/24/24  1018   PROCAL 9.377* 4.129*  --    CRP  --   --  25.30*     No results found for: "ESR"  CRP   Date Value Ref Range Status   10/24/2024 25.30 (H) <1.00 mg/dL Final     Comment:     CRP-Normal Application expected values:   <1.0        mg/dL   Normal Range  1.0 - 5.0  mg/dL   Indicates mild inflammation  5.0 - 10.0 mg/dL   Indicates severe inflammation  >10.0        mg/dL   Represents serious processes and   frequently       "   indicates the presence of a bacterial   infection.          PRIOR  MICROBIOLOGY:    Susceptibility data from last 90 days.  Collected Specimen Info Organism Ceftriaxone Clindamycin Erythromycin Oxacillin Penicillin Tetracycline Trimeth/Sulfa Vancomycin   10/26/24 Abscess from Elbow, Left Staphylococcus aureus           10/24/24 Blood from Peripheral, Hand, Right Staphylococcus aureus           10/22/24 Blood from Peripheral, Hand, Right Staphylococcus aureus  S  S  S  S  R  S  S  S   10/22/24 Blood from Peripheral, Hand, Left Staphylococcus aureus               LAST 7 DAYS MICROBIOLOGY   Microbiology Results (last 7 days)       Procedure Component Value Units Date/Time    Blood culture [[id number]] Collected: 10/24/24 1019    Order Status: Completed Specimen: Blood from Peripheral, Hand, Left Updated: 10/27/24 1232     Blood Culture, Routine No Growth to date      No Growth to date      No Growth to date      No Growth to date    Aerobic culture [[id number]] Collected: 10/27/24 0805    Order Status: Sent Specimen: Abscess from Elbow, Left Updated: 10/27/24 1007    Culture, Anaerobe [[id number]] Collected: 10/27/24 0805    Order Status: Sent Specimen: Abscess from Elbow, Left Updated: 10/27/24 0959    AFB Culture & Smear [[id number]] Collected: 10/27/24 0805    Order Status: Sent Specimen: Abscess from Elbow, Left Updated: 10/27/24 0957    Gram stain [[id number]] Collected: 10/27/24 0805    Order Status: Sent Specimen: Abscess from Elbow, Left Updated: 10/27/24 0957    Fungus culture [[id number]] Collected: 10/27/24 0805    Order Status: Sent Specimen: Abscess from Elbow, Left Updated: 10/27/24 0956    Aerobic culture [[id number]]  (Abnormal) Collected: 10/26/24 1552    Order Status: Completed Specimen: Abscess from Elbow, Left Updated: 10/27/24 0838     Aerobic Bacterial Culture STAPHYLOCOCCUS AUREUS  Moderate  Susceptibility pending      Blood culture [[id number]] Collected: 10/26/24 0456    Order Status:  Completed Specimen: Blood Updated: 10/27/24 0632     Blood Culture, Routine No Growth to date      No Growth to date    Narrative:      can be drawn with am labs per rn Fernando  Collection has been rescheduled by 2MB at 10/26/2024 00:49 Reason:   Draw with am labs per rn Fernando   Collection has been rescheduled by 2MB at 10/26/2024 00:49 Reason:   Draw with am labs per rn Fernando     Blood culture [[id number]]  (Abnormal) Collected: 10/24/24 1018    Order Status: Completed Specimen: Blood from Peripheral, Hand, Right Updated: 10/27/24 0631     Blood Culture, Routine Gram stain aer bottle: Gram positive cocci in clusters resembling Staph      Results called to and read back by:Krista Lobato Charge Nurse 10/26/2024      15:26 ncb      STAPHYLOCOCCUS AUREUS  Susceptibility pending  ID consult required for Staph aureus bacteremia.      Blood culture [[id number]] Collected: 10/26/24 0012    Order Status: Completed Specimen: Blood from Peripheral, Hand, Right Updated: 10/27/24 0232     Blood Culture, Routine No Growth to date      No Growth to date    Rapid Organism ID by PCR (from Blood culture) [[id number]]  (Abnormal) Collected: 10/24/24 0927    Order Status: Completed Updated: 10/26/24 1637     Enterococcus faecalis Not Detected     Enterococcus faecium Not Detected     Listeria monocytogenes Not Detected     Staphylococcus spp. See species for ID     Staphylococcus aureus Detected     Staphylococcus epidermidis Not Detected     Staphylococcus lugdunensis Not Detected     Streptococcus species Not Detected     Streptococcus agalactiae Not Detected     Streptococcus pneumoniae Not Detected     Streptococcus pyogenes Not Detected     Acinetobacter calcoaceticus/baumannii complex Not Detected     Bacteroides fragilis Not Detected     Enterobacterales Not Detected     Enterobacter cloacae complex Not Detected     Escherichia coli Not Detected     Klebsiella aerogenes Not Detected     Klebsiella oxytoca Not Detected      Klebsiella pneumoniae group Not Detected     Proteus Not Detected     Salmonella sp Not Detected     Serratia marcescens Not Detected     Haemophilus influenzae Not Detected     Neisseria meningtidis Not Detected     Pseudomonas aeruginosa Not Detected     Stenotrophomonas maltophilia Not Detected     Candida albicans Not Detected     Candida auris Not Detected     Candida glabrata Not Detected     Candida krusei Not Detected     Candida parapsilosis Not Detected     Candida tropicalis Not Detected     Cryptococcus neoformans/gattii Not Detected     CTX-M (ESBL ) Test not applicable     IMP (Carbapenem resistant) Test not applicable     KPC resistance gene (Carbapenem resistant) Test not applicable     mcr-1  Test not applicable     mec A/C  Test not applicable     mec A/C and MREJ (MRSA) gene Not Detected     NDM (Carbapenem resistant) Test not applicable     OXA-48-like (Carbapenem resistant) Test not applicable     van A/B (VRE gene) Test not applicable     VIM (Carbapenem resistant) Test not applicable    Gram stain [[id number]]     Order Status: No result Specimen: Abscess from Elbow, Left     Stool culture **cannot be ordered stat** [[id number]] Collected: 10/23/24 1357    Order Status: Completed Specimen: Stool Updated: 10/25/24 1101     Stool Culture No Salmonella,Shigella,Vibrio,Campylobacter.      No E coli 0157:H7 isolated.    Urine culture [[id number]] Collected: 10/22/24 1514    Order Status: Completed Specimen: Urine Updated: 10/25/24 0715     Urine Culture, Routine No growth    Narrative:      Specimen Source->Urine    Blood culture x two cultures. Draw prior to antibiotics. [[id number]]  (Abnormal) Collected: 10/22/24 1136    Order Status: Completed Specimen: Blood from Peripheral, Hand, Left Updated: 10/25/24 0645     Blood Culture, Routine Gram stain aer bottle: Gram positive cocci      Positive results previously called on Order #[id number]      STAPHYLOCOCCUS AUREUS  ID consult required  for Staph aureus bacteremia.  For susceptibility see order #[id number]      Narrative:      Aerobic and anaerobic  Collection has been rescheduled by [email or web address] at 10/22/2024 11:44 Reason:   Done  Collection has been rescheduled by [email or web address] at 10/22/2024 11:44 Reason:   Done    Blood culture x two cultures. Draw prior to antibiotics. [[id number]]  (Abnormal)  (Susceptibility) Collected: 10/22/24 1143    Order Status: Completed Specimen: Blood from Peripheral, Hand, Right Updated: 10/25/24 0645     Blood Culture, Routine Gram stain aer bottle: Gram positive cocci      Results called to and read back by:Tiffani Moulton RN-1ICU;      10/23/2024  07:54 CJD      STAPHYLOCOCCUS AUREUS  ID consult required for Staph aureus bacteremia.      Narrative:      Aerobic and anaerobic  Collection has been rescheduled by Z at 10/22/2024 11:44 Reason:   Done  Collection has been rescheduled by Z at 10/22/2024 11:44 Reason:   Done    Rapid Organism ID by PCR (from Blood culture) [[id number]]  (Abnormal) Collected: 10/22/24 1143    Order Status: Completed Updated: 10/23/24 0918     Enterococcus faecalis Not Detected     Enterococcus faecium Not Detected     Listeria monocytogenes Not Detected     Staphylococcus spp. See species for ID     Staphylococcus aureus Detected     Staphylococcus epidermidis Not Detected     Staphylococcus lugdunensis Not Detected     Streptococcus species Not Detected     Streptococcus agalactiae Not Detected     Streptococcus pneumoniae Not Detected     Streptococcus pyogenes Not Detected     Acinetobacter calcoaceticus/baumannii complex Not Detected     Bacteroides fragilis Not Detected     Enterobacterales Not Detected     Enterobacter cloacae complex Not Detected     Escherichia coli Not Detected     Klebsiella aerogenes Not Detected     Klebsiella oxytoca Not Detected     Klebsiella pneumoniae group Not Detected     Proteus Not Detected     Salmonella sp Not Detected     Serratia marcescens Not Detected      Haemophilus influenzae Not Detected     Neisseria meningtidis Not Detected     Pseudomonas aeruginosa Not Detected     Stenotrophomonas maltophilia Not Detected     Candida albicans Not Detected     Candida auris Not Detected     Candida glabrata Not Detected     Candida krusei Not Detected     Candida parapsilosis Not Detected     Candida tropicalis Not Detected     Cryptococcus neoformans/gattii Not Detected     CTX-M (ESBL ) Test not applicable     IMP (Carbapenem resistant) Test not applicable     KPC resistance gene (Carbapenem resistant) Test not applicable     mcr-1  Test not applicable     mec A/C  Test not applicable     mec A/C and MREJ (MRSA) gene Not Detected     NDM (Carbapenem resistant) Test not applicable     OXA-48-like (Carbapenem resistant) Test not applicable     van A/B (VRE gene) Test not applicable     VIM (Carbapenem resistant) Test not applicable    Narrative:      Aerobic and anaerobic          CURRENT/PREVIOUS VISIT EKG  Results for orders placed or performed during the hospital encounter of 10/22/24   EKG 12-lead    Collection Time: 10/22/24 11:45 AM   Result Value Ref Range    QRS Duration 90 ms    [facility] QTC Calculation 428 ms    Narrative    Test Reason : I95.9,    Vent. Rate : 056 BPM     Atrial Rate : 056 BPM     P-R Int : 306 ms          QRS Dur : 090 ms      QT Int : 444 ms       P-R-T Axes : 031 -27 034 degrees     QTc Int : 428 ms    Sinus bradycardia with 1st degree A-V block  Low voltage QRS  Inferior infarct (cited on or before 17-JAN-2023)  Possible Anterolateral infarct (cited on or before 12-APR-2024)  Abnormal ECG  When compared with ECG of 12-APR-2024 09:00,  No significant change was found    Referred By: [email or web address]   SELF           Confirmed By:      Significant Imaging: I have reviewed all relevant and available imaging results/findings within the past 24 hours.    I spent a total of 50 minutes on the day of the visit.This includes face to face time and non-face to  face time preparing to see the patient (eg, review of tests), obtaining and/or reviewing separately obtained history, documenting clinical information in the electronic or other health record, independently interpreting results and communicating results to the patient/family/caregiver, or care coordinator.    Migel Watson MD  Date of Service: 10/27/2024      This note was created using [employer] voice recognition software that occasionally misinterpreted phrases or words.

## 2024-10-27 NOTE — PLAN OF CARE
Problem: Infection  Goal: Absence of Infection Signs and Symptoms  Outcome: Progressing     Problem: Adult Inpatient Plan of Care  Goal: Plan of Care Review  Outcome: Progressing  Goal: Patient-Specific Goal (Individualized)  Outcome: Progressing  Goal: Absence of Hospital-Acquired Illness or Injury  Outcome: Progressing  Goal: Optimal Comfort and Wellbeing  Outcome: Progressing  Goal: Readiness for Transition of Care  Outcome: Progressing     Problem: Sepsis/Septic Shock  Goal: Optimal Coping  Outcome: Progressing  Goal: Absence of Bleeding  Outcome: Progressing  Goal: Blood Glucose Level Within Targeted Range  Outcome: Progressing  Goal: Absence of Infection Signs and Symptoms  Outcome: Progressing  Goal: Optimal Nutrition Intake  Outcome: Progressing     Problem: Acute Kidney Injury/Impairment  Goal: Fluid and Electrolyte Balance  Outcome: Progressing  Goal: Improved Oral Intake  Outcome: Progressing  Goal: Effective Renal Function  Outcome: Progressing     Problem: Pneumonia  Goal: Fluid Balance  Outcome: Progressing  Goal: Resolution of Infection Signs and Symptoms  Outcome: Progressing  Goal: Effective Oxygenation and Ventilation  Outcome: Progressing     Problem: Diabetes Comorbidity  Goal: Blood Glucose Level Within Targeted Range  Outcome: Progressing     Problem: Skin Injury Risk Increased  Goal: Skin Health and Integrity  Outcome: Progressing      PT is AAOX4, no acute changes noted during shift, pt is , no skin breakdown noted, hourly rounds made during shift,  VSS. No falls noted. Fall precautions remain. Pain assessed. No pain noted. Pt resting comfortably in bed on bed on 2.5L nasal canula, Call light in reach. Plan of care ongoing

## 2024-10-27 NOTE — SUBJECTIVE & OBJECTIVE
Interval History: no new complains. Patient gone for debridement this morning. Afebrile.     Review of Systems  Objective:     Vital Signs (Most Recent):  Temp: 97.7 °F (36.5 °C) (10/27/24 1117)  Pulse: 65 (10/27/24 1117)  Resp: 16 (10/27/24 1117)  BP: (!) 165/77 (10/27/24 1117)  SpO2: 96 % (10/27/24 1117) Vital Signs (24h Range):  Temp:  [97.6 °F (36.4 °C)-98.7 °F (37.1 °C)] 97.7 °F (36.5 °C)  Pulse:  [61-73] 65  Resp:  [13-20] 16  SpO2:  [92 %-97 %] 96 %  BP: (106-183)/(58-90) 165/77     Weight: 104.6 kg (230 lb 9.6 oz)  Body mass index is 37.22 kg/m².    Intake/Output Summary (Last 24 hours) at 10/27/2024 1155  Last data filed at 10/27/2024 1121  Gross per 24 hour   Intake 400 ml   Output 1170 ml   Net -770 ml         Physical Exam  Vitals and nursing note reviewed.   Constitutional:       General: He is not in acute distress.     Appearance: He is obese. He is not toxic-appearing.   HENT:      Head: Atraumatic.      Mouth/Throat:      Mouth: Mucous membranes are moist.      Pharynx: Oropharynx is clear.   Eyes:      General: No scleral icterus.     Conjunctiva/sclera: Conjunctivae normal.      Pupils: Pupils are equal, round, and reactive to light.   Cardiovascular:      Rate and Rhythm: Normal rate and regular rhythm.      Heart sounds: No murmur heard.  Pulmonary:      Effort: No respiratory distress.      Breath sounds: No wheezing, rhonchi or rales.   Abdominal:      General: Abdomen is flat. Bowel sounds are normal.      Palpations: Abdomen is soft.   Musculoskeletal:         General: No swelling or deformity.      Cervical back: No rigidity or tenderness.      Comments: Left BKA   There is redness on the left elbow it is swollen and draining pus.    Skin:     Coloration: Skin is not jaundiced or pale.      Findings: No bruising, erythema or rash.   Neurological:      General: No focal deficit present.      Mental Status: He is alert and oriented to person, place, and time.      Cranial Nerves: No cranial  nerve deficit.      Sensory: No sensory deficit.      Motor: No weakness.   Psychiatric:         Mood and Affect: Mood normal.         Behavior: Behavior normal.        Significant Labs: All pertinent labs within the past 24 hours have been reviewed.  CBC:   Recent Labs   Lab 10/26/24  0456 10/27/24  0551   WBC 10.66 12.01   HGB 11.0* 12.2*   HCT 33.6* 37.7*    238     CMP:   Recent Labs   Lab 10/26/24  0456 10/27/24  0551   * 133*   K 4.2 4.7    101   CO2 26 23   * 155*   BUN 21 18   CREATININE 1.0 0.9   CALCIUM 8.1* 8.3*   PROT 6.1 6.6   ALBUMIN 2.9* 3.0*   BILITOT 0.5 0.6   ALKPHOS 85 112   AST 37 46*   ALT 25 16   ANIONGAP 9 9       Significant Imaging: I have reviewed all pertinent imaging results/findings within the past 24 hours.

## 2024-10-27 NOTE — BRIEF OP NOTE
[facility]  Brief Operative Note    SUMMARY     Surgery Date: 10/27/2024     Surgeons and Role:     * Gokul Troy MD - Primary    Assisting Surgeon: None    Pre-op Diagnosis:  Olecranon bursitis of left elbow [M70.22]    Post-op Diagnosis: Post-Op Diagnosis Codes:     * Olecranon bursitis of left elbow [M70.22]      Procedure(s) (LRB):  INCISION AND DRAINAGE, ABSCESS (Left)  Left elbow hardware removal.      Operative Findings:   Posteromedial left elbow abscess with proximal tracking superficial to triceps. Deep extension to olecranon screw with subsequent removal.  Significant scar tissue formation at typical site of medial neurovascular structures. No deep dissection was performed to expose these but were protected for duration of procedure.      Estimated Blood Loss: 5mL.         Specimens:   Specimen (24h ago, onward)      None

## 2024-10-27 NOTE — ASSESSMENT & PLAN NOTE
Secondary to MSSA bacteremia  Left elbow abscess, bursitis   Septic shock is resolved   Organ dysfunction: SIRIA and hypotension   Blood culture: 10/22: 4/4 MSSA, 10/24 1/4 MSSA, 10/26 negative so far   Echo shows no vegetations   Patient does have a pacemaker, given blood culture 10/24 still positive, do we need ROSE, or check the pacemaker? Awaiting ID recommendations   ID following   Cont Cefazolin  Ortho consulted, planned for I&D 10/27

## 2024-10-27 NOTE — ASSESSMENT & PLAN NOTE
Resolved with shifting agents    Recent Labs     10/25/24  0512 10/26/24  0456 10/27/24  0551   K 4.3 4.2 4.7

## 2024-10-27 NOTE — PROGRESS NOTES
[facility]  Progress Note    Patient Name: Dhaval Hernández  MRN: [id number]  Patient Class: IP- Inpatient   Admission Date: 10/22/2024  Length of Stay: 5 days  Attending Physician: Evin Courtney MD  Primary Care Provider: Jessie Jung MD        Subjective:     Principal Problem:Septic shock        HPI:  64 year pt getting admitted with septic shock and hyperkalemia  Pt has been suffering from diarrhea/N&V for past 4 days  Stools very watery and vomitus was clear  Later he started having crampy abdominal pain/radiation to back. No aggravating/relieving factors   He acme to ER and was found to be in chock and got admitted       Overview/Hospital Course:  Patient admitted with septic shock. Bacteremia with Staph +. Repeat b/c showed NGTD. Source likely from left elbow infection. Continue with antibiotics and ID is following patient, Patient is om cefazolin. Ortho consulted for possible left elbow abscess. GI recommends outpatient follow up for FOBT +.  Patient is planned for debridement on 10/27.     Interval History: no new complains. Patient gone for debridement this morning. Afebrile.     Review of Systems  Objective:     Vital Signs (Most Recent):  Temp: 97.7 °F (36.5 °C) (10/27/24 1117)  Pulse: 65 (10/27/24 1117)  Resp: 16 (10/27/24 1117)  BP: (!) 165/77 (10/27/24 1117)  SpO2: 96 % (10/27/24 1117) Vital Signs (24h Range):  Temp:  [97.6 °F (36.4 °C)-98.7 °F (37.1 °C)] 97.7 °F (36.5 °C)  Pulse:  [61-73] 65  Resp:  [13-20] 16  SpO2:  [92 %-97 %] 96 %  BP: (106-183)/(58-90) 165/77     Weight: 104.6 kg (230 lb 9.6 oz)  Body mass index is 37.22 kg/m².    Intake/Output Summary (Last 24 hours) at 10/27/2024 1155  Last data filed at 10/27/2024 1121  Gross per 24 hour   Intake 400 ml   Output 1170 ml   Net -770 ml         Physical Exam  Vitals and nursing note reviewed.   Constitutional:       General: He is not in acute distress.     Appearance: He is obese. He is not  toxic-appearing.   HENT:      Head: Atraumatic.      Mouth/Throat:      Mouth: Mucous membranes are moist.      Pharynx: Oropharynx is clear.   Eyes:      General: No scleral icterus.     Conjunctiva/sclera: Conjunctivae normal.      Pupils: Pupils are equal, round, and reactive to light.   Cardiovascular:      Rate and Rhythm: Normal rate and regular rhythm.      Heart sounds: No murmur heard.  Pulmonary:      Effort: No respiratory distress.      Breath sounds: No wheezing, rhonchi or rales.   Abdominal:      General: Abdomen is flat. Bowel sounds are normal.      Palpations: Abdomen is soft.   Musculoskeletal:         General: No swelling or deformity.      Cervical back: No rigidity or tenderness.      Comments: Left BKA   There is redness on the left elbow it is swollen and draining pus.    Skin:     Coloration: Skin is not jaundiced or pale.      Findings: No bruising, erythema or rash.   Neurological:      General: No focal deficit present.      Mental Status: He is alert and oriented to person, place, and time.      Cranial Nerves: No cranial nerve deficit.      Sensory: No sensory deficit.      Motor: No weakness.   Psychiatric:         Mood and Affect: Mood normal.         Behavior: Behavior normal.        Significant Labs: All pertinent labs within the past 24 hours have been reviewed.  CBC:   Recent Labs   Lab 10/26/24  0456 10/27/24  0551   WBC 10.66 12.01   HGB 11.0* 12.2*   HCT 33.6* 37.7*    238     CMP:   Recent Labs   Lab 10/26/24  0456 10/27/24  0551   * 133*   K 4.2 4.7    101   CO2 26 23   * 155*   BUN 21 18   CREATININE 1.0 0.9   CALCIUM 8.1* 8.3*   PROT 6.1 6.6   ALBUMIN 2.9* 3.0*   BILITOT 0.5 0.6   ALKPHOS 85 112   AST 37 46*   ALT 25 16   ANIONGAP 9 9       Significant Imaging: I have reviewed all pertinent imaging results/findings within the past 24 hours.    Assessment/Plan:      * Septic shock  Secondary to MSSA bacteremia  Left elbow abscess, bursitis   Septic  shock is resolved   Organ dysfunction: SIRIA and hypotension   Blood culture: 10/22: 4/4 MSSA, 10/24 1/4 MSSA, 10/26 negative so far   Echo shows no vegetations   Patient does have a pacemaker, given blood culture 10/24 still positive, do we need ROSE, or check the pacemaker? Awaiting ID recommendations   ID following   Cont Cefazolin  Ortho consulted, planned for I&D 10/27      Olecranon bursitis of left elbow  With abscess  See above     Hyperkalemia  Resolved with shifting agents    Recent Labs     10/25/24  0512 10/26/24  0456 10/27/24  0551   K 4.3 4.2 4.7                 SIRIA (acute kidney injury)  SIRIA in the background of septic/hypovolemic shock  Resolved with IVF  Recent Labs     10/25/24  0512 10/26/24  0456 10/27/24  0551   CREATININE 1.2 1.0 0.9   EGFRNORACEVR >60.0 >60.0 >60.0         Atrial fibrillation, chronic  Rate controlled with Amiodarone and Coreg   Patient not on anticoagulation from home     High anion gap metabolic acidosis  In the background of lactic acidemia  Resolved     Tobacco dependency  Aware     Severe obesity (BMI 35.0-39.9) with comorbidity  Body mass index is 36.32 kg/m². Morbid obesity complicates all aspects of disease management from diagnostic modalities to treatment.     Cardiac pacemaker  PPM insitu      Type 2 diabetes mellitus with stage 2 chronic kidney disease, without long-term current use of insulin  Maintain present insulin regime    Status post unilateral below-knee amputation  L BKA        VTE Risk Mitigation (From admission, onward)           Ordered     enoxaparin injection 40 mg  Daily         10/25/24 1431     IP VTE HIGH RISK PATIENT  Once         10/22/24 1641     Place sequential compression device  Until discontinued         10/22/24 1641                    Discharge Planning   JILLIAN:      Code Status: Full Code   Is the patient medically ready for discharge?:     Reason for patient still in hospital (select all that apply): Treatment  Discharge Plan A: Skilled  Nursing Facility   Discharge Delays: (!) Post-Acute Set-up              Evin Courtney MD  Department of Hospital Medicine   [facility]     [Normal] : affect appropriate [de-identified] : irregular papular 0.2cm lesion noted to inner cantus of R eye lid stable. no drainage or erythema.  [de-identified] : bilateral mastectomy with implants.  - no palpable masses.

## 2024-10-28 LAB
ALBUMIN SERPL BCP-MCNC: 2.9 G/DL (ref 3.5–5.2)
ALP SERPL-CCNC: 227 U/L (ref 55–135)
ALT SERPL W/O P-5'-P-CCNC: 21 U/L (ref 10–44)
ANION GAP SERPL CALC-SCNC: 6 MMOL/L (ref 8–16)
AST SERPL-CCNC: 88 U/L (ref 10–40)
BACTERIA BLD CULT: ABNORMAL
BACTERIA SPEC AEROBE CULT: ABNORMAL
BASOPHILS # BLD AUTO: 0.06 K/UL (ref 0–0.2)
BASOPHILS NFR BLD: 0.6 % (ref 0–1.9)
BILIRUB SERPL-MCNC: 0.5 MG/DL (ref 0.1–1)
BUN SERPL-MCNC: 16 MG/DL (ref 8–23)
CALCIUM SERPL-MCNC: 8.3 MG/DL (ref 8.7–10.5)
CHLORIDE SERPL-SCNC: 102 MMOL/L (ref 95–110)
CO2 SERPL-SCNC: 25 MMOL/L (ref 23–29)
CREAT SERPL-MCNC: 0.8 MG/DL (ref 0.5–1.4)
CRP SERPL-MCNC: 17.1 MG/DL
DIFFERENTIAL METHOD BLD: ABNORMAL
EOSINOPHIL # BLD AUTO: 0.4 K/UL (ref 0–0.5)
EOSINOPHIL NFR BLD: 3.6 % (ref 0–8)
ERYTHROCYTE [DISTWIDTH] IN BLOOD BY AUTOMATED COUNT: 18.6 % (ref 11.5–14.5)
ERYTHROCYTE [SEDIMENTATION RATE] IN BLOOD BY WESTERGREN METHOD: 89 MM/HR (ref 0–10)
EST. GFR  (NO RACE VARIABLE): >60 ML/MIN/1.73 M^2
GLUCOSE SERPL-MCNC: 137 MG/DL (ref 70–110)
GRAM STN SPEC: NORMAL
GRAM STN SPEC: NORMAL
HCT VFR BLD AUTO: 36.5 % (ref 40–54)
HGB BLD-MCNC: 11.3 G/DL (ref 14–18)
IMM GRANULOCYTES # BLD AUTO: 0.21 K/UL (ref 0–0.04)
IMM GRANULOCYTES NFR BLD AUTO: 2.1 % (ref 0–0.5)
LYMPHOCYTES # BLD AUTO: 1.8 K/UL (ref 1–4.8)
LYMPHOCYTES NFR BLD: 17.3 % (ref 18–48)
MAGNESIUM SERPL-MCNC: 1.4 MG/DL (ref 1.6–2.6)
MCH RBC QN AUTO: 26.1 PG (ref 27–31)
MCHC RBC AUTO-ENTMCNC: 31 G/DL (ref 32–36)
MCV RBC AUTO: 84 FL (ref 82–98)
MONOCYTES # BLD AUTO: 0.9 K/UL (ref 0.3–1)
MONOCYTES NFR BLD: 8.9 % (ref 4–15)
NEUTROPHILS # BLD AUTO: 6.9 K/UL (ref 1.8–7.7)
NEUTROPHILS NFR BLD: 67.5 % (ref 38–73)
NRBC BLD-RTO: 0 /100 WBC
PLATELET # BLD AUTO: 269 K/UL (ref 150–450)
PMV BLD AUTO: 9.7 FL (ref 9.2–12.9)
POCT GLUCOSE: 149 MG/DL (ref 70–110)
POCT GLUCOSE: 175 MG/DL (ref 70–110)
POCT GLUCOSE: 244 MG/DL (ref 70–110)
POCT GLUCOSE: 248 MG/DL (ref 70–110)
POTASSIUM SERPL-SCNC: 4.3 MMOL/L (ref 3.5–5.1)
PROT SERPL-MCNC: 6.5 G/DL (ref 6–8.4)
RBC # BLD AUTO: 4.33 M/UL (ref 4.6–6.2)
SODIUM SERPL-SCNC: 133 MMOL/L (ref 136–145)
WBC # BLD AUTO: 10.21 K/UL (ref 3.9–12.7)

## 2024-10-28 PROCEDURE — 97110 THERAPEUTIC EXERCISES: CPT | Mod: CO

## 2024-10-28 PROCEDURE — 85651 RBC SED RATE NONAUTOMATED: CPT | Performed by: INTERNAL MEDICINE

## 2024-10-28 PROCEDURE — 85025 COMPLETE CBC W/AUTO DIFF WBC: CPT | Performed by: INTERNAL MEDICINE

## 2024-10-28 PROCEDURE — 12000002 HC ACUTE/MED SURGE SEMI-PRIVATE ROOM

## 2024-10-28 PROCEDURE — 63600175 PHARM REV CODE 636 W HCPCS: Performed by: FAMILY MEDICINE

## 2024-10-28 PROCEDURE — 94761 N-INVAS EAR/PLS OXIMETRY MLT: CPT

## 2024-10-28 PROCEDURE — 97535 SELF CARE MNGMENT TRAINING: CPT | Mod: CO

## 2024-10-28 PROCEDURE — 36415 COLL VENOUS BLD VENIPUNCTURE: CPT | Performed by: INTERNAL MEDICINE

## 2024-10-28 PROCEDURE — 99900031 HC PATIENT EDUCATION (STAT)

## 2024-10-28 PROCEDURE — 80053 COMPREHEN METABOLIC PANEL: CPT | Performed by: INTERNAL MEDICINE

## 2024-10-28 PROCEDURE — 25000003 PHARM REV CODE 250: Performed by: FAMILY MEDICINE

## 2024-10-28 PROCEDURE — 99233 SBSQ HOSP IP/OBS HIGH 50: CPT | Mod: ,,, | Performed by: INTERNAL MEDICINE

## 2024-10-28 PROCEDURE — 27000221 HC OXYGEN, UP TO 24 HOURS

## 2024-10-28 PROCEDURE — 86140 C-REACTIVE PROTEIN: CPT | Performed by: INTERNAL MEDICINE

## 2024-10-28 PROCEDURE — 97110 THERAPEUTIC EXERCISES: CPT | Mod: CQ

## 2024-10-28 PROCEDURE — 83735 ASSAY OF MAGNESIUM: CPT | Performed by: INTERNAL MEDICINE

## 2024-10-28 PROCEDURE — 87040 BLOOD CULTURE FOR BACTERIA: CPT | Performed by: INTERNAL MEDICINE

## 2024-10-28 PROCEDURE — 25000003 PHARM REV CODE 250: Performed by: HOSPITALIST

## 2024-10-28 PROCEDURE — 63600175 PHARM REV CODE 636 W HCPCS: Performed by: INTERNAL MEDICINE

## 2024-10-28 RX ORDER — INSULIN GLARGINE 100 [IU]/ML
20 INJECTION, SOLUTION SUBCUTANEOUS NIGHTLY
Status: DISCONTINUED | OUTPATIENT
Start: 2024-10-28 | End: 2024-10-31 | Stop reason: HOSPADM

## 2024-10-28 RX ADMIN — INSULIN GLARGINE 20 UNITS: 100 INJECTION, SOLUTION SUBCUTANEOUS at 08:10

## 2024-10-28 RX ADMIN — CEFAZOLIN 2 G: 330 INJECTION, POWDER, FOR SOLUTION INTRAMUSCULAR; INTRAVENOUS at 05:10

## 2024-10-28 RX ADMIN — PANTOPRAZOLE SODIUM 40 MG: 40 INJECTION, POWDER, LYOPHILIZED, FOR SOLUTION INTRAVENOUS at 08:10

## 2024-10-28 RX ADMIN — LEVOTHYROXINE SODIUM 75 MCG: 0.03 TABLET ORAL at 05:10

## 2024-10-28 RX ADMIN — OXYCODONE HYDROCHLORIDE AND ACETAMINOPHEN 1 TABLET: 5; 325 TABLET ORAL at 08:10

## 2024-10-28 RX ADMIN — ENOXAPARIN SODIUM 40 MG: 40 INJECTION SUBCUTANEOUS at 05:10

## 2024-10-28 RX ADMIN — OXYCODONE HYDROCHLORIDE AND ACETAMINOPHEN 1 TABLET: 5; 325 TABLET ORAL at 10:10

## 2024-10-28 RX ADMIN — INSULIN ASPART 6 UNITS: 100 INJECTION, SOLUTION INTRAVENOUS; SUBCUTANEOUS at 11:10

## 2024-10-28 RX ADMIN — INSULIN ASPART 4 UNITS: 100 INJECTION, SOLUTION INTRAVENOUS; SUBCUTANEOUS at 05:10

## 2024-10-28 RX ADMIN — CEFAZOLIN 2 G: 330 INJECTION, POWDER, FOR SOLUTION INTRAMUSCULAR; INTRAVENOUS at 11:10

## 2024-10-28 RX ADMIN — OXYCODONE HYDROCHLORIDE AND ACETAMINOPHEN 1 TABLET: 5; 325 TABLET ORAL at 06:10

## 2024-10-28 RX ADMIN — CITALOPRAM HYDROBROMIDE 10 MG: 10 TABLET ORAL at 08:10

## 2024-10-28 RX ADMIN — CEFAZOLIN 2 G: 330 INJECTION, POWDER, FOR SOLUTION INTRAMUSCULAR; INTRAVENOUS at 02:10

## 2024-10-28 NOTE — PROGRESS NOTES
Nephrology Progress Note        Patient Name: Dhaval Hernández  MRN: [id number]    Patient Class: IP- Inpatient   Admission Date: 10/22/2024  Length of Stay: 6 days  Date of Service: 10/28/2024    Attending Physician: Evin Courtney MD  Primary Care Provider: Jessie Jung MD    Reason for Consult: siria    SUBJECTIVE:     HPI: 64M with DM, HTN, GERD, left BKA, pacemaker, HFpEF presents to the emergency department with generalized weakness, persistent loose watery diarrheal stools over last 4 days with associated abdominal cramping. Denied vomiting, hematochezia, melena, fever.     Upon arrival to the emergency department patient found with blood pressure 70/30. Heart rate was in the 110s. Received IVF promptly, giron was placed, labs obtained - notable for SIRIA, hyponatremia, hyperkalemia, acidosis, bump in LFTs, anemia with elevated WBC. ProCal 9, , A1c 6.5. POC ABG with pH 7.26, pCO2 26, iCa 1.14, K 5.7. CT scan without renal obstruction.    10/23 VSS. No new complains. Decrease labs to daily, monitor UO. Bicarb for another 24h.  10/24 VSS, good UO, switch to NS from bicarb gtt.  10/25 VSS. Appreciate input from GI, GEN SURG, Orhto, ID and agree with plan.  10/26 VSS. OK to dc from renal stand point.  10/27 VSS. S/p I&D of left elbow abscess and hardware removal by ortho today.  10/28  2L UOP recorded, VSS, Na+ unchanged from yesterday.  Renal function stable.  No complaints.    ASSESSMENT/PLAN:     Hyponatremia  Staph bacteremia and left elbow abscess s/p I&D and hardware removal on 10/27  Anemia  HTN  DM  CKD stage 2, eGFR > 60 ml/min in 10/2023  Keep MAP > 60, SBP > 100.  Regular diabetic diet  Control BG, holding Jardiance.  Abx per ID.  Hgb and HCT are acceptable. Monitor for now.  Tolerate asymptomatic HTN up to -160.    Thank you for allowing us to participate in the care of your patient!   We will follow the patient and provide recommendations as needed.          Laboratory:  Recent Labs   Lab 10/25/24  0512 10/26/24  0456 10/27/24  0551    135* 133*   K 4.3 4.2 4.7    100 101   CO2 27 26 23   BUN 25* 21 18   CREATININE 1.2 1.0 0.9   * 173* 155*       Recent Labs   Lab 10/25/24  0512 10/26/24  0456 10/27/24  0551   CALCIUM 8.3* 8.1* 8.3*   ALBUMIN 2.9* 2.9* 3.0*   MG 1.4* 1.7 1.5*             Recent Labs   Lab 10/26/24  0609 10/26/24  1147 10/26/24  1657 10/26/24  1900 10/27/24  0612 10/27/24  0942 10/27/24  1127 10/27/24  1604 10/27/24  2004 10/28/24  0614   POCTGLUCOSE 167* 156* 187* 223* 187* 183* 178* 127* 270* 149*       Recent Labs   Lab 10/30/23  1205   Hemoglobin A1C 6.5 H       Recent Labs   Lab 10/26/24  0456 10/27/24  0551 10/28/24  0716   WBC 10.66 12.01 10.21   HGB 11.0* 12.2* 11.3*   HCT 33.6* 37.7* 36.5*    238 269   MCV 81* 84 84   MCHC 32.7 32.4 31.0*   MONO 12.9  1.4* 9.7  1.2* 8.9  0.9   EOSINOPHIL 4.5 4.2 3.6       Recent Labs   Lab 10/25/24  0512 10/26/24  0456 10/27/24  0551   BILITOT 0.9 0.5 0.6   PROT 6.0 6.1 6.6   ALBUMIN 2.9* 2.9* 3.0*   ALKPHOS 85 85 112   ALT 39 25 16   AST 46* 37 46*       Recent Labs   Lab 10/22/24  1514   Color, UA Yellow   Appearance, UA Clear   pH, UA 6.0   Specific Gravity, UA 1.010   Protein, UA Trace A   Glucose, UA 4+ A   Ketones, UA Negative   Urobilinogen, UA Negative   Bilirubin (UA) Negative   Occult Blood UA 1+ A   Nitrite, UA Negative   RBC, UA 10 H   WBC, UA 19 H   Bacteria None   Hyaline Casts, UA 49 A       Recent Labs   Lab 10/22/24  1345 10/22/24  1423 10/23/24  0456   POC PH  --  7.263 LL 7.371   POC PCO2  --  26.5 LL 32.2 L   POC HCO3  --  12.0 L 18.7 L   POC PO2  --  63 L 61 L   POC SATURATED O2  --  89 91   POC BE  --  -15 L -7 L   Sample VENOUS ARTERIAL ARTERIAL       Microbiology Results (last 7 days)       Procedure Component Value Units Date/Time    Gram stain [[id number]] Collected: 10/27/24 0805    Order Status: Completed Specimen: Abscess from Elbow, Left Updated:  10/28/24 0816     Gram Stain Result Few WBC's      Rare Gram positive cocci    Narrative:      Elbow, Left    Blood culture [[id number]] Collected: 10/28/24 0716    Order Status: Sent Specimen: Blood Updated: 10/28/24 0735    Narrative:      Collection has been rescheduled by JSKERI at 10/27/2024 15:21 Reason:   Unable to collect. Notified ALYSSIA Rios.  Collection has been rescheduled by JSKERI at 10/27/2024 15:21 Reason:   Unable to collect. Notified ALYSSIA Rios.    Aerobic culture [[id number]]  (Abnormal) Collected: 10/27/24 0805    Order Status: Completed Specimen: Abscess from Elbow, Left Updated: 10/28/24 0725     Aerobic Bacterial Culture STAPHYLOCOCCUS AUREUS  Moderate  For susceptibility see order #[id number]      Narrative:      Elbow, Left    Aerobic culture [[id number]]  (Abnormal)  (Susceptibility) Collected: 10/26/24 1552    Order Status: Completed Specimen: Abscess from Elbow, Left Updated: 10/28/24 0639     Aerobic Bacterial Culture STAPHYLOCOCCUS AUREUS  Moderate      Blood culture [[id number]] Collected: 10/26/24 0456    Order Status: Completed Specimen: Blood Updated: 10/28/24 0632     Blood Culture, Routine No Growth to date      No Growth to date      No Growth to date    Narrative:      can be drawn with am labs per rn Fernando  Collection has been rescheduled by 2MB at 10/26/2024 00:49 Reason:   Draw with am labs per rn Fernando   Collection has been rescheduled by 2MB at 10/26/2024 00:49 Reason:   Draw with am labs per rn Fernando     Blood culture [[id number]]  (Abnormal)  (Susceptibility) Collected: 10/24/24 1018    Order Status: Completed Specimen: Blood from Peripheral, Hand, Right Updated: 10/28/24 0623     Blood Culture, Routine Gram stain aer bottle: Gram positive cocci in clusters resembling Staph      Results called to and read back by:Krista Lobato Charge Nurse 10/26/2024      15:26 ncb      STAPHYLOCOCCUS AUREUS  ID consult required for Staph aureus bacteremia.      Blood culture [[id number]]  Collected: 10/26/24 0012    Order Status: Completed Specimen: Blood from Peripheral, Hand, Right Updated: 10/28/24 0232     Blood Culture, Routine No Growth to date      No Growth to date      No Growth to date    Blood culture [[id number]] Collected: 10/27/24 1513    Order Status: Completed Specimen: Blood from Peripheral, Hand, Left Updated: 10/27/24 2158     Blood Culture, Routine No Growth to date    Blood culture [[id number]] Collected: 10/24/24 1019    Order Status: Completed Specimen: Blood from Peripheral, Hand, Left Updated: 10/27/24 1232     Blood Culture, Routine No Growth to date      No Growth to date      No Growth to date      No Growth to date    Culture, Anaerobe [[id number]] Collected: 10/27/24 0805    Order Status: Sent Specimen: Abscess from Elbow, Left Updated: 10/27/24 0959    AFB Culture & Smear [[id number]] Collected: 10/27/24 0805    Order Status: Sent Specimen: Abscess from Elbow, Left Updated: 10/27/24 0957    Fungus culture [[id number]] Collected: 10/27/24 0805    Order Status: Sent Specimen: Abscess from Elbow, Left Updated: 10/27/24 0956    Rapid Organism ID by PCR (from Blood culture) [[id number]]  (Abnormal) Collected: 10/24/24 0927    Order Status: Completed Updated: 10/26/24 1637     Enterococcus faecalis Not Detected     Enterococcus faecium Not Detected     Listeria monocytogenes Not Detected     Staphylococcus spp. See species for ID     Staphylococcus aureus Detected     Staphylococcus epidermidis Not Detected     Staphylococcus lugdunensis Not Detected     Streptococcus species Not Detected     Streptococcus agalactiae Not Detected     Streptococcus pneumoniae Not Detected     Streptococcus pyogenes Not Detected     Acinetobacter calcoaceticus/baumannii complex Not Detected     Bacteroides fragilis Not Detected     Enterobacterales Not Detected     Enterobacter cloacae complex Not Detected     Escherichia coli Not Detected     Klebsiella aerogenes Not Detected     Klebsiella  oxytoca Not Detected     Klebsiella pneumoniae group Not Detected     Proteus Not Detected     Salmonella sp Not Detected     Serratia marcescens Not Detected     Haemophilus influenzae Not Detected     Neisseria meningtidis Not Detected     Pseudomonas aeruginosa Not Detected     Stenotrophomonas maltophilia Not Detected     Candida albicans Not Detected     Candida auris Not Detected     Candida glabrata Not Detected     Candida krusei Not Detected     Candida parapsilosis Not Detected     Candida tropicalis Not Detected     Cryptococcus neoformans/gattii Not Detected     CTX-M (ESBL ) Test not applicable     IMP (Carbapenem resistant) Test not applicable     KPC resistance gene (Carbapenem resistant) Test not applicable     mcr-1  Test not applicable     mec A/C  Test not applicable     mec A/C and MREJ (MRSA) gene Not Detected     NDM (Carbapenem resistant) Test not applicable     OXA-48-like (Carbapenem resistant) Test not applicable     van A/B (VRE gene) Test not applicable     VIM (Carbapenem resistant) Test not applicable    Gram stain [[id number]]     Order Status: No result Specimen: Abscess from Elbow, Left     Stool culture **cannot be ordered stat** [[id number]] Collected: 10/23/24 1357    Order Status: Completed Specimen: Stool Updated: 10/25/24 1101     Stool Culture No Salmonella,Shigella,Vibrio,Campylobacter.      No E coli 0157:H7 isolated.    Urine culture [[id number]] Collected: 10/22/24 1514    Order Status: Completed Specimen: Urine Updated: 10/25/24 0715     Urine Culture, Routine No growth    Narrative:      Specimen Source->Urine    Blood culture x two cultures. Draw prior to antibiotics. [[id number]]  (Abnormal) Collected: 10/22/24 1136    Order Status: Completed Specimen: Blood from Peripheral, Hand, Left Updated: 10/25/24 0645     Blood Culture, Routine Gram stain aer bottle: Gram positive cocci      Positive results previously called on Order #[id number]      STAPHYLOCOCCUS  AUREUS  ID consult required for Staph aureus bacteremia.  For susceptibility see order #[id number]      Narrative:      Aerobic and anaerobic  Collection has been rescheduled by [email or web address] at 10/22/2024 11:44 Reason:   Done  Collection has been rescheduled by [email or web address] at 10/22/2024 11:44 Reason:   Done    Blood culture x two cultures. Draw prior to antibiotics. [[id number]]  (Abnormal)  (Susceptibility) Collected: 10/22/24 1143    Order Status: Completed Specimen: Blood from Peripheral, Hand, Right Updated: 10/25/24 0645     Blood Culture, Routine Gram stain aer bottle: Gram positive cocci      Results called to and read back by:Tiffani Moulton RN-1ICU;      10/23/2024  07:54 CJD      STAPHYLOCOCCUS AUREUS  ID consult required for Staph aureus bacteremia.      Narrative:      Aerobic and anaerobic  Collection has been rescheduled by [email or web address] at 10/22/2024 11:44 Reason:   Done  Collection has been rescheduled by [email or web address] at 10/22/2024 11:44 Reason:   Done    Rapid Organism ID by PCR (from Blood culture) [[id number]]  (Abnormal) Collected: 10/22/24 1143    Order Status: Completed Updated: 10/23/24 0918     Enterococcus faecalis Not Detected     Enterococcus faecium Not Detected     Listeria monocytogenes Not Detected     Staphylococcus spp. See species for ID     Staphylococcus aureus Detected     Staphylococcus epidermidis Not Detected     Staphylococcus lugdunensis Not Detected     Streptococcus species Not Detected     Streptococcus agalactiae Not Detected     Streptococcus pneumoniae Not Detected     Streptococcus pyogenes Not Detected     Acinetobacter calcoaceticus/baumannii complex Not Detected     Bacteroides fragilis Not Detected     Enterobacterales Not Detected     Enterobacter cloacae complex Not Detected     Escherichia coli Not Detected     Klebsiella aerogenes Not Detected     Klebsiella oxytoca Not Detected     Klebsiella pneumoniae group Not Detected     Proteus Not Detected     Salmonella sp Not Detected     Serratia  marcescens Not Detected     Haemophilus influenzae Not Detected     Neisseria meningtidis Not Detected     Pseudomonas aeruginosa Not Detected     Stenotrophomonas maltophilia Not Detected     Candida albicans Not Detected     Candida auris Not Detected     Candida glabrata Not Detected     Candida krusei Not Detected     Candida parapsilosis Not Detected     Candida tropicalis Not Detected     Cryptococcus neoformans/gattii Not Detected     CTX-M (ESBL ) Test not applicable     IMP (Carbapenem resistant) Test not applicable     KPC resistance gene (Carbapenem resistant) Test not applicable     mcr-1  Test not applicable     mec A/C  Test not applicable     mec A/C and MREJ (MRSA) gene Not Detected     NDM (Carbapenem resistant) Test not applicable     OXA-48-like (Carbapenem resistant) Test not applicable     van A/B (VRE gene) Test not applicable     VIM (Carbapenem resistant) Test not applicable    Narrative:      Aerobic and anaerobic            Review of patient's allergies indicates:   Allergen Reactions    Fish containing products      Other reaction(s): .       Outpatient meds:  No current facility-administered medications on file prior to encounter.     Current Outpatient Medications on File Prior to Encounter   Medication Sig Dispense Refill    amiodarone (PACERONE) 200 MG Tab Take 1 tablet (200 mg total) by mouth once daily. 30 tablet 11    amLODIPine (NORVASC) 5 MG tablet Take 1 tablet (5 mg total) by mouth once daily. 90 tablet 1    aspirin (ECOTRIN) 81 MG EC tablet Take 81 mg by mouth once daily.      carvediloL (COREG) 12.5 MG tablet Take 1 tablet (12.5 mg total) by mouth 2 (two) times daily. (Patient taking differently: Take 25 mg by mouth once daily.) 180 tablet 3    cholecalciferol, vitamin D3, (VITAMIN D3) 125 mcg (5,000 unit) Tab Take 5,000 Units by mouth once daily.      empagliflozin (JARDIANCE) 25 mg tablet Take 1 tablet (25 mg total) by mouth once daily. 90 tablet 0    ezetimibe  (ZETIA) 10 mg tablet Take 10 mg by mouth.      famotidine (PEPCID) 20 MG tablet Take 20 mg by mouth 2 (two) times daily.      insulin glargine U-100, Lantus, (LANTUS SOLOSTAR U-100 INSULIN) 100 unit/mL (3 mL) InPn pen Inject 16 Units into the skin every evening. 14.4 mL 1    JANUVIA 100 mg Tab Take 1 tablet (100 mg total) by mouth once daily. 90 tablet 1    levothyroxine (SYNTHROID) 75 MCG tablet Take 1 tablet (75 mcg total) by mouth every morning. 90 tablet 1    losartan (COZAAR) 100 MG tablet Take 1 tablet (100 mg total) by mouth once daily. 90 tablet 3    metFORMIN (GLUCOPHAGE) 1000 MG tablet Take 1 tablet (1,000 mg total) by mouth 2 (two) times daily with meals. 180 tablet 1    omega 3-dha-epa-fish oil (FISH OIL) 1,200 (144-216) mg Cap Take 1 capsule by mouth once daily.      spironolactone (ALDACTONE) 25 MG tablet Take 0.5 tablets (12.5 mg total) by mouth once daily. 45 tablet 3    atorvastatin (LIPITOR) 80 MG tablet Take 1 tablet (80 mg total) by mouth every evening. 90 tablet 3    cholecalciferol, vitamin D3, 1,250 mcg (50,000 unit) capsule Take 50,000 Int'l Units by mouth.      ciclopirox (PENLAC) 8 % Soln Apply topically nightly. 6.6 mL 5    EScitalopram oxalate (LEXAPRO) 10 MG tablet Take 1 tablet (10 mg total) by mouth once daily. 90 tablet 3    multivitamin (THERAGRAN) per tablet Take 1 tablet by mouth.         Scheduled meds:   ceFAZolin  2 g Intravenous Q8H    citalopram  10 mg Oral Daily    enoxparin  40 mg Subcutaneous Daily    insulin glargine U-100  16 Units Subcutaneous QHS    levothyroxine  75 mcg Oral Before breakfast    pantoprazole  40 mg Intravenous Daily       Infusions:        PRN meds:    Current Facility-Administered Medications:     acetaminophen, 650 mg, Oral, Q8H PRN    acetaminophen, 650 mg, Oral, Q4H PRN    aluminum-magnesium hydroxide-simethicone, 30 mL, Oral, QID PRN    dextrose 50%, 12.5 g, Intravenous, PRN    dextrose 50%, 12.5 g, Intravenous, PRN    dextrose 50%, 25 g,  Intravenous, PRN    diphenhydrAMINE, 12.5 mg, Intravenous, Q6H PRN    glucagon (human recombinant), 1 mg, Intramuscular, PRN    glucagon (human recombinant), 1 mg, Intramuscular, PRN    glucose, 16 g, Oral, PRN    glucose, 24 g, Oral, PRN    HYDROmorphone, 0.2 mg, Intravenous, Q5 Min PRN    insulin aspart U-100, 0-10 Units, Subcutaneous, QID (AC + HS) PRN    magnesium oxide, 800 mg, Oral, PRN    magnesium oxide, 800 mg, Oral, PRN    melatonin, 6 mg, Oral, Nightly PRN    naloxone, 0.02 mg, Intravenous, PRN    ondansetron, 4 mg, Intravenous, Q6H PRN    ondansetron, 4 mg, Intravenous, Daily PRN    oxyCODONE, 5 mg, Oral, PRN    oxyCODONE-acetaminophen, 1 tablet, Oral, Q4H PRN    potassium bicarbonate, 35 mEq, Oral, PRN    potassium bicarbonate, 50 mEq, Oral, PRN    potassium bicarbonate, 60 mEq, Oral, PRN    potassium, sodium phosphates, 2 packet, Oral, PRN    potassium, sodium phosphates, 2 packet, Oral, PRN    potassium, sodium phosphates, 2 packet, Oral, PRN    Past Medical History:   Diagnosis Date    Diabetes mellitus, type 2     GERD (gastroesophageal reflux disease)     Hyperlipidemia     Hypertension      Past Surgical History:   Procedure Laterality Date    FRACTURE SURGERY      INSERTION OF PACEMAKER      LEG AMPUTATION       Family History   Problem Relation Name Age of Onset    Arthritis Mother       Social History     Tobacco Use    Smoking status: Some Days     Types: Cigarettes    Smokeless tobacco: Never       OBJECTIVE:     Vital Signs and IO:  Temp:  [97.6 °F (36.4 °C)-98.4 °F (36.9 °C)]   Pulse:  [60-74]   Resp:  [13-20]   BP: (106-182)/(58-88)   SpO2:  [92 %-97 %]   I/O last 3 completed shifts:  In: 700 [P.O.:600; IV Piggyback:100]  Out: 2400 [Urine:2400]  Wt Readings from Last 5 Encounters:   10/24/24 104.6 kg (230 lb 9.6 oz)   10/25/24 104.3 kg (230 lb)   07/23/24 102.4 kg (225 lb 12 oz)   04/17/24 89.8 kg (197 lb 15.6 oz)   04/12/24 89.8 kg (198 lb)     Body mass index is 37.22 kg/m².    Physical  Exam  Constitutional:       General: Patient is not in acute distress.     Appearance: Patient is well-developed. She is not diaphoretic.   HENT:      Head: Normocephalic and atraumatic.      Mouth/Throat: Mucous membranes are moist.   Eyes:      General: No scleral icterus.     Pupils: Pupils are equal, round, and reactive to light.   Cardiovascular:      Rate and Rhythm: Normal rate and regular rhythm.   Pulmonary:      Effort: Pulmonary effort is normal. No respiratory distress.      Breath sounds: No stridor.   Abdominal:      General: There is no distension.      Palpations: Abdomen is soft.   Musculoskeletal:         General: No deformity. Normal range of motion.      Cervical back: Neck supple.   Skin:     General: Skin is warm and dry.      Findings: No rash present. No erythema.   Neurological:      Mental Status: Patient is alert and oriented to person, place, and time.      Cranial Nerves: No cranial nerve deficit.   Psychiatric:         Behavior: Behavior normal.          Patient care time was spent personally by me on the following activities:     Obtaining a history.  Examination of patient.  Providing medical care at the patients bedside.  Developing a treatment plan with patient or surrogate and bedside caregivers.  Ordering and reviewing laboratory studies, radiographic studies, pulse oximetry.  Ordering and performing treatments and interventions.  Evaluation of patient's response to treatment.  Discussions with consultants while on the unit and immediately available to the patient.  Re-evaluation of the patient's condition.  Documentation in the medical record.     Kaitlin Saavedra NP      [facility]  [address] [place] [address]  [place] [address]    [phone] - tel  [phone] - fax    10/28/2024

## 2024-10-28 NOTE — CONSULTS
[facility]  Department of Cardiology  Consult Note      PATIENT NAME: Dhaval Hernández  MRN: [id number]  TODAY'S DATE: 10/28/2024  ADMIT DATE: 10/22/2024                          CONSULT REQUESTED BY: Evin Courtney MD    SUBJECTIVE     PRINCIPAL PROBLEM: Septic shock      REASON FOR CONSULT:  MSSA bacteremia, patient has a pacemaker, ?ROSE      HPI:  Pt is 65 yo male with H/O CVA, Cardiomyopathy, ICD, MI, paroxysmal afib, HTN, HLD, traumatic Left BKA, HLD, DM, who presented to ER on 10/22/24 w/ complaints of nausea, vomiting and diarrhea x 4 days. Found to have septic shock with MSSA bacteremia. He had left elbow abscess with surgical debridement on 10/27/24 which is also growing MSSA. Cardiology has been consulted for ROSE per infectious disease recommendation.     Pt has no problems problems, recent bleeding or loose teeth.      Review of patient's allergies indicates:   Allergen Reactions    Fish containing products      Other reaction(s): .       Past Medical History:   Diagnosis Date    Diabetes mellitus, type 2     GERD (gastroesophageal reflux disease)     Hyperlipidemia     Hypertension      Past Surgical History:   Procedure Laterality Date    FRACTURE SURGERY      INCISION AND DRAINAGE OF ABSCESS Left 10/27/2024    Procedure: INCISION AND DRAINAGE, ABSCESS;  Surgeon: Gokul Troy MD;  Location: [facility];  Service: Orthopedics;  Laterality: Left;  INCISION AND DRAIN OF LEFT ELBOW ABCESS    INSERTION OF PACEMAKER      LEG AMPUTATION       Social History     Tobacco Use    Smoking status: Some Days     Types: Cigarettes    Smokeless tobacco: Never        REVIEW OF SYSTEMS  Negative except as mentioned in HPI    OBJECTIVE     VITAL SIGNS (Most Recent)  Temp: 98 °F (36.7 °C) (10/28/24 1444)  Pulse: 68 (10/28/24 1444)  Resp: 18 (10/28/24 1444)  BP: (!) 154/71 (10/28/24 1444)  SpO2: 96 % (10/28/24 1444)    VENTILATION STATUS  Resp: 18 (10/28/24 1444)  SpO2: 96 % (10/28/24 1444)            I & O (Last 24H):  Intake/Output Summary (Last 24 hours) at 10/28/2024 1600  Last data filed at 10/28/2024 0834  Gross per 24 hour   Intake 660 ml   Output 1600 ml   Net -940 ml       WEIGHTS  Wt Readings from Last 3 Encounters:   10/24/24 0405 104.6 kg (230 lb 9.6 oz)   10/22/24 1115 102.1 kg (225 lb)   10/25/24 1233 104.3 kg (230 lb)   07/23/24 0930 102.4 kg (225 lb 12 oz)       PHYSICAL EXAM    GENERAL:chronically ill elderly male resting in bed in no apparent distress.  HEENT: Normocephalic.    NECK: No JVD.   CARDIAC: Regular rate and rhythm. S1 is normal.S2 is normal.No gallops, clicks or murmurs noted at this time.  CHEST ANATOMY: normal. Left CW pacer device present  LUNGS: Clear to auscultation. No wheezing or rhonchi..   ABDOMEN: Soft .  Normal bowel sounds.    EXTREMITIES: No edema; left BKA; left elbow with ACE wrap;  edema to left hand noted  CENTRAL NERVOUS SYSTEM: AAO x 3  SKIN: No rash     HOME MEDICATIONS:  No current facility-administered medications on file prior to encounter.     Current Outpatient Medications on File Prior to Encounter   Medication Sig Dispense Refill    amiodarone (PACERONE) 200 MG Tab Take 1 tablet (200 mg total) by mouth once daily. 30 tablet 11    amLODIPine (NORVASC) 5 MG tablet Take 1 tablet (5 mg total) by mouth once daily. 90 tablet 1    aspirin (ECOTRIN) 81 MG EC tablet Take 81 mg by mouth once daily.      carvediloL (COREG) 12.5 MG tablet Take 1 tablet (12.5 mg total) by mouth 2 (two) times daily. (Patient taking differently: Take 25 mg by mouth once daily.) 180 tablet 3    cholecalciferol, vitamin D3, (VITAMIN D3) 125 mcg (5,000 unit) Tab Take 5,000 Units by mouth once daily.      empagliflozin (JARDIANCE) 25 mg tablet Take 1 tablet (25 mg total) by mouth once daily. 90 tablet 0    ezetimibe (ZETIA) 10 mg tablet Take 10 mg by mouth.      famotidine (PEPCID) 20 MG tablet Take 20 mg by mouth 2 (two) times daily.      insulin glargine U-100, Lantus, (LANTUS  SOLOSTAR U-100 INSULIN) 100 unit/mL (3 mL) InPn pen Inject 16 Units into the skin every evening. 14.4 mL 1    JANUVIA 100 mg Tab Take 1 tablet (100 mg total) by mouth once daily. 90 tablet 1    levothyroxine (SYNTHROID) 75 MCG tablet Take 1 tablet (75 mcg total) by mouth every morning. 90 tablet 1    losartan (COZAAR) 100 MG tablet Take 1 tablet (100 mg total) by mouth once daily. 90 tablet 3    metFORMIN (GLUCOPHAGE) 1000 MG tablet Take 1 tablet (1,000 mg total) by mouth 2 (two) times daily with meals. 180 tablet 1    omega 3-dha-epa-fish oil (FISH OIL) 1,200 (144-216) mg Cap Take 1 capsule by mouth once daily.      spironolactone (ALDACTONE) 25 MG tablet Take 0.5 tablets (12.5 mg total) by mouth once daily. 45 tablet 3    atorvastatin (LIPITOR) 80 MG tablet Take 1 tablet (80 mg total) by mouth every evening. 90 tablet 3    cholecalciferol, vitamin D3, 1,250 mcg (50,000 unit) capsule Take 50,000 Int'l Units by mouth.      ciclopirox (PENLAC) 8 % Soln Apply topically nightly. 6.6 mL 5    EScitalopram oxalate (LEXAPRO) 10 MG tablet Take 1 tablet (10 mg total) by mouth once daily. 90 tablet 3    multivitamin (THERAGRAN) per tablet Take 1 tablet by mouth.         SCHEDULED MEDS:   ceFAZolin  2 g Intravenous Q8H    citalopram  10 mg Oral Daily    enoxparin  40 mg Subcutaneous Daily    insulin glargine U-100  20 Units Subcutaneous QHS    levothyroxine  75 mcg Oral Before breakfast    pantoprazole  40 mg Intravenous Daily       CONTINUOUS INFUSIONS:    PRN MEDS:  Current Facility-Administered Medications:     acetaminophen, 650 mg, Oral, Q8H PRN    acetaminophen, 650 mg, Oral, Q4H PRN    aluminum-magnesium hydroxide-simethicone, 30 mL, Oral, QID PRN    dextrose 50%, 12.5 g, Intravenous, PRN    dextrose 50%, 12.5 g, Intravenous, PRN    dextrose 50%, 25 g, Intravenous, PRN    diphenhydrAMINE, 12.5 mg, Intravenous, Q6H PRN    glucagon (human recombinant), 1 mg, Intramuscular, PRN    glucagon (human recombinant), 1 mg,  "Intramuscular, PRN    glucose, 16 g, Oral, PRN    glucose, 24 g, Oral, PRN    HYDROmorphone, 0.2 mg, Intravenous, Q5 Min PRN    insulin aspart U-100, 0-10 Units, Subcutaneous, QID (AC + HS) PRN    magnesium oxide, 800 mg, Oral, PRN    magnesium oxide, 800 mg, Oral, PRN    melatonin, 6 mg, Oral, Nightly PRN    naloxone, 0.02 mg, Intravenous, PRN    ondansetron, 4 mg, Intravenous, Q6H PRN    ondansetron, 4 mg, Intravenous, Daily PRN    oxyCODONE, 5 mg, Oral, PRN    oxyCODONE-acetaminophen, 1 tablet, Oral, Q4H PRN    potassium bicarbonate, 35 mEq, Oral, PRN    potassium bicarbonate, 50 mEq, Oral, PRN    potassium bicarbonate, 60 mEq, Oral, PRN    potassium, sodium phosphates, 2 packet, Oral, PRN    potassium, sodium phosphates, 2 packet, Oral, PRN    potassium, sodium phosphates, 2 packet, Oral, PRN    LABS AND DIAGNOSTICS     CBC LAST 3 DAYS  Recent Labs   Lab 10/26/24  0456 10/27/24  0551 10/28/24  0716   WBC 10.66 12.01 10.21   RBC 4.13* 4.50* 4.33*   HGB 11.0* 12.2* 11.3*   HCT 33.6* 37.7* 36.5*   MCV 81* 84 84   MCH 26.6* 27.1 26.1*   MCHC 32.7 32.4 31.0*   RDW 18.4* 18.6* 18.6*    238 269   MPV 9.9 10.0 9.7   GRAN 61.8  6.6 68.8  8.3* 67.5  6.9   LYMPH 18.3  2.0 14.7*  1.8 17.3*  1.8   MONO 12.9  1.4* 9.7  1.2* 8.9  0.9   BASO 0.03 0.07 0.06   NRBC 0 0 0       COAGULATION LAST 3 DAYS  No results for input(s): "LABPT", "INR", "APTT" in the last 168 hours.    CHEMISTRY LAST 3 DAYS  Recent Labs   Lab 10/22/24  1423 10/22/24  1548 10/23/24  0456 10/24/24  0333 10/26/24  0456 10/27/24  0551 10/28/24  0716   NA  --    < >  --    < > 135* 133* 133*   K  --    < >  --    < > 4.2 4.7 4.3   CL  --    < >  --    < > 100 101 102   CO2  --    < >  --    < > 26 23 25   ANIONGAP  --    < >  --    < > 9 9 6*   BUN  --    < >  --    < > 21 18 16   CREATININE  --    < >  --    < > 1.0 0.9 0.8   GLU  --    < >  --    < > 173* 155* 137*   CALCIUM  --    < >  --    < > 8.1* 8.3* 8.3*   PH 7.263*  --  7.371  --   --   " --   --    MG  --    < >  --    < > 1.7 1.5* 1.4*   ALBUMIN  --    < >  --    < > 2.9* 3.0* 2.9*   PROT  --    < >  --    < > 6.1 6.6 6.5   ALKPHOS  --    < >  --    < > 85 112 227*   ALT  --    < >  --    < > 25 16 21   AST  --    < >  --    < > 37 46* 88*   BILITOT  --    < >  --    < > 0.5 0.6 0.5    < > = values in this interval not displayed.       CARDIAC PROFILE LAST 3 DAYS  Recent Labs   Lab 10/22/24  1144   *       ENDOCRINE LAST 3 DAYS  Recent Labs   Lab 10/22/24  1146 10/24/24  0333   PROCAL 9.377* 4.129*       LAST ARTERIAL BLOOD GAS  ABG  Recent Labs   Lab 10/23/24  0456   PH 7.371   PO2 61*   PCO2 32.2*   HCO3 18.7*   BE -7*       LAST 7 DAYS MICROBIOLOGY   Microbiology Results (last 7 days)       Procedure Component Value Units Date/Time    Blood culture [[id number]] Collected: 10/28/24 0716    Order Status: Completed Specimen: Blood Updated: 10/28/24 1358     Blood Culture, Routine No Growth to date    Narrative:      Collection has been rescheduled by GISEL at 10/27/2024 15:21 Reason:   Unable to collect. Notified ALYSSIA Rios.  Collection has been rescheduled by GISEL at 10/27/2024 15:21 Reason:   Unable to collect. Notified ALYSSIA Rios.    Blood culture [[id number]] Collected: 10/24/24 1019    Order Status: Completed Specimen: Blood from Peripheral, Hand, Left Updated: 10/28/24 1232     Blood Culture, Routine No Growth to date      No Growth to date      No Growth to date      No Growth to date      No Growth to date    Gram stain [[id number]] Collected: 10/27/24 0805    Order Status: Completed Specimen: Abscess from Elbow, Left Updated: 10/28/24 0816     Gram Stain Result Few WBC's      Rare Gram positive cocci    Narrative:      Elbow, Left    Aerobic culture [[id number]]  (Abnormal) Collected: 10/27/24 0805    Order Status: Completed Specimen: Abscess from Elbow, Left Updated: 10/28/24 0725     Aerobic Bacterial Culture STAPHYLOCOCCUS AUREUS  Moderate  For susceptibility see order  #[id number]      Narrative:      Elbow, Left    Aerobic culture [[id number]]  (Abnormal)  (Susceptibility) Collected: 10/26/24 1552    Order Status: Completed Specimen: Abscess from Elbow, Left Updated: 10/28/24 0639     Aerobic Bacterial Culture STAPHYLOCOCCUS AUREUS  Moderate      Blood culture [[id number]] Collected: 10/26/24 0456    Order Status: Completed Specimen: Blood Updated: 10/28/24 0632     Blood Culture, Routine No Growth to date      No Growth to date      No Growth to date    Narrative:      can be drawn with am labs per rn Fernando  Collection has been rescheduled by 2MB at 10/26/2024 00:49 Reason:   Draw with am labs per rn Fernando   Collection has been rescheduled by 2MB at 10/26/2024 00:49 Reason:   Draw with am labs per rn Fernando     Blood culture [[id number]]  (Abnormal)  (Susceptibility) Collected: 10/24/24 1018    Order Status: Completed Specimen: Blood from Peripheral, Hand, Right Updated: 10/28/24 0623     Blood Culture, Routine Gram stain aer bottle: Gram positive cocci in clusters resembling Staph      Results called to and read back by:Krista Lobato Charge Nurse 10/26/2024      15:26 ncb      STAPHYLOCOCCUS AUREUS  ID consult required for Staph aureus bacteremia.      Blood culture [[id number]] Collected: 10/26/24 0012    Order Status: Completed Specimen: Blood from Peripheral, Hand, Right Updated: 10/28/24 0232     Blood Culture, Routine No Growth to date      No Growth to date      No Growth to date    Blood culture [[id number]] Collected: 10/27/24 1513    Order Status: Completed Specimen: Blood from Peripheral, Hand, Left Updated: 10/27/24 2158     Blood Culture, Routine No Growth to date    Culture, Anaerobe [[id number]] Collected: 10/27/24 0805    Order Status: Sent Specimen: Abscess from Elbow, Left Updated: 10/27/24 0959    AFB Culture & Smear [[id number]] Collected: 10/27/24 0805    Order Status: Sent Specimen: Abscess from Elbow, Left Updated: 10/27/24 0957    Fungus culture  [[id number]] Collected: 10/27/24 0805    Order Status: Sent Specimen: Abscess from Elbow, Left Updated: 10/27/24 0956    Rapid Organism ID by PCR (from Blood culture) [[id number]]  (Abnormal) Collected: 10/24/24 0927    Order Status: Completed Updated: 10/26/24 1637     Enterococcus faecalis Not Detected     Enterococcus faecium Not Detected     Listeria monocytogenes Not Detected     Staphylococcus spp. See species for ID     Staphylococcus aureus Detected     Staphylococcus epidermidis Not Detected     Staphylococcus lugdunensis Not Detected     Streptococcus species Not Detected     Streptococcus agalactiae Not Detected     Streptococcus pneumoniae Not Detected     Streptococcus pyogenes Not Detected     Acinetobacter calcoaceticus/baumannii complex Not Detected     Bacteroides fragilis Not Detected     Enterobacterales Not Detected     Enterobacter cloacae complex Not Detected     Escherichia coli Not Detected     Klebsiella aerogenes Not Detected     Klebsiella oxytoca Not Detected     Klebsiella pneumoniae group Not Detected     Proteus Not Detected     Salmonella sp Not Detected     Serratia marcescens Not Detected     Haemophilus influenzae Not Detected     Neisseria meningtidis Not Detected     Pseudomonas aeruginosa Not Detected     Stenotrophomonas maltophilia Not Detected     Candida albicans Not Detected     Candida auris Not Detected     Candida glabrata Not Detected     Candida krusei Not Detected     Candida parapsilosis Not Detected     Candida tropicalis Not Detected     Cryptococcus neoformans/gattii Not Detected     CTX-M (ESBL ) Test not applicable     IMP (Carbapenem resistant) Test not applicable     KPC resistance gene (Carbapenem resistant) Test not applicable     mcr-1  Test not applicable     mec A/C  Test not applicable     mec A/C and MREJ (MRSA) gene Not Detected     NDM (Carbapenem resistant) Test not applicable     OXA-48-like (Carbapenem resistant) Test not applicable     van  A/B (VRE gene) Test not applicable     VIM (Carbapenem resistant) Test not applicable    Gram stain [[id number]]     Order Status: No result Specimen: Abscess from Elbow, Left     Stool culture **cannot be ordered stat** [[id number]] Collected: 10/23/24 1357    Order Status: Completed Specimen: Stool Updated: 10/25/24 1101     Stool Culture No Salmonella,Shigella,Vibrio,Campylobacter.      No E coli 0157:H7 isolated.    Urine culture [[id number]] Collected: 10/22/24 1514    Order Status: Completed Specimen: Urine Updated: 10/25/24 0715     Urine Culture, Routine No growth    Narrative:      Specimen Source->Urine    Blood culture x two cultures. Draw prior to antibiotics. [[id number]]  (Abnormal) Collected: 10/22/24 1136    Order Status: Completed Specimen: Blood from Peripheral, Hand, Left Updated: 10/25/24 0645     Blood Culture, Routine Gram stain aer bottle: Gram positive cocci      Positive results previously called on Order #[id number]      STAPHYLOCOCCUS AUREUS  ID consult required for Staph aureus bacteremia.  For susceptibility see order #[id number]      Narrative:      Aerobic and anaerobic  Collection has been rescheduled by MAI at 10/22/2024 11:44 Reason:   Done  Collection has been rescheduled by MAI at 10/22/2024 11:44 Reason:   Done    Blood culture x two cultures. Draw prior to antibiotics. [[id number]]  (Abnormal)  (Susceptibility) Collected: 10/22/24 1143    Order Status: Completed Specimen: Blood from Peripheral, Hand, Right Updated: 10/25/24 0645     Blood Culture, Routine Gram stain aer bottle: Gram positive cocci      Results called to and read back by:Tiffani Moulton RN-1ICU;      10/23/2024  07:54 CJD      STAPHYLOCOCCUS AUREUS  ID consult required for Staph aureus bacteremia.      Narrative:      Aerobic and anaerobic  Collection has been rescheduled by MAI at 10/22/2024 11:44 Reason:   Done  Collection has been rescheduled by MAI at 10/22/2024 11:44 Reason:   Done    Rapid Organism ID by  PCR (from Blood culture) [[id number]]  (Abnormal) Collected: 10/22/24 1143    Order Status: Completed Updated: 10/23/24 0918     Enterococcus faecalis Not Detected     Enterococcus faecium Not Detected     Listeria monocytogenes Not Detected     Staphylococcus spp. See species for ID     Staphylococcus aureus Detected     Staphylococcus epidermidis Not Detected     Staphylococcus lugdunensis Not Detected     Streptococcus species Not Detected     Streptococcus agalactiae Not Detected     Streptococcus pneumoniae Not Detected     Streptococcus pyogenes Not Detected     Acinetobacter calcoaceticus/baumannii complex Not Detected     Bacteroides fragilis Not Detected     Enterobacterales Not Detected     Enterobacter cloacae complex Not Detected     Escherichia coli Not Detected     Klebsiella aerogenes Not Detected     Klebsiella oxytoca Not Detected     Klebsiella pneumoniae group Not Detected     Proteus Not Detected     Salmonella sp Not Detected     Serratia marcescens Not Detected     Haemophilus influenzae Not Detected     Neisseria meningtidis Not Detected     Pseudomonas aeruginosa Not Detected     Stenotrophomonas maltophilia Not Detected     Candida albicans Not Detected     Candida auris Not Detected     Candida glabrata Not Detected     Candida krusei Not Detected     Candida parapsilosis Not Detected     Candida tropicalis Not Detected     Cryptococcus neoformans/gattii Not Detected     CTX-M (ESBL ) Test not applicable     IMP (Carbapenem resistant) Test not applicable     KPC resistance gene (Carbapenem resistant) Test not applicable     mcr-1  Test not applicable     mec A/C  Test not applicable     mec A/C and MREJ (MRSA) gene Not Detected     NDM (Carbapenem resistant) Test not applicable     OXA-48-like (Carbapenem resistant) Test not applicable     van A/B (VRE gene) Test not applicable     VIM (Carbapenem resistant) Test not applicable    Narrative:      Aerobic and anaerobic             MOST RECENT IMAGING  SURG FL Surgery Fluoro Usage  See OP Notes for results.     IMPRESSION: See OP Notes for results.     This procedure was auto-finalized by: Virtual Radiologist      ECHOCARDIOGRAM RESULTS (last 5)  Results for orders placed during the hospital encounter of 10/22/24    Echo    Interpretation Summary    Left Ventricle: The left ventricle is normal in size. Normal wall thickness. There is normal systolic function. There is normal diastolic function. E/A ratio is 0.99.    Right Ventricle: Normal right ventricular cavity size. Wall thickness is normal. Systolic function is normal. Pacemaker lead present in the ventricle.    Mitral Valve: There is mild regurgitation.    Tricuspid Valve: There is mild to moderate regurgitation. The estimated PA systolic pressure is at least 33 mmHg.    IVC/SVC: Normal venous pressure at 3 mmHg.      Results for orders placed in visit on 04/17/24    Echo Saline Bubble? No    Interpretation Summary    Left Ventricle: The left ventricle is normal in size. Normal wall thickness. There is normal systolic function with a visually estimated ejection fraction of 65 - 70%. There is normal diastolic function.    Right Ventricle: Normal right ventricular cavity size. Wall thickness is normal. Right ventricle wall motion  is normal. Systolic function is normal.    Left Atrium: Left atrium is moderately dilated.    IVC/SVC: Normal venous pressure at 3 mmHg.      CURRENT/PREVIOUS VISIT EKG  Results for orders placed or performed during the hospital encounter of 10/22/24   EKG 12-lead    Collection Time: 10/22/24 11:45 AM   Result Value Ref Range    QRS Duration 90 ms    [facility] QTC Calculation 428 ms    Narrative    Test Reason : I95.9,    Vent. Rate : 056 BPM     Atrial Rate : 056 BPM     P-R Int : 306 ms          QRS Dur : 090 ms      QT Int : 444 ms       P-R-T Axes : 031 -27 034 degrees     QTc Int : 428 ms    Sinus bradycardia with 1st degree A-V block  Low voltage  QRS  Inferior infarct (cited on or before 17-JAN-2023)  Possible Anterolateral infarct (cited on or before 12-APR-2024)  Abnormal ECG  When compared with ECG of 12-APR-2024 09:00,  No significant change was found    Referred By: [email or web address]   SELF           Confirmed By:            ASSESSMENT/PLAN:     Active Hospital Problems    Diagnosis    *Septic shock    Olecranon bursitis of left elbow    Tobacco dependency    High anion gap metabolic acidosis    SIRIA (acute kidney injury)    Hyperkalemia    Atrial fibrillation, chronic    Severe obesity (BMI 35.0-39.9) with comorbidity    Cardiac pacemaker    Status post unilateral below-knee amputation    Type 2 diabetes mellitus with stage 2 chronic kidney disease, without long-term current use of insulin       ASSESSMENT & PLAN:   Septic shock  MSSA bacteremia  Cardiomyopathy  ICD in-situ  Chronic atrial fibrillation  Left AKA 1988  DM 2  SIRIA/CKD  Hypomagnesemia  Hyperkalemia  Left elbow abscess  S/P I& D left elbow abscess  H/O Cva 2016  H/o MI 2016    RECOMMENDATIONS:  TTE showed normal systolic and diastolic function and mild-moderate TR  Magnesium level 1.4; Replace with 2 gm Magnesium IV today and repeat in 3 hrs   Check magnesium daily    Plan for ROSE in AM. Risks of ROSE were discussed with patient the risks include but not limited to oropharyngeal trauma, dental trauma, trauma to the esophagus, possible aspiration, and reaction to anesthesia.    Obtain device interrogation (LikeAnd[employer])      Loraine Mera NP  [facility]  Department of Cardiology  Date of Service: 10/28/2024       64-year-old gentleman noted to have MSSA bacteremia clinically has no new murmurs.  He was dual-chamber pacemaker in Situ transesophageal echocardiography is requested.  Risks and Benefits of the procedure have been explained to the patient. alternatives discussed in detail regarding upcoming procedures and sedation and possible complications. Some of the more  complications  include but not limited to immediate or delayed perforation, bleeding, infections, pain, inadvertent injury to surrounding tissue / organs and possible need for surgical evaluation.To include but  not limited to oropharyngeal damage, tooth damage, aspiration, sore throat and risk of anesthesia have been explained to patient.  Other risks of conscious sedation to be explained by anesthesiologist.  All questions have been answered to patient's satisfaction Informed consent obtained.    I have personally interviewed and examined the patient, I have reviewed the Nurse Practitioner's history and physical, assessment, and plan. I have personally evaluated the patient at bedside and agree with the findings and made appropriate changes as necessary in recommendations.        Dr. Jose Raul Garcia M.D.  [facility]  Department of Cardiology  Date of Service: 10/28/2024  4:00 PM

## 2024-10-28 NOTE — CARE UPDATE
10/28/24 0703   PRE-TX-O2   Device (Oxygen Therapy) nasal cannula   $ Is the patient on Low Flow Oxygen? Yes   Flow (L/min) (Oxygen Therapy) 1   SpO2 (!) 94 %   Pulse Oximetry Type Intermittent   $ Pulse Oximetry - Multiple Charge Pulse Oximetry - Multiple   Pulse 74   Resp 18   Education   $ Education 15 min;Oxygen

## 2024-10-28 NOTE — SUBJECTIVE & OBJECTIVE
Interval History: Patient states constant pain, but currently controlled. Afebrile. No other complains.     Review of Systems  Objective:     Vital Signs (Most Recent):  Temp: 98.2 °F (36.8 °C) (10/28/24 1034)  Pulse: 73 (10/28/24 1034)  Resp: 18 (10/28/24 1034)  BP: (!) 167/72 (10/28/24 1034)  SpO2: 96 % (10/28/24 1034) Vital Signs (24h Range):  Temp:  [97.8 °F (36.6 °C)-98.4 °F (36.9 °C)] 98.2 °F (36.8 °C)  Pulse:  [60-74] 73  Resp:  [16-18] 18  SpO2:  [92 %-97 %] 96 %  BP: (155-182)/(72-88) 167/72     Weight: 104.6 kg (230 lb 9.6 oz)  Body mass index is 37.22 kg/m².    Intake/Output Summary (Last 24 hours) at 10/28/2024 1159  Last data filed at 10/28/2024 0834  Gross per 24 hour   Intake 900 ml   Output 1600 ml   Net -700 ml         Physical Exam  Vitals and nursing note reviewed.   Constitutional:       General: He is not in acute distress.     Appearance: He is obese. He is not toxic-appearing.   HENT:      Head: Atraumatic.      Mouth/Throat:      Mouth: Mucous membranes are moist.      Pharynx: Oropharynx is clear.   Eyes:      General: No scleral icterus.     Conjunctiva/sclera: Conjunctivae normal.      Pupils: Pupils are equal, round, and reactive to light.   Cardiovascular:      Rate and Rhythm: Normal rate and regular rhythm.      Heart sounds: murmur heard.  Pulmonary:      Effort: No respiratory distress.      Breath sounds: No wheezing, rhonchi or rales.   Abdominal:      General: Abdomen is flat. Bowel sounds are normal.      Palpations: Abdomen is soft.   Musculoskeletal:         General: No swelling or deformity.      Cervical back: No rigidity or tenderness.      Comments: Left BKA   There is redness on the left elbow it is swollen and draining pus.    Skin:     Coloration: Skin is not jaundiced or pale.      Findings: No bruising, erythema or rash.   Neurological:      General: No focal deficit present.      Mental Status: He is alert and oriented to person, place, and time.      Cranial Nerves:  No cranial nerve deficit.      Sensory: No sensory deficit.      Motor: No weakness.   Psychiatric:         Mood and Affect: Mood normal.         Behavior: Behavior normal.     Significant Labs: All pertinent labs within the past 24 hours have been reviewed.  CBC:   Recent Labs   Lab 10/27/24  0551 10/28/24  0716   WBC 12.01 10.21   HGB 12.2* 11.3*   HCT 37.7* 36.5*    269     CMP:   Recent Labs   Lab 10/27/24  0551 10/28/24  0716   * 133*   K 4.7 4.3    102   CO2 23 25   * 137*   BUN 18 16   CREATININE 0.9 0.8   CALCIUM 8.3* 8.3*   PROT 6.6 6.5   ALBUMIN 3.0* 2.9*   BILITOT 0.6 0.5   ALKPHOS 112 227*   AST 46* 88*   ALT 16 21   ANIONGAP 9 6*       Significant Imaging: I have reviewed all pertinent imaging results/findings within the past 24 hours.

## 2024-10-28 NOTE — ASSESSMENT & PLAN NOTE
Resolved with shifting agents    Recent Labs     10/26/24  0456 10/27/24  0551 10/28/24  0716   K 4.2 4.7 4.3

## 2024-10-28 NOTE — PROGRESS NOTES
[facility] Medicine  Progress Note    Patient Name: Dhaval Hernández  MRN: [id number]  Patient Class: IP- Inpatient   Admission Date: 10/22/2024  Length of Stay: 6 days  Attending Physician: Evin Courtney MD  Primary Care Provider: Jessie Jung MD        Subjective:     Principal Problem:Septic shock        HPI:  64 year pt getting admitted with septic shock and hyperkalemia  Pt has been suffering from diarrhea/N&V for past 4 days  Stools very watery and vomitus was clear  Later he started having crampy abdominal pain/radiation to back. No aggravating/relieving factors   He acme to ER and was found to be in chock and got admitted       Overview/Hospital Course:  Patient admitted with septic shock. Bacteremia with Staph +. Repeat b/c showed NGTD. Source likely from left elbow infection. Continue with antibiotics and ID is following patient, Patient is om cefazolin. Ortho consulted for possible left elbow abscess. GI recommends outpatient follow up for FOBT +.  Patient underwent debridement on 10/27 by orthopedics. Abscess was drained and fluid is also growing MSSA. Cardiology consulted as patient has pacemaker and may need ROSE.     Interval History: Patient states constant pain, but currently controlled. Afebrile. No other complains.     Review of Systems  Objective:     Vital Signs (Most Recent):  Temp: 98.2 °F (36.8 °C) (10/28/24 1034)  Pulse: 73 (10/28/24 1034)  Resp: 18 (10/28/24 1034)  BP: (!) 167/72 (10/28/24 1034)  SpO2: 96 % (10/28/24 1034) Vital Signs (24h Range):  Temp:  [97.8 °F (36.6 °C)-98.4 °F (36.9 °C)] 98.2 °F (36.8 °C)  Pulse:  [60-74] 73  Resp:  [16-18] 18  SpO2:  [92 %-97 %] 96 %  BP: (155-182)/(72-88) 167/72     Weight: 104.6 kg (230 lb 9.6 oz)  Body mass index is 37.22 kg/m².    Intake/Output Summary (Last 24 hours) at 10/28/2024 1159  Last data filed at 10/28/2024 0834  Gross per 24 hour   Intake 900 ml   Output 1600 ml   Net -700 ml         Physical  Exam  Vitals and nursing note reviewed.   Constitutional:       General: He is not in acute distress.     Appearance: He is obese. He is not toxic-appearing.   HENT:      Head: Atraumatic.      Mouth/Throat:      Mouth: Mucous membranes are moist.      Pharynx: Oropharynx is clear.   Eyes:      General: No scleral icterus.     Conjunctiva/sclera: Conjunctivae normal.      Pupils: Pupils are equal, round, and reactive to light.   Cardiovascular:      Rate and Rhythm: Normal rate and regular rhythm.      Heart sounds: murmur heard.  Pulmonary:      Effort: No respiratory distress.      Breath sounds: No wheezing, rhonchi or rales.   Abdominal:      General: Abdomen is flat. Bowel sounds are normal.      Palpations: Abdomen is soft.   Musculoskeletal:         General: No swelling or deformity.      Cervical back: No rigidity or tenderness.      Comments: Left BKA   There is redness on the left elbow it is swollen and draining pus.    Skin:     Coloration: Skin is not jaundiced or pale.      Findings: No bruising, erythema or rash.   Neurological:      General: No focal deficit present.      Mental Status: He is alert and oriented to person, place, and time.      Cranial Nerves: No cranial nerve deficit.      Sensory: No sensory deficit.      Motor: No weakness.   Psychiatric:         Mood and Affect: Mood normal.         Behavior: Behavior normal.     Significant Labs: All pertinent labs within the past 24 hours have been reviewed.  CBC:   Recent Labs   Lab 10/27/24  0551 10/28/24  0716   WBC 12.01 10.21   HGB 12.2* 11.3*   HCT 37.7* 36.5*    269     CMP:   Recent Labs   Lab 10/27/24  0551 10/28/24  0716   * 133*   K 4.7 4.3    102   CO2 23 25   * 137*   BUN 18 16   CREATININE 0.9 0.8   CALCIUM 8.3* 8.3*   PROT 6.6 6.5   ALBUMIN 3.0* 2.9*   BILITOT 0.6 0.5   ALKPHOS 112 227*   AST 46* 88*   ALT 16 21   ANIONGAP 9 6*       Significant Imaging: I have reviewed all pertinent imaging  results/findings within the past 24 hours.    Assessment/Plan:      * Septic shock  Secondary to MSSA bacteremia  Left elbow abscess, bursitis   S/p I7D 10/27  Septic shock is resolved   Organ dysfunction: SIRIA and hypotension   Blood culture: 10/22: 4/4 MSSA, 10/24 1/4 MSSA, 10/26 negative so far   Abscess fluid also growing MSSA   Echo shows no vegetations   Patient does have a pacemaker, given blood culture 10/24 still positive, do we need ROSE, or check the pacemaker, consulted cardiology for recommendations   ID following   Cont Cefazolin      Olecranon bursitis of left elbow  With abscess  See above     Hyperkalemia  Resolved with shifting agents    Recent Labs     10/26/24  0456 10/27/24  0551 10/28/24  0716   K 4.2 4.7 4.3                 SIRIA (acute kidney injury)  SIRIA in the background of septic/hypovolemic shock  Resolved with IVF  Recent Labs     10/26/24  0456 10/27/24  0551 10/28/24  0716   CREATININE 1.0 0.9 0.8   EGFRNORACEVR >60.0 >60.0 >60.0         Atrial fibrillation, chronic  Rate controlled with Amiodarone and Coreg   Patient not on anticoagulation from home     High anion gap metabolic acidosis  In the background of lactic acidemia  Resolved     Tobacco dependency  Aware     Severe obesity (BMI 35.0-39.9) with comorbidity  Body mass index is 36.32 kg/m². Morbid obesity complicates all aspects of disease management from diagnostic modalities to treatment.     Cardiac pacemaker  PPM insitu      Type 2 diabetes mellitus with stage 2 chronic kidney disease, without long-term current use of insulin  Increased Lantus from 16 >> 20  Cont sliding scale     Status post unilateral below-knee amputation  L BKA        VTE Risk Mitigation (From admission, onward)           Ordered     enoxaparin injection 40 mg  Daily         10/25/24 1431     IP VTE HIGH RISK PATIENT  Once         10/22/24 1641     Place sequential compression device  Until discontinued         10/22/24 1641                    Discharge  Planning   JILLIAN: 10/30/2024     Code Status: Full Code   Is the patient medically ready for discharge?:     Reason for patient still in hospital (select all that apply): Treatment  Discharge Plan A: Skilled Nursing Facility   Discharge Delays: (!) Post-Acute Set-up              Evin Courtney MD  Department of Hospital Medicine   [facility]

## 2024-10-28 NOTE — ASSESSMENT & PLAN NOTE
SIRIA in the background of septic/hypovolemic shock  Resolved with IVF  Recent Labs     10/26/24  0456 10/27/24  0551 10/28/24  0716   CREATININE 1.0 0.9 0.8   EGFRNORACEVR >60.0 >60.0 >60.0

## 2024-10-28 NOTE — PROGRESS NOTES
[facility]   Department of Infectious Disease  Progress Note        PATIENT NAME: Dhaval Hernández  MRN: [id number]  TODAY'S DATE: 10/28/2024  ADMIT DATE: 10/22/2024  LOS: 6 days    CHIEF COMPLAINT: Numbness (Pt is complaining of left sided numbness and tingling in arm started at 9 am./)      PRINCIPLE PROBLEM: Septic shock    INTERVAL HISTORY      10/25/2024: Out of ICU and now on medical floor.  X-ray shows hardware left elbow.  CT without contrast with the abscess.  X-ray right shoulder with mild DJD.  Repeat blood culture 10/24/2024 so far negative.     10/26/2024: He started draining abscess spontaneously from the left elbow today.  Repeat blood cultures remain negative.      10/27/2024:  He had I and D of left elbow today with removal of olecranon screw.  Abscess culture from yesterday growing Staphylococcus aureus.  Repeat blood culture 10/24/2024 hospital and positive.    10/28/2024:  Left elbow continues to improve clinically.  Cultures have grown MRSA.  Repeat blood cultures in progress.    Antibiotics (From admission, onward)      Start     Stop Route Frequency Ordered    10/25/24 1830  ceFAZolin injection 2 g         -- IV Every 8 hours (non-standard times) 10/25/24 1400          Antifungals (From admission, onward)      None           Antivirals (From admission, onward)      None            ASSESSMENT and PLAN      MSSA bacteremia.  This is most likely from left elbow infection.  TTE with no vegetation. He has a pacemaker.  Consult cardiology for ROSE.  Follow repeat blood cultures    SIRIA.  Resolved      3.   Left elbow abscess.  He had I&D today.  Abscess culture growing Staphylococcus aureus.  He has retained hardware with probable clinical osteomyelitis.  We will treat for 6 weeks for this indication.    4. Right shoulder pain.  This is chronic associated with weakness.  Has mild DJD on x-ray.    5. Diabetes mellitus.  Management as per hospitalist.    RECOMMENDATIONS:    Continue  cefazolin   Follow Repeat blood cultures  Plan to add oral rifampin in about 3-4 days   Consult cardiology for ROSE    Please send Epic secure chat with any questions.      SUBJECTIVE    Dhaval Hernández is a 64 y.o. male with history of diabetes mellitus, hypertension, hyperlipidemia, GERD and previous left BKA.  Also with history of AICD.  He presents to the ER 10/22/2024 with 4 day history of nausea, vomiting and diarrhea.  He had no fever.  In the ER vitals were abnormal with blood pressure 75/39, pulse 60, respiratory 18, temperature 97.6° oxygen saturation 92%.  WBC was 20 K with left shift.  Creatinine 5.1.  UA abnormal with 19 WBC and 4+ glucose.  Chest x-ray with no clear acute infiltrate.  CT abdomen and pelvis showed mild cardiomegaly and also cholelithiasis.  He was commenced on IV fluid and antibiotics.     Leukocytosis resolved and WBC down to 8.0.  Blood cultures have grown MSSA in 2/2 bottles.  HIDA scan was abnormal.  Surgery was consulted to evaluate for possible cholecystitis.  Notes reviewed.  No plan for surgical intervention at this time since patient had no abdominal pain and appears to be improving.  ID asked to assist with his care.     States his symptoms started with his left elbow hurting.  He has been leaning on the left elbow to transfer from bed to wheelchair.  Has a elbow ORIF and is fixed in a flexion position.  Has had persistent pain for the last 1 week with swelling.  His other symptoms followed after the elbow pain started.        Antibiotic history:  Vancomycin: 10/22/2024 x 1 day  Meropenem: 10/22/2024-10/23/2024  Cefazolin:  10/24/2024-     Microbiology:    Blood culture 10/22/2024, 10/24/2024: MSSA 2/2  Blood culture 10/24/2024: NGTD      Review of Systems  Negative except as stated above in Interval History     OBJECTIVE   Temp:  [97.8 °F (36.6 °C)-98.4 °F (36.9 °C)] 98.2 °F (36.8 °C)  Pulse:  [60-74] 73  Resp:  [16-18] 18  SpO2:  [92 %-97 %] 96 %  BP: (155-182)/(72-88)  167/72  Temp:  [97.8 °F (36.6 °C)-98.4 °F (36.9 °C)]   Temp: 98.2 °F (36.8 °C) (10/28/24 1034)  Pulse: 73 (10/28/24 1034)  Resp: 18 (10/28/24 1034)  BP: (!) 167/72 (10/28/24 1034)  SpO2: 96 % (10/28/24 1034)    Intake/Output Summary (Last 24 hours) at 10/28/2024 1454  Last data filed at 10/28/2024 0834  Gross per 24 hour   Intake 660 ml   Output 1600 ml   Net -940 ml       Physical Exam  General:  Elderly man sitting in chair.  Left upper extremity:  Flexion contracture of the elbow.  Purulent drainage from elbow CVS: S1 and 2 heard, no murmurs appreciated   Respiratory: Clear to auscultation   Abdomen: Full, soft, nontender, no palpable organomegaly   Skin: No rash appreciated   CNS: No focal deficits   Musculoskeletal system: No joint or bony abnormalities appreciated  Psych: Good mood, normal affect.      VAD:  ISOLATION:  Now     Wounds:  None    Significant Labs: All pertinent labs within the past 24 hours have been reviewed.    CBC LAST 7 DAYS  Recent Labs   Lab 10/22/24  1548 10/23/24  0443 10/23/24  0456 10/24/24  0333 10/25/24  0513 10/26/24  0456 10/27/24  0551 10/28/24  0716   WBC 18.57* 11.77  --  10.85 11.58 10.66 12.01 10.21   RBC 3.81* 3.87*  --  3.93* 4.34* 4.13* 4.50* 4.33*   HGB 9.9* 10.1*  --  10.2* 11.3* 11.0* 12.2* 11.3*   HCT 32.9* 32.0* 31* 31.4* 35.3* 33.6* 37.7* 36.5*   MCV 86 83  --  80* 81* 81* 84 84   MCH 26.0* 26.1*  --  26.0* 26.0* 26.6* 27.1 26.1*   MCHC 30.1* 31.6*  --  32.5 32.0 32.7 32.4 31.0*   RDW 18.6* 18.4*  --  18.1* 18.4* 18.4* 18.6* 18.6*    201  --  188 207 228 238 269   MPV 10.9 10.2  --  10.1 9.7 9.9 10.0 9.7   GRAN 82.7*  15.4* 81.2*  9.6*  --  75.9*  8.2* 68.6  7.9* 61.8  6.6 68.8  8.3* 67.5  6.9   LYMPH 4.8*  0.9* 6.3*  0.7*  --  10.6*  1.2 15.6*  1.8 18.3  2.0 14.7*  1.8 17.3*  1.8   MONO 9.0  1.7* 11.3  1.3*  --  10.1  1.1* 12.3  1.4* 12.9  1.4* 9.7  1.2* 8.9  0.9   BASO 0.04 0.04  --  0.02 0.04 0.03 0.07 0.06   NRBC 0 0  --  0 0 0 0 0  "      CHEMISTRY LAST 7 DAYS  Recent Labs   Lab 10/22/24  1144 10/22/24  1423 10/22/24  1548 10/23/24  0443 10/23/24  0456 10/24/24  0333 10/24/24  1405 10/25/24  0512 10/26/24  0456 10/27/24  0551 10/28/24  0716   *  --    < > 132*  --  135* 135* 136 135* 133* 133*   K 5.7*  --    < > 4.6  --  4.1 4.6 4.3 4.2 4.7 4.3     --    < > 101  --  101 98 100 100 101 102   CO2 12*  --    < > 17*  --  25 28 27 26 23 25   ANIONGAP 18*  --    < > 14  --  9 9 9 9 9 6*   BUN 71*  --    < > 63*  --  45* 38* 25* 21 18 16   CREATININE 5.1*  --    < > 3.8*  --  2.3* 1.7* 1.2 1.0 0.9 0.8   *  --    < > 126*  --  168* 193* 114* 173* 155* 137*   CALCIUM 8.0*  --    < > 8.1*  --  8.0* 8.3* 8.3* 8.1* 8.3* 8.3*   PH  --  7.263*  --   --  7.371  --   --   --   --   --   --    MG 1.6  --   --  1.4*  --  1.5*  --  1.4* 1.7 1.5* 1.4*   ALBUMIN 3.1*  --   --  2.9*  --  2.9*  --  2.9* 2.9* 3.0* 2.9*   PROT 5.9*  --   --  5.9*  --  5.7*  --  6.0 6.1 6.6 6.5   ALKPHOS 68  --   --  74  --  76  --  85 85 112 227*   ALT 69*  --   --  73*  --  60*  --  39 25 16 21   AST 58*  --   --  69*  --  63*  --  46* 37 46* 88*   BILITOT 0.6  --   --  0.6  --  0.7  --  0.9 0.5 0.6 0.5    < > = values in this interval not displayed.       Estimated Creatinine Clearance: 102.9 mL/min (based on SCr of 0.8 mg/dL).    INFLAMMATORY/PROCAL  LAST 7 DAYS  Recent Labs   Lab 10/22/24  1146 10/24/24  0333 10/24/24  1018 10/28/24  0716   PROCAL 9.377* 4.129*  --   --    CRP  --   --  25.30* 17.10*     No results found for: "ESR"  CRP   Date Value Ref Range Status   10/28/2024 17.10 (H) <1.00 mg/dL Final     Comment:     CRP-Normal Application expected values:   <1.0        mg/dL   Normal Range  1.0 - 5.0  mg/dL   Indicates mild inflammation  5.0 - 10.0 mg/dL   Indicates severe inflammation  >10.0        mg/dL   Represents serious processes and   frequently         indicates the presence of a bacterial   infection.      10/24/2024 25.30 (H) <1.00 mg/dL Final "     Comment:     CRP-Normal Application expected values:   <1.0        mg/dL   Normal Range  1.0 - 5.0  mg/dL   Indicates mild inflammation  5.0 - 10.0 mg/dL   Indicates severe inflammation  >10.0        mg/dL   Represents serious processes and   frequently         indicates the presence of a bacterial   infection.          PRIOR  MICROBIOLOGY:    Susceptibility data from last 90 days.  Collected Specimen Info Organism Ceftriaxone Clindamycin Erythromycin Oxacillin Penicillin Tetracycline Trimeth/Sulfa Vancomycin   10/27/24 Abscess from Elbow, Left Staphylococcus aureus           10/26/24 Abscess from Elbow, Left Staphylococcus aureus  S  S  S  S  R  S  S  S   10/24/24 Blood from Peripheral, Hand, Right Staphylococcus aureus  S  S  S  S  R  S  S  S   10/22/24 Blood from Peripheral, Hand, Right Staphylococcus aureus  S  S  S  S  R  S  S  S   10/22/24 Blood from Peripheral, Hand, Left Staphylococcus aureus               LAST 7 DAYS MICROBIOLOGY   Microbiology Results (last 7 days)       Procedure Component Value Units Date/Time    Blood culture [[id number]] Collected: 10/28/24 0716    Order Status: Completed Specimen: Blood Updated: 10/28/24 1358     Blood Culture, Routine No Growth to date    Narrative:      Collection has been rescheduled by GISEL at 10/27/2024 15:21 Reason:   Unable to collect. Notified ALYSSIA Rios.  Collection has been rescheduled by GISEL at 10/27/2024 15:21 Reason:   Unable to collect. Notified ALYSSIA Rios.    Blood culture [[id number]] Collected: 10/24/24 1019    Order Status: Completed Specimen: Blood from Peripheral, Hand, Left Updated: 10/28/24 1232     Blood Culture, Routine No Growth to date      No Growth to date      No Growth to date      No Growth to date      No Growth to date    Gram stain [[id number]] Collected: 10/27/24 0805    Order Status: Completed Specimen: Abscess from Elbow, Left Updated: 10/28/24 0816     Gram Stain Result Few WBC's      Rare Gram positive cocci    Narrative:       Elbow, Left    Aerobic culture [[id number]]  (Abnormal) Collected: 10/27/24 0805    Order Status: Completed Specimen: Abscess from Elbow, Left Updated: 10/28/24 0725     Aerobic Bacterial Culture STAPHYLOCOCCUS AUREUS  Moderate  For susceptibility see order #[id number]      Narrative:      Elbow, Left    Aerobic culture [[id number]]  (Abnormal)  (Susceptibility) Collected: 10/26/24 1552    Order Status: Completed Specimen: Abscess from Elbow, Left Updated: 10/28/24 0639     Aerobic Bacterial Culture STAPHYLOCOCCUS AUREUS  Moderate      Blood culture [[id number]] Collected: 10/26/24 0456    Order Status: Completed Specimen: Blood Updated: 10/28/24 0632     Blood Culture, Routine No Growth to date      No Growth to date      No Growth to date    Narrative:      can be drawn with am labs per rn Fernando  Collection has been rescheduled by 2MB at 10/26/2024 00:49 Reason:   Draw with am labs per rn Fernando   Collection has been rescheduled by 2MB at 10/26/2024 00:49 Reason:   Draw with am labs per rn Fernando     Blood culture [[id number]]  (Abnormal)  (Susceptibility) Collected: 10/24/24 1018    Order Status: Completed Specimen: Blood from Peripheral, Hand, Right Updated: 10/28/24 0623     Blood Culture, Routine Gram stain aer bottle: Gram positive cocci in clusters resembling Staph      Results called to and read back by:Krista Lobato Charge Nurse 10/26/2024      15:26 ncb      STAPHYLOCOCCUS AUREUS  ID consult required for Staph aureus bacteremia.      Blood culture [[id number]] Collected: 10/26/24 0012    Order Status: Completed Specimen: Blood from Peripheral, Hand, Right Updated: 10/28/24 0232     Blood Culture, Routine No Growth to date      No Growth to date      No Growth to date    Blood culture [[id number]] Collected: 10/27/24 1513    Order Status: Completed Specimen: Blood from Peripheral, Hand, Left Updated: 10/27/24 2158     Blood Culture, Routine No Growth to date    Culture, Anaerobe [[id number]] Collected:  10/27/24 0805    Order Status: Sent Specimen: Abscess from Elbow, Left Updated: 10/27/24 0959    AFB Culture & Smear [[id number]] Collected: 10/27/24 0805    Order Status: Sent Specimen: Abscess from Elbow, Left Updated: 10/27/24 0957    Fungus culture [[id number]] Collected: 10/27/24 0805    Order Status: Sent Specimen: Abscess from Elbow, Left Updated: 10/27/24 0956    Rapid Organism ID by PCR (from Blood culture) [[id number]]  (Abnormal) Collected: 10/24/24 0927    Order Status: Completed Updated: 10/26/24 1637     Enterococcus faecalis Not Detected     Enterococcus faecium Not Detected     Listeria monocytogenes Not Detected     Staphylococcus spp. See species for ID     Staphylococcus aureus Detected     Staphylococcus epidermidis Not Detected     Staphylococcus lugdunensis Not Detected     Streptococcus species Not Detected     Streptococcus agalactiae Not Detected     Streptococcus pneumoniae Not Detected     Streptococcus pyogenes Not Detected     Acinetobacter calcoaceticus/baumannii complex Not Detected     Bacteroides fragilis Not Detected     Enterobacterales Not Detected     Enterobacter cloacae complex Not Detected     Escherichia coli Not Detected     Klebsiella aerogenes Not Detected     Klebsiella oxytoca Not Detected     Klebsiella pneumoniae group Not Detected     Proteus Not Detected     Salmonella sp Not Detected     Serratia marcescens Not Detected     Haemophilus influenzae Not Detected     Neisseria meningtidis Not Detected     Pseudomonas aeruginosa Not Detected     Stenotrophomonas maltophilia Not Detected     Candida albicans Not Detected     Candida auris Not Detected     Candida glabrata Not Detected     Candida krusei Not Detected     Candida parapsilosis Not Detected     Candida tropicalis Not Detected     Cryptococcus neoformans/gattii Not Detected     CTX-M (ESBL ) Test not applicable     IMP (Carbapenem resistant) Test not applicable     KPC resistance gene (Carbapenem  resistant) Test not applicable     mcr-1  Test not applicable     mec A/C  Test not applicable     mec A/C and MREJ (MRSA) gene Not Detected     NDM (Carbapenem resistant) Test not applicable     OXA-48-like (Carbapenem resistant) Test not applicable     van A/B (VRE gene) Test not applicable     VIM (Carbapenem resistant) Test not applicable    Gram stain [[id number]]     Order Status: No result Specimen: Abscess from Elbow, Left     Stool culture **cannot be ordered stat** [[id number]] Collected: 10/23/24 1357    Order Status: Completed Specimen: Stool Updated: 10/25/24 1101     Stool Culture No Salmonella,Shigella,Vibrio,Campylobacter.      No E coli 0157:H7 isolated.    Urine culture [[id number]] Collected: 10/22/24 1514    Order Status: Completed Specimen: Urine Updated: 10/25/24 0715     Urine Culture, Routine No growth    Narrative:      Specimen Source->Urine    Blood culture x two cultures. Draw prior to antibiotics. [[id number]]  (Abnormal) Collected: 10/22/24 1136    Order Status: Completed Specimen: Blood from Peripheral, Hand, Left Updated: 10/25/24 0645     Blood Culture, Routine Gram stain aer bottle: Gram positive cocci      Positive results previously called on Order #[id number]      STAPHYLOCOCCUS AUREUS  ID consult required for Staph aureus bacteremia.  For susceptibility see order #[id number]      Narrative:      Aerobic and anaerobic  Collection has been rescheduled by MAI at 10/22/2024 11:44 Reason:   Done  Collection has been rescheduled by MAI at 10/22/2024 11:44 Reason:   Done    Blood culture x two cultures. Draw prior to antibiotics. [[id number]]  (Abnormal)  (Susceptibility) Collected: 10/22/24 1143    Order Status: Completed Specimen: Blood from Peripheral, Hand, Right Updated: 10/25/24 0645     Blood Culture, Routine Gram stain aer bottle: Gram positive cocci      Results called to and read back by:Tiffani Moulton RN-1ICU;      10/23/2024  07:54 CJD      STAPHYLOCOCCUS AUREUS  ID  consult required for Staph aureus bacteremia.      Narrative:      Aerobic and anaerobic  Collection has been rescheduled by ZCORBIN at 10/22/2024 11:44 Reason:   Done  Collection has been rescheduled by ZCORBIN at 10/22/2024 11:44 Reason:   Done    Rapid Organism ID by PCR (from Blood culture) [[id number]]  (Abnormal) Collected: 10/22/24 1143    Order Status: Completed Updated: 10/23/24 0918     Enterococcus faecalis Not Detected     Enterococcus faecium Not Detected     Listeria monocytogenes Not Detected     Staphylococcus spp. See species for ID     Staphylococcus aureus Detected     Staphylococcus epidermidis Not Detected     Staphylococcus lugdunensis Not Detected     Streptococcus species Not Detected     Streptococcus agalactiae Not Detected     Streptococcus pneumoniae Not Detected     Streptococcus pyogenes Not Detected     Acinetobacter calcoaceticus/baumannii complex Not Detected     Bacteroides fragilis Not Detected     Enterobacterales Not Detected     Enterobacter cloacae complex Not Detected     Escherichia coli Not Detected     Klebsiella aerogenes Not Detected     Klebsiella oxytoca Not Detected     Klebsiella pneumoniae group Not Detected     Proteus Not Detected     Salmonella sp Not Detected     Serratia marcescens Not Detected     Haemophilus influenzae Not Detected     Neisseria meningtidis Not Detected     Pseudomonas aeruginosa Not Detected     Stenotrophomonas maltophilia Not Detected     Candida albicans Not Detected     Candida auris Not Detected     Candida glabrata Not Detected     Candida krusei Not Detected     Candida parapsilosis Not Detected     Candida tropicalis Not Detected     Cryptococcus neoformans/gattii Not Detected     CTX-M (ESBL ) Test not applicable     IMP (Carbapenem resistant) Test not applicable     KPC resistance gene (Carbapenem resistant) Test not applicable     mcr-1  Test not applicable     mec A/C  Test not applicable     mec A/C and MREJ (MRSA) gene Not  Detected     NDM (Carbapenem resistant) Test not applicable     OXA-48-like (Carbapenem resistant) Test not applicable     van A/B (VRE gene) Test not applicable     VIM (Carbapenem resistant) Test not applicable    Narrative:      Aerobic and anaerobic          CURRENT/PREVIOUS VISIT EKG  Results for orders placed or performed during the hospital encounter of 10/22/24   EKG 12-lead    Collection Time: 10/22/24 11:45 AM   Result Value Ref Range    QRS Duration 90 ms    [facility] QTC Calculation 428 ms    Narrative    Test Reason : I95.9,    Vent. Rate : 056 BPM     Atrial Rate : 056 BPM     P-R Int : 306 ms          QRS Dur : 090 ms      QT Int : 444 ms       P-R-T Axes : 031 -27 034 degrees     QTc Int : 428 ms    Sinus bradycardia with 1st degree A-V block  Low voltage QRS  Inferior infarct (cited on or before 17-JAN-2023)  Possible Anterolateral infarct (cited on or before 12-APR-2024)  Abnormal ECG  When compared with ECG of 12-APR-2024 09:00,  No significant change was found    Referred By: [email or web address]   SELF           Confirmed By:      Significant Imaging: I have reviewed all relevant and available imaging results/findings within the past 24 hours.    I spent a total of 50 minutes on the day of the visit.This includes face to face time and non-face to face time preparing to see the patient (eg, review of tests), obtaining and/or reviewing separately obtained history, documenting clinical information in the electronic or other health record, independently interpreting results and communicating results to the patient/family/caregiver, or care coordinator.    Migel Watson MD  Date of Service: 10/28/2024      This note was created using [employer] voice recognition software that occasionally misinterpreted phrases or words.

## 2024-10-28 NOTE — PT/OT/SLP PROGRESS
Physical Therapy Treatment    Patient Name:  Dhaval Hernández   MRN:  [id number]    Recommendations:     Discharge Recommendations: Moderate Intensity Therapy  Discharge Equipment Recommendations: to be determined by next level of care  Barriers to discharge:  increased assist with mobility, decreased activity tolerance, balance deficits, pain    Assessment:     Dhaval Hernández is a 64 y.o. male admitted with a medical diagnosis of Septic shock.  He presents with the following impairments/functional limitations: weakness, impaired endurance, impaired self care skills, impaired functional mobility, decreased upper extremity function, decreased lower extremity function, pain.    Pt agreeable to visit. Pt required mod assist x 1-2 persons for supine to sit transfer with verbal cuing for sequencing. Pt keeping LUE close to his body, not using it during transfer secondary to pain in elbow. Pt reports limited ROM in L elbow.    Pt performed LE TE seated EOB with emphasis on muscle engagement and proper form.    Pt performed scooting along EOB with mod assist x 2 and verbal cuing using RUE and RLE to assist.    Pt required mod assist x 2 to transfer back to supine and scoot up in bed.    Rehab Prognosis: Fair; patient would benefit from acute skilled PT services to address these deficits and reach maximum level of function.    Recent Surgery: Procedure(s) (LRB):  INCISION AND DRAINAGE, ABSCESS (Left) 1 Day Post-Op    Plan:     During this hospitalization, patient to be seen 6 x/week to address the identified rehab impairments via therapeutic activities, therapeutic exercises and progress toward the following goals:    Plan of Care Expires:  11/24/24    Subjective     Chief Complaint: pain in L elbow  Patient/Family Comments/goals: to get better  Pain/Comfort:  Pain Rating 1: 5/10  Location - Side 1: Left  Location 1: elbow  Pain Addressed 1: Reposition, Distraction, Cessation of Activity  Pain Rating Post-Intervention 1:  5/10      Objective:     Communicated with nurse prior to session.  Patient found HOB elevated with bed alarm, oxygen, telemetry, peripheral IV upon PT entry to room.     General Precautions: Standard, fall  Orthopedic Precautions: LUE weight bearing as tolerated  Braces: N/A  Respiratory Status: Nasal cannula, flow 1 L/min     Functional Mobility:  Bed Mobility:     Scooting: moderate assistance, of 2 persons, along EOB and up in bed  Supine to Sit: moderate assistance and of 1-2 persons  Sit to Supine: moderate assistance and of 2 persons  Balance: sitting EOB with SBA while performing LE TE      AM-PAC 6 CLICK MOBILITY          Treatment & Education:  Pt tolerated seated RLE TE x 10 each including hip flexion and knee extension with verbal cuing for proper form and pacing for optimal strengthening.    Pt educated on importance of time OOB, importance of intermittent mobility, safe techniques for transfers/ambulation, discharge recommendations/options, and use of call light for assistance and fall prevention.      Patient left HOB elevated with all lines intact, call button in reach, and bed alarm on..    GOALS:   Multidisciplinary Problems       Physical Therapy Goals          Problem: Physical Therapy    Goal Priority Disciplines Outcome Interventions   Physical Therapy Goal     PT, PT/OT Progressing    Description: Goals to be met by: 2024     Patient will increase functional independence with mobility by performin. Supine to sit with MInimal Assistance  2. Sit to supine with MInimal Assistance  3. Bed to chair transfer with Moderate Assistance using No Assistive Device                         Time Tracking:     PT Received On: 10/28/24  PT Start Time: 919     PT Stop Time: 935  PT Total Time (min): 16 min     Billable Minutes: Therapeutic Exercise 16    Treatment Type: Treatment  PT/PTA: PTA     Number of PTA visits since last PT visit: 2     10/28/2024

## 2024-10-28 NOTE — PLAN OF CARE
Problem: Diabetes Comorbidity  Goal: Blood Glucose Level Within Targeted Range  Outcome: Progressing  Intervention: Monitor and Manage Glycemia  Flowsheets (Taken 10/28/2024 0810)  Glycemic Management: blood glucose monitored

## 2024-10-28 NOTE — PLAN OF CARE
Problem: Infection  Goal: Absence of Infection Signs and Symptoms  Outcome: Progressing     Problem: Adult Inpatient Plan of Care  Goal: Plan of Care Review  Outcome: Progressing  Goal: Readiness for Transition of Care  Outcome: Progressing     Problem: Skin Injury Risk Increased  Goal: Skin Health and Integrity  Outcome: Progressing

## 2024-10-28 NOTE — ASSESSMENT & PLAN NOTE
Secondary to MSSA bacteremia  Left elbow abscess, bursitis   S/p I7D 10/27  Septic shock is resolved   Organ dysfunction: SIRIA and hypotension   Blood culture: 10/22: 4/4 MSSA, 10/24 1/4 MSSA, 10/26 negative so far   Abscess fluid also growing MSSA   Echo shows no vegetations   Patient does have a pacemaker, given blood culture 10/24 still positive, do we need ROSE, or check the pacemaker, consulted cardiology for recommendations   ID following   Cont Cefazolin

## 2024-10-29 ENCOUNTER — CLINICAL SUPPORT (OUTPATIENT)
Dept: SMOKING CESSATION | Facility: CLINIC | Age: 64
End: 2024-10-29
Payer: COMMERCIAL

## 2024-10-29 DIAGNOSIS — F17.200 NICOTINE DEPENDENCE: Primary | ICD-10-CM

## 2024-10-29 LAB
ALBUMIN SERPL BCP-MCNC: 2.7 G/DL (ref 3.5–5.2)
ALP SERPL-CCNC: 295 U/L (ref 55–135)
ALT SERPL W/O P-5'-P-CCNC: 24 U/L (ref 10–44)
ANION GAP SERPL CALC-SCNC: 5 MMOL/L (ref 8–16)
AST SERPL-CCNC: 104 U/L (ref 10–40)
BACTERIA BLD CULT: ABNORMAL
BACTERIA SPEC AEROBE CULT: ABNORMAL
BACTERIA SPEC ANAEROBE CULT: NORMAL
BASOPHILS # BLD AUTO: 0.07 K/UL (ref 0–0.2)
BASOPHILS NFR BLD: 0.7 % (ref 0–1.9)
BILIRUB SERPL-MCNC: 0.5 MG/DL (ref 0.1–1)
BUN SERPL-MCNC: 13 MG/DL (ref 8–23)
CALCIUM SERPL-MCNC: 7.9 MG/DL (ref 8.7–10.5)
CHLORIDE SERPL-SCNC: 100 MMOL/L (ref 95–110)
CO2 SERPL-SCNC: 29 MMOL/L (ref 23–29)
CREAT SERPL-MCNC: 0.8 MG/DL (ref 0.5–1.4)
DIFFERENTIAL METHOD BLD: ABNORMAL
EOSINOPHIL # BLD AUTO: 0.5 K/UL (ref 0–0.5)
EOSINOPHIL NFR BLD: 5.1 % (ref 0–8)
ERYTHROCYTE [DISTWIDTH] IN BLOOD BY AUTOMATED COUNT: 18.6 % (ref 11.5–14.5)
EST. GFR  (NO RACE VARIABLE): >60 ML/MIN/1.73 M^2
GLUCOSE SERPL-MCNC: 130 MG/DL (ref 70–110)
HCT VFR BLD AUTO: 32.6 % (ref 40–54)
HGB BLD-MCNC: 10.2 G/DL (ref 14–18)
IMM GRANULOCYTES # BLD AUTO: 0.21 K/UL (ref 0–0.04)
IMM GRANULOCYTES NFR BLD AUTO: 2.2 % (ref 0–0.5)
INR PPP: 1 (ref 0.8–1.2)
LYMPHOCYTES # BLD AUTO: 2.2 K/UL (ref 1–4.8)
LYMPHOCYTES NFR BLD: 22.8 % (ref 18–48)
MAGNESIUM SERPL-MCNC: 1.4 MG/DL (ref 1.6–2.6)
MCH RBC QN AUTO: 26 PG (ref 27–31)
MCHC RBC AUTO-ENTMCNC: 31.3 G/DL (ref 32–36)
MCV RBC AUTO: 83 FL (ref 82–98)
MONOCYTES # BLD AUTO: 0.9 K/UL (ref 0.3–1)
MONOCYTES NFR BLD: 9 % (ref 4–15)
NEUTROPHILS # BLD AUTO: 5.7 K/UL (ref 1.8–7.7)
NEUTROPHILS NFR BLD: 60.2 % (ref 38–73)
NRBC BLD-RTO: 0 /100 WBC
PLATELET # BLD AUTO: 288 K/UL (ref 150–450)
PMV BLD AUTO: 10 FL (ref 9.2–12.9)
POCT GLUCOSE: 153 MG/DL (ref 70–110)
POCT GLUCOSE: 156 MG/DL (ref 70–110)
POCT GLUCOSE: 214 MG/DL (ref 70–110)
POCT GLUCOSE: 240 MG/DL (ref 70–110)
POTASSIUM SERPL-SCNC: 4 MMOL/L (ref 3.5–5.1)
PROT SERPL-MCNC: 6 G/DL (ref 6–8.4)
PROTHROMBIN TIME: 11.4 SEC (ref 9–12.5)
RBC # BLD AUTO: 3.92 M/UL (ref 4.6–6.2)
SODIUM SERPL-SCNC: 134 MMOL/L (ref 136–145)
WBC # BLD AUTO: 9.42 K/UL (ref 3.9–12.7)

## 2024-10-29 PROCEDURE — 63600175 PHARM REV CODE 636 W HCPCS: Performed by: NURSE PRACTITIONER

## 2024-10-29 PROCEDURE — 99406 BEHAV CHNG SMOKING 3-10 MIN: CPT

## 2024-10-29 PROCEDURE — 85025 COMPLETE CBC W/AUTO DIFF WBC: CPT | Performed by: INTERNAL MEDICINE

## 2024-10-29 PROCEDURE — 94760 N-INVAS EAR/PLS OXIMETRY 1: CPT

## 2024-10-29 PROCEDURE — 63600175 PHARM REV CODE 636 W HCPCS: Performed by: FAMILY MEDICINE

## 2024-10-29 PROCEDURE — 97535 SELF CARE MNGMENT TRAINING: CPT | Mod: CO

## 2024-10-29 PROCEDURE — 83735 ASSAY OF MAGNESIUM: CPT | Performed by: INTERNAL MEDICINE

## 2024-10-29 PROCEDURE — 97530 THERAPEUTIC ACTIVITIES: CPT

## 2024-10-29 PROCEDURE — 80053 COMPREHEN METABOLIC PANEL: CPT | Performed by: INTERNAL MEDICINE

## 2024-10-29 PROCEDURE — 63600175 PHARM REV CODE 636 W HCPCS: Performed by: INTERNAL MEDICINE

## 2024-10-29 PROCEDURE — 99222 1ST HOSP IP/OBS MODERATE 55: CPT | Mod: ,,, | Performed by: INTERNAL MEDICINE

## 2024-10-29 PROCEDURE — 97110 THERAPEUTIC EXERCISES: CPT

## 2024-10-29 PROCEDURE — 99232 SBSQ HOSP IP/OBS MODERATE 35: CPT | Mod: ,,, | Performed by: INTERNAL MEDICINE

## 2024-10-29 PROCEDURE — 25000003 PHARM REV CODE 250: Performed by: FAMILY MEDICINE

## 2024-10-29 PROCEDURE — 36415 COLL VENOUS BLD VENIPUNCTURE: CPT | Performed by: NURSE PRACTITIONER

## 2024-10-29 PROCEDURE — 25000003 PHARM REV CODE 250: Performed by: HOSPITALIST

## 2024-10-29 PROCEDURE — 12000002 HC ACUTE/MED SURGE SEMI-PRIVATE ROOM

## 2024-10-29 PROCEDURE — 36415 COLL VENOUS BLD VENIPUNCTURE: CPT | Performed by: INTERNAL MEDICINE

## 2024-10-29 PROCEDURE — 25000003 PHARM REV CODE 250: Performed by: STUDENT IN AN ORGANIZED HEALTH CARE EDUCATION/TRAINING PROGRAM

## 2024-10-29 PROCEDURE — 97110 THERAPEUTIC EXERCISES: CPT | Mod: CO

## 2024-10-29 PROCEDURE — 85610 PROTHROMBIN TIME: CPT | Performed by: NURSE PRACTITIONER

## 2024-10-29 RX ORDER — MAGNESIUM SULFATE HEPTAHYDRATE 40 MG/ML
2 INJECTION, SOLUTION INTRAVENOUS ONCE
Status: COMPLETED | OUTPATIENT
Start: 2024-10-29 | End: 2024-10-29

## 2024-10-29 RX ADMIN — ENOXAPARIN SODIUM 40 MG: 40 INJECTION SUBCUTANEOUS at 06:10

## 2024-10-29 RX ADMIN — CEFAZOLIN 2 G: 330 INJECTION, POWDER, FOR SOLUTION INTRAMUSCULAR; INTRAVENOUS at 06:10

## 2024-10-29 RX ADMIN — PANTOPRAZOLE SODIUM 40 MG: 40 INJECTION, POWDER, LYOPHILIZED, FOR SOLUTION INTRAVENOUS at 09:10

## 2024-10-29 RX ADMIN — MAGNESIUM SULFATE HEPTAHYDRATE 2 G: 40 INJECTION, SOLUTION INTRAVENOUS at 06:10

## 2024-10-29 RX ADMIN — INSULIN ASPART 1 UNITS: 100 INJECTION, SOLUTION INTRAVENOUS; SUBCUTANEOUS at 01:10

## 2024-10-29 RX ADMIN — OXYCODONE HYDROCHLORIDE AND ACETAMINOPHEN 1 TABLET: 5; 325 TABLET ORAL at 06:10

## 2024-10-29 RX ADMIN — Medication 800 MG: at 09:10

## 2024-10-29 RX ADMIN — LEVOTHYROXINE SODIUM 75 MCG: 0.03 TABLET ORAL at 05:10

## 2024-10-29 RX ADMIN — CEFAZOLIN 2 G: 330 INJECTION, POWDER, FOR SOLUTION INTRAMUSCULAR; INTRAVENOUS at 09:10

## 2024-10-29 RX ADMIN — OXYCODONE HYDROCHLORIDE AND ACETAMINOPHEN 1 TABLET: 5; 325 TABLET ORAL at 11:10

## 2024-10-29 RX ADMIN — CITALOPRAM HYDROBROMIDE 10 MG: 10 TABLET ORAL at 09:10

## 2024-10-29 RX ADMIN — INSULIN GLARGINE 20 UNITS: 100 INJECTION, SOLUTION SUBCUTANEOUS at 08:10

## 2024-10-29 RX ADMIN — OXYCODONE HYDROCHLORIDE AND ACETAMINOPHEN 1 TABLET: 5; 325 TABLET ORAL at 03:10

## 2024-10-29 RX ADMIN — INSULIN ASPART 2 UNITS: 100 INJECTION, SOLUTION INTRAVENOUS; SUBCUTANEOUS at 08:10

## 2024-10-29 RX ADMIN — CEFAZOLIN 2 G: 330 INJECTION, POWDER, FOR SOLUTION INTRAMUSCULAR; INTRAVENOUS at 03:10

## 2024-10-29 RX ADMIN — MAGNESIUM SULFATE HEPTAHYDRATE 2 G: 40 INJECTION, SOLUTION INTRAVENOUS at 08:10

## 2024-10-29 NOTE — PT/OT/SLP PROGRESS
Physical Therapy Treatment    Patient Name:  Dhaval Hernández   MRN:  [id number]    Recommendations:     Discharge Recommendations: Moderate Intensity Therapy  Discharge Equipment Recommendations: to be determined by next level of care  Barriers to discharge: Decreased caregiver support and pt currently requiring assist of 2 persons for functional transfers    Assessment:     Dhaval Hernández is a 64 y.o. male admitted with a medical diagnosis of Septic shock.  He presents with the following impairments/functional limitations: weakness, impaired endurance, impaired self care skills, impaired functional mobility, gait instability, impaired balance, decreased upper extremity function, decreased lower extremity function, pain, edema, orthopedic precautions, impaired joint extensibility, decreased ROM.    Rehab Prognosis: Fair; patient would benefit from acute skilled PT services to address these deficits and reach maximum level of function.    Recent Surgery: Procedure(s) (LRB):  INCISION AND DRAINAGE, ABSCESS (Left) 2 Days Post-Op    Plan:     During this hospitalization, patient to be seen 6 x/week to address the identified rehab impairments via therapeutic activities, therapeutic exercises, neuromuscular re-education and progress toward the following goals:    Plan of Care Expires:  11/24/24    Subjective     Chief Complaint: L elbow/forearm pain  Patient/Family Comments/goals: return to prior level of functional mobility  Pain/Comfort:  Pain Rating 1:  (did not rate pain)  Location - Side 1: Left  Location 1: elbow  Pain Addressed 1: Reposition, Distraction, Cessation of Activity      Objective:     Communicated with ALYSSIA Jacobs prior to and after session.  Patient found HOB elevated with bed alarm, oxygen, peripheral IV, telemetry upon PT entry to room.     General Precautions: Standard, fall, pacemaker  Orthopedic Precautions: LUE weight bearing as tolerated  Braces: N/A  Respiratory Status: Nasal cannula, flow 2  L/min     Functional Mobility:  Bed Mobility:     Rolling Left:  minimum assistance  Rolling Right: minimum assistance  Scooting: moderate assistance, of 1-2 persons, and along EOB with demonstration of proper technique by PT  Bridging: stand by assistance and vc for R LE bridging as exercise  Supine to Sit: moderate assistance and of 1-2 persons  Sit to Supine: contact guard assistance and of 1-2 persons      AM-PAC 6 CLICK MOBILITY          Treatment & Education:  Pt performed B LE there ex supine x 5-10 reps each with vc for proper execution: qs, hip abd/add & slr with education regarding exercise of L residual limb to aid in better fit/function of prosthesis.      Patient left HOB elevated with all lines intact, call button in reach, bed alarm on, RN notified, and pt's spouse present..    GOALS:   Multidisciplinary Problems       Physical Therapy Goals          Problem: Physical Therapy    Goal Priority Disciplines Outcome Interventions   Physical Therapy Goal     PT, PT/OT Progressing    Description: Goals to be met by: 2024     Patient will increase functional independence with mobility by performin. Supine to sit with MInimal Assistance  2. Sit to supine with MInimal Assistance  3. Bed to chair transfer with Moderate Assistance using No Assistive Device                         Time Tracking:     PT Received On: 10/29/24  PT Start Time: 1032     PT Stop Time: 1101  PT Total Time (min): 29 min     Billable Minutes: Therapeutic Activity 15 and Therapeutic Exercise 14    Treatment Type: Treatment  PT/PTA: PT     Number of PTA visits since last PT visit: 2     10/29/2024

## 2024-10-29 NOTE — PROGRESS NOTES
Formerly [facility], [facility]–1983   Department of Infectious Disease  Progress Note        PATIENT NAME: Dhaval Hernández  MRN: [id number]  TODAY'S DATE: 10/29/2024  ADMIT DATE: 10/22/2024  LOS: 7 days    CHIEF COMPLAINT: Numbness (Pt is complaining of left sided numbness and tingling in arm started at 9 am./)      PRINCIPLE PROBLEM: Septic shock    INTERVAL HISTORY      10/25/2024: Out of ICU and now on medical floor.  X-ray shows hardware left elbow.  CT without contrast with the abscess.  X-ray right shoulder with mild DJD.  Repeat blood culture 10/24/2024 so far negative.     10/26/2024: He started draining abscess spontaneously from the left elbow today.  Repeat blood cultures remain negative.      10/27/2024:  He had I and D of left elbow today with removal of olecranon screw.  Abscess culture from yesterday growing Staphylococcus aureus.  Repeat blood culture 10/24/2024 hospital and positive.    10/28/2024:  Left elbow continues to improve clinically.  Cultures have grown MRSA.  Repeat blood cultures in progress.    10/29/2024:  Seen and evaluated at bedside.  No acute issues overnight.  Repeat blood cultures so far negative.  Pending evaluation by cardiologist for ROSE.    Antibiotics (From admission, onward)      Start     Stop Route Frequency Ordered    10/25/24 1830  ceFAZolin injection 2 g         -- IV Every 8 hours (non-standard times) 10/25/24 1400          Antifungals (From admission, onward)      None           Antivirals (From admission, onward)      None            ASSESSMENT and PLAN      MSSA bacteremia.  This is most likely from left elbow infection.  TTE with no vegetation. He has a pacemaker.  Consult cardiology for ROSE.  Follow repeat blood cultures    SIRIA.  Resolved      3.   Left elbow abscess.  He had I&D today.  Abscess culture growing Staphylococcus aureus.  He has retained hardware with probable clinical osteomyelitis.  We will treat for 6 weeks for this indication.    4. Right shoulder pain.   This is chronic associated with weakness.  Has mild DJD on x-ray.    5. Diabetes mellitus.  Management as per hospitalist.    RECOMMENDATIONS:    Continue cefazolin   Follow Repeat blood cultures  Plan to add oral rifampin in a few days   Consult cardiology for ROSE    Please send Epic secure chat with any questions.  Discussed with patient and his wife at bedside.      SUBJECTIVE    Dhaval Hernández is a 64 y.o. male with history of diabetes mellitus, hypertension, hyperlipidemia, GERD and previous left BKA.  Also with history of AICD.  He presents to the ER 10/22/2024 with 4 day history of nausea, vomiting and diarrhea.  He had no fever.  In the ER vitals were abnormal with blood pressure 75/39, pulse 60, respiratory 18, temperature 97.6° oxygen saturation 92%.  WBC was 20 K with left shift.  Creatinine 5.1.  UA abnormal with 19 WBC and 4+ glucose.  Chest x-ray with no clear acute infiltrate.  CT abdomen and pelvis showed mild cardiomegaly and also cholelithiasis.  He was commenced on IV fluid and antibiotics.     Leukocytosis resolved and WBC down to 8.0.  Blood cultures have grown MSSA in 2/2 bottles.  HIDA scan was abnormal.  Surgery was consulted to evaluate for possible cholecystitis.  Notes reviewed.  No plan for surgical intervention at this time since patient had no abdominal pain and appears to be improving.  ID asked to assist with his care.     States his symptoms started with his left elbow hurting.  He has been leaning on the left elbow to transfer from bed to wheelchair.  Has a elbow ORIF and is fixed in a flexion position.  Has had persistent pain for the last 1 week with swelling.  His other symptoms followed after the elbow pain started.        Antibiotic history:  Vancomycin: 10/22/2024 x 1 day  Meropenem: 10/22/2024-10/23/2024  Cefazolin:  10/24/2024-     Microbiology:    Blood culture 10/22/2024, 10/24/2024: MSSA 2/2  Blood culture 10/24/2024: NGTD      Review of Systems  Negative except as  stated above in Interval History     OBJECTIVE   Temp:  [97.9 °F (36.6 °C)-98.2 °F (36.8 °C)] 97.9 °F (36.6 °C)  Pulse:  [61-78] 65  Resp:  [17-20] 18  SpO2:  [93 %-96 %] 96 %  BP: (143-177)/(71-81) 158/81  Temp:  [97.9 °F (36.6 °C)-98.2 °F (36.8 °C)]   Temp: 97.9 °F (36.6 °C) (10/29/24 0749)  Pulse: 65 (10/29/24 0751)  Resp: 18 (10/29/24 0751)  BP: (!) 158/81 (10/29/24 0749)  SpO2: 96 % (10/29/24 0751)    Intake/Output Summary (Last 24 hours) at 10/29/2024 0901  Last data filed at 10/29/2024 0750  Gross per 24 hour   Intake 400 ml   Output 600 ml   Net -200 ml       Physical Exam  General:  Elderly man sitting in chair.  Left upper extremity:  Flexion contracture of the elbow.  Purulent drainage from elbow CVS: S1 and 2 heard, no murmurs appreciated   Respiratory: Clear to auscultation   Abdomen: Full, soft, nontender, no palpable organomegaly   Skin: No rash appreciated   CNS: No focal deficits   Musculoskeletal system: No joint or bony abnormalities appreciated  Psych: Good mood, normal affect.      VAD:  ISOLATION:  Now     Wounds:  None    Significant Labs: All pertinent labs within the past 24 hours have been reviewed.    CBC LAST 7 DAYS  Recent Labs   Lab 10/23/24  0443 10/23/24  0456 10/24/24  0333 10/25/24  0513 10/26/24  0456 10/27/24  0551 10/28/24  0716 10/29/24  0518   WBC 11.77  --  10.85 11.58 10.66 12.01 10.21 9.42   RBC 3.87*  --  3.93* 4.34* 4.13* 4.50* 4.33* 3.92*   HGB 10.1*  --  10.2* 11.3* 11.0* 12.2* 11.3* 10.2*   HCT 32.0* 31* 31.4* 35.3* 33.6* 37.7* 36.5* 32.6*   MCV 83  --  80* 81* 81* 84 84 83   MCH 26.1*  --  26.0* 26.0* 26.6* 27.1 26.1* 26.0*   MCHC 31.6*  --  32.5 32.0 32.7 32.4 31.0* 31.3*   RDW 18.4*  --  18.1* 18.4* 18.4* 18.6* 18.6* 18.6*     --  188 207 228 238 269 288   MPV 10.2  --  10.1 9.7 9.9 10.0 9.7 10.0   GRAN 81.2*  9.6*  --  75.9*  8.2* 68.6  7.9* 61.8  6.6 68.8  8.3* 67.5  6.9 60.2  5.7   LYMPH 6.3*  0.7*  --  10.6*  1.2 15.6*  1.8 18.3  2.0 14.7*  1.8  "17.3*  1.8 22.8  2.2   MONO 11.3  1.3*  --  10.1  1.1* 12.3  1.4* 12.9  1.4* 9.7  1.2* 8.9  0.9 9.0  0.9   BASO 0.04  --  0.02 0.04 0.03 0.07 0.06 0.07   NRBC 0  --  0 0 0 0 0 0       CHEMISTRY LAST 7 DAYS  Recent Labs   Lab 10/22/24  1423 10/22/24  1548 10/23/24  0443 10/23/24  0456 10/24/24  0333 10/24/24  1405 10/25/24  0512 10/26/24  0456 10/27/24  0551 10/28/24  0716 10/29/24  0518   NA  --    < > 132*  --  135* 135* 136 135* 133* 133* 134*   K  --    < > 4.6  --  4.1 4.6 4.3 4.2 4.7 4.3 4.0   CL  --    < > 101  --  101 98 100 100 101 102 100   CO2  --    < > 17*  --  25 28 27 26 23 25 29   ANIONGAP  --    < > 14  --  9 9 9 9 9 6* 5*   BUN  --    < > 63*  --  45* 38* 25* 21 18 16 13   CREATININE  --    < > 3.8*  --  2.3* 1.7* 1.2 1.0 0.9 0.8 0.8   GLU  --    < > 126*  --  168* 193* 114* 173* 155* 137* 130*   CALCIUM  --    < > 8.1*  --  8.0* 8.3* 8.3* 8.1* 8.3* 8.3* 7.9*   PH 7.263*  --   --  7.371  --   --   --   --   --   --   --    MG  --   --  1.4*  --  1.5*  --  1.4* 1.7 1.5* 1.4* 1.4*   ALBUMIN  --   --  2.9*  --  2.9*  --  2.9* 2.9* 3.0* 2.9* 2.7*   PROT  --   --  5.9*  --  5.7*  --  6.0 6.1 6.6 6.5 6.0   ALKPHOS  --   --  74  --  76  --  85 85 112 227* 295*   ALT  --   --  73*  --  60*  --  39 25 16 21 24   AST  --   --  69*  --  63*  --  46* 37 46* 88* 104*   BILITOT  --   --  0.6  --  0.7  --  0.9 0.5 0.6 0.5 0.5    < > = values in this interval not displayed.       Estimated Creatinine Clearance: 102.9 mL/min (based on SCr of 0.8 mg/dL).    INFLAMMATORY/PROCAL  LAST 7 DAYS  Recent Labs   Lab 10/22/24  1146 10/24/24  0333 10/24/24  1018 10/28/24  0716   PROCAL 9.377* 4.129*  --   --    CRP  --   --  25.30* 17.10*     No results found for: "ESR"  CRP   Date Value Ref Range Status   10/28/2024 17.10 (H) <1.00 mg/dL Final     Comment:     CRP-Normal Application expected values:   <1.0        mg/dL   Normal Range  1.0 - 5.0  mg/dL   Indicates mild inflammation  5.0 - 10.0 mg/dL   Indicates severe " inflammation  >10.0        mg/dL   Represents serious processes and   frequently         indicates the presence of a bacterial   infection.      10/24/2024 25.30 (H) <1.00 mg/dL Final     Comment:     CRP-Normal Application expected values:   <1.0        mg/dL   Normal Range  1.0 - 5.0  mg/dL   Indicates mild inflammation  5.0 - 10.0 mg/dL   Indicates severe inflammation  >10.0        mg/dL   Represents serious processes and   frequently         indicates the presence of a bacterial   infection.          PRIOR  MICROBIOLOGY:    Susceptibility data from last 90 days.  Collected Specimen Info Organism Ceftriaxone Clindamycin Erythromycin Oxacillin Penicillin Tetracycline Trimeth/Sulfa Vancomycin   10/27/24 Abscess from Elbow, Left Staphylococcus aureus           10/26/24 Abscess from Elbow, Left Staphylococcus aureus  S  S  S  S  R  S  S  S   10/24/24 Blood from Peripheral, Hand, Left Staphylococcus aureus           10/24/24 Blood from Peripheral, Hand, Right Staphylococcus aureus  S  S  S  S  R  S  S  S   10/22/24 Blood from Peripheral, Hand, Right Staphylococcus aureus  S  S  S  S  R  S  S  S   10/22/24 Blood from Peripheral, Hand, Left Staphylococcus aureus               LAST 7 DAYS MICROBIOLOGY   Microbiology Results (last 7 days)       Procedure Component Value Units Date/Time    Blood culture [[id number]] Collected: 10/28/24 0716    Order Status: Completed Specimen: Blood Updated: 10/29/24 0832     Blood Culture, Routine No Growth to date      No Growth to date    Narrative:      Collection has been rescheduled by GISEL at 10/27/2024 15:21 Reason:   Unable to collect. Notified ALYSSIA Rios.  Collection has been rescheduled by GISEL at 10/27/2024 15:21 Reason:   Unable to collect. Notified ALYSSIA Rios.    Aerobic culture [[id number]]  (Abnormal) Collected: 10/27/24 0805    Order Status: Completed Specimen: Abscess from Elbow, Left Updated: 10/29/24 0633     Aerobic Bacterial Culture STAPHYLOCOCCUS  AUREUS  Moderate  For susceptibility see order #[id number]      Narrative:      Elbow, Left    Blood culture [[id number]]  (Abnormal) Collected: 10/24/24 1019    Order Status: Completed Specimen: Blood from Peripheral, Hand, Left Updated: 10/29/24 0632     Blood Culture, Routine Gram stain aer bottle: Gram positive cocci      Positive results previously called on order [id number]      STAPHYLOCOCCUS AUREUS  ID consult required for Staph aureus bacteremia.  For susceptibility see order #[id number]      Blood culture [[id number]] Collected: 10/26/24 0456    Order Status: Completed Specimen: Blood Updated: 10/29/24 0632     Blood Culture, Routine No Growth to date      No Growth to date      No Growth to date      No Growth to date    Narrative:      can be drawn with am labs per rn Fernando  Collection has been rescheduled by 2MB at 10/26/2024 00:49 Reason:   Draw with am labs per rn Fernando   Collection has been rescheduled by 2MB at 10/26/2024 00:49 Reason:   Draw with am labs per rn Fernando     Blood culture [[id number]] Collected: 10/26/24 0012    Order Status: Completed Specimen: Blood from Peripheral, Hand, Right Updated: 10/29/24 0232     Blood Culture, Routine No Growth to date      No Growth to date      No Growth to date      No Growth to date    Blood culture [[id number]] Collected: 10/27/24 1513    Order Status: Completed Specimen: Blood from Peripheral, Hand, Left Updated: 10/28/24 1632     Blood Culture, Routine No Growth to date      No Growth to date    Gram stain [[id number]] Collected: 10/27/24 0805    Order Status: Completed Specimen: Abscess from Elbow, Left Updated: 10/28/24 0816     Gram Stain Result Few WBC's      Rare Gram positive cocci    Narrative:      Elbow, Left    Aerobic culture [[id number]]  (Abnormal)  (Susceptibility) Collected: 10/26/24 1552    Order Status: Completed Specimen: Abscess from Elbow, Left Updated: 10/28/24 0639     Aerobic Bacterial Culture STAPHYLOCOCCUS  AUREUS  Moderate      Blood culture [[id number]]  (Abnormal)  (Susceptibility) Collected: 10/24/24 1018    Order Status: Completed Specimen: Blood from Peripheral, Hand, Right Updated: 10/28/24 0623     Blood Culture, Routine Gram stain aer bottle: Gram positive cocci in clusters resembling Staph      Results called to and read back by:Krista Lobato Charge Nurse 10/26/2024      15:26 ncb      STAPHYLOCOCCUS AUREUS  ID consult required for Staph aureus bacteremia.      Culture, Anaerobe [[id number]] Collected: 10/27/24 0805    Order Status: Sent Specimen: Abscess from Elbow, Left Updated: 10/27/24 0959    AFB Culture & Smear [[id number]] Collected: 10/27/24 0805    Order Status: Sent Specimen: Abscess from Elbow, Left Updated: 10/27/24 0957    Fungus culture [[id number]] Collected: 10/27/24 0805    Order Status: Sent Specimen: Abscess from Elbow, Left Updated: 10/27/24 0956    Rapid Organism ID by PCR (from Blood culture) [[id number]]  (Abnormal) Collected: 10/24/24 0927    Order Status: Completed Updated: 10/26/24 1637     Enterococcus faecalis Not Detected     Enterococcus faecium Not Detected     Listeria monocytogenes Not Detected     Staphylococcus spp. See species for ID     Staphylococcus aureus Detected     Staphylococcus epidermidis Not Detected     Staphylococcus lugdunensis Not Detected     Streptococcus species Not Detected     Streptococcus agalactiae Not Detected     Streptococcus pneumoniae Not Detected     Streptococcus pyogenes Not Detected     Acinetobacter calcoaceticus/baumannii complex Not Detected     Bacteroides fragilis Not Detected     Enterobacterales Not Detected     Enterobacter cloacae complex Not Detected     Escherichia coli Not Detected     Klebsiella aerogenes Not Detected     Klebsiella oxytoca Not Detected     Klebsiella pneumoniae group Not Detected     Proteus Not Detected     Salmonella sp Not Detected     Serratia marcescens Not Detected     Haemophilus influenzae Not Detected      Neisseria meningtidis Not Detected     Pseudomonas aeruginosa Not Detected     Stenotrophomonas maltophilia Not Detected     Candida albicans Not Detected     Candida auris Not Detected     Candida glabrata Not Detected     Candida krusei Not Detected     Candida parapsilosis Not Detected     Candida tropicalis Not Detected     Cryptococcus neoformans/gattii Not Detected     CTX-M (ESBL ) Test not applicable     IMP (Carbapenem resistant) Test not applicable     KPC resistance gene (Carbapenem resistant) Test not applicable     mcr-1  Test not applicable     mec A/C  Test not applicable     mec A/C and MREJ (MRSA) gene Not Detected     NDM (Carbapenem resistant) Test not applicable     OXA-48-like (Carbapenem resistant) Test not applicable     van A/B (VRE gene) Test not applicable     VIM (Carbapenem resistant) Test not applicable    Gram stain [[id number]]     Order Status: No result Specimen: Abscess from Elbow, Left     Stool culture **cannot be ordered stat** [[id number]] Collected: 10/23/24 1357    Order Status: Completed Specimen: Stool Updated: 10/25/24 1101     Stool Culture No Salmonella,Shigella,Vibrio,Campylobacter.      No E coli 0157:H7 isolated.    Urine culture [[id number]] Collected: 10/22/24 1514    Order Status: Completed Specimen: Urine Updated: 10/25/24 0715     Urine Culture, Routine No growth    Narrative:      Specimen Source->Urine    Blood culture x two cultures. Draw prior to antibiotics. [[id number]]  (Abnormal) Collected: 10/22/24 1136    Order Status: Completed Specimen: Blood from Peripheral, Hand, Left Updated: 10/25/24 0645     Blood Culture, Routine Gram stain aer bottle: Gram positive cocci      Positive results previously called on Order #[id number]      STAPHYLOCOCCUS AUREUS  ID consult required for Staph aureus bacteremia.  For susceptibility see order #[id number]      Narrative:      Aerobic and anaerobic  Collection has been rescheduled by MAI at 10/22/2024 11:44  Reason:   Done  Collection has been rescheduled by MAI at 10/22/2024 11:44 Reason:   Done    Blood culture x two cultures. Draw prior to antibiotics. [[id number]]  (Abnormal)  (Susceptibility) Collected: 10/22/24 1143    Order Status: Completed Specimen: Blood from Peripheral, Hand, Right Updated: 10/25/24 0645     Blood Culture, Routine Gram stain aer bottle: Gram positive cocci      Results called to and read back by:Tiffani Moulton RN-1ICU;      10/23/2024  07:54 CJD      STAPHYLOCOCCUS AUREUS  ID consult required for Staph aureus bacteremia.      Narrative:      Aerobic and anaerobic  Collection has been rescheduled by MAI at 10/22/2024 11:44 Reason:   Done  Collection has been rescheduled by CORBIN at 10/22/2024 11:44 Reason:   Done    Rapid Organism ID by PCR (from Blood culture) [[id number]]  (Abnormal) Collected: 10/22/24 1143    Order Status: Completed Updated: 10/23/24 0918     Enterococcus faecalis Not Detected     Enterococcus faecium Not Detected     Listeria monocytogenes Not Detected     Staphylococcus spp. See species for ID     Staphylococcus aureus Detected     Staphylococcus epidermidis Not Detected     Staphylococcus lugdunensis Not Detected     Streptococcus species Not Detected     Streptococcus agalactiae Not Detected     Streptococcus pneumoniae Not Detected     Streptococcus pyogenes Not Detected     Acinetobacter calcoaceticus/baumannii complex Not Detected     Bacteroides fragilis Not Detected     Enterobacterales Not Detected     Enterobacter cloacae complex Not Detected     Escherichia coli Not Detected     Klebsiella aerogenes Not Detected     Klebsiella oxytoca Not Detected     Klebsiella pneumoniae group Not Detected     Proteus Not Detected     Salmonella sp Not Detected     Serratia marcescens Not Detected     Haemophilus influenzae Not Detected     Neisseria meningtidis Not Detected     Pseudomonas aeruginosa Not Detected     Stenotrophomonas maltophilia Not Detected     Candida  albicans Not Detected     Candida auris Not Detected     Candida glabrata Not Detected     Candida krusei Not Detected     Candida parapsilosis Not Detected     Candida tropicalis Not Detected     Cryptococcus neoformans/gattii Not Detected     CTX-M (ESBL ) Test not applicable     IMP (Carbapenem resistant) Test not applicable     KPC resistance gene (Carbapenem resistant) Test not applicable     mcr-1  Test not applicable     mec A/C  Test not applicable     mec A/C and MREJ (MRSA) gene Not Detected     NDM (Carbapenem resistant) Test not applicable     OXA-48-like (Carbapenem resistant) Test not applicable     van A/B (VRE gene) Test not applicable     VIM (Carbapenem resistant) Test not applicable    Narrative:      Aerobic and anaerobic          CURRENT/PREVIOUS VISIT EKG  Results for orders placed or performed during the hospital encounter of 10/22/24   EKG 12-lead    Collection Time: 10/22/24 11:45 AM   Result Value Ref Range    QRS Duration 90 ms    [facility] QTC Calculation 428 ms    Narrative    Test Reason : I95.9,    Vent. Rate : 056 BPM     Atrial Rate : 056 BPM     P-R Int : 306 ms          QRS Dur : 090 ms      QT Int : 444 ms       P-R-T Axes : 031 -27 034 degrees     QTc Int : 428 ms    Sinus bradycardia with 1st degree A-V block  Low voltage QRS  Inferior infarct (cited on or before 17-JAN-2023)  Possible Anterolateral infarct (cited on or before 12-APR-2024)  Abnormal ECG  When compared with ECG of 12-APR-2024 09:00,  No significant change was found    Referred By: [email or web address]   SELF           Confirmed By:      Significant Imaging: I have reviewed all relevant and available imaging results/findings within the past 24 hours.    I spent a total of 45 minutes on the day of the visit.This includes face to face time and non-face to face time preparing to see the patient (eg, review of tests), obtaining and/or reviewing separately obtained history, documenting clinical information in the electronic or  other health record, independently interpreting results and communicating results to the patient/family/caregiver, or care coordinator.    Migel Watson MD  Date of Service: 10/29/2024      This note was created using [employer] voice recognition software that occasionally misinterpreted phrases or words.

## 2024-10-29 NOTE — ASSESSMENT & PLAN NOTE
Resolved with shifting agents    Recent Labs     10/27/24  0551 10/28/24  0716 10/29/24  0518   K 4.7 4.3 4.0

## 2024-10-29 NOTE — ASSESSMENT & PLAN NOTE
Secondary to MSSA bacteremia  Left elbow abscess, bursitis   S/p I7D 10/27  Septic shock is resolved   Organ dysfunction: SIRIA and hypotension   Blood culture: 10/22: 4/4 MSSA, 10/24 1/4 MSSA, 10/26 and 10/27 negative so far   Abscess fluid also growing MSSA   Echo shows no vegetations   Patient does have a pacemaker, do we need ROSE, or check the pacemaker, consulted cardiology for recommendations   ID following   Cont Cefazolin

## 2024-10-29 NOTE — PROGRESS NOTES
Critical access hospital Medicine  Progress Note    Patient Name: Dhaval Hernández  MRN: [id number]  Patient Class: IP- Inpatient   Admission Date: 10/22/2024  Length of Stay: 7 days  Attending Physician: Evin Courtney MD  Primary Care Provider: Jessie Jung MD        Subjective:     Principal Problem:Septic shock        HPI:  64 year pt getting admitted with septic shock and hyperkalemia  Pt has been suffering from diarrhea/N&V for past 4 days  Stools very watery and vomitus was clear  Later he started having crampy abdominal pain/radiation to back. No aggravating/relieving factors   He acme to ER and was found to be in chock and got admitted       Overview/Hospital Course:  Patient admitted with septic shock. Bacteremia with Staph +. Repeat b/c showed NGTD. Source likely from left elbow infection. Continue with antibiotics and ID is following patient, Patient is om cefazolin. Ortho consulted for possible left elbow abscess. GI recommends outpatient follow up for FOBT +.  Patient underwent debridement on 10/27 by orthopedics. Abscess was drained and fluid is also growing MSSA. Cardiology consulted as patient has pacemaker and may need ROSE.     Interval History: no new complains. Pain fairly controlled. Afebrile.     Review of Systems  Objective:     Vital Signs (Most Recent):  Temp: 98.4 °F (36.9 °C) (10/29/24 1158)  Pulse: 68 (10/29/24 1158)  Resp: 17 (10/29/24 1158)  BP: (!) 152/70 (10/29/24 1158)  SpO2: 96 % (10/29/24 1158) Vital Signs (24h Range):  Temp:  [97.9 °F (36.6 °C)-98.4 °F (36.9 °C)] 98.4 °F (36.9 °C)  Pulse:  [61-78] 68  Resp:  [17-20] 17  SpO2:  [93 %-96 %] 96 %  BP: (143-177)/(70-81) 152/70     Weight: 104.6 kg (230 lb 9.6 oz)  Body mass index is 37.22 kg/m².    Intake/Output Summary (Last 24 hours) at 10/29/2024 1250  Last data filed at 10/29/2024 1201  Gross per 24 hour   Intake 600 ml   Output 600 ml   Net 0 ml         Physical Exam  Vitals and nursing note reviewed.    Constitutional:       General: He is not in acute distress.     Appearance: He is obese. He is not toxic-appearing.   HENT:      Head: Atraumatic.      Mouth/Throat:      Mouth: Mucous membranes are moist.      Pharynx: Oropharynx is clear.   Eyes:      General: No scleral icterus.     Conjunctiva/sclera: Conjunctivae normal.      Pupils: Pupils are equal, round, and reactive to light.   Cardiovascular:      Rate and Rhythm: Normal rate and regular rhythm.      Heart sounds: murmur heard.  Pulmonary:      Effort: No respiratory distress.      Breath sounds: No wheezing, rhonchi or rales.   Abdominal:      General: Abdomen is flat. Bowel sounds are normal.      Palpations: Abdomen is soft.   Musculoskeletal:         General: No swelling or deformity.      Cervical back: No rigidity or tenderness.      Comments: Left BKA   Left elbow surgical dressing on   Skin:     Coloration: Skin is not jaundiced or pale.      Findings: No bruising, erythema or rash.   Neurological:      General: No focal deficit present.      Mental Status: He is alert and oriented to person, place, and time.      Cranial Nerves: No cranial nerve deficit.      Sensory: No sensory deficit.      Motor: No weakness.   Psychiatric:         Mood and Affect: Mood normal.         Behavior: Behavior normal.     Significant Labs: All pertinent labs within the past 24 hours have been reviewed.  CBC:   Recent Labs   Lab 10/28/24  0716 10/29/24  0518   WBC 10.21 9.42   HGB 11.3* 10.2*   HCT 36.5* 32.6*    288     CMP:   Recent Labs   Lab 10/28/24  0716 10/29/24  0518   * 134*   K 4.3 4.0    100   CO2 25 29   * 130*   BUN 16 13   CREATININE 0.8 0.8   CALCIUM 8.3* 7.9*   PROT 6.5 6.0   ALBUMIN 2.9* 2.7*   BILITOT 0.5 0.5   ALKPHOS 227* 295*   AST 88* 104*   ALT 21 24   ANIONGAP 6* 5*       Significant Imaging: I have reviewed all pertinent imaging results/findings within the past 24 hours.    Assessment/Plan:      * Septic  shock  Secondary to MSSA bacteremia  Left elbow abscess, bursitis   S/p I7D 10/27  Septic shock is resolved   Organ dysfunction: SIRIA and hypotension   Blood culture: 10/22: 4/4 MSSA, 10/24 1/4 MSSA, 10/26 and 10/27 negative so far   Abscess fluid also growing MSSA   Echo shows no vegetations   Patient does have a pacemaker, do we need ROSE, or check the pacemaker, consulted cardiology for recommendations   ID following   Cont Cefazolin      Olecranon bursitis of left elbow  With abscess  See above     Hyperkalemia  Resolved with shifting agents    Recent Labs     10/27/24  0551 10/28/24  0716 10/29/24  0518   K 4.7 4.3 4.0                 SIRIA (acute kidney injury)  SIRIA in the background of septic/hypovolemic shock  Resolved with IVF  Recent Labs     10/27/24  0551 10/28/24  0716 10/29/24  0518   CREATININE 0.9 0.8 0.8   EGFRNORACEVR >60.0 >60.0 >60.0         Atrial fibrillation, chronic  Rate controlled with Amiodarone and Coreg   Patient not on anticoagulation from home     High anion gap metabolic acidosis  In the background of lactic acidemia  Resolved     Tobacco dependency  Aware     Severe obesity (BMI 35.0-39.9) with comorbidity  Body mass index is 36.32 kg/m². Morbid obesity complicates all aspects of disease management from diagnostic modalities to treatment.     Cardiac pacemaker  PPM insitu      Type 2 diabetes mellitus with stage 2 chronic kidney disease, without long-term current use of insulin  Increased Lantus from 16 >> 20  Cont sliding scale   Glucose better controlled     Status post unilateral below-knee amputation  L BKA        VTE Risk Mitigation (From admission, onward)           Ordered     enoxaparin injection 40 mg  Daily         10/25/24 1431     IP VTE HIGH RISK PATIENT  Once         10/22/24 1641     Place sequential compression device  Until discontinued         10/22/24 1641                    Discharge Planning   JILLIAN: 11/1/2024     Code Status: Full Code   Is the patient medically ready  for discharge?:     Reason for patient still in hospital (select all that apply): Treatment  Discharge Plan A: Skilled Nursing Facility   Discharge Delays: (!) Post-Acute Set-up              Evin Courtney MD  Department of Hospital Medicine   [facility]

## 2024-10-29 NOTE — ASSESSMENT & PLAN NOTE
SIRIA in the background of septic/hypovolemic shock  Resolved with IVF  Recent Labs     10/27/24  0551 10/28/24  0716 10/29/24  0518   CREATININE 0.9 0.8 0.8   EGFRNORACEVR >60.0 >60.0 >60.0

## 2024-10-29 NOTE — PLAN OF CARE
8:01  JANI contacted Jenifer to follow up on if family has scheduled a meeting for paperwork. Awaiting reply.    Jenifer stated she was out yesterday and would check with Annette to see if it was scheduled. She will inform JANI once she knows.    10:38  JANI called Jenifer for an update. She stated she would call JANI back.    11:07  JANI spoke to Jenifer via phone who stated she has been trying to reach Pt's spouse, Wanda to schedule a meeting to sign paperwork. While JANI was on the phone with Jenifer, SW went into Pt's room where spouse was sitting. JANI, Wanda, Jenifer, and Pt spoke on speaker phone (after permission was granted from all) and stated they would meet at the hospital in about 30 minutes to an hour. Jenifer stated she would bring the paperwork to Wanda and Pt due to Wanda not having transportation, and come pick it up later giving Wanda time to fill everything out.  JANI will follow up with Jenifer tomorrow and assure everything is in place for Pt's discharge.    3:30  JANI sent packet and consult to SS via fax and scanned into e-mail to Toni at [facility] for auth.

## 2024-10-29 NOTE — PROGRESS NOTES
"63yo M admitted on 10/22 admitted for septic shock, generalized weakness, and Left elbow pain.  PMH significant for T2DM, A1c of 6.5; HTN; Left above knee amputation; and [facility] ([facility] with Left elbow ORIF -- now ankylosed.  STATUS POST Left elbow abscess I&D on 10/27/2024.    --WBAT LUE although patient with fixed elbow flexion at 90 degrees and ankylosis/autofusion on x-ray.  --Plans for me to see patient later this afternoon for dressing change. Please have xeroform, 4x4 gauze, ABD pad, 4" ACE bandage and suture removal kit at bedside.  --No plans for surgical intervention at this time.  --Updated plan after seeing patient later today.      Gokul Troy MD  "

## 2024-10-29 NOTE — PLAN OF CARE
Problem: Acute Kidney Injury/Impairment  Goal: Fluid and Electrolyte Balance  Outcome: Progressing     Problem: Pneumonia  Goal: Fluid Balance  Outcome: Progressing     Problem: Diabetes Comorbidity  Goal: Blood Glucose Level Within Targeted Range  Outcome: Progressing

## 2024-10-29 NOTE — SUBJECTIVE & OBJECTIVE
Interval History: no new complains. Pain fairly controlled. Afebrile.     Review of Systems  Objective:     Vital Signs (Most Recent):  Temp: 98.4 °F (36.9 °C) (10/29/24 1158)  Pulse: 68 (10/29/24 1158)  Resp: 17 (10/29/24 1158)  BP: (!) 152/70 (10/29/24 1158)  SpO2: 96 % (10/29/24 1158) Vital Signs (24h Range):  Temp:  [97.9 °F (36.6 °C)-98.4 °F (36.9 °C)] 98.4 °F (36.9 °C)  Pulse:  [61-78] 68  Resp:  [17-20] 17  SpO2:  [93 %-96 %] 96 %  BP: (143-177)/(70-81) 152/70     Weight: 104.6 kg (230 lb 9.6 oz)  Body mass index is 37.22 kg/m².    Intake/Output Summary (Last 24 hours) at 10/29/2024 1250  Last data filed at 10/29/2024 1201  Gross per 24 hour   Intake 600 ml   Output 600 ml   Net 0 ml         Physical Exam  Vitals and nursing note reviewed.   Constitutional:       General: He is not in acute distress.     Appearance: He is obese. He is not toxic-appearing.   HENT:      Head: Atraumatic.      Mouth/Throat:      Mouth: Mucous membranes are moist.      Pharynx: Oropharynx is clear.   Eyes:      General: No scleral icterus.     Conjunctiva/sclera: Conjunctivae normal.      Pupils: Pupils are equal, round, and reactive to light.   Cardiovascular:      Rate and Rhythm: Normal rate and regular rhythm.      Heart sounds: murmur heard.  Pulmonary:      Effort: No respiratory distress.      Breath sounds: No wheezing, rhonchi or rales.   Abdominal:      General: Abdomen is flat. Bowel sounds are normal.      Palpations: Abdomen is soft.   Musculoskeletal:         General: No swelling or deformity.      Cervical back: No rigidity or tenderness.      Comments: Left BKA   Left elbow surgical dressing on   Skin:     Coloration: Skin is not jaundiced or pale.      Findings: No bruising, erythema or rash.   Neurological:      General: No focal deficit present.      Mental Status: He is alert and oriented to person, place, and time.      Cranial Nerves: No cranial nerve deficit.      Sensory: No sensory deficit.      Motor: No  weakness.   Psychiatric:         Mood and Affect: Mood normal.         Behavior: Behavior normal.     Significant Labs: All pertinent labs within the past 24 hours have been reviewed.  CBC:   Recent Labs   Lab 10/28/24  0716 10/29/24  0518   WBC 10.21 9.42   HGB 11.3* 10.2*   HCT 36.5* 32.6*    288     CMP:   Recent Labs   Lab 10/28/24  0716 10/29/24  0518   * 134*   K 4.3 4.0    100   CO2 25 29   * 130*   BUN 16 13   CREATININE 0.8 0.8   CALCIUM 8.3* 7.9*   PROT 6.5 6.0   ALBUMIN 2.9* 2.7*   BILITOT 0.5 0.5   ALKPHOS 227* 295*   AST 88* 104*   ALT 21 24   ANIONGAP 6* 5*       Significant Imaging: I have reviewed all pertinent imaging results/findings within the past 24 hours.

## 2024-10-29 NOTE — PT/OT/SLP PROGRESS
Occupational Therapy   Treatment    Name: Dhaval Hernández  MRN: [id number]  Admitting Diagnosis:  Septic shock  2 Days Post-Op    Recommendations:     Discharge Recommendations: Moderate Intensity Therapy  Discharge Equipment Recommendations:  to be determined by next level of care  Barriers to discharge:  None    Assessment:     Dhaval Hernández is a 64 y.o. male with a medical diagnosis of Septic shock.  He presents with good receptivity to new TherEx, remains motivated, looking forward to early session tomorrow for BSC transfer with OT and Tech. Performance deficits affecting function are weakness, impaired endurance, impaired sensation, impaired self care skills, impaired functional mobility, gait instability, impaired balance, decreased lower extremity function, decreased upper extremity function, pain, decreased ROM, edema, impaired cardiopulmonary response to activity, impaired joint extensibility, orthopedic precautions.     Rehab Prognosis:  Good; patient would benefit from acute skilled OT services to address these deficits and reach maximum level of function.       Plan:     Patient to be seen 5 x/week to address the above listed problems via self-care/home management, therapeutic activities, therapeutic exercises  Plan of Care Expires: 11/24/24  Plan of Care Reviewed with: patient    Subjective     Chief Complaint: too weak for BSC t/f without MaxA x 2 right now.  Patient/Family Comments/goals: increased strength and independence and regain PLOF.  Pain/Comfort:  Pain Rating 1: other (see comments) (unrated)  Location - Side 1: Left  Location - Orientation 1: generalized  Location 1: elbow  Pain Addressed 1: Pre-medicate for activity, Reposition  Pain Rating Post-Intervention 1: other (see comments) (pain appears less at rest with pillow support)    Objective:     Communicated with: Arlene huffman prior to session.  Patient found HOB elevated with bed alarm, oxygen, peripheral IV, telemetry upon OT entry  "to room.    General Precautions: Standard, fall, pacemaker    Orthopedic Precautions:LUE weight bearing as tolerated  Braces: N/A (soft cast/wrap elbow/upper arm)  Respiratory Status: Nasal cannula, flow 2 L/min     Occupational Performance:     Bed Mobility:    Patient completed Rolling/Turning to Left with  stand by assistance and with side rail  Patient completed Scooting/Bridging with minimum assistance and moderate assistance     Activities of Daily Living:  Attempted BM on bed pan but to no avail. Planning BSC transfer with OT and tech tomorrow.    Treatment & Education:  -Pt completed 10-20 reps of all new TherEx prescribed at PT session this AM, we added on 2 x 10 reps of LLE hip joint in all planes with VC of "up, in, out, down" and Ariela for completion, bridging 2 x 10 with focus to pres LLE and glute into mattress for strengthening and mobility assist.    Patient left HOB elevated with all lines intact, call button in reach, and nurseArlene notified    GOALS:   Multidisciplinary Problems       Occupational Therapy Goals          Problem: Occupational Therapy    Goal Priority Disciplines Outcome Interventions   Occupational Therapy Goal     OT, PT/OT Progressing    Description: Goals to be met by: 11/24/24     Patient will increase functional independence with ADLs by performing:    UE Dressing with Set-up Assistance.  LE Dressing with Set-up Assistance.  Grooming while seated with Supervision.  Toileting from bedside commode with Moderate Assistance for hygiene and clothing management.   Toilet transfer to bedside commode with Moderate Assistance.                         Time Tracking:     OT Date of Treatment: 10/29/24  OT Start Time: 1608  OT Stop Time: 1647  OT Total Time (min): 39 min    Billable Minutes:Self Care/Home Management 23 min  Therapeutic Exercise 16 min    OT/MONIK: MONIK     Number of MONIK visits since last OT visit: 2    10/29/2024  "

## 2024-10-29 NOTE — PT/OT/SLP PROGRESS
Occupational Therapy   Treatment    Name: Dhaval Hernández  MRN: [id number]  Admitting Diagnosis:  Septic shock  1 Day Post-Op    Recommendations:     Discharge Recommendations: Moderate Intensity Therapy  Discharge Equipment Recommendations:  to be determined by next level of care  Barriers to discharge:  Other (Comment) (increased assist with ADLs and mobility, especially transfers)    Assessment:     Dhaval Hernández is a 64 y.o. male with a medical diagnosis of Septic shock.  He presents with hx of L AKA (2016) and new I & D of L elbow, with limitations of 90° since 1988 when original sx was performed and hardware placed. Pt is dependent on that LUE in its 90° angle at base line to assist him in all transfers and is experiencing a significant decrease in independence at this time. He is very motivated and was very happy about getting to drop arm BSC today with ModA x 2 there and MaxA x 2 back (as that is to his L side which has the A AKA and elbow 90° limitation). Although he did well with the transfer he is not cleared to attempt with staff until we solidify a safe transfer technique that is consistently repeatable. Pt demos complete understanding of all there ex previously assigned and new BUE bed presses assigned today to increased strength in preparation for all EOB and OOB and transfers. Performance deficits affecting function are weakness, impaired endurance, impaired self care skills, impaired functional mobility, gait instability, impaired balance, decreased upper extremity function, decreased lower extremity function, pain, decreased ROM, impaired cardiopulmonary response to activity.     Rehab Prognosis:  Good; patient would benefit from acute skilled OT services to address these deficits and reach maximum level of function.       Plan:     Patient to be seen 5 x/week to address the above listed problems via self-care/home management, therapeutic exercises, therapeutic activities  Plan of Care  Expires: 11/24/24  Plan of Care Reviewed with: patient    Subjective     Chief Complaint: painful WB through LUE  Patient/Family Comments/goals: get stronger and back to [facility] life.  Pain/Comfort:  Pain Rating 1: other (see comments) (unrated- appeared mild)  Location - Side 1: Left  Location - Orientation 1: generalized  Location 1: elbow  Pain Addressed 1: Other (see comments) (attempted disuse as much as possible)  Pain Rating Post-Intervention 1: other (see comments) (pain appears less at rest)    Objective:     Communicated with: nurse tech prior to session.  Patient found HOB elevated with bed alarm, oxygen, telemetry, peripheral IV upon OT entry to room.    General Precautions: Standard, fall    Orthopedic Precautions:LUE weight bearing as tolerated  Braces: N/A (soft cast/wrap elbow/upper arm)  Respiratory Status: Nasal cannula, flow 2 L/min     Occupational Performance:     Bed Mobility:    Patient completed Scooting with moderate assistance, with side rail, and gait belt and short scoots until destination reached.  Patient completed Supine to Sit with minimum assistance and COBOS  hand as a  stabilizer to pull himself to EOB.  Patient completed Sit to Supine with stand by assistance     Functional Mobility/Transfers:  Patient completed Sit <> Stand Transfer with maximal assistance and of 2 persons from BSC with gait belt and lower bed rail for UB support, but unable to shift weight over ARNAUD and extend R knee to come to stand, but held 30 secs with flexed R knee and RUE support on bed rail.  Patient completed Bed>drop arm BSC to his right (placed at HOB) with ModA x 2 and 3 scoots.   Patient completed Drop arm BSC>Bed to his left (with lower bed rail in use) with MaxA x 2 and 5 scoots.  Functional Mobility: as described    Activities of Daily Living:  Toileting: moderate assistance for Bm from drop arm commode; pt completed 75% of BM hygiene and no LB garments to manage at this time.    [facility] 6 Click ADL:  14    Treatment & Education:  -pt stated all UE TherEx previously given and glute squeezes and demos them properly. Pt given new TherEx of BUE bed push ups for increased tricep activation with HOB raised and for entire posterior chain activation when HOB is closer to supine. Pt demos reps properly and motivated to complete throughout the day.    Patient left HOB elevated with all lines intact, call button in reach, and nurse, Deya notified    GOALS:   Multidisciplinary Problems       Occupational Therapy Goals          Problem: Occupational Therapy    Goal Priority Disciplines Outcome Interventions   Occupational Therapy Goal     OT, PT/OT Progressing    Description: Goals to be met by: 11/24/24     Patient will increase functional independence with ADLs by performing:    UE Dressing with Set-up Assistance.  LE Dressing with Set-up Assistance.  Grooming while seated with Supervision.  Toileting from bedside commode with Moderate Assistance for hygiene and clothing management.   Toilet transfer to bedside commode with Moderate Assistance.                         Time Tracking:     OT Date of Treatment: 10/28/24  OT Start Time: 1328  OT Stop Time: 1422  OT Total Time (min): 54 min    Billable Minutes:Self Care/Home Management 44 min  Therapeutic Exercise 10 min    OT/MONIK: MONIK     Number of MONIK visits since last OT visit: 1    10/28/2024

## 2024-10-29 NOTE — PLAN OF CARE
Problem: Occupational Therapy  Goal: Occupational Therapy Goal  Description: Goals to be met by: 11/24/24     Patient will increase functional independence with ADLs by performing:    UE Dressing with Set-up Assistance.  LE Dressing with Set-up Assistance.  Grooming while seated with Supervision.  Toileting from bedside commode with Moderate Assistance for hygiene and clothing management. -Met this first trial, but no LB garments to manage. Pt completes BM hygiene with 75% accuracy.  Toilet transfer to bedside commode with Moderate Assistance.-Bed>drop arm BSC to pt's R ModA x 2.  Drop arm BSC>Bed to pt's L with MaxA x 2 with extensive scooting training following.    Outcome: Progressing

## 2024-10-30 ENCOUNTER — ANESTHESIA (OUTPATIENT)
Dept: CARDIOLOGY | Facility: HOSPITAL | Age: 64
End: 2024-10-30
Payer: MEDICARE

## 2024-10-30 ENCOUNTER — ANESTHESIA EVENT (OUTPATIENT)
Dept: CARDIOLOGY | Facility: HOSPITAL | Age: 64
End: 2024-10-30
Payer: MEDICARE

## 2024-10-30 ENCOUNTER — CLINICAL SUPPORT (OUTPATIENT)
Dept: CARDIOLOGY | Facility: HOSPITAL | Age: 64
End: 2024-10-30
Attending: EMERGENCY MEDICINE
Payer: MEDICARE

## 2024-10-30 VITALS
RESPIRATION RATE: 21 BRPM | SYSTOLIC BLOOD PRESSURE: 119 MMHG | HEART RATE: 64 BPM | DIASTOLIC BLOOD PRESSURE: 56 MMHG | OXYGEN SATURATION: 100 %

## 2024-10-30 DIAGNOSIS — E11.9 TYPE 2 DIABETES MELLITUS WITHOUT COMPLICATION: ICD-10-CM

## 2024-10-30 LAB
ACID FAST MOD KINY STN SPEC: NORMAL
ALBUMIN SERPL BCP-MCNC: 2.8 G/DL (ref 3.5–5.2)
ALP SERPL-CCNC: 252 U/L (ref 55–135)
ALT SERPL W/O P-5'-P-CCNC: 15 U/L (ref 10–44)
ANION GAP SERPL CALC-SCNC: 7 MMOL/L (ref 8–16)
AST SERPL-CCNC: 47 U/L (ref 10–40)
BASOPHILS # BLD AUTO: 0.06 K/UL (ref 0–0.2)
BASOPHILS NFR BLD: 0.5 % (ref 0–1.9)
BILIRUB SERPL-MCNC: 0.4 MG/DL (ref 0.1–1)
BSA FOR ECHO PROCEDURE: 2.27 M2
BUN SERPL-MCNC: 11 MG/DL (ref 8–23)
CALCIUM SERPL-MCNC: 8.3 MG/DL (ref 8.7–10.5)
CHLORIDE SERPL-SCNC: 99 MMOL/L (ref 95–110)
CO2 SERPL-SCNC: 26 MMOL/L (ref 23–29)
CREAT SERPL-MCNC: 0.8 MG/DL (ref 0.5–1.4)
DIFFERENTIAL METHOD BLD: ABNORMAL
EOSINOPHIL # BLD AUTO: 0.5 K/UL (ref 0–0.5)
EOSINOPHIL NFR BLD: 4.5 % (ref 0–8)
ERYTHROCYTE [DISTWIDTH] IN BLOOD BY AUTOMATED COUNT: 18.3 % (ref 11.5–14.5)
EST. GFR  (NO RACE VARIABLE): >60 ML/MIN/1.73 M^2
GLUCOSE SERPL-MCNC: 194 MG/DL (ref 70–110)
HCT VFR BLD AUTO: 31.5 % (ref 40–54)
HGB BLD-MCNC: 9.9 G/DL (ref 14–18)
IMM GRANULOCYTES # BLD AUTO: 0.14 K/UL (ref 0–0.04)
IMM GRANULOCYTES NFR BLD AUTO: 1.2 % (ref 0–0.5)
LYMPHOCYTES # BLD AUTO: 2 K/UL (ref 1–4.8)
LYMPHOCYTES NFR BLD: 17.5 % (ref 18–48)
MAGNESIUM SERPL-MCNC: 2.2 MG/DL (ref 1.6–2.6)
MCH RBC QN AUTO: 26.4 PG (ref 27–31)
MCHC RBC AUTO-ENTMCNC: 31.4 G/DL (ref 32–36)
MCV RBC AUTO: 84 FL (ref 82–98)
MONOCYTES # BLD AUTO: 1 K/UL (ref 0.3–1)
MONOCYTES NFR BLD: 9.1 % (ref 4–15)
NEUTROPHILS # BLD AUTO: 7.6 K/UL (ref 1.8–7.7)
NEUTROPHILS NFR BLD: 67.2 % (ref 38–73)
NRBC BLD-RTO: 0 /100 WBC
PLATELET # BLD AUTO: 326 K/UL (ref 150–450)
PMV BLD AUTO: 10.2 FL (ref 9.2–12.9)
POCT GLUCOSE: 156 MG/DL (ref 70–110)
POCT GLUCOSE: 179 MG/DL (ref 70–110)
POCT GLUCOSE: 197 MG/DL (ref 70–110)
POTASSIUM SERPL-SCNC: 4.5 MMOL/L (ref 3.5–5.1)
PROT SERPL-MCNC: 6.5 G/DL (ref 6–8.4)
RBC # BLD AUTO: 3.75 M/UL (ref 4.6–6.2)
SODIUM SERPL-SCNC: 132 MMOL/L (ref 136–145)
WBC # BLD AUTO: 11.25 K/UL (ref 3.9–12.7)

## 2024-10-30 PROCEDURE — 99233 SBSQ HOSP IP/OBS HIGH 50: CPT | Mod: ,,, | Performed by: INTERNAL MEDICINE

## 2024-10-30 PROCEDURE — 21400001 HC TELEMETRY ROOM

## 2024-10-30 PROCEDURE — 80053 COMPREHEN METABOLIC PANEL: CPT | Performed by: INTERNAL MEDICINE

## 2024-10-30 PROCEDURE — 37000008 HC ANESTHESIA 1ST 15 MINUTES: Performed by: ANESTHESIOLOGY

## 2024-10-30 PROCEDURE — 63600175 PHARM REV CODE 636 W HCPCS: Performed by: NURSE ANESTHETIST, CERTIFIED REGISTERED

## 2024-10-30 PROCEDURE — 93320 DOPPLER ECHO COMPLETE: CPT

## 2024-10-30 PROCEDURE — 27000221 HC OXYGEN, UP TO 24 HOURS

## 2024-10-30 PROCEDURE — 99900031 HC PATIENT EDUCATION (STAT)

## 2024-10-30 PROCEDURE — 63600175 PHARM REV CODE 636 W HCPCS: Performed by: FAMILY MEDICINE

## 2024-10-30 PROCEDURE — 99232 SBSQ HOSP IP/OBS MODERATE 35: CPT | Mod: ,,, | Performed by: INTERNAL MEDICINE

## 2024-10-30 PROCEDURE — D9220A PRA ANESTHESIA: Mod: ANES,,, | Performed by: ANESTHESIOLOGY

## 2024-10-30 PROCEDURE — 93312 ECHO TRANSESOPHAGEAL: CPT

## 2024-10-30 PROCEDURE — 25000003 PHARM REV CODE 250: Performed by: HOSPITALIST

## 2024-10-30 PROCEDURE — 37000009 HC ANESTHESIA EA ADD 15 MINS: Performed by: ANESTHESIOLOGY

## 2024-10-30 PROCEDURE — 12000002 HC ACUTE/MED SURGE SEMI-PRIVATE ROOM

## 2024-10-30 PROCEDURE — 63600175 PHARM REV CODE 636 W HCPCS: Performed by: INTERNAL MEDICINE

## 2024-10-30 PROCEDURE — 36415 COLL VENOUS BLD VENIPUNCTURE: CPT | Performed by: INTERNAL MEDICINE

## 2024-10-30 PROCEDURE — D9220A PRA ANESTHESIA: Mod: CRNA,,, | Performed by: NURSE ANESTHETIST, CERTIFIED REGISTERED

## 2024-10-30 PROCEDURE — 94760 N-INVAS EAR/PLS OXIMETRY 1: CPT

## 2024-10-30 PROCEDURE — 93325 DOPPLER ECHO COLOR FLOW MAPG: CPT

## 2024-10-30 PROCEDURE — 83735 ASSAY OF MAGNESIUM: CPT | Performed by: INTERNAL MEDICINE

## 2024-10-30 PROCEDURE — 85025 COMPLETE CBC W/AUTO DIFF WBC: CPT | Performed by: INTERNAL MEDICINE

## 2024-10-30 PROCEDURE — 25000003 PHARM REV CODE 250: Performed by: FAMILY MEDICINE

## 2024-10-30 RX ORDER — RIFAMPIN 300 MG/1
300 CAPSULE ORAL 2 TIMES DAILY
Status: DISCONTINUED | OUTPATIENT
Start: 2024-11-01 | End: 2024-10-31 | Stop reason: HOSPADM

## 2024-10-30 RX ORDER — PROPOFOL 10 MG/ML
VIAL (ML) INTRAVENOUS
Status: DISCONTINUED | OUTPATIENT
Start: 2024-10-30 | End: 2024-10-30

## 2024-10-30 RX ADMIN — OXYCODONE HYDROCHLORIDE AND ACETAMINOPHEN 1 TABLET: 5; 325 TABLET ORAL at 11:10

## 2024-10-30 RX ADMIN — ENOXAPARIN SODIUM 40 MG: 40 INJECTION SUBCUTANEOUS at 06:10

## 2024-10-30 RX ADMIN — CITALOPRAM HYDROBROMIDE 10 MG: 10 TABLET ORAL at 09:10

## 2024-10-30 RX ADMIN — INSULIN ASPART 1 UNITS: 100 INJECTION, SOLUTION INTRAVENOUS; SUBCUTANEOUS at 10:10

## 2024-10-30 RX ADMIN — OXYCODONE HYDROCHLORIDE AND ACETAMINOPHEN 1 TABLET: 5; 325 TABLET ORAL at 03:10

## 2024-10-30 RX ADMIN — INSULIN ASPART 2 UNITS: 100 INJECTION, SOLUTION INTRAVENOUS; SUBCUTANEOUS at 06:10

## 2024-10-30 RX ADMIN — PROPOFOL 50 MG: 10 INJECTION, EMULSION INTRAVENOUS at 10:10

## 2024-10-30 RX ADMIN — INSULIN GLARGINE 20 UNITS: 100 INJECTION, SOLUTION SUBCUTANEOUS at 10:10

## 2024-10-30 RX ADMIN — CEFAZOLIN 2 G: 330 INJECTION, POWDER, FOR SOLUTION INTRAMUSCULAR; INTRAVENOUS at 09:10

## 2024-10-30 RX ADMIN — INSULIN ASPART 2 UNITS: 100 INJECTION, SOLUTION INTRAVENOUS; SUBCUTANEOUS at 09:10

## 2024-10-30 RX ADMIN — OXYCODONE HYDROCHLORIDE AND ACETAMINOPHEN 1 TABLET: 5; 325 TABLET ORAL at 02:10

## 2024-10-30 RX ADMIN — CEFAZOLIN 2 G: 330 INJECTION, POWDER, FOR SOLUTION INTRAMUSCULAR; INTRAVENOUS at 06:10

## 2024-10-30 RX ADMIN — CEFAZOLIN 2 G: 330 INJECTION, POWDER, FOR SOLUTION INTRAMUSCULAR; INTRAVENOUS at 02:10

## 2024-10-30 RX ADMIN — PANTOPRAZOLE SODIUM 40 MG: 40 INJECTION, POWDER, LYOPHILIZED, FOR SOLUTION INTRAVENOUS at 09:10

## 2024-10-30 RX ADMIN — OXYCODONE HYDROCHLORIDE AND ACETAMINOPHEN 1 TABLET: 5; 325 TABLET ORAL at 07:10

## 2024-10-30 RX ADMIN — LEVOTHYROXINE SODIUM 75 MCG: 0.03 TABLET ORAL at 05:10

## 2024-10-30 NOTE — PT/OT/SLP PROGRESS
Physical Therapy      Patient Name:  Dhaval Hernández   MRN:  [id number]    Patient not seen today secondary to Off the floor for procedure/surgery, Other (Comment) (Pt off the floor for ROSE, then transferred into ICU. Hold today until further details available.). Will follow-up 10/31/24.

## 2024-10-30 NOTE — NURSING
ROSE completed.   Report called to ALYSSIA Matias for 3110    Patient sitting up in bed, no acute distress noted, awake, alert, and oriented x 4, calm, respirations even and unlabored, and skin is warm and dry. Patient verbalized understanding of status and plan of care. Patient denies needs at this time. Side rails up x 2, call light in reach, bed low and locked. Care ongoing. Will continue to observe and report any changes, see flowsheets for assessment and care.

## 2024-10-30 NOTE — ASSESSMENT & PLAN NOTE
Resolved with shifting agents    Recent Labs     10/28/24  0716 10/29/24  0518 10/30/24  0334   K 4.3 4.0 4.5

## 2024-10-30 NOTE — SUBJECTIVE & OBJECTIVE
Interval History: Pain is controlled. Patient is afebrile. Planned for ROSE today. Wound was assessed by ortho yesterday, recommended packing by wound care.     Review of Systems  Objective:     Vital Signs (Most Recent):  Temp: 98.3 °F (36.8 °C) (10/30/24 0732)  Pulse: 73 (10/30/24 0950)  Resp: 16 (10/30/24 0950)  BP: (!) 174/76 (10/30/24 0732)  SpO2: (!) 94 % (10/30/24 0952) Vital Signs (24h Range):  Temp:  [97.9 °F (36.6 °C)-98.4 °F (36.9 °C)] 98.3 °F (36.8 °C)  Pulse:  [67-78] 73  Resp:  [16-18] 16  SpO2:  [94 %-96 %] 94 %  BP: (152-178)/(70-81) 174/76     Weight: 104.6 kg (230 lb 9.6 oz)  Body mass index is 37.22 kg/m².    Intake/Output Summary (Last 24 hours) at 10/30/2024 1037  Last data filed at 10/30/2024 0756  Gross per 24 hour   Intake 200 ml   Output 1101 ml   Net -901 ml         Physical Exam  Vitals and nursing note reviewed.   Constitutional:       General: He is not in acute distress.     Appearance: He is obese. He is not toxic-appearing.   HENT:      Head: Atraumatic.      Mouth/Throat:      Mouth: Mucous membranes are moist.      Pharynx: Oropharynx is clear.   Eyes:      General: No scleral icterus.     Conjunctiva/sclera: Conjunctivae normal.      Pupils: Pupils are equal, round, and reactive to light.   Cardiovascular:      Rate and Rhythm: Normal rate and regular rhythm.      Heart sounds: murmur heard.  Pulmonary:      Effort: No respiratory distress.      Breath sounds: No wheezing, rhonchi or rales.   Abdominal:      General: Abdomen is flat. Bowel sounds are normal.      Palpations: Abdomen is soft.   Musculoskeletal:         General: No swelling or deformity.      Cervical back: No rigidity or tenderness.      Comments: Left BKA   Left elbow surgical dressing on   Skin:     Coloration: Skin is not jaundiced or pale.      Findings: No bruising, erythema or rash.   Neurological:      General: No focal deficit present.      Mental Status: He is alert and oriented to person, place, and time.       Cranial Nerves: No cranial nerve deficit.      Sensory: No sensory deficit.      Motor: No weakness.   Psychiatric:         Mood and Affect: Mood normal.         Behavior: Behavior normal.    Significant Labs: All pertinent labs within the past 24 hours have been reviewed.  CBC:   Recent Labs   Lab 10/29/24  0518 10/30/24  0334   WBC 9.42 11.25   HGB 10.2* 9.9*   HCT 32.6* 31.5*    326     CMP:   Recent Labs   Lab 10/29/24  0518 10/30/24  0334   * 132*   K 4.0 4.5    99   CO2 29 26   * 194*   BUN 13 11   CREATININE 0.8 0.8   CALCIUM 7.9* 8.3*   PROT 6.0 6.5   ALBUMIN 2.7* 2.8*   BILITOT 0.5 0.4   ALKPHOS 295* 252*   * 47*   ALT 24 15   ANIONGAP 5* 7*       Significant Imaging: I have reviewed all pertinent imaging results/findings within the past 24 hours.

## 2024-10-30 NOTE — ASSESSMENT & PLAN NOTE
SIRIA in the background of septic/hypovolemic shock  Resolved with IVF  Recent Labs     10/28/24  0716 10/29/24  0518 10/30/24  0334   CREATININE 0.8 0.8 0.8   EGFRNORACEVR >60.0 >60.0 >60.0

## 2024-10-30 NOTE — PROGRESS NOTES
[facility]  Adult Nutrition   Progress Note (Initial Assessment)     SUMMARY     Recommendations  Recommendation/Intervention:   1. Recommend Diabetic 2000 kcal diet.   2. May consider David BID for healing s/p Left elbow abscess I&D on 10/27/2024.    Goals: 1. Intake to be >/= 75% EEN / EPN by follow up.  Nutrition Goal Status: new    Nutrition Diagnosis PES Statement: Increased nutrition needs related to wound healing as evidenced by s/p wound debridement in patient with history of diabetes having likely impaired healing. RD added diabetic diet restrictions. May need David BID.    Dietitian Rounds Brief  LOS Screen. Patient underwent debridement on 10/27 by orthopedics. Abscess was drained and fluid is also growing MSSA. Cardiology consulted as patient has pacemaker. Patient had ROSE today and was transferred to ICU post ROSE. Patient now back on floor and sleeping. Diet advanced to regular and RD added CCHO 2000 kcal due to patient's history of diabetes and glucose > 180 mg/dL. RD to follow for intake, status, and need for supplement PRN.    Nutrition Related Social Determinants of Health:   Food Insecurity: No Food Insecurity (10/22/2024)    Hunger Vital Sign     Worried About Running Out of Food in the Last Year: Never true     Ran Out of Food in the Last Year: Never true         Malnutrition Assessment   No visible signs of malnutrition.           Diet order:   Current Diet Order: Regular diet.                 Evaluation of Received Nutrient/Fluid Intake  Energy Calories Required: not meeting needs  Protein Required: not meeting needs  Fluid Required: meeting needs  Tolerance: other (see comments) (NPO)     % Intake of Estimated Energy Needs: 0%  % Meal Intake: 0 - 25 %      Intake/Output Summary (Last 24 hours) at 10/30/2024 1516  Last data filed at 10/30/2024 1418  Gross per 24 hour   Intake --   Output 2300 ml   Net -2300 ml        Anthropometrics  Temp: 98.3 °F (36.8 °C)  Height Method:  "Estimated  Height: 5' 6" (167.6 cm)  Height (inches): 66 in  Weight Method: Bed Scale  Weight: 104.6 kg (230 lb 9.6 oz)  Weight (lb): 230.6 lb  Ideal Body Weight (IBW), Male: 142 lb  % Ideal Body Weight, Male (lb): 158.45 %  BMI (Calculated): 37.2       Estimated/Assessed Needs  Weight Used For Calorie Calculations: 104.3 kg (229 lb 15 oz)  Energy Calorie Requirements (kcal): 8865-2262 kcal/day (11-14 kcal/kg)  Energy Need Method: Kcal/kg  Protein Requirements: 112-150 gm/day (1.5-2.0 gm/kg IBW)  Weight Used For Protein Calculations: 75 kg (165 lb 5.5 oz) (IBW)  Fluid Requirements (mL): 1 mL/kcal or per MD  Estimated Fluid Requirement Method: RDA Method  RDA Method (mL): 1147  CHO Requirement: 143-182 gm/day (9-12 cho servings/day).    Reason for Assessment  Reason For Assessment: length of stay  Diagnosis: infection/sepsis  General Information Comments: Patient admitted with septic shock and hyperkalemia  Pt has been suffering from diarrhea/N&V for past 4 days  Stools very watery and vomitus was clear  Later he started having crampy abdominal pain/radiation to back. No aggravating/relieving factors. Pt medical history includes: DM 2, HLD, HTN, GERD, s/p ORIF left elbow, left AKA and pacemaker.  Nutrition Discharge Planning: Diabetic Cardiac diet as tolerated.    Nutrition/Diet History  Spiritual, Cultural Beliefs, Anabaptism Practices, Values that Affect Care: no  Food Allergies: fish (fish containing products)  Factors Affecting Nutritional Intake: NPO    Nutrition Risk Screen  Nutrition Risk Screen: reduced oral intake over the last month     MST Score: 1  Have you recently lost weight without trying?: No  Weight loss score: 0  Have you been eating poorly because of a decreased appetite?: Yes  Appetite score: 1       Weight History:  Wt Readings from Last 10 Encounters:   10/24/24 104.6 kg (230 lb 9.6 oz)   10/25/24 104.3 kg (230 lb)   07/23/24 102.4 kg (225 lb 12 oz)   04/17/24 89.8 kg (197 lb 15.6 oz)   04/12/24 " 89.8 kg (198 lb)   12/13/23 89.8 kg (198 lb)   10/30/23 89.8 kg (198 lb)   02/21/18 90.1 kg (198 lb 10.2 oz)        Lab/Procedures/Meds: Pertinent Labs/Meds Reviewed    Medications:Pertinent Medications Reviewed  Scheduled Meds:   ceFAZolin  2 g Intravenous Q8H    citalopram  10 mg Oral Daily    enoxparin  40 mg Subcutaneous Daily    insulin glargine U-100  20 Units Subcutaneous QHS    levothyroxine  75 mcg Oral Before breakfast    pantoprazole  40 mg Intravenous Daily    [START ON 11/1/2024] rifAMpin  300 mg Oral BID     Continuous Infusions:  PRN Meds:.  Current Facility-Administered Medications:     acetaminophen, 650 mg, Oral, Q8H PRN    acetaminophen, 650 mg, Oral, Q4H PRN    aluminum-magnesium hydroxide-simethicone, 30 mL, Oral, QID PRN    dextrose 50%, 12.5 g, Intravenous, PRN    dextrose 50%, 12.5 g, Intravenous, PRN    dextrose 50%, 25 g, Intravenous, PRN    diphenhydrAMINE, 12.5 mg, Intravenous, Q6H PRN    glucagon (human recombinant), 1 mg, Intramuscular, PRN    glucagon (human recombinant), 1 mg, Intramuscular, PRN    glucose, 16 g, Oral, PRN    glucose, 24 g, Oral, PRN    HYDROmorphone, 0.2 mg, Intravenous, Q5 Min PRN    insulin aspart U-100, 0-10 Units, Subcutaneous, QID (AC + HS) PRN    magnesium oxide, 800 mg, Oral, PRN    magnesium oxide, 800 mg, Oral, PRN    melatonin, 6 mg, Oral, Nightly PRN    naloxone, 0.02 mg, Intravenous, PRN    ondansetron, 4 mg, Intravenous, Q6H PRN    ondansetron, 4 mg, Intravenous, Daily PRN    oxyCODONE, 5 mg, Oral, PRN    oxyCODONE-acetaminophen, 1 tablet, Oral, Q4H PRN    potassium bicarbonate, 35 mEq, Oral, PRN    potassium bicarbonate, 50 mEq, Oral, PRN    potassium bicarbonate, 60 mEq, Oral, PRN    potassium, sodium phosphates, 2 packet, Oral, PRN    potassium, sodium phosphates, 2 packet, Oral, PRN    potassium, sodium phosphates, 2 packet, Oral, PRN    Labs: Pertinent Labs Reviewed  Clinical Chemistry:  Recent Labs   Lab 10/28/24  0716 10/29/24  0518 10/30/24  0336  "  * 134* 132*   K 4.3 4.0 4.5    100 99   CO2 25 29 26   * 130* 194*   BUN 16 13 11   CREATININE 0.8 0.8 0.8   CALCIUM 8.3* 7.9* 8.3*   PROT 6.5 6.0 6.5   ALBUMIN 2.9* 2.7* 2.8*   BILITOT 0.5 0.5 0.4   ALKPHOS 227* 295* 252*   AST 88* 104* 47*   ALT 21 24 15   ANIONGAP 6* 5* 7*   MG 1.4* 1.4* 2.2     CBC:   Recent Labs   Lab 10/30/24  0334   WBC 11.25   RBC 3.75*   HGB 9.9*   HCT 31.5*      MCV 84   MCH 26.4*   MCHC 31.4*     Lipid Panel:  No results for input(s): "CHOL", "HDL", "LDLCALC", "TRIG", "CHOLHDL" in the last 168 hours.  Cardiac Profile:  No results for input(s): "BNP", "CPK", "CPKMB", "TROPONINI", "CKTOTAL" in the last 168 hours.  Inflammatory Labs:  Recent Labs   Lab 10/24/24  1018 10/28/24  0716   CRP 25.30* 17.10*     Diabetes:  Recent Labs   Lab 10/29/24  1204 10/29/24  2022 10/30/24  0658   POCTGLUCOSE 214* 240* 179*     Thyroid & Parathyroid:  No results for input(s): "TSH", "FREET4", "L8PEUJR", "D5GGGUB", "THYROIDAB" in the last 168 hours.    Monitor and Evaluation  Food and Nutrient Intake: energy intake, food and beverage intake  Food and Nutrient Adminstration: diet order  Knowledge/Beliefs/Attitudes: food and nutrition knowledge/skill  Physical Activity and Function: nutrition-related ADLs and IADLs  Anthropometric Measurements: weight change, body mass index, weight  Biochemical Data, Medical Tests and Procedures: electrolyte and renal panel, inflammatory profile, gastrointestinal profile, lipid profile, glucose/endocrine profile  Nutrition-Focused Physical Findings: overall appearance     Nutrition Risk  Level of Risk/Frequency of Follow-up:  (1 x / week)     Nutrition Follow-Up  RD Follow-up?: Yes            "

## 2024-10-30 NOTE — PROGRESS NOTES
[facility] access hospital  Orthopedics  Progress Note    Patient Name: Dhaval Hernández  MRN: [id number]  Admission Date: 10/22/2024  Hospital Length of Stay: 7 days  Attending Provider: Evin Courtney MD  Primary Care Provider: Jessie Jung MD  Follow-up For: Procedure(s) (LRB):  INCISION AND DRAINAGE, ABSCESS (Left)    Post-Operative Day: 2 Days Post-Op  Subjective:     Principal Problem:Septic shock    Principal Orthopedic Problem: Left elbow abscess    Interval History:   NAEON. AFVSS. Left elbow pain and small finger paresthesias improved vs preop. Cultures (+) MSSA.      Review of patient's allergies indicates:   Allergen Reactions    Fish containing products      Other reaction(s): .       Current Facility-Administered Medications   Medication    acetaminophen tablet 650 mg    acetaminophen tablet 650 mg    aluminum-magnesium hydroxide-simethicone 200-200-20 mg/5 mL suspension 30 mL    ceFAZolin injection 2 g    citalopram tablet 10 mg    dextrose 50% injection 12.5 g    dextrose 50% injection 12.5 g    dextrose 50% injection 25 g    diphenhydrAMINE injection 12.5 mg    enoxaparin injection 40 mg    glucagon (human recombinant) injection 1 mg    glucagon (human recombinant) injection 1 mg    glucose chewable tablet 16 g    glucose chewable tablet 24 g    HYDROmorphone injection 0.2 mg    insulin aspart U-100 pen 0-10 Units    insulin glargine U-100 (Lantus) pen 20 Units    levothyroxine tablet 75 mcg    magnesium oxide tablet 800 mg    magnesium oxide tablet 800 mg    magnesium sulfate 2g in water 50mL IVPB (premix)    magnesium sulfate 2g in water 50mL IVPB (premix)    melatonin tablet 6 mg    naloxone 0.4 mg/mL injection 0.02 mg    ondansetron injection 4 mg    ondansetron injection 4 mg    oxyCODONE immediate release tablet 5 mg    oxyCODONE-acetaminophen 5-325 mg per tablet 1 tablet    pantoprazole injection 40 mg    potassium bicarbonate disintegrating tablet 35 mEq    potassium  "bicarbonate disintegrating tablet 50 mEq    potassium bicarbonate disintegrating tablet 60 mEq    potassium, sodium phosphates 280-160-250 mg packet 2 packet    potassium, sodium phosphates 280-160-250 mg packet 2 packet    potassium, sodium phosphates 280-160-250 mg packet 2 packet     Objective:     Vital Signs (Most Recent):  Temp: 98.4 °F (36.9 °C) (10/29/24 1158)  Pulse: 68 (10/29/24 1158)  Resp: 18 (10/29/24 1810)  BP: (!) 152/70 (10/29/24 1158)  SpO2: 96 % (10/29/24 1158) Vital Signs (24h Range):  Temp:  [97.9 °F (36.6 °C)-98.4 °F (36.9 °C)] 98.4 °F (36.9 °C)  Pulse:  [61-78] 68  Resp:  [17-20] 18  SpO2:  [93 %-96 %] 96 %  BP: (143-177)/(70-81) 152/70     Weight: 104.6 kg (230 lb 9.6 oz)  Height: 5' 6" (167.6 cm)  Body mass index is 37.22 kg/m².      Intake/Output Summary (Last 24 hours) at 10/29/2024 1906  Last data filed at 10/29/2024 1302  Gross per 24 hour   Intake 400 ml   Output 601 ml   Net -201 ml       Ortho/SPM Exam  Gen: NAD; A&Ox3.  CV: RR.  Resp: no increased WOB. On oxygen via NC.  Left UE:   Postoperative dressing removed.  Mild serosanguineous drainage.  No residual purulence.  Penrose drain removed.  Overall significant improvement in regard to postoperative swelling, warmth, erythema.  Baseline 90 degree flexion contracture/ankylosed elbow joint.  30° supination and 45° pronation.  No suggestion of septic arthritis although difficult to assess given baseline ankylosed joint.  First dorsal interossei atrophy.  Improved sensation to the small finger.  Subjective improved  strength versus preop.  Palpable radial pulse.      Significant Labs: A1C: No results for input(s): "HGBA1C" in the last 4320 hours.  BMP:   Recent Labs   Lab 10/29/24  0518   *   *   K 4.0      CO2 29   BUN 13   CREATININE 0.8   CALCIUM 7.9*   MG 1.4*     CBC:   Recent Labs   Lab 10/28/24  0716 10/29/24  0518   WBC 10.21 9.42   HGB 11.3* 10.2*   HCT 36.5* 32.6*    288     CMP:   Recent Labs " "  Lab 10/28/24  0716 10/29/24  0518   * 134*   K 4.3 4.0    100   CO2 25 29   * 130*   BUN 16 13   CREATININE 0.8 0.8   CALCIUM 8.3* 7.9*   PROT 6.5 6.0   ALBUMIN 2.9* 2.7*   BILITOT 0.5 0.5   ALKPHOS 227* 295*   AST 88* 104*   ALT 21 24   ANIONGAP 6* 5*     Coagulation:   Recent Labs   Lab 10/29/24  0906   LABPROT 11.4   INR 1.0     CRP:   Recent Labs   Lab 10/28/24  0716   CRP 17.10*     All pertinent labs within the past 24 hours have been reviewed.    Significant Imaging: None    Assessment/Plan:       65yo M admitted on 10/22 admitted for septic shock, generalized weakness, and Left elbow pain.  PMH significant for T2DM, A1c of 6.5; HTN; Left above knee amputation; and high speed longterm (1979) with Left elbow ORIF -- now ankylosed.  STATUS POST Left elbow abscess I&D on 10/27/2024.     --Pinrose drain removed today and new dressing placed by nursing staff.  --Recommend wound care consult for daily packing with 1/4" cotton packing. If wound is not large enough for packing, may perform simple dressing change with xeroform, 4x4 gauze, ABD and ACE bandage.  --WBAT LUE although patient with fixed elbow flexion at 90 degrees and ankylosis/autofusion on x-ray.  --No plans for return to OR.  --Follow up in 10-14 days for wound check. Ortho will sign off. Call with questions.           Gokul Troy MD  Orthopedics  [facility]  "

## 2024-10-30 NOTE — PROGRESS NOTES
formerly [facility]   Department of Infectious Disease  Progress Note        PATIENT NAME: Dhaval Hernández  MRN: [id number]  TODAY'S DATE: 10/30/2024  ADMIT DATE: 10/22/2024  LOS: 8 days    CHIEF COMPLAINT: Numbness (Pt is complaining of left sided numbness and tingling in arm started at 9 am./)      PRINCIPLE PROBLEM: Septic shock    INTERVAL HISTORY      10/25/2024: Out of ICU and now on medical floor.  X-ray shows hardware left elbow.  CT without contrast with the abscess.  X-ray right shoulder with mild DJD.  Repeat blood culture 10/24/2024 so far negative.     10/26/2024: He started draining abscess spontaneously from the left elbow today.  Repeat blood cultures remain negative.      10/27/2024:  He had I and D of left elbow today with removal of olecranon screw.  Abscess culture from yesterday growing Staphylococcus aureus.  Repeat blood culture 10/24/2024 hospital and positive.    10/28/2024:  Left elbow continues to improve clinically.  Cultures have grown MRSA.  Repeat blood cultures in progress.    10/29/2024:  Seen and evaluated at bedside.  No acute issues overnight.  Repeat blood cultures so far negative.  Pending evaluation by cardiologist for ROSE.    10/30/2024:  Evaluated at bedside.  Continues to improve.  Penrose drain removed from left elbow yesterday by orthopedic surgeon.  Had ROSE today.  Report pending.    Antibiotics (From admission, onward)      Start     Stop Route Frequency Ordered    10/25/24 1830  ceFAZolin injection 2 g         -- IV Every 8 hours (non-standard times) 10/25/24 1400          Antifungals (From admission, onward)      None           Antivirals (From admission, onward)      None            ASSESSMENT and PLAN      MSSA bacteremia.  This is most likely from left elbow infection.  TTE with no vegetation. He has a pacemaker.  Consult cardiology for ROSE.  Follow repeat blood cultures    SIRIA.  Resolved      3.   Left elbow abscess.  He had I&D today.  Abscess culture  growing Staphylococcus aureus.  He has retained hardware with probable clinical osteomyelitis.  We will treat for 6 weeks for this indication.    4. Right shoulder pain.  This is chronic associated with weakness.  Has mild DJD on x-ray.    5. Diabetes mellitus.  Management as per hospitalist.    RECOMMENDATIONS:    Continue cefazolin for 6 weeks through 12/05/2024  Add rifampin 300 mg p.o. b.i.d. on 11/1/2024 through 12/05/2024  Place PICC line for prolonged IV antibiotics.  Remove after completing IV antibiotics 12/05/2024   Check CBC, CMP, CRP, ESR every Monday  He will probably require chronic p.o. antibiotics with either cefadroxil on Bactrim after 12/05/2024  Follow Repeat blood cultures  Check x-ray left elbow to assess residual hardware    Please send Epic secure chat with any questions.  Discussed with patient and his wife at bedside.      SUBJECTIVE    Dhaval Hernández is a 64 y.o. male with history of diabetes mellitus, hypertension, hyperlipidemia, GERD and previous left BKA.  Also with history of AICD.  He presents to the ER 10/22/2024 with 4 day history of nausea, vomiting and diarrhea.  He had no fever.  In the ER vitals were abnormal with blood pressure 75/39, pulse 60, respiratory 18, temperature 97.6° oxygen saturation 92%.  WBC was 20 K with left shift.  Creatinine 5.1.  UA abnormal with 19 WBC and 4+ glucose.  Chest x-ray with no clear acute infiltrate.  CT abdomen and pelvis showed mild cardiomegaly and also cholelithiasis.  He was commenced on IV fluid and antibiotics.     Leukocytosis resolved and WBC down to 8.0.  Blood cultures have grown MSSA in 2/2 bottles.  HIDA scan was abnormal.  Surgery was consulted to evaluate for possible cholecystitis.  Notes reviewed.  No plan for surgical intervention at this time since patient had no abdominal pain and appears to be improving.  ID asked to assist with his care.     States his symptoms started with his left elbow hurting.  He has been leaning on  the left elbow to transfer from bed to wheelchair.  Has a elbow ORIF and is fixed in a flexion position.  Has had persistent pain for the last 1 week with swelling.  His other symptoms followed after the elbow pain started.        Antibiotic history:  Vancomycin: 10/22/2024 x 1 day  Meropenem: 10/22/2024-10/23/2024  Cefazolin:  10/24/2024-     Microbiology:    Blood culture 10/22/2024, 10/24/2024: MSSA 2/2  Blood culture 10/24/2024: NGTD      Review of Systems  Negative except as stated above in Interval History     OBJECTIVE   Temp:  [97.9 °F (36.6 °C)-98.3 °F (36.8 °C)] 98.3 °F (36.8 °C)  Pulse:  [64-78] 64  Resp:  [13-21] 21  SpO2:  [94 %-100 %] 100 %  BP: (116-178)/(54-81) 119/56  Temp:  [97.9 °F (36.6 °C)-98.3 °F (36.8 °C)]   Temp: 98.3 °F (36.8 °C) (10/30/24 0732)  Pulse: 73 (10/30/24 0950)  Resp: 16 (10/30/24 0950)  BP: (!) 174/76 (10/30/24 0732)  SpO2: (!) 94 % (10/30/24 0952)    Intake/Output Summary (Last 24 hours) at 10/30/2024 1158  Last data filed at 10/30/2024 1000  Gross per 24 hour   Intake 200 ml   Output 1701 ml   Net -1501 ml       Physical Exam  General:  Elderly man sitting in chair.  Left upper extremity:  Flexion contracture of the elbow.  Purulent drainage from elbow CVS: S1 and 2 heard, no murmurs appreciated   Respiratory: Clear to auscultation   Abdomen: Full, soft, nontender, no palpable organomegaly   Skin: No rash appreciated   CNS: No focal deficits   Musculoskeletal system: No joint or bony abnormalities appreciated  Psych: Good mood, normal affect.      VAD:  ISOLATION:  Now     Wounds:  None    Significant Labs: All pertinent labs within the past 24 hours have been reviewed.    CBC LAST 7 DAYS  Recent Labs   Lab 10/24/24  0333 10/25/24  0513 10/26/24  0456 10/27/24  0551 10/28/24  0716 10/29/24  0518 10/30/24  0334   WBC 10.85 11.58 10.66 12.01 10.21 9.42 11.25   RBC 3.93* 4.34* 4.13* 4.50* 4.33* 3.92* 3.75*   HGB 10.2* 11.3* 11.0* 12.2* 11.3* 10.2* 9.9*   HCT 31.4* 35.3* 33.6*  "37.7* 36.5* 32.6* 31.5*   MCV 80* 81* 81* 84 84 83 84   MCH 26.0* 26.0* 26.6* 27.1 26.1* 26.0* 26.4*   MCHC 32.5 32.0 32.7 32.4 31.0* 31.3* 31.4*   RDW 18.1* 18.4* 18.4* 18.6* 18.6* 18.6* 18.3*    207 228 238 269 288 326   MPV 10.1 9.7 9.9 10.0 9.7 10.0 10.2   GRAN 75.9*  8.2* 68.6  7.9* 61.8  6.6 68.8  8.3* 67.5  6.9 60.2  5.7 67.2  7.6   LYMPH 10.6*  1.2 15.6*  1.8 18.3  2.0 14.7*  1.8 17.3*  1.8 22.8  2.2 17.5*  2.0   MONO 10.1  1.1* 12.3  1.4* 12.9  1.4* 9.7  1.2* 8.9  0.9 9.0  0.9 9.1  1.0   BASO 0.02 0.04 0.03 0.07 0.06 0.07 0.06   NRBC 0 0 0 0 0 0 0       CHEMISTRY LAST 7 DAYS  Recent Labs   Lab 10/24/24  0333 10/24/24  1405 10/25/24  0512 10/26/24  0456 10/27/24  0551 10/28/24  0716 10/29/24  0518 10/30/24  0334   * 135* 136 135* 133* 133* 134* 132*   K 4.1 4.6 4.3 4.2 4.7 4.3 4.0 4.5    98 100 100 101 102 100 99   CO2 25 28 27 26 23 25 29 26   ANIONGAP 9 9 9 9 9 6* 5* 7*   BUN 45* 38* 25* 21 18 16 13 11   CREATININE 2.3* 1.7* 1.2 1.0 0.9 0.8 0.8 0.8   * 193* 114* 173* 155* 137* 130* 194*   CALCIUM 8.0* 8.3* 8.3* 8.1* 8.3* 8.3* 7.9* 8.3*   MG 1.5*  --  1.4* 1.7 1.5* 1.4* 1.4* 2.2   ALBUMIN 2.9*  --  2.9* 2.9* 3.0* 2.9* 2.7* 2.8*   PROT 5.7*  --  6.0 6.1 6.6 6.5 6.0 6.5   ALKPHOS 76  --  85 85 112 227* 295* 252*   ALT 60*  --  39 25 16 21 24 15   AST 63*  --  46* 37 46* 88* 104* 47*   BILITOT 0.7  --  0.9 0.5 0.6 0.5 0.5 0.4       Estimated Creatinine Clearance: 102.9 mL/min (based on SCr of 0.8 mg/dL).    INFLAMMATORY/PROCAL  LAST 7 DAYS  Recent Labs   Lab 10/24/24  0333 10/24/24  1018 10/28/24  0716   PROCAL 4.129*  --   --    CRP  --  25.30* 17.10*     No results found for: "ESR"  CRP   Date Value Ref Range Status   10/28/2024 17.10 (H) <1.00 mg/dL Final     Comment:     CRP-Normal Application expected values:   <1.0        mg/dL   Normal Range  1.0 - 5.0  mg/dL   Indicates mild inflammation  5.0 - 10.0 mg/dL   Indicates severe inflammation  >10.0        mg/dL "   Represents serious processes and   frequently         indicates the presence of a bacterial   infection.      10/24/2024 25.30 (H) <1.00 mg/dL Final     Comment:     CRP-Normal Application expected values:   <1.0        mg/dL   Normal Range  1.0 - 5.0  mg/dL   Indicates mild inflammation  5.0 - 10.0 mg/dL   Indicates severe inflammation  >10.0        mg/dL   Represents serious processes and   frequently         indicates the presence of a bacterial   infection.          PRIOR  MICROBIOLOGY:    Susceptibility data from last 90 days.  Collected Specimen Info Organism Ceftriaxone Clindamycin Erythromycin Oxacillin Penicillin Tetracycline Trimeth/Sulfa Vancomycin   10/27/24 Abscess from Elbow, Left Staphylococcus aureus           10/26/24 Abscess from Elbow, Left Staphylococcus aureus  S  S  S  S  R  S  S  S   10/24/24 Blood from Peripheral, Hand, Left Staphylococcus aureus           10/24/24 Blood from Peripheral, Hand, Right Staphylococcus aureus  S  S  S  S  R  S  S  S   10/22/24 Blood from Peripheral, Hand, Right Staphylococcus aureus  S  S  S  S  R  S  S  S   10/22/24 Blood from Peripheral, Hand, Left Staphylococcus aureus               LAST 7 DAYS MICROBIOLOGY   Microbiology Results (last 7 days)       Procedure Component Value Units Date/Time    AFB Culture & Smear [[id number]] Collected: 10/27/24 0805    Order Status: Completed Specimen: Abscess from Elbow, Left Updated: 10/30/24 1116     AFB CULTURE STAIN No acid fast bacilli seen.     AFB CULTURE STAIN Testing performed by:     AFB CULTURE STAIN Lab [facility]     [facility] [address] [facility]     [place] [address]     AFB CULTURE STAIN Dr. Aditya Greenfield MD    Narrative:      Elbow, Left    Blood culture [[id number]] Collected: 10/28/24 0716    Order Status: Completed Specimen: Blood Updated: 10/30/24 0832     Blood Culture, Routine No Growth to date      No Growth to date      No Growth to date    Narrative:       Collection has been rescheduled by JSKERI at 10/27/2024 15:21 Reason:   Unable to collect. Notified ALYSSIA Rios.  Collection has been rescheduled by GISEL at 10/27/2024 15:21 Reason:   Unable to collect. Notified ALYSSIA Rios.    Blood culture [[id number]] Collected: 10/26/24 0456    Order Status: Completed Specimen: Blood Updated: 10/30/24 0632     Blood Culture, Routine No Growth to date      No Growth to date      No Growth to date      No Growth to date      No Growth to date    Narrative:      can be drawn with am labs per rn Fernando  Collection has been rescheduled by 2MB at 10/26/2024 00:49 Reason:   Draw with am labs per rn Fernando   Collection has been rescheduled by 2MB at 10/26/2024 00:49 Reason:   Draw with am labs per rn Fernando     Blood culture [[id number]] Collected: 10/26/24 0012    Order Status: Completed Specimen: Blood from Peripheral, Hand, Right Updated: 10/30/24 0232     Blood Culture, Routine No Growth to date      No Growth to date      No Growth to date      No Growth to date      No Growth to date    Blood culture [[id number]] Collected: 10/27/24 1513    Order Status: Completed Specimen: Blood from Peripheral, Hand, Left Updated: 10/29/24 1632     Blood Culture, Routine No Growth to date      No Growth to date      No Growth to date    Culture, Anaerobe [[id number]] Collected: 10/27/24 0805    Order Status: Completed Specimen: Abscess from Elbow, Left Updated: 10/29/24 1417     Anaerobic Culture No anaerobes isolated    Narrative:      Elbow, Left    Aerobic culture [[id number]]  (Abnormal) Collected: 10/27/24 0805    Order Status: Completed Specimen: Abscess from Elbow, Left Updated: 10/29/24 0633     Aerobic Bacterial Culture STAPHYLOCOCCUS AUREUS  Moderate  For susceptibility see order #[id number]      Narrative:      Elbow, Left    Blood culture [[id number]]  (Abnormal) Collected: 10/24/24 1019    Order Status: Completed Specimen: Blood from Peripheral, Hand, Left Updated: 10/29/24 0632      Blood Culture, Routine Gram stain aer bottle: Gram positive cocci      Positive results previously called on order [id number]      STAPHYLOCOCCUS AUREUS  ID consult required for Staph aureus bacteremia.  For susceptibility see order #[id number]      Gram stain [[id number]] Collected: 10/27/24 0805    Order Status: Completed Specimen: Abscess from Elbow, Left Updated: 10/28/24 0816     Gram Stain Result Few WBC's      Rare Gram positive cocci    Narrative:      Elbow, Left    Aerobic culture [[id number]]  (Abnormal)  (Susceptibility) Collected: 10/26/24 1552    Order Status: Completed Specimen: Abscess from Elbow, Left Updated: 10/28/24 0639     Aerobic Bacterial Culture STAPHYLOCOCCUS AUREUS  Moderate      Blood culture [[id number]]  (Abnormal)  (Susceptibility) Collected: 10/24/24 1018    Order Status: Completed Specimen: Blood from Peripheral, Hand, Right Updated: 10/28/24 0623     Blood Culture, Routine Gram stain aer bottle: Gram positive cocci in clusters resembling Staph      Results called to and read back by:Krista Lobato Charge Nurse 10/26/2024      15:26 ncb      STAPHYLOCOCCUS AUREUS  ID consult required for Staph aureus bacteremia.      Fungus culture [[id number]] Collected: 10/27/24 0805    Order Status: Sent Specimen: Abscess from Elbow, Left Updated: 10/27/24 0956    Rapid Organism ID by PCR (from Blood culture) [[id number]]  (Abnormal) Collected: 10/24/24 0927    Order Status: Completed Updated: 10/26/24 1637     Enterococcus faecalis Not Detected     Enterococcus faecium Not Detected     Listeria monocytogenes Not Detected     Staphylococcus spp. See species for ID     Staphylococcus aureus Detected     Staphylococcus epidermidis Not Detected     Staphylococcus lugdunensis Not Detected     Streptococcus species Not Detected     Streptococcus agalactiae Not Detected     Streptococcus pneumoniae Not Detected     Streptococcus pyogenes Not Detected     Acinetobacter calcoaceticus/baumannii complex Not  Detected     Bacteroides fragilis Not Detected     Enterobacterales Not Detected     Enterobacter cloacae complex Not Detected     Escherichia coli Not Detected     Klebsiella aerogenes Not Detected     Klebsiella oxytoca Not Detected     Klebsiella pneumoniae group Not Detected     Proteus Not Detected     Salmonella sp Not Detected     Serratia marcescens Not Detected     Haemophilus influenzae Not Detected     Neisseria meningtidis Not Detected     Pseudomonas aeruginosa Not Detected     Stenotrophomonas maltophilia Not Detected     Candida albicans Not Detected     Candida auris Not Detected     Candida glabrata Not Detected     Candida krusei Not Detected     Candida parapsilosis Not Detected     Candida tropicalis Not Detected     Cryptococcus neoformans/gattii Not Detected     CTX-M (ESBL ) Test not applicable     IMP (Carbapenem resistant) Test not applicable     KPC resistance gene (Carbapenem resistant) Test not applicable     mcr-1  Test not applicable     mec A/C  Test not applicable     mec A/C and MREJ (MRSA) gene Not Detected     NDM (Carbapenem resistant) Test not applicable     OXA-48-like (Carbapenem resistant) Test not applicable     van A/B (VRE gene) Test not applicable     VIM (Carbapenem resistant) Test not applicable    Gram stain [[id number]]     Order Status: No result Specimen: Abscess from Elbow, Left     Stool culture **cannot be ordered stat** [[id number]] Collected: 10/23/24 1357    Order Status: Completed Specimen: Stool Updated: 10/25/24 1101     Stool Culture No Salmonella,Shigella,Vibrio,Campylobacter.      No E coli 0157:H7 isolated.    Urine culture [[id number]] Collected: 10/22/24 1514    Order Status: Completed Specimen: Urine Updated: 10/25/24 0715     Urine Culture, Routine No growth    Narrative:      Specimen Source->Urine    Blood culture x two cultures. Draw prior to antibiotics. [[id number]]  (Abnormal) Collected: 10/22/24 1136    Order Status: Completed  Specimen: Blood from Peripheral, Hand, Left Updated: 10/25/24 0645     Blood Culture, Routine Gram stain aer bottle: Gram positive cocci      Positive results previously called on Order #[id number]      STAPHYLOCOCCUS AUREUS  ID consult required for Staph aureus bacteremia.  For susceptibility see order #[id number]      Narrative:      Aerobic and anaerobic  Collection has been rescheduled by MAI at 10/22/2024 11:44 Reason:   Done  Collection has been rescheduled by ZCORBIN at 10/22/2024 11:44 Reason:   Done    Blood culture x two cultures. Draw prior to antibiotics. [[id number]]  (Abnormal)  (Susceptibility) Collected: 10/22/24 1143    Order Status: Completed Specimen: Blood from Peripheral, Hand, Right Updated: 10/25/24 0645     Blood Culture, Routine Gram stain aer bottle: Gram positive cocci      Results called to and read back by:Tiffani Moulton RN-1ICU;      10/23/2024  07:54 CJD      STAPHYLOCOCCUS AUREUS  ID consult required for Staph aureus bacteremia.      Narrative:      Aerobic and anaerobic  Collection has been rescheduled by MAI at 10/22/2024 11:44 Reason:   Done  Collection has been rescheduled by MAI at 10/22/2024 11:44 Reason:   Done          CURRENT/PREVIOUS VISIT EKG  Results for orders placed or performed during the hospital encounter of 10/22/24   EKG 12-lead    Collection Time: 10/22/24 11:45 AM   Result Value Ref Range    QRS Duration 90 ms    OHS QTC Calculation 428 ms    Narrative    Test Reason : I95.9,    Vent. Rate : 056 BPM     Atrial Rate : 056 BPM     P-R Int : 306 ms          QRS Dur : 090 ms      QT Int : 444 ms       P-R-T Axes : 031 -27 034 degrees     QTc Int : 428 ms    Sinus bradycardia with 1st degree A-V block  Low voltage QRS  Inferior infarct (cited on or before 17-JAN-2023)  Possible Anterolateral infarct (cited on or before 12-APR-2024)  Abnormal ECG  When compared with ECG of 12-APR-2024 09:00,  No significant change was found    Referred By: [email or web address]   SELF            Confirmed By:      Significant Imaging: I have reviewed all relevant and available imaging results/findings within the past 24 hours.    I spent a total of 45 minutes on the day of the visit.This includes face to face time and non-face to face time preparing to see the patient (eg, review of tests), obtaining and/or reviewing separately obtained history, documenting clinical information in the electronic or other health record, independently interpreting results and communicating results to the patient/family/caregiver, or care coordinator.    Migel Watson MD  Date of Service: 10/30/2024      This note was created using [employer] voice recognition software that occasionally misinterpreted phrases or words.

## 2024-10-30 NOTE — PLAN OF CARE
"8:06  Abimbola from [facility] reached out via e-mail to ask if Pt was returning to  with IVAB. JANI e-mailed back that he is not.    JANI received response from Abimbola at [facility] stating Pt has auth. "SNF was approved, SRN [id number]."    1:00  Jenifer from  contacted Jani to stated that Pt's family came and signed all of the consents for admission. She said the insurance card they provided was for the [facility]. Jenifer asked even though there is auth for [facility], is the family wanting to use their VA benefits. JANI will follow up with Pt to learn his preference.    2:55  JANI talked to Tiffani at the [facility] who checked on Pt's VA benefits. She stated Pt is not service connected and not only would have co pays if he used his VA benefits, but would have to go to a [place] facility. JANI went to bedside to speak to Pt about this and he stated he would prefer to use his [employer] insurance benefits rather than VA. JANI contacted Jenifer at  and informed her of this. Plan for discharge is tomorrow.        "

## 2024-10-30 NOTE — CARE UPDATE
10/29/24 2009   Patient Assessment/Suction   Level of Consciousness (AVPU) alert   Respiratory Effort Normal;Unlabored   Expansion/Accessory Muscles/Retractions no use of accessory muscles;no retractions;expansion symmetric   All Lung Fields Breath Sounds clear;equal bilaterally   Rhythm/Pattern, Respiratory unlabored;pattern regular;no shortness of breath reported   Cough Frequency no cough   PRE-TX-O2   Device (Oxygen Therapy) room air   SpO2 95 %   Pulse Oximetry Type Intermittent   Pulse 78   Resp 18

## 2024-10-30 NOTE — PROGRESS NOTES
[facility]  Progress Note    Patient Name: Dhaval Hernández  MRN: [id number]  Patient Class: IP- Inpatient   Admission Date: 10/22/2024  Length of Stay: 8 days  Attending Physician: Evin Courtney MD  Primary Care Provider: Jessie Jung MD        Subjective:     Principal Problem:Septic shock        HPI:  64 year pt getting admitted with septic shock and hyperkalemia  Pt has been suffering from diarrhea/N&V for past 4 days  Stools very watery and vomitus was clear  Later he started having crampy abdominal pain/radiation to back. No aggravating/relieving factors   He acme to ER and was found to be in chock and got admitted       Overview/Hospital Course:  Patient admitted with septic shock. Bacteremia with Staph +. Repeat b/c showed NGTD. Source likely from left elbow infection. Continue with antibiotics and ID is following patient, Patient is om cefazolin. Ortho consulted for possible left elbow abscess. GI recommends outpatient follow up for FOBT +.  Patient underwent debridement on 10/27 by orthopedics. Abscess was drained and fluid is also growing MSSA. Cardiology consulted as patient has pacemaker and may need ROSE. Patient was transferred to ICU post ROSE.     Interval History: Pain is controlled. Patient is afebrile. Planned for ROSE today. Wound was assessed by ortho yesterday, recommended packing by wound care.     Review of Systems  Objective:     Vital Signs (Most Recent):  Temp: 98.3 °F (36.8 °C) (10/30/24 0732)  Pulse: 73 (10/30/24 0950)  Resp: 16 (10/30/24 0950)  BP: (!) 174/76 (10/30/24 0732)  SpO2: (!) 94 % (10/30/24 0952) Vital Signs (24h Range):  Temp:  [97.9 °F (36.6 °C)-98.4 °F (36.9 °C)] 98.3 °F (36.8 °C)  Pulse:  [67-78] 73  Resp:  [16-18] 16  SpO2:  [94 %-96 %] 94 %  BP: (152-178)/(70-81) 174/76     Weight: 104.6 kg (230 lb 9.6 oz)  Body mass index is 37.22 kg/m².    Intake/Output Summary (Last 24 hours) at 10/30/2024 1037  Last data filed at  10/30/2024 0756  Gross per 24 hour   Intake 200 ml   Output 1101 ml   Net -901 ml         Physical Exam  Vitals and nursing note reviewed.   Constitutional:       General: He is not in acute distress.     Appearance: He is obese. He is not toxic-appearing.   HENT:      Head: Atraumatic.      Mouth/Throat:      Mouth: Mucous membranes are moist.      Pharynx: Oropharynx is clear.   Eyes:      General: No scleral icterus.     Conjunctiva/sclera: Conjunctivae normal.      Pupils: Pupils are equal, round, and reactive to light.   Cardiovascular:      Rate and Rhythm: Normal rate and regular rhythm.      Heart sounds: murmur heard.  Pulmonary:      Effort: No respiratory distress.      Breath sounds: No wheezing, rhonchi or rales.   Abdominal:      General: Abdomen is flat. Bowel sounds are normal.      Palpations: Abdomen is soft.   Musculoskeletal:         General: No swelling or deformity.      Cervical back: No rigidity or tenderness.      Comments: Left BKA   Left elbow surgical dressing on   Skin:     Coloration: Skin is not jaundiced or pale.      Findings: No bruising, erythema or rash.   Neurological:      General: No focal deficit present.      Mental Status: He is alert and oriented to person, place, and time.      Cranial Nerves: No cranial nerve deficit.      Sensory: No sensory deficit.      Motor: No weakness.   Psychiatric:         Mood and Affect: Mood normal.         Behavior: Behavior normal.    Significant Labs: All pertinent labs within the past 24 hours have been reviewed.  CBC:   Recent Labs   Lab 10/29/24  0518 10/30/24  0334   WBC 9.42 11.25   HGB 10.2* 9.9*   HCT 32.6* 31.5*    326     CMP:   Recent Labs   Lab 10/29/24  0518 10/30/24  0334   * 132*   K 4.0 4.5    99   CO2 29 26   * 194*   BUN 13 11   CREATININE 0.8 0.8   CALCIUM 7.9* 8.3*   PROT 6.0 6.5   ALBUMIN 2.7* 2.8*   BILITOT 0.5 0.4   ALKPHOS 295* 252*   * 47*   ALT 24 15   ANIONGAP 5* 7*        Significant Imaging: I have reviewed all pertinent imaging results/findings within the past 24 hours.    Assessment/Plan:      * Septic shock  Secondary to MSSA bacteremia  Left elbow abscess, bursitis   S/p I&D 10/27  Septic shock is resolved   Organ dysfunction: SIRIA and hypotension   Blood culture: 10/22: 4/4 MSSA, 10/24 1/4 MSSA, 10/26 and 10/27 negative so far   Abscess fluid also growing MSSA   Echo shows no vegetations   Patient does have a pacemaker, Planned for ROSE 10/30  ID following   Cont Cefazolin      Olecranon bursitis of left elbow  With abscess  See above     Hyperkalemia  Resolved with shifting agents    Recent Labs     10/28/24  0716 10/29/24  0518 10/30/24  0334   K 4.3 4.0 4.5                 SIRIA (acute kidney injury)  SIRIA in the background of septic/hypovolemic shock  Resolved with IVF  Recent Labs     10/28/24  0716 10/29/24  0518 10/30/24  0334   CREATININE 0.8 0.8 0.8   EGFRNORACEVR >60.0 >60.0 >60.0         Atrial fibrillation, chronic  Rate controlled with Amiodarone and Coreg   Patient not on anticoagulation from home     High anion gap metabolic acidosis  In the background of lactic acidemia  Resolved     Tobacco dependency  Aware     Severe obesity (BMI 35.0-39.9) with comorbidity  Body mass index is 36.32 kg/m². Morbid obesity complicates all aspects of disease management from diagnostic modalities to treatment.     Cardiac pacemaker  PPM insitu      Type 2 diabetes mellitus with stage 2 chronic kidney disease, without long-term current use of insulin  Increased Lantus from 16 >> 20  Cont sliding scale   Glucose better controlled     Status post unilateral below-knee amputation  L BKA        VTE Risk Mitigation (From admission, onward)           Ordered     enoxaparin injection 40 mg  Daily         10/25/24 1431     IP VTE HIGH RISK PATIENT  Once         10/22/24 1641     Place sequential compression device  Until discontinued         10/22/24 1641                    Discharge  Planning   JILLIAN: 10/31/2024     Code Status: Full Code   Is the patient medically ready for discharge?:     Reason for patient still in hospital (select all that apply): Treatment  Discharge Plan A: Skilled Nursing Facility   Discharge Delays: (!) Post-Acute Set-up          Evin Courtney MD  Department of Hospital Medicine   [facility]

## 2024-10-30 NOTE — PT/OT/SLP PROGRESS
Occupational Therapy      Patient Name:  Dhaval Hernández   MRN:  [id number]    Patient not seen today secondary to Other (Comment) (Pt off the floor for ROSE, then transferred into ICU. Hold today until further details available.). Will follow-up next service date.    10/30/2024

## 2024-10-30 NOTE — CARE UPDATE
10/30/24 0906   Patient Assessment/Suction   Level of Consciousness (AVPU) alert   Respiratory Effort Normal;Unlabored   Expansion/Accessory Muscles/Retractions no use of accessory muscles;no retractions;expansion symmetric   All Lung Fields Breath Sounds clear   Rhythm/Pattern, Respiratory unlabored;pattern regular;depth regular   Cough Frequency no cough   PRE-TX-O2   Device (Oxygen Therapy) nasal cannula   $ Is the patient on Low Flow Oxygen? Yes   Flow (L/min) (Oxygen Therapy) 0.5   SpO2 96 %   Pulse Oximetry Type Intermittent   $ Pulse Oximetry - Single Charge Pulse Oximetry - Single   Pulse 73   Resp 16   Education   $ Education 15 min;Oxygen

## 2024-10-30 NOTE — ASSESSMENT & PLAN NOTE
Secondary to MSSA bacteremia  Left elbow abscess, bursitis   S/p I&D 10/27  Septic shock is resolved   Organ dysfunction: SIRAI and hypotension   Blood culture: 10/22: 4/4 MSSA, 10/24 1/4 MSSA, 10/26 and 10/27 negative so far   Abscess fluid also growing MSSA   Echo shows no vegetations   Patient does have a pacemaker, Planned for ROSE 10/30  ID following   Cont Cefazolin

## 2024-10-31 ENCOUNTER — PATIENT OUTREACH (OUTPATIENT)
Dept: ADMINISTRATIVE | Facility: HOSPITAL | Age: 64
End: 2024-10-31
Payer: MEDICARE

## 2024-10-31 VITALS
HEART RATE: 73 BPM | HEIGHT: 66 IN | DIASTOLIC BLOOD PRESSURE: 79 MMHG | TEMPERATURE: 98 F | BODY MASS INDEX: 37.06 KG/M2 | RESPIRATION RATE: 18 BRPM | SYSTOLIC BLOOD PRESSURE: 168 MMHG | OXYGEN SATURATION: 93 % | WEIGHT: 230.63 LBS

## 2024-10-31 LAB
ALBUMIN SERPL BCP-MCNC: 2.9 G/DL (ref 3.5–5.2)
ALP SERPL-CCNC: 227 U/L (ref 55–135)
ALT SERPL W/O P-5'-P-CCNC: 10 U/L (ref 10–44)
ANION GAP SERPL CALC-SCNC: 8 MMOL/L (ref 8–16)
AST SERPL-CCNC: 37 U/L (ref 10–40)
BACTERIA BLD CULT: NORMAL
BACTERIA BLD CULT: NORMAL
BASOPHILS # BLD AUTO: 0.05 K/UL (ref 0–0.2)
BASOPHILS NFR BLD: 0.5 % (ref 0–1.9)
BILIRUB SERPL-MCNC: 0.4 MG/DL (ref 0.1–1)
BUN SERPL-MCNC: 10 MG/DL (ref 8–23)
CALCIUM SERPL-MCNC: 8.5 MG/DL (ref 8.7–10.5)
CHLORIDE SERPL-SCNC: 98 MMOL/L (ref 95–110)
CO2 SERPL-SCNC: 27 MMOL/L (ref 23–29)
CREAT SERPL-MCNC: 0.8 MG/DL (ref 0.5–1.4)
DIFFERENTIAL METHOD BLD: ABNORMAL
EOSINOPHIL # BLD AUTO: 0.4 K/UL (ref 0–0.5)
EOSINOPHIL NFR BLD: 3.7 % (ref 0–8)
ERYTHROCYTE [DISTWIDTH] IN BLOOD BY AUTOMATED COUNT: 18.5 % (ref 11.5–14.5)
EST. GFR  (NO RACE VARIABLE): >60 ML/MIN/1.73 M^2
GLUCOSE SERPL-MCNC: 141 MG/DL (ref 70–110)
HCT VFR BLD AUTO: 32.9 % (ref 40–54)
HGB BLD-MCNC: 10.2 G/DL (ref 14–18)
IMM GRANULOCYTES # BLD AUTO: 0.12 K/UL (ref 0–0.04)
IMM GRANULOCYTES NFR BLD AUTO: 1.2 % (ref 0–0.5)
LYMPHOCYTES # BLD AUTO: 2.2 K/UL (ref 1–4.8)
LYMPHOCYTES NFR BLD: 22.1 % (ref 18–48)
MAGNESIUM SERPL-MCNC: 1.7 MG/DL (ref 1.6–2.6)
MCH RBC QN AUTO: 26.2 PG (ref 27–31)
MCHC RBC AUTO-ENTMCNC: 31 G/DL (ref 32–36)
MCV RBC AUTO: 85 FL (ref 82–98)
MONOCYTES # BLD AUTO: 0.9 K/UL (ref 0.3–1)
MONOCYTES NFR BLD: 9.4 % (ref 4–15)
NEUTROPHILS # BLD AUTO: 6.2 K/UL (ref 1.8–7.7)
NEUTROPHILS NFR BLD: 63.1 % (ref 38–73)
NRBC BLD-RTO: 0 /100 WBC
PLATELET # BLD AUTO: 394 K/UL (ref 150–450)
PMV BLD AUTO: 10.1 FL (ref 9.2–12.9)
POCT GLUCOSE: 148 MG/DL (ref 70–110)
POCT GLUCOSE: 192 MG/DL (ref 70–110)
POTASSIUM SERPL-SCNC: 4.4 MMOL/L (ref 3.5–5.1)
PROT SERPL-MCNC: 6.8 G/DL (ref 6–8.4)
RBC # BLD AUTO: 3.89 M/UL (ref 4.6–6.2)
SODIUM SERPL-SCNC: 133 MMOL/L (ref 136–145)
WBC # BLD AUTO: 9.8 K/UL (ref 3.9–12.7)

## 2024-10-31 PROCEDURE — 85025 COMPLETE CBC W/AUTO DIFF WBC: CPT | Performed by: INTERNAL MEDICINE

## 2024-10-31 PROCEDURE — 25000003 PHARM REV CODE 250: Performed by: FAMILY MEDICINE

## 2024-10-31 PROCEDURE — 94760 N-INVAS EAR/PLS OXIMETRY 1: CPT

## 2024-10-31 PROCEDURE — 99900031 HC PATIENT EDUCATION (STAT)

## 2024-10-31 PROCEDURE — 36569 INSJ PICC 5 YR+ W/O IMAGING: CPT

## 2024-10-31 PROCEDURE — 02HV33Z INSERTION OF INFUSION DEVICE INTO SUPERIOR VENA CAVA, PERCUTANEOUS APPROACH: ICD-10-PCS | Performed by: INTERNAL MEDICINE

## 2024-10-31 PROCEDURE — 80053 COMPREHEN METABOLIC PANEL: CPT | Performed by: INTERNAL MEDICINE

## 2024-10-31 PROCEDURE — 36415 COLL VENOUS BLD VENIPUNCTURE: CPT | Performed by: INTERNAL MEDICINE

## 2024-10-31 PROCEDURE — 63600175 PHARM REV CODE 636 W HCPCS: Performed by: INTERNAL MEDICINE

## 2024-10-31 PROCEDURE — 99233 SBSQ HOSP IP/OBS HIGH 50: CPT | Mod: ,,, | Performed by: INTERNAL MEDICINE

## 2024-10-31 PROCEDURE — 83735 ASSAY OF MAGNESIUM: CPT | Performed by: INTERNAL MEDICINE

## 2024-10-31 PROCEDURE — C1751 CATH, INF, PER/CENT/MIDLINE: HCPCS

## 2024-10-31 PROCEDURE — 25000003 PHARM REV CODE 250: Performed by: HOSPITALIST

## 2024-10-31 RX ORDER — LANOLIN ALCOHOL/MO/W.PET/CERES
400 CREAM (GRAM) TOPICAL DAILY
Status: DISCONTINUED | OUTPATIENT
Start: 2024-10-31 | End: 2024-10-31 | Stop reason: HOSPADM

## 2024-10-31 RX ORDER — CITALOPRAM 10 MG/1
10 TABLET ORAL DAILY
Qty: 30 TABLET | Refills: 0 | Status: SHIPPED | OUTPATIENT
Start: 2024-11-01 | End: 2024-12-01

## 2024-10-31 RX ORDER — EZETIMIBE 10 MG/1
10 TABLET ORAL DAILY
Qty: 30 TABLET | Refills: 0 | Status: SHIPPED | OUTPATIENT
Start: 2024-10-31 | End: 2024-11-30

## 2024-10-31 RX ORDER — CEFAZOLIN SODIUM 1 G/3ML
2 INJECTION, POWDER, FOR SOLUTION INTRAMUSCULAR; INTRAVENOUS EVERY 8 HOURS
Start: 2024-10-31 | End: 2024-12-05

## 2024-10-31 RX ORDER — IBUPROFEN 200 MG
1 TABLET ORAL DAILY
Qty: 14 PATCH | Refills: 0 | Status: SHIPPED | OUTPATIENT
Start: 2024-10-31 | End: 2024-11-14

## 2024-10-31 RX ORDER — OXYCODONE AND ACETAMINOPHEN 5; 325 MG/1; MG/1
1 TABLET ORAL EVERY 4 HOURS PRN
Qty: 10 TABLET | Refills: 0 | Status: SHIPPED | OUTPATIENT
Start: 2024-10-31

## 2024-10-31 RX ORDER — RIFAMPIN 300 MG/1
300 CAPSULE ORAL 2 TIMES DAILY
Qty: 68 CAPSULE | Refills: 0 | Status: SHIPPED | OUTPATIENT
Start: 2024-11-01 | End: 2024-12-05

## 2024-10-31 RX ORDER — LOSARTAN POTASSIUM 25 MG/1
25 TABLET ORAL NIGHTLY
Status: DISCONTINUED | OUTPATIENT
Start: 2024-10-31 | End: 2024-10-31 | Stop reason: HOSPADM

## 2024-10-31 RX ORDER — INSULIN GLARGINE 100 [IU]/ML
20 INJECTION, SOLUTION SUBCUTANEOUS NIGHTLY
Qty: 6 ML | Refills: 0 | Status: SHIPPED | OUTPATIENT
Start: 2024-10-31 | End: 2024-11-30

## 2024-10-31 RX ADMIN — LEVOTHYROXINE SODIUM 75 MCG: 0.03 TABLET ORAL at 05:10

## 2024-10-31 RX ADMIN — CITALOPRAM HYDROBROMIDE 10 MG: 10 TABLET ORAL at 08:10

## 2024-10-31 RX ADMIN — CEFAZOLIN 2 G: 330 INJECTION, POWDER, FOR SOLUTION INTRAMUSCULAR; INTRAVENOUS at 12:10

## 2024-10-31 RX ADMIN — OXYCODONE HYDROCHLORIDE AND ACETAMINOPHEN 1 TABLET: 5; 325 TABLET ORAL at 12:10

## 2024-10-31 RX ADMIN — PANTOPRAZOLE SODIUM 40 MG: 40 INJECTION, POWDER, LYOPHILIZED, FOR SOLUTION INTRAVENOUS at 08:10

## 2024-10-31 RX ADMIN — OXYCODONE HYDROCHLORIDE AND ACETAMINOPHEN 1 TABLET: 5; 325 TABLET ORAL at 05:10

## 2024-10-31 RX ADMIN — CEFAZOLIN 2 G: 330 INJECTION, POWDER, FOR SOLUTION INTRAMUSCULAR; INTRAVENOUS at 03:10
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
## 2024-10-31 NOTE — PLAN OF CARE
JANI sent AVS, chest x ray, PASRR, and 142 via [employer] to . Waiting for PPD. JANI called Annette to notify her documents were sent. Left .    12:56  Annette called JANI back and asked about Pt's PPD if it had been ordered. JANI stated that it had. JANI informed Annette that all other paperwork had been sent over. Annette stated she would review it and call back.

## 2024-10-31 NOTE — DISCHARGE INSTRUCTIONS
"[facility]  Facility Transfer Orders        Admit to: SNF    Diagnoses:   Active Hospital Problems    Diagnosis  POA    *Septic shock [A41.9, R65.21]  Yes    Olecranon bursitis of left elbow [M70.22]  Yes     Priority: 1 - High    SIRIA (acute kidney injury) [N17.9]  Yes     Priority: 3     Hyperkalemia [E87.5]  Yes     Priority: 3     Tobacco dependency [F17.200]  Yes    High anion gap metabolic acidosis [E87.29]  Yes    Atrial fibrillation, chronic [I48.20]  Yes    Severe obesity (BMI 35.0-39.9) with comorbidity [E66.01]  Yes    Cardiac pacemaker [Z95.0]  Yes     Chronic    Status post unilateral below-knee amputation [Z89.519]  Not Applicable    Type 2 diabetes mellitus with stage 2 chronic kidney disease, without long-term current use of insulin [E11.22, N18.2]  Yes      Resolved Hospital Problems    Diagnosis Date Resolved POA    Acute pneumonia [J18.9] 10/26/2024 Yes     Allergies:   Review of patient's allergies indicates:   Allergen Reactions    Fish containing products      Other reaction(s): .       Code Status: full    Vitals: Routine       Diet: diabetic diet: 2000 calorie    Activity: Activity as tolerated    Nursing Precautions: Aspiration  and Fall    Bed/Surface: Low Air Loss    Consults: PT to evaluate and treat- 5 times a week and OT to evaluate and treat- 5 times a week    Oxygen: 2 liters/min via nasal cannula    Dialysis: Patient not on dialysis     Labs:              CBL and BMP weekly with CRP and sed rate   Pending Diagnostic Studies:       Procedure Component Value Units Date/Time    Stool Exam-Ova,Cysts,Parasites [[id number]] Collected: 10/23/24 1359    Order Status: Sent Lab Status: In process Updated: 10/23/24 1416    Specimen: Stool     Transesophageal echo (ROSE) [[id number]]     Order Status: Sent Lab Status: No result             Miscellaneous Care:   Wound care : daily packing with 1/4" cotton packing. If wound is not large enough for packing, may perform simple dressing " change with xeroform, 4x4 gauze, ABD and ACE bandage.     IV Access: PICC     Medications: Discontinue all previous medication orders, if any. See new list below.  Current Discharge Medication List        START taking these medications    Details   ceFAZolin (ANCEF) 1 gram injection Inject 2,000 mg (2 g total) into the vein every 8 (eight) hours.      citalopram (CELEXA) 10 MG tablet Take 1 tablet (10 mg total) by mouth once daily.  Qty: 30 tablet, Refills: 0      nicotine (NICODERM CQ) 21 mg/24 hr Place 1 patch onto the skin once daily. for 14 days  Qty: 14 patch, Refills: 0      oxyCODONE-acetaminophen (PERCOCET) 5-325 mg per tablet Take 1 tablet by mouth every 4 (four) hours as needed for Pain.  Qty: 10 tablet, Refills: 0    Comments: Quantity prescribed more than 7 day supply? Yes, quantity medically necessary  Associated Diagnoses: Olecranon bursitis of left elbow      rifAMpin (RIFADIN) 300 MG capsule Take 1 capsule (300 mg total) by mouth 2 (two) times a day.  Qty: 68 capsule, Refills: 0           CONTINUE these medications which have CHANGED    Details   insulin glargine U-100, Lantus, 100 unit/mL (3 mL) SubQ InPn pen Inject 20 Units into the skin every evening.  Qty: 6 mL, Refills: 0           CONTINUE these medications which have NOT CHANGED    Details   cholecalciferol, vitamin D3, (VITAMIN D3) 125 mcg (5,000 unit) Tab Take 5,000 Units by mouth once daily.      empagliflozin (JARDIANCE) 25 mg tablet Take 1 tablet (25 mg total) by mouth once daily.  Qty: 90 tablet, Refills: 0    Associated Diagnoses: Type 2 diabetes mellitus with stage 2 chronic kidney disease, without long-term current use of insulin      ezetimibe (ZETIA) 10 mg tablet Take 10 mg by mouth.      famotidine (PEPCID) 20 MG tablet Take 20 mg by mouth 2 (two) times daily.      JANUVIA 100 mg Tab Take 1 tablet (100 mg total) by mouth once daily.  Qty: 90 tablet, Refills: 1    Comments: NEEDS APPT      levothyroxine (SYNTHROID) 75 MCG tablet  Take 1 tablet (75 mcg total) by mouth every morning.  Qty: 90 tablet, Refills: 1    Comments: NEEDS APPT      losartan (COZAAR) 100 MG tablet Take 1 tablet (100 mg total) by mouth once daily.  Qty: 90 tablet, Refills: 3    Comments: .      metFORMIN (GLUCOPHAGE) 1000 MG tablet Take 1 tablet (1,000 mg total) by mouth 2 (two) times daily with meals.  Qty: 180 tablet, Refills: 1    Comments: NEEDS APPT      omega 3-dha-epa-fish oil (FISH OIL) 1,200 (144-216) mg Cap Take 1 capsule by mouth once daily.      EScitalopram oxalate (LEXAPRO) 10 MG tablet Take 1 tablet (10 mg total) by mouth once daily.  Qty: 90 tablet, Refills: 3    Associated Diagnoses: Depression, unspecified depression type      multivitamin (THERAGRAN) per tablet Take 1 tablet by mouth.           STOP taking these medications       amiodarone (PACERONE) 200 MG Tab Comments:   Reason for Stopping:         amLODIPine (NORVASC) 5 MG tablet Comments:   Reason for Stopping:         aspirin (ECOTRIN) 81 MG EC tablet Comments:   Reason for Stopping:         carvediloL (COREG) 12.5 MG tablet Comments:   Reason for Stopping:         spironolactone (ALDACTONE) 25 MG tablet Comments:   Reason for Stopping:         atorvastatin (LIPITOR) 80 MG tablet Comments:   Reason for Stopping:         cholecalciferol, vitamin D3, 1,250 mcg (50,000 unit) capsule Comments:   Reason for Stopping:         ciclopirox (PENLAC) 8 % Soln Comments:   Reason for Stopping:             Follow up:       Immunizations Administered as of 10/31/2024       No immunizations on file.                Some patients may experience side effects after vaccination.  These may include fever, headache, muscle or joint aches.  Most symptoms resolve with 24-48 hours and do not require urgent medical evaluation unless they persist for more than 72 hours or symptoms are concerning for an unrelated medical condition.          Evin Courtney MD

## 2024-10-31 NOTE — NURSING
Report called to Elvira at station 1 at [phone]. Pt to transport to [facility].

## 2024-10-31 NOTE — CARE UPDATE
10/31/24 0833   Patient Assessment/Suction   Level of Consciousness (AVPU) alert   Respiratory Effort Normal;Unlabored   Expansion/Accessory Muscles/Retractions no use of accessory muscles;no retractions;expansion symmetric   All Lung Fields Breath Sounds Anterior:   MARTHA Breath Sounds clear   RUL Breath Sounds clear   Rhythm/Pattern, Respiratory unlabored   Cough Frequency no cough   PRE-TX-O2   Device (Oxygen Therapy) room air   SpO2 (!) 94 %   Pulse Oximetry Type Intermittent   $ Pulse Oximetry - Single Charge Pulse Oximetry - Single   Pulse 74   Resp 16   Education   $ Education 15 min;Oxygen

## 2024-10-31 NOTE — PT/OT/SLP PROGRESS
Physical Therapy      Patient Name:  Dhaval Hernández   MRN:  [id number]    Patient not seen today secondary to Other (Comment) (Pt on bedpan 1st attempt and with IV RN for midline placement 2nd attempt. Pt scheduled for discharge to SNF today.). Will follow-up 11/1/24 if not discharged to SNF today as anticipated.     Discontinue Regimen: Clotrimazole/ Betamethasone cream Detail Level: Zone Initiate Treatment: Purchase 16 oz jar of CeraVe cream. Pour Rx Clobetasol solution into CeraVe and mix up well. Apply to rash affected areas twice a day x 2 weeks, then stop x 1 week, then repeat cycle. Do not apply to face or groin or biopsy site.

## 2024-10-31 NOTE — PLAN OF CARE
Discharge orders and chart reviewed with no further post-acute discharge needs identified at this time.     Pt is discharging to [facility]. Discharge orders sent and received in [facility] verified by Annette.  will transport, no ETA. Call report to [facility] at station 1 at [phone]. Floor nurse notified.    At this time, patient is cleared for discharge from Case Management standpoint.           10/31/24 1522   Final Note   Assessment Type Final Discharge Note   Anticipated Discharge Disposition SNF   What phone number can be called within the next 1-3 days to see how you are doing after discharge? [phone]   Post-Acute Status   Post-Acute Authorization Placement   Post-Acute Placement Status Set-up Complete/Auth obtained   [facility]age [facility] SNP -   Discharge Delays None known at this time

## 2024-10-31 NOTE — ASSESSMENT & PLAN NOTE
SIRIA in the background of septic/hypovolemic shock  Resolved with IVF  Recent Labs     10/29/24  0518 10/30/24  0334 10/31/24  0527   CREATININE 0.8 0.8 0.8   EGFRNORACEVR >60.0 >60.0 >60.0

## 2024-10-31 NOTE — PROGRESS NOTES
[facility]  Department of Cardiology  Progress Note      PATIENT NAME: Dhaval Hernández  MRN: [id number]  TODAY'S DATE: 10/31/2024  ADMIT DATE: 10/22/2024                          CONSULT REQUESTED BY: Evin Courtney MD    SUBJECTIVE     PRINCIPAL PROBLEM: Septic shock      REASON FOR CONSULT:  MSSA bacteremia, patient has a pacemaker, ?ROSE    Interval history:  10/31/24  Pt resting comfortably w/o complaints  No complications post ROSE  Left hand swelling resolved, pt doing hand/fingers exercises routinely  Rt brachial PICC line placed today    10/30/24  NPO after midnight for ROSE today  Platelet count 326, H&H 9.9/31.5    HPI:  Pt is 65 yo male with H/O CVA, Cardiomyopathy, ICD, MI, paroxysmal afib, HTN, HLD, traumatic Left BKA, HLD, DM, who presented to ER on 10/22/24 w/ complaints of nausea, vomiting and diarrhea x 4 days. Found to have septic shock with MSSA bacteremia. He had left elbow abscess with surgical debridement on 10/27/24 which is also growing MSSA. Cardiology has been consulted for ROSE per infectious disease recommendation.     Pt has no problems problems, recent bleeding or loose teeth.      Review of patient's allergies indicates:   Allergen Reactions    Fish containing products      Other reaction(s): .       Past Medical History:   Diagnosis Date    Diabetes mellitus, type 2     GERD (gastroesophageal reflux disease)     Hyperlipidemia     Hypertension      Past Surgical History:   Procedure Laterality Date    FRACTURE SURGERY      INCISION AND DRAINAGE OF ABSCESS Left 10/27/2024    Procedure: INCISION AND DRAINAGE, ABSCESS;  Surgeon: Gokul Troy MD;  Location: [facility];  Service: Orthopedics;  Laterality: Left;  INCISION AND DRAIN OF LEFT ELBOW ABCESS    INSERTION OF PACEMAKER      LEG AMPUTATION       Social History     Tobacco Use    Smoking status: Every Day     Types: Cigarettes    Smokeless tobacco: Former     Types: Snuff     Quit date: 04/2024    Tobacco  comments:     Pt used smokeless tobacco from 0600-6998 and quit. Started dipping tobacco throughout most of his 20's. Started smoking cigarettes again in 06/2024.         REVIEW OF SYSTEMS  Negative except as mentioned in HPI    OBJECTIVE     VITAL SIGNS (Most Recent)  Temp: 97.9 °F (36.6 °C) (10/31/24 1447)  Pulse: 73 (10/31/24 1447)  Resp: 18 (10/31/24 1447)  BP: (!) 168/79 (10/31/24 1447)  SpO2: (!) 93 % (10/31/24 1447)    VENTILATION STATUS  Resp: 18 (10/31/24 1447)  SpO2: (!) 93 % (10/31/24 1447)           I & O (Last 24H):  Intake/Output Summary (Last 24 hours) at 10/31/2024 1518  Last data filed at 10/31/2024 1405  Gross per 24 hour   Intake 440 ml   Output 950 ml   Net -510 ml       WEIGHTS  Wt Readings from Last 3 Encounters:   10/24/24 0405 104.6 kg (230 lb 9.6 oz)   10/22/24 1115 102.1 kg (225 lb)   10/25/24 1233 104.3 kg (230 lb)   07/23/24 0930 102.4 kg (225 lb 12 oz)       PHYSICAL EXAM    GENERAL:chronically ill elderly male resting in bed in no apparent distress.  HEENT: Normocephalic.    NECK: No JVD.   CARDIAC: Regular rate and rhythm. S1 is normal.S2 is normal.No gallops, clicks or murmurs noted at this time.  CHEST ANATOMY: normal. Left CW pacer device present  LUNGS: Clear to auscultation. No wheezing or rhonchi..   ABDOMEN: Soft .  Normal bowel sounds.    EXTREMITIES: No edema; left BKA; left elbow with ACE wrap;  resolved edema to left hand noted  CENTRAL NERVOUS SYSTEM: AAO x 3  SKIN: No rash     HOME MEDICATIONS:  No current facility-administered medications on file prior to encounter.     Current Outpatient Medications on File Prior to Encounter   Medication Sig Dispense Refill    cholecalciferol, vitamin D3, (VITAMIN D3) 125 mcg (5,000 unit) Tab Take 5,000 Units by mouth once daily.      empagliflozin (JARDIANCE) 25 mg tablet Take 1 tablet (25 mg total) by mouth once daily. 90 tablet 0    famotidine (PEPCID) 20 MG tablet Take 20 mg by mouth 2 (two) times daily.      JANUVIA 100 mg Tab Take  1 tablet (100 mg total) by mouth once daily. 90 tablet 1    levothyroxine (SYNTHROID) 75 MCG tablet Take 1 tablet (75 mcg total) by mouth every morning. 90 tablet 1    losartan (COZAAR) 100 MG tablet Take 1 tablet (100 mg total) by mouth once daily. 90 tablet 3    metFORMIN (GLUCOPHAGE) 1000 MG tablet Take 1 tablet (1,000 mg total) by mouth 2 (two) times daily with meals. 180 tablet 1    omega 3-dha-epa-fish oil (FISH OIL) 1,200 (144-216) mg Cap Take 1 capsule by mouth once daily.      [DISCONTINUED] amiodarone (PACERONE) 200 MG Tab Take 1 tablet (200 mg total) by mouth once daily. 30 tablet 11    [DISCONTINUED] amLODIPine (NORVASC) 5 MG tablet Take 1 tablet (5 mg total) by mouth once daily. 90 tablet 1    [DISCONTINUED] aspirin (ECOTRIN) 81 MG EC tablet Take 81 mg by mouth once daily.      [DISCONTINUED] carvediloL (COREG) 12.5 MG tablet Take 1 tablet (12.5 mg total) by mouth 2 (two) times daily. (Patient taking differently: Take 25 mg by mouth once daily.) 180 tablet 3    [DISCONTINUED] ezetimibe (ZETIA) 10 mg tablet Take 10 mg by mouth.      [DISCONTINUED] insulin glargine U-100, Lantus, (LANTUS SOLOSTAR U-100 INSULIN) 100 unit/mL (3 mL) InPn pen Inject 16 Units into the skin every evening. 14.4 mL 1    [DISCONTINUED] spironolactone (ALDACTONE) 25 MG tablet Take 0.5 tablets (12.5 mg total) by mouth once daily. 45 tablet 3    EScitalopram oxalate (LEXAPRO) 10 MG tablet Take 1 tablet (10 mg total) by mouth once daily. 90 tablet 3    multivitamin (THERAGRAN) per tablet Take 1 tablet by mouth.      [DISCONTINUED] atorvastatin (LIPITOR) 80 MG tablet Take 1 tablet (80 mg total) by mouth every evening. 90 tablet 3    [DISCONTINUED] cholecalciferol, vitamin D3, 1,250 mcg (50,000 unit) capsule Take 50,000 Int'l Units by mouth.      [DISCONTINUED] ciclopirox (PENLAC) 8 % Soln Apply topically nightly. 6.6 mL 5       SCHEDULED MEDS:   ceFAZolin  2 g Intravenous Q8H    citalopram  10 mg Oral Daily    enoxparin  40 mg  Subcutaneous Daily    insulin glargine U-100  20 Units Subcutaneous QHS    levothyroxine  75 mcg Oral Before breakfast    pantoprazole  40 mg Intravenous Daily    [START ON 11/1/2024] rifAMpin  300 mg Oral BID       CONTINUOUS INFUSIONS:    PRN MEDS:  Current Facility-Administered Medications:     acetaminophen, 650 mg, Oral, Q8H PRN    acetaminophen, 650 mg, Oral, Q4H PRN    aluminum-magnesium hydroxide-simethicone, 30 mL, Oral, QID PRN    dextrose 50%, 12.5 g, Intravenous, PRN    dextrose 50%, 12.5 g, Intravenous, PRN    dextrose 50%, 25 g, Intravenous, PRN    diphenhydrAMINE, 12.5 mg, Intravenous, Q6H PRN    glucagon (human recombinant), 1 mg, Intramuscular, PRN    glucagon (human recombinant), 1 mg, Intramuscular, PRN    glucose, 16 g, Oral, PRN    glucose, 24 g, Oral, PRN    HYDROmorphone, 0.2 mg, Intravenous, Q5 Min PRN    insulin aspart U-100, 0-10 Units, Subcutaneous, QID (AC + HS) PRN    magnesium oxide, 800 mg, Oral, PRN    magnesium oxide, 800 mg, Oral, PRN    melatonin, 6 mg, Oral, Nightly PRN    naloxone, 0.02 mg, Intravenous, PRN    ondansetron, 4 mg, Intravenous, Q6H PRN    ondansetron, 4 mg, Intravenous, Daily PRN    oxyCODONE, 5 mg, Oral, PRN    oxyCODONE-acetaminophen, 1 tablet, Oral, Q4H PRN    potassium bicarbonate, 35 mEq, Oral, PRN    potassium bicarbonate, 50 mEq, Oral, PRN    potassium bicarbonate, 60 mEq, Oral, PRN    potassium, sodium phosphates, 2 packet, Oral, PRN    potassium, sodium phosphates, 2 packet, Oral, PRN    potassium, sodium phosphates, 2 packet, Oral, PRN    LABS AND DIAGNOSTICS     CBC LAST 3 DAYS  Recent Labs   Lab 10/29/24  0518 10/30/24  0334 10/31/24  0527   WBC 9.42 11.25 9.80   RBC 3.92* 3.75* 3.89*   HGB 10.2* 9.9* 10.2*   HCT 32.6* 31.5* 32.9*   MCV 83 84 85   MCH 26.0* 26.4* 26.2*   MCHC 31.3* 31.4* 31.0*   RDW 18.6* 18.3* 18.5*    326 394   MPV 10.0 10.2 10.1   GRAN 60.2  5.7 67.2  7.6 63.1  6.2   LYMPH 22.8  2.2 17.5*  2.0 22.1  2.2   MONO 9.0  0.9  "9.1  1.0 9.4  0.9   BASO 0.07 0.06 0.05   NRBC 0 0 0       COAGULATION LAST 3 DAYS  Recent Labs   Lab 10/29/24  0906   INR 1.0       CHEMISTRY LAST 3 DAYS  Recent Labs   Lab 10/29/24  0518 10/30/24  0334 10/31/24  0527   * 132* 133*   K 4.0 4.5 4.4    99 98   CO2 29 26 27   ANIONGAP 5* 7* 8   BUN 13 11 10   CREATININE 0.8 0.8 0.8   * 194* 141*   CALCIUM 7.9* 8.3* 8.5*   MG 1.4* 2.2 1.7   ALBUMIN 2.7* 2.8* 2.9*   PROT 6.0 6.5 6.8   ALKPHOS 295* 252* 227*   ALT 24 15 10   * 47* 37   BILITOT 0.5 0.4 0.4       CARDIAC PROFILE LAST 3 DAYS  No results for input(s): "BNP", "CPK", "CPKMB", "LDH", "TROPONINI" in the last 168 hours.      ENDOCRINE LAST 3 DAYS  No results for input(s): "TSH", "PROCAL" in the last 168 hours.      LAST ARTERIAL BLOOD GAS  ABG  No results for input(s): "PH", "PO2", "PCO2", "HCO3", "BE" in the last 168 hours.      LAST 7 DAYS MICROBIOLOGY   Microbiology Results (last 7 days)       Procedure Component Value Units Date/Time    Blood culture [[id number]] Collected: 10/26/24 0456    Order Status: Completed Specimen: Blood Updated: 10/31/24 0832     Blood Culture, Routine No growth after 5 days.    Narrative:      can be drawn with am labs per rn Fernando  Collection has been rescheduled by 2MB at 10/26/2024 00:49 Reason:   Draw with am labs per rn Fernando   Collection has been rescheduled by 2MB at 10/26/2024 00:49 Reason:   Draw with am labs per rn Fernando     Blood culture [[id number]] Collected: 10/28/24 0716    Order Status: Completed Specimen: Blood Updated: 10/31/24 0832     Blood Culture, Routine No Growth to date      No Growth to date      No Growth to date      No Growth to date    Narrative:      Collection has been rescheduled by [email or web address] at 10/27/2024 15:21 Reason:   Unable to collect. Notified ALYSSIA Rios.  Collection has been rescheduled by GISEL at 10/27/2024 15:21 Reason:   Unable to collect. Notified ALYSSIA Rios.    Blood culture [[id number]] Collected: 10/26/24 0012 "    Order Status: Completed Specimen: Blood from Peripheral, Hand, Right Updated: 10/31/24 0232     Blood Culture, Routine No growth after 5 days.    Blood culture [[id number]] Collected: 10/27/24 1513    Order Status: Completed Specimen: Blood from Peripheral, Hand, Left Updated: 10/30/24 1632     Blood Culture, Routine No Growth to date      No Growth to date      No Growth to date      No Growth to date    AFB Culture & Smear [[id number]] Collected: 10/27/24 0805    Order Status: Completed Specimen: Abscess from Elbow, Left Updated: 10/30/24 1116     AFB CULTURE STAIN No acid fast bacilli seen.     AFB CULTURE STAIN Testing performed by:     AFB CULTURE STAIN Lab [facility]     AFB CULTURE STAIN [address] [facility]     [place] [address]     AFB CULTURE STAIN Dr. Aditya Greenfield MD    Narrative:      Elbow, Left    Culture, Anaerobe [[id number]] Collected: 10/27/24 0805    Order Status: Completed Specimen: Abscess from Elbow, Left Updated: 10/29/24 1417     Anaerobic Culture No anaerobes isolated    Narrative:      Elbow, Left    Aerobic culture [[id number]]  (Abnormal) Collected: 10/27/24 0805    Order Status: Completed Specimen: Abscess from Elbow, Left Updated: 10/29/24 0633     Aerobic Bacterial Culture STAPHYLOCOCCUS AUREUS  Moderate  For susceptibility see order #[id number]      Narrative:      Elbow, Left    Blood culture [[id number]]  (Abnormal) Collected: 10/24/24 1019    Order Status: Completed Specimen: Blood from Peripheral, Hand, Left Updated: 10/29/24 0632     Blood Culture, Routine Gram stain aer bottle: Gram positive cocci      Positive results previously called on order [id number]      STAPHYLOCOCCUS AUREUS  ID consult required for Staph aureus bacteremia.  For susceptibility see order #[id number]      Gram stain [[id number]] Collected: 10/27/24 0805    Order Status: Completed Specimen: Abscess from Elbow, Left Updated: 10/28/24 0816     Gram Stain Result Few WBC's       Rare Gram positive cocci    Narrative:      Elbow, Left    Aerobic culture [[id number]]  (Abnormal)  (Susceptibility) Collected: 10/26/24 1552    Order Status: Completed Specimen: Abscess from Elbow, Left Updated: 10/28/24 0639     Aerobic Bacterial Culture STAPHYLOCOCCUS AUREUS  Moderate      Blood culture [[id number]]  (Abnormal)  (Susceptibility) Collected: 10/24/24 1018    Order Status: Completed Specimen: Blood from Peripheral, Hand, Right Updated: 10/28/24 0623     Blood Culture, Routine Gram stain aer bottle: Gram positive cocci in clusters resembling Staph      Results called to and read back by:Krista Lobato Charge Nurse 10/26/2024      15:26 ncb      STAPHYLOCOCCUS AUREUS  ID consult required for Staph aureus bacteremia.      Fungus culture [[id number]] Collected: 10/27/24 0805    Order Status: Sent Specimen: Abscess from Elbow, Left Updated: 10/27/24 0956    Rapid Organism ID by PCR (from Blood culture) [[id number]]  (Abnormal) Collected: 10/24/24 0927    Order Status: Completed Updated: 10/26/24 1637     Enterococcus faecalis Not Detected     Enterococcus faecium Not Detected     Listeria monocytogenes Not Detected     Staphylococcus spp. See species for ID     Staphylococcus aureus Detected     Staphylococcus epidermidis Not Detected     Staphylococcus lugdunensis Not Detected     Streptococcus species Not Detected     Streptococcus agalactiae Not Detected     Streptococcus pneumoniae Not Detected     Streptococcus pyogenes Not Detected     Acinetobacter calcoaceticus/baumannii complex Not Detected     Bacteroides fragilis Not Detected     Enterobacterales Not Detected     Enterobacter cloacae complex Not Detected     Escherichia coli Not Detected     Klebsiella aerogenes Not Detected     Klebsiella oxytoca Not Detected     Klebsiella pneumoniae group Not Detected     Proteus Not Detected     Salmonella sp Not Detected     Serratia marcescens Not Detected     Haemophilus influenzae Not Detected      Neisseria meningtidis Not Detected     Pseudomonas aeruginosa Not Detected     Stenotrophomonas maltophilia Not Detected     Candida albicans Not Detected     Candida auris Not Detected     Candida glabrata Not Detected     Candida krusei Not Detected     Candida parapsilosis Not Detected     Candida tropicalis Not Detected     Cryptococcus neoformans/gattii Not Detected     CTX-M (ESBL ) Test not applicable     IMP (Carbapenem resistant) Test not applicable     KPC resistance gene (Carbapenem resistant) Test not applicable     mcr-1  Test not applicable     mec A/C  Test not applicable     mec A/C and MREJ (MRSA) gene Not Detected     NDM (Carbapenem resistant) Test not applicable     OXA-48-like (Carbapenem resistant) Test not applicable     van A/B (VRE gene) Test not applicable     VIM (Carbapenem resistant) Test not applicable    Gram stain [[id number]]     Order Status: No result Specimen: Abscess from Elbow, Left     Stool culture **cannot be ordered stat** [[id number]] Collected: 10/23/24 1357    Order Status: Completed Specimen: Stool Updated: 10/25/24 1101     Stool Culture No Salmonella,Shigella,Vibrio,Campylobacter.      No E coli 0157:H7 isolated.    Urine culture [[id number]] Collected: 10/22/24 1514    Order Status: Completed Specimen: Urine Updated: 10/25/24 0715     Urine Culture, Routine No growth    Narrative:      Specimen Source->Urine    Blood culture x two cultures. Draw prior to antibiotics. [[id number]]  (Abnormal) Collected: 10/22/24 1136    Order Status: Completed Specimen: Blood from Peripheral, Hand, Left Updated: 10/25/24 0645     Blood Culture, Routine Gram stain aer bottle: Gram positive cocci      Positive results previously called on Order #[id number]      STAPHYLOCOCCUS AUREUS  ID consult required for Staph aureus bacteremia.  For susceptibility see order #[id number]      Narrative:      Aerobic and anaerobic  Collection has been rescheduled by MAI at 10/22/2024 11:44  Reason:   Done  Collection has been rescheduled by MAI at 10/22/2024 11:44 Reason:   Done    Blood culture x two cultures. Draw prior to antibiotics. [[id number]]  (Abnormal)  (Susceptibility) Collected: 10/22/24 1143    Order Status: Completed Specimen: Blood from Peripheral, Hand, Right Updated: 10/25/24 0645     Blood Culture, Routine Gram stain aer bottle: Gram positive cocci      Results called to and read back by:Tiffani Moulton RN-1ICU;      10/23/2024  07:54 CJD      STAPHYLOCOCCUS AUREUS  ID consult required for Staph aureus bacteremia.      Narrative:      Aerobic and anaerobic  Collection has been rescheduled by MAI at 10/22/2024 11:44 Reason:   Done  Collection has been rescheduled by MAI at 10/22/2024 11:44 Reason:   Done            MOST RECENT IMAGING  X-Ray Chest 1 View S/P PICC Line by Nursing  Narrative: EXAMINATION:  XR CHEST 1 VIEW S/P PICC LINE BY NURSING    CLINICAL HISTORY:  picc line adjustment;    COMPARISON:  October 31, 2024, 10:51 hours    FINDINGS:  AP view demonstrates repositioning of right-sided PICC line.  The loop has been reduced, and the catheter now appears appropriately positioned, with tip at the superior vena cava.    The heart and mediastinum are unremarkable.  Single lead implantable cardiac device is unchanged in position.  No focal infiltrate or effusion is identified.  There is no pneumothorax.  Impression: 1. Right-sided PICC line now appears appropriately positioned, with tip at the superior vena cava.  2. No acute radiographic abnormalities.    Electronically signed by: Walt Sullivan  Date:    10/31/2024  Time:    12:13  X-Ray Chest 1 View S/P PICC Line by Nursing  Narrative: EXAMINATION:  XR CHEST 1 VIEW S/P PICC LINE BY NURSING    CLINICAL HISTORY:  PICC line;    FINDINGS:  AP chest radiograph with comparison radiograph 10/22/2024.  The cardiomediastinal silhouette is within normal limits in size.  Left AICD again noted.  Right PICC line is looped with tip  projecting in the right infraclavicular region.  No pneumothorax.  Low lung volumes demonstrated.  The lungs are otherwise clear.  Impression: Right PICC line is looped with tip projecting in the right infraclavicular region.  Recommend repositioning.    Electronically signed by: Lamin Watts  Date:    10/31/2024  Time:    11:42      ECHOCARDIOGRAM RESULTS (last 5)  Results for orders placed during the hospital encounter of 10/22/24    Echo    Interpretation Summary    Left Ventricle: The left ventricle is normal in size. Normal wall thickness. There is normal systolic function. There is normal diastolic function. E/A ratio is 0.99.    Right Ventricle: Normal right ventricular cavity size. Wall thickness is normal. Systolic function is normal. Pacemaker lead present in the ventricle.    Mitral Valve: There is mild regurgitation.    Tricuspid Valve: There is mild to moderate regurgitation. The estimated PA systolic pressure is at least 33 mmHg.    IVC/SVC: Normal venous pressure at 3 mmHg.      Results for orders placed in visit on 04/17/24    Echo Saline Bubble? No    Interpretation Summary    Left Ventricle: The left ventricle is normal in size. Normal wall thickness. There is normal systolic function with a visually estimated ejection fraction of 65 - 70%. There is normal diastolic function.    Right Ventricle: Normal right ventricular cavity size. Wall thickness is normal. Right ventricle wall motion  is normal. Systolic function is normal.    Left Atrium: Left atrium is moderately dilated.    IVC/SVC: Normal venous pressure at 3 mmHg.      CURRENT/PREVIOUS VISIT EKG  Results for orders placed or performed during the hospital encounter of 10/22/24   EKG 12-lead    Collection Time: 10/22/24 11:45 AM   Result Value Ref Range    QRS Duration 90 ms    OHS QTC Calculation 428 ms    Narrative    Test Reason : I95.9,    Vent. Rate : 056 BPM     Atrial Rate : 056 BPM     P-R Int : 306 ms          QRS Dur : 090 ms       QT Int : 444 ms       P-R-T Axes : 031 -27 034 degrees     QTc Int : 428 ms    Sinus bradycardia with 1st degree A-V block  Low voltage QRS  Inferior infarct (cited on or before 17-JAN-2023)  Possible Anterolateral infarct (cited on or before 12-APR-2024)  Abnormal ECG  When compared with ECG of 12-APR-2024 09:00,  No significant change was found    Referred By: [email or web address]   SELF           Confirmed By:            ASSESSMENT/PLAN:     Active Hospital Problems    Diagnosis    *Septic shock    Olecranon bursitis of left elbow    Tobacco dependency    High anion gap metabolic acidosis    SIRIA (acute kidney injury)    Hyperkalemia    Atrial fibrillation, chronic    Severe obesity (BMI 35.0-39.9) with comorbidity    Cardiac pacemaker    Status post unilateral below-knee amputation    Type 2 diabetes mellitus with stage 2 chronic kidney disease, without long-term current use of insulin       ASSESSMENT & PLAN:   Septic shock  MSSA bacteremia  Cardiomyopathy  ICD in-situ  Chronic atrial fibrillation  Left AKA 1988  DM 2  SIRIA/CKD  Hypomagnesemia  Hyperkalemia  Left elbow abscess  S/P I& D left elbow abscess  H/O Cva 2016  H/o MI 2016    RECOMMENDATIONS:  TTE showed normal systolic and diastolic function and mild-moderate TR    ROSE completed with normal findings and without complication    Pt received PICC line with plans to get Cefazolin x 6 weeks    Has discharge orders to rehab    SHARLA Jensen  [facility]  Department of Cardiology  Date of Service: 10/31/24  4:00 PM     Patient was doing very well.    Blood pressure is trending upwards recommend to add losartan 25 mg to his regimen in view of longstanding diabetes   Also add magnesium oxide 400 mg daily to his regimen  I have personally interviewed and examined the patient, I have reviewed the Nurse Practitioner's history and physical, assessment, and plan. I have personally evaluated the patient at bedside and agree with the findings and made  appropriate changes as necessary in recommendations.      Dr. Jose Raul Garcia M.D.  [facility]  Department of Cardiology  Date of Service: 10/31/24  4:00 PM

## 2024-10-31 NOTE — CARE UPDATE
10/30/24 1947   Patient Assessment/Suction   Level of Consciousness (AVPU) alert   Respiratory Effort Normal;Unlabored   Expansion/Accessory Muscles/Retractions no use of accessory muscles;no retractions;expansion symmetric   All Lung Fields Breath Sounds clear   Rhythm/Pattern, Respiratory unlabored;pattern regular   Cough Frequency no cough   PRE-TX-O2   Device (Oxygen Therapy) room air   SpO2 (!) 93 %   Pulse Oximetry Type Intermittent   Pulse 75   Resp 16

## 2024-10-31 NOTE — PROGRESS NOTES
[facility]   Department of Infectious Disease  Progress Note        PATIENT NAME: Dhaval Hernández  MRN: [id number]  TODAY'S DATE: 10/31/2024  ADMIT DATE: 10/22/2024  LOS: 9 days    CHIEF COMPLAINT: Numbness (Pt is complaining of left sided numbness and tingling in arm started at 9 am./)      PRINCIPLE PROBLEM: Septic shock    INTERVAL HISTORY      10/25/2024: Out of ICU and now on medical floor.  X-ray shows hardware left elbow.  CT without contrast with the abscess.  X-ray right shoulder with mild DJD.  Repeat blood culture 10/24/2024 so far negative.     10/26/2024: He started draining abscess spontaneously from the left elbow today.  Repeat blood cultures remain negative.      10/27/2024:  He had I and D of left elbow today with removal of olecranon screw.  Abscess culture from yesterday growing Staphylococcus aureus.  Repeat blood culture 10/24/2024 hospital and positive.    10/28/2024:  Left elbow continues to improve clinically.  Cultures have grown MRSA.  Repeat blood cultures in progress.    10/29/2024:  Seen and evaluated at bedside.  No acute issues overnight.  Repeat blood cultures so far negative.  Pending evaluation by cardiologist for ROSE.    10/30/2024:  Evaluated at bedside.  Continues to improve.  Penrose drain removed from left elbow yesterday by orthopedic surgeon.  Had ROSE today.  Report pending.    10/31/2024:  ROSE negative for valvular and AICD lead vegetation.  No acute issues overnight.  Pending disposition to SNF for rehab.    Antibiotics (From admission, onward)      Start     Stop Route Frequency Ordered    11/01/24 0900  rifAMpin capsule 300 mg         -- Oral 2 times daily 10/30/24 1428    10/25/24 1830  ceFAZolin injection 2 g         -- IV Every 8 hours (non-standard times) 10/25/24 1400          Antifungals (From admission, onward)      None           Antivirals (From admission, onward)      None            ASSESSMENT and PLAN      Complicated MSSA bacteremia.  This is  most likely from left elbow infection.  TTE and ROSE both negative for valvular and lead vegetation.  Antibiotics as below.  with n.   2.  MRSA Left elbow abscess.  He had I&D 10/27/2024.  Abscess culture growing Staphylococcus aureus.  He has some retained hardware with probable clinical osteomyelitis.  Continue cefazolin and add rifampin 600 mg b.i.d..  Treat for 6 weeks through 12/05/2024.    4. Right shoulder pain.  This is chronic associated with weakness.  Has mild DJD on x-ray.    5. Diabetes mellitus.  Management as per hospitalist.    RECOMMENDATIONS:    Continue cefazolin for 6 weeks through 12/05/2024  Add rifampin 300 mg p.o. b.i.d. on 11/1/2024 through 12/05/2024  Place PICC line for prolonged IV antibiotics.  Remove after completing IV antibiotics 12/05/2024   Check CBC, CMP, CRP, ESR every Monday  He will probably require chronic p.o. antibiotics with either cefadroxil 500mg BID or Bactrim DS 1 B ID after 12/05/2024  Follow up with ID in 2-3 weeks    Thank you for involving ID in the care of this patient.  ID service will drop off today.  Please send Epic secure chat with any questions.       SUBJECTIVE    Dhaval Hernández is a 64 y.o. male with history of diabetes mellitus, hypertension, hyperlipidemia, GERD and previous left BKA.  Also with history of AICD.  He presents to the ER 10/22/2024 with 4 day history of nausea, vomiting and diarrhea.  He had no fever.  In the ER vitals were abnormal with blood pressure 75/39, pulse 60, respiratory 18, temperature 97.6° oxygen saturation 92%.  WBC was 20 K with left shift.  Creatinine 5.1.  UA abnormal with 19 WBC and 4+ glucose.  Chest x-ray with no clear acute infiltrate.  CT abdomen and pelvis showed mild cardiomegaly and also cholelithiasis.  He was commenced on IV fluid and antibiotics.     Leukocytosis resolved and WBC down to 8.0.  Blood cultures have grown MSSA in 2/2 bottles.  HIDA scan was abnormal.  Surgery was consulted to evaluate for possible  cholecystitis.  Notes reviewed.  No plan for surgical intervention at this time since patient had no abdominal pain and appears to be improving.  ID asked to assist with his care.     States his symptoms started with his left elbow hurting.  He has been leaning on the left elbow to transfer from bed to wheelchair.  Has a elbow ORIF and is fixed in a flexion position.  Has had persistent pain for the last 1 week with swelling.  His other symptoms followed after the elbow pain started.        Antibiotic history:  Vancomycin: 10/22/2024 x 1 day  Meropenem: 10/22/2024-10/23/2024  Cefazolin:  10/24/2024-     Microbiology:    Blood culture 10/22/2024, 10/24/2024: MSSA 2/2  Blood culture 10/24/2024: NGTD      Review of Systems  Negative except as stated above in Interval History     OBJECTIVE   Temp:  [98.1 °F (36.7 °C)-98.8 °F (37.1 °C)] 98.1 °F (36.7 °C)  Pulse:  [63-78] 78  Resp:  [15-18] 17  SpO2:  [91 %-94 %] 91 %  BP: (119-178)/(62-85) 119/62  Temp:  [98.1 °F (36.7 °C)-98.8 °F (37.1 °C)]   Temp: 98.1 °F (36.7 °C) (10/31/24 0901)  Pulse: 78 (10/31/24 0901)  Resp: 17 (10/31/24 0901)  BP: 119/62 (10/31/24 0901)  SpO2: (!) 91 % (10/31/24 0901)    Intake/Output Summary (Last 24 hours) at 10/31/2024 1114  Last data filed at 10/31/2024 0903  Gross per 24 hour   Intake 200 ml   Output 1400 ml   Net -1200 ml       Physical Exam  General:  Elderly man sitting in chair.  Chest:  Left chest AICD with no erythema, induration or fluctuance.  Left upper extremity:  Flexion contracture of the elbow.  Purulent drainage from elbow CVS: S1 and 2 heard, no murmurs appreciated   Respiratory: Clear to auscultation   Abdomen: Full, soft, nontender, no palpable organomegaly   Skin: No rash appreciated   CNS: No focal deficits   Musculoskeletal system: No joint or bony abnormalities appreciated  Psych: Good mood, normal affect.      VAD:  ISOLATION:  Now     Wounds:  None    Significant Labs: All pertinent labs within the past 24 hours have  "been reviewed.    CBC LAST 7 DAYS  Recent Labs   Lab 10/25/24  0513 10/26/24  0456 10/27/24  0551 10/28/24  0716 10/29/24  0518 10/30/24  0334 10/31/24  0527   WBC 11.58 10.66 12.01 10.21 9.42 11.25 9.80   RBC 4.34* 4.13* 4.50* 4.33* 3.92* 3.75* 3.89*   HGB 11.3* 11.0* 12.2* 11.3* 10.2* 9.9* 10.2*   HCT 35.3* 33.6* 37.7* 36.5* 32.6* 31.5* 32.9*   MCV 81* 81* 84 84 83 84 85   MCH 26.0* 26.6* 27.1 26.1* 26.0* 26.4* 26.2*   MCHC 32.0 32.7 32.4 31.0* 31.3* 31.4* 31.0*   RDW 18.4* 18.4* 18.6* 18.6* 18.6* 18.3* 18.5*    228 238 269 288 326 394   MPV 9.7 9.9 10.0 9.7 10.0 10.2 10.1   GRAN 68.6  7.9* 61.8  6.6 68.8  8.3* 67.5  6.9 60.2  5.7 67.2  7.6 63.1  6.2   LYMPH 15.6*  1.8 18.3  2.0 14.7*  1.8 17.3*  1.8 22.8  2.2 17.5*  2.0 22.1  2.2   MONO 12.3  1.4* 12.9  1.4* 9.7  1.2* 8.9  0.9 9.0  0.9 9.1  1.0 9.4  0.9   BASO 0.04 0.03 0.07 0.06 0.07 0.06 0.05   NRBC 0 0 0 0 0 0 0       CHEMISTRY LAST 7 DAYS  Recent Labs   Lab 10/25/24  0512 10/26/24  0456 10/27/24  0551 10/28/24  0716 10/29/24  0518 10/30/24  0334 10/31/24  0527    135* 133* 133* 134* 132* 133*   K 4.3 4.2 4.7 4.3 4.0 4.5 4.4    100 101 102 100 99 98   CO2 27 26 23 25 29 26 27   ANIONGAP 9 9 9 6* 5* 7* 8   BUN 25* 21 18 16 13 11 10   CREATININE 1.2 1.0 0.9 0.8 0.8 0.8 0.8   * 173* 155* 137* 130* 194* 141*   CALCIUM 8.3* 8.1* 8.3* 8.3* 7.9* 8.3* 8.5*   MG 1.4* 1.7 1.5* 1.4* 1.4* 2.2 1.7   ALBUMIN 2.9* 2.9* 3.0* 2.9* 2.7* 2.8* 2.9*   PROT 6.0 6.1 6.6 6.5 6.0 6.5 6.8   ALKPHOS 85 85 112 227* 295* 252* 227*   ALT 39 25 16 21 24 15 10   AST 46* 37 46* 88* 104* 47* 37   BILITOT 0.9 0.5 0.6 0.5 0.5 0.4 0.4       Estimated Creatinine Clearance: 102.9 mL/min (based on SCr of 0.8 mg/dL).    INFLAMMATORY/PROCAL  LAST 7 DAYS  Recent Labs   Lab 10/28/24  0716   CRP 17.10*     No results found for: "ESR"  CRP   Date Value Ref Range Status   10/28/2024 17.10 (H) <1.00 mg/dL Final     Comment:     CRP-Normal Application expected " values:   <1.0        mg/dL   Normal Range  1.0 - 5.0  mg/dL   Indicates mild inflammation  5.0 - 10.0 mg/dL   Indicates severe inflammation  >10.0        mg/dL   Represents serious processes and   frequently         indicates the presence of a bacterial   infection.      10/24/2024 25.30 (H) <1.00 mg/dL Final     Comment:     CRP-Normal Application expected values:   <1.0        mg/dL   Normal Range  1.0 - 5.0  mg/dL   Indicates mild inflammation  5.0 - 10.0 mg/dL   Indicates severe inflammation  >10.0        mg/dL   Represents serious processes and   frequently         indicates the presence of a bacterial   infection.          PRIOR  MICROBIOLOGY:    Susceptibility data from last 90 days.  Collected Specimen Info Organism Ceftriaxone Clindamycin Erythromycin Oxacillin Penicillin Tetracycline Trimeth/Sulfa Vancomycin   10/27/24 Abscess from Elbow, Left Staphylococcus aureus           10/26/24 Abscess from Elbow, Left Staphylococcus aureus  S  S  S  S  R  S  S  S   10/26/24 Blood No growth after 5 days.           10/26/24 Blood from Peripheral, Hand, Right No growth after 5 days.           10/24/24 Blood from Peripheral, Hand, Left Staphylococcus aureus           10/24/24 Blood from Peripheral, Hand, Right Staphylococcus aureus  S  S  S  S  R  S  S  S   10/22/24 Blood from Peripheral, Hand, Right Staphylococcus aureus  S  S  S  S  R  S  S  S   10/22/24 Blood from Peripheral, Hand, Left Staphylococcus aureus               LAST 7 DAYS MICROBIOLOGY   Microbiology Results (last 7 days)       Procedure Component Value Units Date/Time    Blood culture [[id number]] Collected: 10/26/24 0456    Order Status: Completed Specimen: Blood Updated: 10/31/24 0832     Blood Culture, Routine No growth after 5 days.    Narrative:      can be drawn with am labs per rn Fernando  Collection has been rescheduled by 2MB at 10/26/2024 00:49 Reason:   Draw with am labs per rn Fernando   Collection has been rescheduled by 2MB at 10/26/2024 00:49  Reason:   Draw with am labs per alyssia King     Blood culture [[id number]] Collected: 10/28/24 0716    Order Status: Completed Specimen: Blood Updated: 10/31/24 0832     Blood Culture, Routine No Growth to date      No Growth to date      No Growth to date      No Growth to date    Narrative:      Collection has been rescheduled by GISEL at 10/27/2024 15:21 Reason:   Unable to collect. Notified ALYSSIA Rios.  Collection has been rescheduled by GISEL at 10/27/2024 15:21 Reason:   Unable to collect. Notified ALYSSIA Rios.    Blood culture [[id number]] Collected: 10/26/24 0012    Order Status: Completed Specimen: Blood from Peripheral, Hand, Right Updated: 10/31/24 0232     Blood Culture, Routine No growth after 5 days.    Blood culture [[id number]] Collected: 10/27/24 1513    Order Status: Completed Specimen: Blood from Peripheral, Hand, Left Updated: 10/30/24 1632     Blood Culture, Routine No Growth to date      No Growth to date      No Growth to date      No Growth to date    AFB Culture & Smear [[id number]] Collected: 10/27/24 0805    Order Status: Completed Specimen: Abscess from Elbow, Left Updated: 10/30/24 1116     AFB CULTURE STAIN No acid fast bacilli seen.     AFB CULTURE STAIN Testing performed by:     AFB CULTURE STAIN Lab [facility]     AFB CULTURE STAIN [address] [facility] [place] [facility]     [place] [address]     AFB CULTURE STAIN Dr. Aditya Greenfield MD    Narrative:      Elbow, Left    Culture, Anaerobe [[id number]] Collected: 10/27/24 0805    Order Status: Completed Specimen: Abscess from Elbow, Left Updated: 10/29/24 1417     Anaerobic Culture No anaerobes isolated    Narrative:      Elbow, Left    Aerobic culture [[id number]]  (Abnormal) Collected: 10/27/24 0805    Order Status: Completed Specimen: Abscess from Elbow, Left Updated: 10/29/24 0633     Aerobic Bacterial Culture STAPHYLOCOCCUS AUREUS  Moderate  For susceptibility see order #[id number]      Narrative:      Elbow, Left    Blood  culture [[id number]]  (Abnormal) Collected: 10/24/24 1019    Order Status: Completed Specimen: Blood from Peripheral, Hand, Left Updated: 10/29/24 0632     Blood Culture, Routine Gram stain aer bottle: Gram positive cocci      Positive results previously called on order [id number]      STAPHYLOCOCCUS AUREUS  ID consult required for Staph aureus bacteremia.  For susceptibility see order #[id number]      Gram stain [[id number]] Collected: 10/27/24 0805    Order Status: Completed Specimen: Abscess from Elbow, Left Updated: 10/28/24 0816     Gram Stain Result Few WBC's      Rare Gram positive cocci    Narrative:      Elbow, Left    Aerobic culture [[id number]]  (Abnormal)  (Susceptibility) Collected: 10/26/24 1552    Order Status: Completed Specimen: Abscess from Elbow, Left Updated: 10/28/24 0639     Aerobic Bacterial Culture STAPHYLOCOCCUS AUREUS  Moderate      Blood culture [[id number]]  (Abnormal)  (Susceptibility) Collected: 10/24/24 1018    Order Status: Completed Specimen: Blood from Peripheral, Hand, Right Updated: 10/28/24 0623     Blood Culture, Routine Gram stain aer bottle: Gram positive cocci in clusters resembling Staph      Results called to and read back by:Krista Lobato Charge Nurse 10/26/2024      15:26 ncb      STAPHYLOCOCCUS AUREUS  ID consult required for Staph aureus bacteremia.      Fungus culture [[id number]] Collected: 10/27/24 0805    Order Status: Sent Specimen: Abscess from Elbow, Left Updated: 10/27/24 0956    Rapid Organism ID by PCR (from Blood culture) [[id number]]  (Abnormal) Collected: 10/24/24 0927    Order Status: Completed Updated: 10/26/24 1637     Enterococcus faecalis Not Detected     Enterococcus faecium Not Detected     Listeria monocytogenes Not Detected     Staphylococcus spp. See species for ID     Staphylococcus aureus Detected     Staphylococcus epidermidis Not Detected     Staphylococcus lugdunensis Not Detected     Streptococcus species Not Detected     Streptococcus  agalactiae Not Detected     Streptococcus pneumoniae Not Detected     Streptococcus pyogenes Not Detected     Acinetobacter calcoaceticus/baumannii complex Not Detected     Bacteroides fragilis Not Detected     Enterobacterales Not Detected     Enterobacter cloacae complex Not Detected     Escherichia coli Not Detected     Klebsiella aerogenes Not Detected     Klebsiella oxytoca Not Detected     Klebsiella pneumoniae group Not Detected     Proteus Not Detected     Salmonella sp Not Detected     Serratia marcescens Not Detected     Haemophilus influenzae Not Detected     Neisseria meningtidis Not Detected     Pseudomonas aeruginosa Not Detected     Stenotrophomonas maltophilia Not Detected     Candida albicans Not Detected     Candida auris Not Detected     Candida glabrata Not Detected     Candida krusei Not Detected     Candida parapsilosis Not Detected     Candida tropicalis Not Detected     Cryptococcus neoformans/gattii Not Detected     CTX-M (ESBL ) Test not applicable     IMP (Carbapenem resistant) Test not applicable     KPC resistance gene (Carbapenem resistant) Test not applicable     mcr-1  Test not applicable     mec A/C  Test not applicable     mec A/C and MREJ (MRSA) gene Not Detected     NDM (Carbapenem resistant) Test not applicable     OXA-48-like (Carbapenem resistant) Test not applicable     van A/B (VRE gene) Test not applicable     VIM (Carbapenem resistant) Test not applicable    Gram stain [[id number]]     Order Status: No result Specimen: Abscess from Elbow, Left     Stool culture **cannot be ordered stat** [[id number]] Collected: 10/23/24 1357    Order Status: Completed Specimen: Stool Updated: 10/25/24 1101     Stool Culture No Salmonella,Shigella,Vibrio,Campylobacter.      No E coli 0157:H7 isolated.    Urine culture [[id number]] Collected: 10/22/24 1514    Order Status: Completed Specimen: Urine Updated: 10/25/24 0715     Urine Culture, Routine No growth    Narrative:      Specimen  Source->Urine    Blood culture x two cultures. Draw prior to antibiotics. [[id number]]  (Abnormal) Collected: 10/22/24 1136    Order Status: Completed Specimen: Blood from Peripheral, Hand, Left Updated: 10/25/24 0645     Blood Culture, Routine Gram stain aer bottle: Gram positive cocci      Positive results previously called on Order #[id number]      STAPHYLOCOCCUS AUREUS  ID consult required for Staph aureus bacteremia.  For susceptibility see order #[id number]      Narrative:      Aerobic and anaerobic  Collection has been rescheduled by MAI at 10/22/2024 11:44 Reason:   Done  Collection has been rescheduled by ZCORBIN at 10/22/2024 11:44 Reason:   Done    Blood culture x two cultures. Draw prior to antibiotics. [[id number]]  (Abnormal)  (Susceptibility) Collected: 10/22/24 1143    Order Status: Completed Specimen: Blood from Peripheral, Hand, Right Updated: 10/25/24 0645     Blood Culture, Routine Gram stain aer bottle: Gram positive cocci      Results called to and read back by:Tiffani Moulton RN-1ICU;      10/23/2024  07:54 CJD      STAPHYLOCOCCUS AUREUS  ID consult required for Staph aureus bacteremia.      Narrative:      Aerobic and anaerobic  Collection has been rescheduled by MAI at 10/22/2024 11:44 Reason:   Done  Collection has been rescheduled by ZCORBIN at 10/22/2024 11:44 Reason:   Done          CURRENT/PREVIOUS VISIT EKG  Results for orders placed or performed during the hospital encounter of 10/22/24   EKG 12-lead    Collection Time: 10/22/24 11:45 AM   Result Value Ref Range    QRS Duration 90 ms    [facility] QTC Calculation 428 ms    Narrative    Test Reason : I95.9,    Vent. Rate : 056 BPM     Atrial Rate : 056 BPM     P-R Int : 306 ms          QRS Dur : 090 ms      QT Int : 444 ms       P-R-T Axes : 031 -27 034 degrees     QTc Int : 428 ms    Sinus bradycardia with 1st degree A-V block  Low voltage QRS  Inferior infarct (cited on or before 17-JAN-2023)  Possible Anterolateral infarct (cited on or before  12-APR-2024)  Abnormal ECG  When compared with ECG of 12-APR-2024 09:00,  No significant change was found    Referred By: [email or web address]   SELF           Confirmed By:      Significant Imaging: I have reviewed all relevant and available imaging results/findings within the past 24 hours.    I spent a total of 45 minutes on the day of the visit.This includes face to face time and non-face to face time preparing to see the patient (eg, review of tests), obtaining and/or reviewing separately obtained history, documenting clinical information in the electronic or other health record, independently interpreting results and communicating results to the patient/family/caregiver, or care coordinator.    Migel Watson MD  Date of Service: 10/31/2024      This note was created using M Mod[employer] voice recognition software that occasionally misinterpreted phrases or words.

## 2024-10-31 NOTE — ASSESSMENT & PLAN NOTE
Rate controlled, patient has not been getting Amiodarone and Coreg here for 9 days. Will continue to monitor off and resume outpatient as needed.   Patient not on anticoagulation from home

## 2024-10-31 NOTE — PLAN OF CARE
Spoke with Wanda Rodriguez(Spouse)- [phone] regarding discharge IMM, Understands statement and IMM will be scanned to chart.

## 2024-10-31 NOTE — ASSESSMENT & PLAN NOTE
Resolved with shifting agents    Recent Labs     10/29/24  0518 10/30/24  0334 10/31/24  0527   K 4.0 4.5 4.4

## 2024-10-31 NOTE — PROCEDURES
[facility]  Date/Time: 10/31/2024 11:44 AM  Location procedure was performed: [facility] [address]  Performed by: Bertram Chacon RN  Supervising provider: Jyoti Alcazar RT  Assisting provider: Evin Courtney MD  Pre-operative Diagnosis: Infected left  elbow  Consent Done: Yes  Time out: Immediately prior to procedure a time out was called to verify the correct patient, procedure, equipment, support staff and site/side marked as required  Indications: med administration  Anesthesia: local infiltration  Local anesthetic: lidocaine 1% without epinephrine  Anesthetic Total (mL): 5  Preparation: skin prepped with Betadine and skin prepped with ChloraPrep  Skin prep agent dried: skin prep agent completely dried prior to procedure  Sterile barriers: all five maximum sterile barriers used - cap, mask, sterile gown, sterile gloves, and large sterile sheet  Hand hygiene: hand hygiene performed prior to central venous catheter insertion  Location details: right brachial  Catheter type: double lumen  Catheter size: 5 Fr  Catheter Length: 42cm    Ultrasound guidance: yes  Vessel Caliber: medium and patent, compressibility normal  Vascular Doppler: not done  Needle advanced into vessel with real time Ultrasound guidance.  Guidewire confirmed in vessel.  Sterile sheath used.  no esophageal manometryNumber of attempts: 1  Post-procedure: blood return through all ports, chlorhexidine patch and sterile dressing applied  Technical procedures used: seldinger technique  Estimated blood loss (mL): 0  Specimens: No  Implants: No  Assessment: placement verified by x-ray and successful placement  Complications: none  Other complications: had to power flush line kinked after hitting pacemaker lead.

## 2024-10-31 NOTE — DISCHARGE SUMMARY
Formerly [facility], [facility] Medicine  Discharge Summary      Patient Name: Dhaval Hernández  MRN: [id number]  MAAME: [id number]  Patient Class: IP- Inpatient  Admission Date: 10/22/2024  Hospital Length of Stay: 9 days  Discharge Date and Time:  10/31/2024 8:58 AM  Attending Physician: Evin Courtney MD   Discharging Provider: Evin Courtney MD  Primary Care Provider: Jessie Jung MD    Primary Care Team: Networked reference to record PCT     HPI:   64 year pt getting admitted with septic shock and hyperkalemia  Pt has been suffering from diarrhea/N&V for past 4 days  Stools very watery and vomitus was clear  Later he started having crampy abdominal pain/radiation to back. No aggravating/relieving factors   He acme to ER and was found to be in chock and got admitted       Procedure(s) (LRB):  INCISION AND DRAINAGE, ABSCESS (Left)      Hospital Course:   Patient admitted with septic shock. Bacteremia with Staph +. Repeat b/c showed NGTD. Source likely from left elbow infection. Continue with antibiotics and ID is following patient, Patient is om cefazolin. Ortho consulted for possible left elbow abscess. GI recommends outpatient follow up for FOBT +.  Patient underwent debridement on 10/27 by orthopedics. Abscess was drained and fluid is also growing MSSA. Cardiology consulted as patient has pacemaker and may need ROSE. Patient was transferred to ICU post ROSE.  ROSE was negative. Final antibiotics recommendations as below. Cefazolin 6 weeks until 12/5. Rifampin PO from 11/1 until 12/5. Weekly labs. PICC line placed. Patient discharged to NH to complete antibiotics and continue rehab.      Goals of Care Treatment Preferences:  Code Status: Full Code    Physical Exam  Vitals and nursing note reviewed.   Constitutional:       General: He is not in acute distress.     Appearance: He is obese. He is not toxic-appearing.   HENT:      Head: Atraumatic.      Mouth/Throat:      Mouth: Mucous  membranes are moist.      Pharynx: Oropharynx is clear.   Eyes:      General: No scleral icterus.     Conjunctiva/sclera: Conjunctivae normal.      Pupils: Pupils are equal, round, and reactive to light.   Cardiovascular:      Rate and Rhythm: Normal rate and regular rhythm.      Heart sounds: murmur heard.  Pulmonary:      Effort: No respiratory distress.      Breath sounds: No wheezing, rhonchi or rales.   Abdominal:      General: Abdomen is flat. Bowel sounds are normal.      Palpations: Abdomen is soft.   Musculoskeletal:         General: No swelling or deformity.      Cervical back: No rigidity or tenderness.      Comments: Left BKA   Left elbow surgical dressing on   Skin:     Coloration: Skin is not jaundiced or pale.      Findings: No bruising, erythema or rash.   Neurological:      General: No focal deficit present.      Mental Status: He is alert and oriented to person, place, and time.      Cranial Nerves: No cranial nerve deficit.      Sensory: No sensory deficit.      Motor: No weakness.   Psychiatric:         Mood and Affect: Mood normal.         Behavior: Behavior normal.    SDOH Screening:  The patient was screened for utility difficulties, food insecurity, transport difficulties, housing insecurity, and interpersonal safety and there were no concerns identified this admission.     Consults:   Consults (From admission, onward)          Status Ordering Provider     Inpatient consult to PICC team (NIAS)  Once        Provider:  (Not yet assigned)    Acknowledged VENESSA ROOT     Inpatient consult to   Once        Provider:  (Not yet assigned)    Acknowledged VENESSA ROOT     Inpatient consult to Cardiology  Once        Provider:  (Not yet assigned)    Completed VENESSA ROOT     Inpatient consult to Orthopedic Surgery  Once        Provider:  Gokul Troy MD    Completed MAUREEN TURNER     Inpatient consult to Gastroenterology  Once        Provider:  Amy  Dhruv GRULLON MD    Completed ALEX LINDA     Inpatient consult to Infectious Diseases  Once        Provider:  Migel Watson MD    Completed ALEX LINDA     Inpatient consult to Nephrology  Once        Provider:  Jabari Verma MD    Completed ALBERTO TOLEDO     Inpatient consult to General surgery  Once        Provider:  Earnest Vogt MD    Completed ALBERTO TOLEDO            Cardiac/Vascular  Atrial fibrillation, chronic  Rate controlled, patient has not been getting Amiodarone and Coreg here for 9 days. Will continue to monitor off and resume outpatient as needed.   Patient not on anticoagulation from home     Cardiac pacemaker  PPM insitu      Renal/  High anion gap metabolic acidosis  In the background of lactic acidemia  Resolved     Hyperkalemia  Resolved with shifting agents    Recent Labs     10/29/24  0518 10/30/24  0334 10/31/24  0527   K 4.0 4.5 4.4                 SIRIA (acute kidney injury)  SIRIA in the background of septic/hypovolemic shock  Resolved with IVF  Recent Labs     10/29/24  0518 10/30/24  0334 10/31/24  0527   CREATININE 0.8 0.8 0.8   EGFRNORACEVR >60.0 >60.0 >60.0         ID  * Septic shock  Secondary to MSSA bacteremia  Left elbow abscess, bursitis   S/p I&D 10/27  Septic shock is resolved   Organ dysfunction: SIRIA and hypotension   Blood culture: 10/22: 4/4 MSSA, 10/24 1/4 MSSA, 10/26 and 10/27 negative so far   Abscess fluid also growing MSSA   TTE and ROSE shows no vegetations   Patient does have a pacemake  Cont Cefazolin for 6 weeks until 12/5 per ID. Added Rifampin 11/1 to 12/5, weekly labs per ID   PICC line placed       Orthopedic  Olecranon bursitis of left elbow  With abscess  See above   S/p I&D  See wound care as below       Final Active Diagnoses:    Diagnosis Date Noted POA    PRINCIPAL PROBLEM:  Septic shock [A41.9, R65.21] 10/22/2024 Yes    Olecranon bursitis of left elbow [M70.22] 10/24/2024 Yes    SIRIA (acute kidney injury) [N17.9] 10/22/2024 Yes     Hyperkalemia [E87.5] 10/22/2024 Yes    Tobacco dependency [F17.200] 10/22/2024 Yes    High anion gap metabolic acidosis [E87.29] 10/22/2024 Yes    Atrial fibrillation, chronic [I48.20] 10/22/2024 Yes    Severe obesity (BMI 35.0-39.9) with comorbidity [E66.01] 10/01/2024 Yes    Cardiac pacemaker [Z95.0] 12/11/2023 Yes     Chronic    Status post unilateral below-knee amputation [Z89.519] 10/30/2023 Not Applicable    Type 2 diabetes mellitus with stage 2 chronic kidney disease, without long-term current use of insulin [E11.22, N18.2] 10/30/2023 Yes      Problems Resolved During this Admission:    Diagnosis Date Noted Date Resolved POA    Acute pneumonia [J18.9] 10/22/2024 10/26/2024 Yes       Discharged Condition: good    Disposition: Skilled Nursing Facility    Follow Up:    Patient Instructions:      Ambulatory referral/consult to Smoking Cessation Program   Standing Status: Future   Referral Priority: Routine Referral Type: Consultation   Referral Reason: Specialty Services Required   Requested Specialty: CTTS   Number of Visits Requested: 1       Significant Diagnostic Studies: Labs: CMP   Recent Labs   Lab 10/30/24  0334 10/31/24  0527   * 133*   K 4.5 4.4   CL 99 98   CO2 26 27   * 141*   BUN 11 10   CREATININE 0.8 0.8   CALCIUM 8.3* 8.5*   PROT 6.5 6.8   ALBUMIN 2.8* 2.9*   BILITOT 0.4 0.4   ALKPHOS 252* 227*   AST 47* 37   ALT 15 10   ANIONGAP 7* 8    and CBC   Recent Labs   Lab 10/30/24  0334 10/31/24  0527   WBC 11.25 9.80   HGB 9.9* 10.2*   HCT 31.5* 32.9*    394       Pending Diagnostic Studies:       Procedure Component Value Units Date/Time    Stool Exam-Ova,Cysts,Parasites [[id number]] Collected: 10/23/24 1359    Order Status: Sent Lab Status: In process Updated: 10/23/24 1416    Specimen: Stool     Transesophageal echo (ROSE) [[id number]]     Order Status: Sent Lab Status: No result            Medications:  Reconciled Home Medications:      Medication List        START taking these  medications      ceFAZolin 1 gram injection  Commonly known as: Ancef  Inject 2,000 mg (2 g total) into the vein every 8 (eight) hours.     citalopram 10 MG tablet  Commonly known as: CeleXA  Take 1 tablet (10 mg total) by mouth once daily.  Start taking on: November 1, 2024     nicotine 21 mg/24 hr  Commonly known as: NICODERM CQ  Place 1 patch onto the skin once daily. for 14 days     oxyCODONE-acetaminophen 5-325 mg per tablet  Commonly known as: PERCOCET  Take 1 tablet by mouth every 4 (four) hours as needed for Pain.     rifAMpin 300 MG capsule  Commonly known as: RIFADIN  Take 1 capsule (300 mg total) by mouth 2 (two) times a day.  Start taking on: November 1, 2024            CHANGE how you take these medications      insulin glargine U-100 (Lantus) 100 unit/mL (3 mL) Inpn pen  Inject 20 Units into the skin every evening.  What changed: how much to take     VITAMIN D3 125 mcg (5,000 unit) Tab  Generic drug: cholecalciferol (vitamin D3)  Take 5,000 Units by mouth once daily.  What changed: Another medication with the same name was removed. Continue taking this medication, and follow the directions you see here.            CONTINUE taking these medications      empagliflozin 25 mg tablet  Commonly known as: JARDIANCE  Take 1 tablet (25 mg total) by mouth once daily.     EScitalopram oxalate 10 MG tablet  Commonly known as: LEXAPRO  Take 1 tablet (10 mg total) by mouth once daily.     ezetimibe 10 mg tablet  Commonly known as: ZETIA  Take 10 mg by mouth.     famotidine 20 MG tablet  Commonly known as: PEPCID  Take 20 mg by mouth 2 (two) times daily.     Fish OiL 1,200 (144-216) mg Cap  Generic drug: omega 3-dha-epa-fish oil  Take 1 capsule by mouth once daily.     JANUVIA 100 mg Tab  Generic drug: SITagliptin phosphate  Take 1 tablet (100 mg total) by mouth once daily.     levothyroxine 75 MCG tablet  Commonly known as: SYNTHROID  Take 1 tablet (75 mcg total) by mouth every morning.     losartan 100 MG  tablet  Commonly known as: COZAAR  Take 1 tablet (100 mg total) by mouth once daily.     metFORMIN 1000 MG tablet  Commonly known as: GLUCOPHAGE  Take 1 tablet (1,000 mg total) by mouth 2 (two) times daily with meals.     multivitamin per tablet  Commonly known as: THERAGRAN  Take 1 tablet by mouth.            STOP taking these medications      amiodarone 200 MG Tab  Commonly known as: PACERONE     amLODIPine 5 MG tablet  Commonly known as: NORVASC     aspirin 81 MG EC tablet  Commonly known as: ECOTRIN     atorvastatin 80 MG tablet  Commonly known as: LIPITOR     carvediloL 12.5 MG tablet  Commonly known as: COREG     ciclopirox 8 % Soln  Commonly known as: PENLAC     spironolactone 25 MG tablet  Commonly known as: ALDACTONE              Indwelling Lines/Drains at time of discharge:   Lines/Drains/Airways       Drain  Duration                  Open Drain 10/27/24 0820 Tube - 1 Left Elbow Penrose 1/4 inch 4 days                    Time spent on the discharge of patient: 40 minutes         Evin Courtney MD  Department of Hospital Medicine  [facility]

## 2024-10-31 NOTE — ASSESSMENT & PLAN NOTE
Secondary to MSSA bacteremia  Left elbow abscess, bursitis   S/p I&D 10/27  Septic shock is resolved   Organ dysfunction: SIRIA and hypotension   Blood culture: 10/22: 4/4 MSSA, 10/24 1/4 MSSA, 10/26 and 10/27 negative so far   Abscess fluid also growing MSSA   TTE and ROSE shows no vegetations   Patient does have a pacemake  Cont Cefazolin for 6 weeks until 12/5 per ID. Added Rifampin 11/1 to 12/5, weekly labs per ID   PICC line placed

## 2024-10-31 NOTE — PT/OT/SLP PROGRESS
Occupational Therapy      Patient Name:  Dhaval Hernández   MRN:  [id number]    Patient not seen today secondary to Other (Comment) (Pt getting line placed x 2 attempts; Pt with pending d/c). Will follow-up next service date.    10/31/2024

## 2024-11-01 LAB
BACTERIA BLD CULT: NORMAL
O+P SPEC MICRO: NORMAL
O+P STL CONC: NORMAL

## 2024-11-02 LAB
BACTERIA BLD CULT: NORMAL
OHS QRS DURATION: 90 MS
OHS QTC CALCULATION: 428 MS

## 2024-11-06 ENCOUNTER — PATIENT OUTREACH (OUTPATIENT)
Dept: ADMINISTRATIVE | Facility: HOSPITAL | Age: 64
End: 2024-11-06
Payer: MEDICARE

## 2024-11-06 ENCOUNTER — TELEPHONE (OUTPATIENT)
Dept: ORTHOPEDICS | Facility: CLINIC | Age: 64
End: 2024-11-06
Payer: MEDICARE

## 2024-11-06 NOTE — TELEPHONE ENCOUNTER
Pt stated he is at [facility] in [place].   I left a voicemail for transportation informing them his appt in 11/8 at 10:30.

## 2024-11-06 NOTE — PROGRESS NOTES
Called patient. Recording says voicemail not set up yet. Patient portal message sent.    Note added to 11/20/2024 primary care appointment note in appointment desk of  due.    Foot Exam hyperlinked.    [facility] and [facility] reviewed. No new  items found.

## 2024-11-06 NOTE — TELEPHONE ENCOUNTER
----- Message from Gokul Troy MD sent at 11/6/2024  6:29 AM CST -----  Regarding: postop appt  Pt had Left elbow abscess I&D in [place] on 10/27.  Needs appt with me this Friday (11/8) for wound check. No x-ray needed.

## 2024-11-08 ENCOUNTER — OFFICE VISIT (OUTPATIENT)
Dept: ORTHOPEDICS | Facility: CLINIC | Age: 64
End: 2024-11-08
Payer: MEDICARE

## 2024-11-08 DIAGNOSIS — M70.22 OLECRANON BURSITIS OF LEFT ELBOW: Primary | ICD-10-CM

## 2024-11-08 PROCEDURE — 99999 PR PBB SHADOW E&M-EST. PATIENT-LVL III: CPT | Mod: PBBFAC,,, | Performed by: ORTHOPAEDIC SURGERY

## 2024-11-08 NOTE — PROGRESS NOTES
Status/Diagnosis: Left elbow abscess with retained deep HW.  Date of Surgery:   10/27/2024: Left elbow I&D with partial HWR.  Date of Injury: none  Return visit: 1 week for wound check  X-rays on Return: none      Present History:  Dhaval Hernández is a 64 y.o. male who presents today from [facility] for routine postop visit and wound check.  Overall doing well.  PICC line in place per ID recs.  4/10 pain.  Mostly related to pressure with transfers.  No persistent drainage.  Previous complaints of ulnar nerve paresthesias is resolved.      Past Medical History:   Diagnosis Date    Diabetes mellitus, type 2     GERD (gastroesophageal reflux disease)     Hyperlipidemia     Hypertension        Past Surgical History:   Procedure Laterality Date    FRACTURE SURGERY      INCISION AND DRAINAGE OF ABSCESS Left 10/27/2024    Procedure: INCISION AND DRAINAGE, ABSCESS;  Surgeon: Gokul Troy MD;  Location: [facility];  Service: Orthopedics;  Laterality: Left;  INCISION AND DRAIN OF LEFT ELBOW ABCESS    INSERTION OF PACEMAKER      LEG AMPUTATION         Current Outpatient Medications   Medication Sig    ceFAZolin (ANCEF) 1 gram injection Inject 2,000 mg (2 g total) into the vein every 8 (eight) hours.    cholecalciferol, vitamin D3, (VITAMIN D3) 125 mcg (5,000 unit) Tab Take 5,000 Units by mouth once daily.    citalopram (CELEXA) 10 MG tablet Take 1 tablet (10 mg total) by mouth once daily.    empagliflozin (JARDIANCE) 25 mg tablet Take 1 tablet (25 mg total) by mouth once daily.    EScitalopram oxalate (LEXAPRO) 10 MG tablet Take 1 tablet (10 mg total) by mouth once daily.    ezetimibe (ZETIA) 10 mg tablet Take 1 tablet (10 mg total) by mouth once daily.    famotidine (PEPCID) 20 MG tablet Take 20 mg by mouth 2 (two) times daily.    insulin glargine U-100, Lantus, 100 unit/mL (3 mL) SubQ InPn pen Inject 20 Units into the skin every evening.    JANUVIA 100 mg Tab Take 1 tablet (100 mg total) by mouth once daily.     levothyroxine (SYNTHROID) 75 MCG tablet Take 1 tablet (75 mcg total) by mouth every morning.    losartan (COZAAR) 100 MG tablet Take 1 tablet (100 mg total) by mouth once daily.    metFORMIN (GLUCOPHAGE) 1000 MG tablet Take 1 tablet (1,000 mg total) by mouth 2 (two) times daily with meals.    multivitamin (THERAGRAN) per tablet Take 1 tablet by mouth.    nicotine (NICODERM CQ) 21 mg/24 hr Place 1 patch onto the skin once daily. for 14 days    omega 3-dha-epa-fish oil (FISH OIL) 1,200 (144-216) mg Cap Take 1 capsule by mouth once daily.    oxyCODONE-acetaminophen (PERCOCET) 5-325 mg per tablet Take 1 tablet by mouth every 4 (four) hours as needed for Pain.    rifAMpin (RIFADIN) 300 MG capsule Take 1 capsule (300 mg total) by mouth 2 (two) times a day.     No current facility-administered medications for this visit.       Social History     Socioeconomic History    Marital status:    Tobacco Use    Smoking status: Every Day     Types: Cigarettes    Smokeless tobacco: Former     Types: Snuff     Quit date: 04/2024    Tobacco comments:     Pt used smokeless tobacco from 6849-9505 and quit. Started dipping tobacco throughout most of his 20's. Started smoking cigarettes again in 06/2024.      Social Drivers of Health     Financial Resource Strain: Low Risk  (10/22/2024)    Overall Financial Resource Strain (CARDIA)     Difficulty of Paying Living Expenses: Not hard at all   Food Insecurity: No Food Insecurity (10/22/2024)    Hunger Vital Sign     Worried About Running Out of Food in the Last Year: Never true     Ran Out of Food in the Last Year: Never true   Transportation Needs: No Transportation Needs (10/22/2024)    TRANSPORTATION NEEDS     Transportation : No   Physical Activity: Inactive (10/22/2024)    Exercise Vital Sign     Days of Exercise per Week: 0 days     Minutes of Exercise per Session: 0 min   Stress: No Stress Concern Present (10/22/2024)    [employer] - Occupational  Stress Questionnaire     Feeling of Stress : Not at all   Housing Stability: Low Risk  (10/22/2024)    Housing Stability Vital Sign     Unable to Pay for Housing in the Last Year: No     Homeless in the Last Year: No       Physical exam:  There were no vitals filed for this visit.  There is no height or weight on file to calculate BMI.  General: In no apparent distress; well developed and well nourished.  HEENT: normocephalic; atraumatic.  Cardiovascular: regular rate.  Respiratory: no increased work of breathing.  Musculoskeletal:   Inspection:   Dry dressing removed today.  Minimal residual swelling.  Nylon sutures in place with near-complete healing.  No fluctuance or drainage.   Baseline 90 degree flexion contracture/ankylosis elbow joint.  30° supination and 45° pronation.  First dorsal interossei atrophy, unchanged.  Ulnar nerve sensation improved.  Palpable radial pulse.                   Imaging Studies/Outside documentation:  I have ordered/reviewed/interpreted the following images/outside documentation:  No new imaging today.        Assessment:  Dhaval Hernández is a 64 y.o. male with Left elbow abscess with retained deep HW.     Plan:   Every other suture removed today without complication.  We will continue daily dry dressing changes.  IV antibiotics per infectious disease service.    Recommend the patient be fit for some sort of offloading brace/pad given he uses the left forearm and/or elbow for transfers status post left AKA.    Patient voiced understanding.  All questions were answered.  Return to clinic in 1 week for repeat evaluation and wound check.  No new x-rays.      This note was created using voice recognition software and may contain grammatical errors.

## 2024-11-09 NOTE — PHYSICIAN QUERY
Please clarify if there is any clinical correlation between Left elbow abscess and deep orthopedic implants Left elbow.  Are the conditions:  Unrelated to each other

## 2024-11-14 LAB
FUNGUS SPEC CULT: NORMAL

## 2024-11-15 ENCOUNTER — TELEPHONE (OUTPATIENT)
Dept: INFECTIOUS DISEASES | Facility: CLINIC | Age: 64
End: 2024-11-15
Payer: MEDICARE

## 2024-11-15 ENCOUNTER — OFFICE VISIT (OUTPATIENT)
Dept: ORTHOPEDICS | Facility: CLINIC | Age: 64
End: 2024-11-15
Payer: MEDICARE

## 2024-11-15 DIAGNOSIS — M70.22 OLECRANON BURSITIS OF LEFT ELBOW: Primary | ICD-10-CM

## 2024-11-15 PROCEDURE — 99999 PR PBB SHADOW E&M-EST. PATIENT-LVL III: CPT | Mod: PBBFAC,,, | Performed by: ORTHOPAEDIC SURGERY

## 2024-11-15 NOTE — TELEPHONE ENCOUNTER
I spoke with patient to advise we re scheduled his appointment for the same date and time ( 12/19/24   At 10:20 am) per his request , as he accidentally  Canceled the appt.  He stated he is in [facility] and he advised them  Of his appointment     TAMMY Benitez [facility]   11/15/24     ----- Message from Eulalia sent at 11/15/2024 11:18 AM CST -----   Type:  Needs Medical Advice    Who Called: PT      Would the patient rather a call back or a response via [facility]? Call  Best Call Back Number:  [phone]        Additional Information: Cancel wrong appt he want to keeo appt 11/19/24 at 10:20am cancel wrong appt. Please call back to advise. Thank you!

## 2024-11-15 NOTE — PROGRESS NOTES
Status/Diagnosis: Left elbow abscess with retained deep HW.  Date of Surgery:   10/27/2024: Left elbow I&D with partial HWR.  Date of Injury: none  Return visit: 3 weeks for wound check  X-rays on Return: none      Present History:  Dhaval Hernández is a 64 y.o. male who returns today for routine postop visit and wound check.  Doing well.  3/10 pain.  Only complaint today is some new onset intermittent numbness involving the entire left upper extremity.  Not present currently.  Reports that the offloading left elbow pad has been ordered but not yet received at [facility].      Past Medical History:   Diagnosis Date    Diabetes mellitus, type 2     GERD (gastroesophageal reflux disease)     Hyperlipidemia     Hypertension        Past Surgical History:   Procedure Laterality Date    FRACTURE SURGERY      INCISION AND DRAINAGE OF ABSCESS Left 10/27/2024    Procedure: INCISION AND DRAINAGE, ABSCESS;  Surgeon: Gokul Troy MD;  Location: [facility];  Service: Orthopedics;  Laterality: Left;  INCISION AND DRAIN OF LEFT ELBOW ABCESS    INSERTION OF PACEMAKER      LEG AMPUTATION         Current Outpatient Medications   Medication Sig    ceFAZolin (ANCEF) 1 gram injection Inject 2,000 mg (2 g total) into the vein every 8 (eight) hours.    cholecalciferol, vitamin D3, (VITAMIN D3) 125 mcg (5,000 unit) Tab Take 5,000 Units by mouth once daily.    citalopram (CELEXA) 10 MG tablet Take 1 tablet (10 mg total) by mouth once daily.    empagliflozin (JARDIANCE) 25 mg tablet Take 1 tablet (25 mg total) by mouth once daily.    EScitalopram oxalate (LEXAPRO) 10 MG tablet Take 1 tablet (10 mg total) by mouth once daily.    ezetimibe (ZETIA) 10 mg tablet Take 1 tablet (10 mg total) by mouth once daily.    famotidine (PEPCID) 20 MG tablet Take 20 mg by mouth 2 (two) times daily.    insulin glargine U-100, Lantus, 100 unit/mL (3 mL) SubQ InPn pen Inject 20 Units into the skin every evening.    JANUVIA 100 mg Tab Take 1 tablet (100  mg total) by mouth once daily.    levothyroxine (SYNTHROID) 75 MCG tablet Take 1 tablet (75 mcg total) by mouth every morning.    losartan (COZAAR) 100 MG tablet Take 1 tablet (100 mg total) by mouth once daily.    metFORMIN (GLUCOPHAGE) 1000 MG tablet Take 1 tablet (1,000 mg total) by mouth 2 (two) times daily with meals.    multivitamin (THERAGRAN) per tablet Take 1 tablet by mouth.    omega 3-dha-epa-fish oil (FISH OIL) 1,200 (144-216) mg Cap Take 1 capsule by mouth once daily.    oxyCODONE-acetaminophen (PERCOCET) 5-325 mg per tablet Take 1 tablet by mouth every 4 (four) hours as needed for Pain.    rifAMpin (RIFADIN) 300 MG capsule Take 1 capsule (300 mg total) by mouth 2 (two) times a day.     No current facility-administered medications for this visit.       Social History     Socioeconomic History    Marital status:    Tobacco Use    Smoking status: Every Day     Types: Cigarettes    Smokeless tobacco: Former     Types: Snuff     Quit date: 04/2024    Tobacco comments:     Pt used smokeless tobacco from 4974-7586 and quit. Started dipping tobacco throughout most of his 20's. Started smoking cigarettes again in 06/2024.      Social Drivers of Health     Financial Resource Strain: Low Risk  (10/22/2024)    Overall Financial Resource Strain (CARDIA)     Difficulty of Paying Living Expenses: Not hard at all   Food Insecurity: No Food Insecurity (10/22/2024)    Hunger Vital Sign     Worried About Running Out of Food in the Last Year: Never true     Ran Out of Food in the Last Year: Never true   Transportation Needs: No Transportation Needs (10/22/2024)    TRANSPORTATION NEEDS     Transportation : No   Physical Activity: Inactive (10/22/2024)    Exercise Vital Sign     Days of Exercise per Week: 0 days     Minutes of Exercise per Session: 0 min   Stress: No Stress Concern Present (10/22/2024)    [employer] - Occupational Stress Questionnaire     Feeling of Stress : Not at all    Housing Stability: Low Risk  (10/22/2024)    Housing Stability Vital Sign     Unable to Pay for Housing in the Last Year: No     Homeless in the Last Year: No       Physical exam:  There were no vitals filed for this visit.  There is no height or weight on file to calculate BMI.  General: In no apparent distress; well developed and well nourished.  HEENT: normocephalic; atraumatic.  Cardiovascular: regular rate.  Respiratory: no increased work of breathing.  Musculoskeletal:   Inspection:   Dry dressing removed today.  Minimal residual swelling.    Few residual nylon sutures in place.  No fluctuance or drainage noted.  Small area of residual delayed wound healing where drain had previously been placed.  Baseline 90 degree flexion contracture/ankylosis elbow joint.  30° supination and 45° pronation.  First dorsal interossei atrophy, unchanged.  Ulnar nerve sensation improved.  Palpable radial pulse.                   Imaging Studies/Outside documentation:  I have ordered/reviewed/interpreted the following images/outside documentation:  No new imaging today.        Assessment:  Dhaval Hernández is a 64 y.o. male with Left elbow abscess with retained deep HW.     Plan:   Remaining sutures removed today without complication.  We will initiate daily Betadine paint dressing changes to aid in healing by secondary intention.  Reinforced the importance of obtaining offloading brace/pad for his left elbow to minimize recurrent irritation and/or infection.    Return to clinic in 3 weeks for repeat evaluation and wound check, or sooner if needed.    Recommend evaluation by neuro for intermittent left upper extremity numbness and paresthesias.  Patient voiced understanding.  All questions were answered.      This note was created using voice recognition software and may contain grammatical errors.

## 2024-11-19 ENCOUNTER — OFFICE VISIT (OUTPATIENT)
Dept: INFECTIOUS DISEASES | Facility: CLINIC | Age: 64
End: 2024-11-19
Payer: MEDICARE

## 2024-11-19 VITALS
OXYGEN SATURATION: 99 % | DIASTOLIC BLOOD PRESSURE: 80 MMHG | TEMPERATURE: 98 F | BODY MASS INDEX: 33 KG/M2 | HEART RATE: 92 BPM | HEIGHT: 70 IN | SYSTOLIC BLOOD PRESSURE: 142 MMHG

## 2024-11-19 DIAGNOSIS — B95.62 MRSA BACTEREMIA: Primary | ICD-10-CM

## 2024-11-19 DIAGNOSIS — G89.29 CHRONIC RIGHT SHOULDER PAIN: ICD-10-CM

## 2024-11-19 DIAGNOSIS — M25.511 CHRONIC RIGHT SHOULDER PAIN: ICD-10-CM

## 2024-11-19 DIAGNOSIS — L02.414 ABSCESS OF LEFT ELBOW: ICD-10-CM

## 2024-11-19 DIAGNOSIS — R78.81 MRSA BACTEREMIA: Primary | ICD-10-CM

## 2024-11-19 DIAGNOSIS — M86.622: ICD-10-CM

## 2024-11-19 PROCEDURE — 3077F SYST BP >= 140 MM HG: CPT | Mod: CPTII,S$GLB,, | Performed by: INTERNAL MEDICINE

## 2024-11-19 PROCEDURE — 3079F DIAST BP 80-89 MM HG: CPT | Mod: CPTII,S$GLB,, | Performed by: INTERNAL MEDICINE

## 2024-11-19 PROCEDURE — 4010F ACE/ARB THERAPY RXD/TAKEN: CPT | Mod: CPTII,S$GLB,, | Performed by: INTERNAL MEDICINE

## 2024-11-19 PROCEDURE — 1159F MED LIST DOCD IN RCRD: CPT | Mod: CPTII,S$GLB,, | Performed by: INTERNAL MEDICINE

## 2024-11-19 PROCEDURE — 1125F AMNT PAIN NOTED PAIN PRSNT: CPT | Mod: CPTII,S$GLB,, | Performed by: INTERNAL MEDICINE

## 2024-11-19 PROCEDURE — 1111F DSCHRG MED/CURRENT MED MERGE: CPT | Mod: CPTII,S$GLB,, | Performed by: INTERNAL MEDICINE

## 2024-11-19 PROCEDURE — 99214 OFFICE O/P EST MOD 30 MIN: CPT | Mod: S$GLB,,, | Performed by: INTERNAL MEDICINE

## 2024-11-19 PROCEDURE — 3008F BODY MASS INDEX DOCD: CPT | Mod: CPTII,S$GLB,, | Performed by: INTERNAL MEDICINE

## 2024-11-19 PROCEDURE — 3288F FALL RISK ASSESSMENT DOCD: CPT | Mod: CPTII,S$GLB,, | Performed by: INTERNAL MEDICINE

## 2024-11-19 PROCEDURE — 1101F PT FALLS ASSESS-DOCD LE1/YR: CPT | Mod: CPTII,S$GLB,, | Performed by: INTERNAL MEDICINE

## 2024-11-19 PROCEDURE — 99999 PR PBB SHADOW E&M-EST. PATIENT-LVL IV: CPT | Mod: PBBFAC,,, | Performed by: INTERNAL MEDICINE

## 2024-11-19 NOTE — PATIENT INSTRUCTIONS
You will complete IV antibiotics 12/5/24  Start doxycycline on 12/6/24  Your surgeon will nee to see you to evaluate for management of exposed hardware

## 2024-11-20 NOTE — PROGRESS NOTES
Subjective:      Reason for consult:     HPI: Dhaval Hernández is a 64 y.o. male   with history of diabetes mellitus, hypertension, hyperlipidemia, GERD and previous left BKA.  Also with history of AICD.  He presents to the ER 10/22/2024 with 4 day history of nausea, vomiting and diarrhea.  He had no fever.  In the ER vitals were abnormal with blood pressure 75/39, pulse 60, respiratory 18, temperature 97.6° oxygen saturation 92%.  WBC was 20 K with left shift.  Creatinine 5.1.  UA abnormal with 19 WBC and 4+ glucose.  Chest x-ray with no clear acute infiltrate.  CT abdomen and pelvis showed mild cardiomegaly and also cholelithiasis.  He was commenced on IV fluid and antibiotics.     Leukocytosis resolved and WBC down to 8.0.  Blood cultures have grown MSSA in 2/2 bottles.  HIDA scan was abnormal.  Surgery was consulted to evaluate for possible cholecystitis.  Notes reviewed.  No plan for surgical intervention at this time since patient had no abdominal pain and appears to be improving.  ID asked to assist with his care.     States his symptoms started with his left elbow hurting.  He has been leaning on the left elbow to transfer from bed to wheelchair.  Has a elbow ORIF and is fixed in a flexion position.  Has had persistent pain for the last 1 week with swelling.  His other symptoms followed after the elbow pain started.        Antibiotic history:  Vancomycin: 10/22/2024 x 1 day  Meropenem: 10/22/2024-10/23/2024  Cefazolin:  10/24/2024-  Rifampin: 11/01/2024-     Microbiology:    Blood culture 10/22/2024, 10/24/2024: MSSA 2/2  Blood culture 10/24/2024: NGTD    11/19/2024:  Recently hospitalized and treated for left elbow abscess with osteomyelitis complicated by MSSA bacteremia.  Improved post I&D and was ultimately discharged on cefazolin and rifampin to complete 6 week course 12/05/2024.  Sutures have been removed.  Here for follow up.    Review of patient's allergies indicates:   Allergen Reactions    Fish  "containing products      Other reaction(s): .     Past Medical History:   Diagnosis Date    Diabetes mellitus, type 2     GERD (gastroesophageal reflux disease)     Hyperlipidemia     Hypertension      Past Surgical History:   Procedure Laterality Date    FRACTURE SURGERY      INCISION AND DRAINAGE OF ABSCESS Left 10/27/2024    Procedure: INCISION AND DRAINAGE, ABSCESS;  Surgeon: Gokul Troy MD;  Location: [facility];  Service: Orthopedics;  Laterality: Left;  INCISION AND DRAIN OF LEFT ELBOW ABCESS    INSERTION OF PACEMAKER      LEG AMPUTATION        Social History     Tobacco Use    Smoking status: Every Day     Types: Cigarettes    Smokeless tobacco: Former     Types: Snuff     Quit date: 04/2024    Tobacco comments:     Pt used smokeless tobacco from 7755-9141 and quit. Started dipping tobacco throughout most of his 20's. Started smoking cigarettes again in 06/2024.    Substance Use Topics    Alcohol use: Not on file     Family History   Problem Relation Name Age of Onset    Arthritis Mother         Pertinent medications noted:     Review of Systems  10 system review unremarkable.    Outdoor activities:  Wheelchair-bound  Travel:  No recent travel  Implants:  Left elbow hardware  Antibiotic History:  As in HPI      Objective:      Blood pressure (!) 142/80, pulse 92, temperature 98.2 °F (36.8 °C), temperature source Temporal, height 5' 10" (1.778 m), SpO2 99%. Body mass index is 33 kg/m².  Physical Exam      General:  Elderly man sitting in wheelchair in no acute distress   Left elbow:  Flexion contracture.  0.5 x 0.5 cm elbow wound with wire visible at the wound   CVS: S1 and 2 heard, no murmurs appreciated  Respiratory:  Clear to auscultation   Abdomen: Full, soft, nontender, no palpable organomegaly   Skin: No rash appreciated  Psychiatric: Normal mood, speech,  demeanor     Wound:  Left elbow wound    VAD:  PICC line      General Labs reviewed:  Lab Results   Component Value Date    WBC 9.80 10/31/2024 "    RBC 3.89 (L) 10/31/2024    HGB 10.2 (L) 10/31/2024    HCT 32.9 (L) 10/31/2024    MCV 85 10/31/2024    MCH 26.2 (L) 10/31/2024    MCHC 31.0 (L) 10/31/2024    RDW 18.5 (H) 10/31/2024     10/31/2024    MPV 10.1 10/31/2024    GRAN 6.2 10/31/2024    GRAN 63.1 10/31/2024    LYMPH 2.2 10/31/2024    LYMPH 22.1 10/31/2024    MONO 0.9 10/31/2024    MONO 9.4 10/31/2024    EOS 0.4 10/31/2024    BASO 0.05 10/31/2024    EOSINOPHIL 3.7 10/31/2024    BASOPHIL 0.5 10/31/2024     CMP  Sodium   Date Value Ref Range Status   10/31/2024 133 (L) 136 - 145 mmol/L Final     Potassium   Date Value Ref Range Status   10/31/2024 4.4 3.5 - 5.1 mmol/L Final     Chloride   Date Value Ref Range Status   10/31/2024 98 95 - 110 mmol/L Final     CO2   Date Value Ref Range Status   10/31/2024 27 23 - 29 mmol/L Final     Glucose   Date Value Ref Range Status   10/31/2024 141 (H) 70 - 110 mg/dL Final     BUN   Date Value Ref Range Status   10/31/2024 10 8 - 23 mg/dL Final     Creatinine   Date Value Ref Range Status   10/31/2024 0.8 0.5 - 1.4 mg/dL Final     Calcium   Date Value Ref Range Status   10/31/2024 8.5 (L) 8.7 - 10.5 mg/dL Final     Total Protein   Date Value Ref Range Status   10/31/2024 6.8 6.0 - 8.4 g/dL Final     Albumin   Date Value Ref Range Status   10/31/2024 2.9 (L) 3.5 - 5.2 g/dL Final     Total Bilirubin   Date Value Ref Range Status   10/31/2024 0.4 0.1 - 1.0 mg/dL Final     Comment:     For infants and newborns, interpretation of results should be based  on gestational age, weight and in agreement with clinical  observations.    Premature Infant recommended reference ranges:  Up to 24 hours.............<8.0 mg/dL  Up to 48 hours............<12.0 mg/dL  3-5 days..................<15.0 mg/dL  6-29 days.................<15.0 mg/dL       Alkaline Phosphatase   Date Value Ref Range Status   10/31/2024 227 (H) 55 - 135 U/L Final     AST   Date Value Ref Range Status   10/31/2024 37 10 - 40 U/L Final     ALT   Date Value Ref  Range Status   10/31/2024 10 10 - 44 U/L Final     Anion Gap   Date Value Ref Range Status   10/31/2024 8 8 - 16 mmol/L Final     eGFR   Date Value Ref Range Status   10/31/2024 >60.0 >60 mL/min/1.73 m^2 Final           Micro:  Microbiology Results (last 7 days)       ** No results found for the last 168 hours. **          Imaging Reviewed:          Assessment:     Complicated MSSA bacteremia.  This was from left elbow infection.  TTE and ROSE both negative for valvular and lead vegetation.  Antibiotics as below.      2.  MRSA Left elbow abscess.  He had I&D 10/27/2024.  He has some retained hardware with probable clinical osteomyelitis.  Continue cefazolin and add rifampin 600 mg b.i.d..  Treat for 6 weeks through 12/05/2024.  Unfortunately wound has broken down with feasible hardware at also floor.  Need follow up by orthopedic surgeon for additional hardware removal.  He will need chronic suppressive antibiotics after 12/05/2024.  He is on losartan making Bactrim use problematic.  We will use doxycycline 100 mg b.i.d. from 12/06/2024.  May transition to Bactrim if losartan can be discontinued with close monitoring of creatinine and potassium.     4. Right shoulder pain.  This is chronic associated with weakness.  Has mild DJD on x-ray.     5. Diabetes mellitus.  Management as per hospitalist.    I will see again in 4 weeks  Problem List Items Addressed This Visit    None       Plan:     As above      There are no diagnoses linked to this encounter.    This note was created using Dragon voice recognition software that occasionally misinterpreted phrases or words.

## 2024-11-26 ENCOUNTER — TELEPHONE (OUTPATIENT)
Dept: INFECTIOUS DISEASES | Facility: CLINIC | Age: 64
End: 2024-11-26
Payer: MEDICARE

## 2024-11-26 DIAGNOSIS — R11.2 NAUSEA AND VOMITING, UNSPECIFIED VOMITING TYPE: Primary | ICD-10-CM

## 2024-11-26 RX ORDER — ONDANSETRON HYDROCHLORIDE 8 MG/1
8 TABLET, FILM COATED ORAL EVERY 8 HOURS PRN
Qty: 30 TABLET | Refills: 0 | Status: SHIPPED | OUTPATIENT
Start: 2024-11-26

## 2024-11-26 RX ORDER — DOXYCYCLINE 100 MG/1
100 CAPSULE ORAL EVERY 12 HOURS
Qty: 60 CAPSULE | Refills: 2 | Status: SHIPPED | OUTPATIENT
Start: 2024-11-26

## 2024-11-26 NOTE — TELEPHONE ENCOUNTER
Patients wife called stating patient is experiencing nausea and vomiting related to the IV antibiotics and is asking that zofran is prescribed if possible. Patient has an appointment with Dr Watson again December 17, 2024 for a 4 week follow up.   Patient is supposed to start bactrum 12/6/2024 after finishing IV  antiobiotics but is having trouble getting it from pharmacy they wanted to make sure that it was sent on your end if possible.   Thank you,   Susan Mojica         I called in Zofran for nausea.  I also called in doxycycline to start 12/06/2024 after completing IV antibiotics..  Decided not to use Bactrim because patient is on medication that can interact with Bactrim.

## 2024-11-27 ENCOUNTER — TELEPHONE (OUTPATIENT)
Dept: FAMILY MEDICINE | Facility: CLINIC | Age: 64
End: 2024-11-27

## 2024-11-27 ENCOUNTER — OFFICE VISIT (OUTPATIENT)
Dept: FAMILY MEDICINE | Facility: CLINIC | Age: 64
End: 2024-11-27
Payer: MEDICARE

## 2024-11-27 VITALS
OXYGEN SATURATION: 99 % | DIASTOLIC BLOOD PRESSURE: 86 MMHG | WEIGHT: 211 LBS | HEIGHT: 70 IN | RESPIRATION RATE: 16 BRPM | HEART RATE: 99 BPM | TEMPERATURE: 98 F | SYSTOLIC BLOOD PRESSURE: 138 MMHG | BODY MASS INDEX: 30.21 KG/M2

## 2024-11-27 DIAGNOSIS — E03.9 HYPOTHYROIDISM, UNSPECIFIED TYPE: ICD-10-CM

## 2024-11-27 DIAGNOSIS — F32.A DEPRESSION, UNSPECIFIED DEPRESSION TYPE: ICD-10-CM

## 2024-11-27 DIAGNOSIS — I10 PRIMARY HYPERTENSION: ICD-10-CM

## 2024-11-27 DIAGNOSIS — R65.21 SEPTIC SHOCK: Primary | ICD-10-CM

## 2024-11-27 DIAGNOSIS — Z89.519: ICD-10-CM

## 2024-11-27 DIAGNOSIS — E11.22 TYPE 2 DIABETES MELLITUS WITH STAGE 2 CHRONIC KIDNEY DISEASE, WITHOUT LONG-TERM CURRENT USE OF INSULIN: ICD-10-CM

## 2024-11-27 DIAGNOSIS — I48.20 ATRIAL FIBRILLATION, CHRONIC: ICD-10-CM

## 2024-11-27 DIAGNOSIS — A41.9 SEPTIC SHOCK: Primary | ICD-10-CM

## 2024-11-27 DIAGNOSIS — M70.22 OLECRANON BURSITIS OF LEFT ELBOW: ICD-10-CM

## 2024-11-27 DIAGNOSIS — N18.2 TYPE 2 DIABETES MELLITUS WITH STAGE 2 CHRONIC KIDNEY DISEASE, WITHOUT LONG-TERM CURRENT USE OF INSULIN: ICD-10-CM

## 2024-11-27 PROCEDURE — 99215 OFFICE O/P EST HI 40 MIN: CPT | Mod: S$GLB,,,

## 2024-11-27 PROCEDURE — 1111F DSCHRG MED/CURRENT MED MERGE: CPT | Mod: CPTII,S$GLB,,

## 2024-11-27 PROCEDURE — 4010F ACE/ARB THERAPY RXD/TAKEN: CPT | Mod: CPTII,S$GLB,,

## 2024-11-27 PROCEDURE — 3079F DIAST BP 80-89 MM HG: CPT | Mod: CPTII,S$GLB,,

## 2024-11-27 PROCEDURE — 1126F AMNT PAIN NOTED NONE PRSNT: CPT | Mod: CPTII,S$GLB,,

## 2024-11-27 PROCEDURE — 1159F MED LIST DOCD IN RCRD: CPT | Mod: CPTII,S$GLB,,

## 2024-11-27 PROCEDURE — 3008F BODY MASS INDEX DOCD: CPT | Mod: CPTII,S$GLB,,

## 2024-11-27 PROCEDURE — 99999 PR PBB SHADOW E&M-EST. PATIENT-LVL V: CPT | Mod: PBBFAC,,,

## 2024-11-27 PROCEDURE — 3288F FALL RISK ASSESSMENT DOCD: CPT | Mod: CPTII,S$GLB,,

## 2024-11-27 PROCEDURE — 1160F RVW MEDS BY RX/DR IN RCRD: CPT | Mod: CPTII,S$GLB,,

## 2024-11-27 PROCEDURE — 3075F SYST BP GE 130 - 139MM HG: CPT | Mod: CPTII,S$GLB,,

## 2024-11-27 PROCEDURE — 1101F PT FALLS ASSESS-DOCD LE1/YR: CPT | Mod: CPTII,S$GLB,,

## 2024-11-27 RX ORDER — PEN NEEDLE, DIABETIC, SAFETY 30 GX3/16"
NEEDLE, DISPOSABLE MISCELLANEOUS
COMMUNITY
Start: 2024-11-19

## 2024-11-27 RX ORDER — SITAGLIPTIN 100 MG/1
100 TABLET, FILM COATED ORAL DAILY
Qty: 90 TABLET | Refills: 1 | Status: SHIPPED | OUTPATIENT
Start: 2024-11-27 | End: 2025-05-26

## 2024-11-27 RX ORDER — INSULIN GLARGINE 100 [IU]/ML
20 INJECTION, SOLUTION SUBCUTANEOUS NIGHTLY
Qty: 18 ML | Refills: 1 | Status: SHIPPED | OUTPATIENT
Start: 2024-11-27 | End: 2025-05-26

## 2024-11-27 RX ORDER — LEVOTHYROXINE SODIUM 75 UG/1
75 TABLET ORAL EVERY MORNING
Qty: 90 TABLET | Refills: 1 | Status: SHIPPED | OUTPATIENT
Start: 2024-11-27 | End: 2025-05-26

## 2024-11-27 RX ORDER — IBUPROFEN 200 MG
1 TABLET ORAL
COMMUNITY
Start: 2024-11-19

## 2024-11-27 RX ORDER — OSELTAMIVIR PHOSPHATE 75 MG/1
75 CAPSULE ORAL
COMMUNITY
Start: 2024-11-06

## 2024-11-27 RX ORDER — METFORMIN HYDROCHLORIDE 1000 MG/1
1000 TABLET ORAL 2 TIMES DAILY WITH MEALS
Qty: 180 TABLET | Refills: 1 | Status: SHIPPED | OUTPATIENT
Start: 2024-11-27 | End: 2025-05-26

## 2024-11-27 RX ORDER — LOSARTAN POTASSIUM 100 MG/1
100 TABLET ORAL DAILY
Qty: 90 TABLET | Refills: 1 | Status: SHIPPED | OUTPATIENT
Start: 2024-11-27 | End: 2025-05-26

## 2024-11-27 RX ORDER — CITALOPRAM 10 MG/1
10 TABLET ORAL DAILY
Qty: 90 TABLET | Refills: 1 | Status: SHIPPED | OUTPATIENT
Start: 2024-11-27 | End: 2025-05-26

## 2024-11-27 RX ORDER — HEPARIN SODIUM,PORCINE/PF 10 UNIT/ML
SYRINGE (ML) INTRAVENOUS
COMMUNITY
Start: 2024-11-06

## 2024-11-27 RX ORDER — SODIUM CHLORIDE 0.9 % (FLUSH) 0.9 %
SYRINGE (ML) INJECTION
COMMUNITY
Start: 2024-11-13

## 2024-11-27 RX ORDER — CEFAZOLIN SODIUM 2 G/50ML
SOLUTION INTRAVENOUS
COMMUNITY
Start: 2024-11-13

## 2024-11-27 NOTE — TELEPHONE ENCOUNTER
----- Message from Kaitlin Piña PA-C sent at 11/27/2024 10:37 AM CST -----  Please notify patient that I did order some labs I would like him to complete in the next couple of weeks to check on his kidney function, A1c, and thyroid.  Thanks

## 2024-11-27 NOTE — TELEPHONE ENCOUNTER
----- Message from Nga sent at 11/27/2024  1:18 PM CST -----  Contact: pt  Type: Needs Medical Advice         Who Called: pt  Best Call Back Number:[phone]  Additional Information: Requesting a call back regarding pt returned office call and asking for a call back. Pt said he does not know who to use [facility] and to please call him .   Please Advise- Thank you

## 2024-11-27 NOTE — TELEPHONE ENCOUNTER
Called an spoke with wife an informed her of the medication called in an when to start the antibiotics . - acb

## 2024-11-27 NOTE — PROGRESS NOTES
Subjective:       Patient ID:  [id number]     Chief Complaint: Follow-up      History of Present Illness        Mr. Rodriguez is a 63-year-old male who presents to the clinic for hospital follow up.      Patient was admitted on 10/22/2024 for 9 days until 10/31/2024 for septic shock and hyperkalemia.  Four days prior to admission patient was complaining of watery stools and vomiting.  Upon admission he was diagnosed with bacteremia positive for staph.  Suspected source was from a left elbow infection.  Patient underwent debridement on 10/27 by Orthopedics.  Final antibiotic recommendation was cefazolin 6 weeks until 12/5 and rifampin p.o. from 11/01 until 12/5.  Patient did have a positive FOBT while in patient with recommendation to follow up with GI after discharge.  Patient was discharged to [facility].  Since then, he has followed up with Orthopedic surgery.  Patient has had sutures removed from elbow.  Recommendation is for him to follow up on 12/06/2024.  Patient saw Infectious Disease on 11/19/2024.  Recommendation was for patient to continue with current antibiotics.  Recommendation is that he will likely need chronic suppressive antibiotics after 12/05/2024.  Recommendation to start doxycycline 100 mg b.i.d. and 12/6/2024.     Today, patient reports overall he was doing well.  He reports that he was home health coming to his house to provide wound care.   Wife expresses concern that patient's cardiac medications were discharged after his hospital visit.  He reports that he was not seen cardiology since his discharge.  Wife is requesting refills on all of his medications today.  No acute concerns today       Active Problem List with Overview Notes    Diagnosis Date Noted    Olecranon bursitis of left elbow 10/24/2024    Tobacco dependency 10/22/2024    Septic shock 10/22/2024    High anion gap metabolic acidosis 10/22/2024    SIRIA (acute kidney injury) 10/22/2024    Hyperkalemia 10/22/2024    Atrial  "fibrillation, chronic 10/22/2024    ICD (implantable cardioverter-defibrillator) in place 10/01/2024    Severe obesity (BMI 35.0-39.9) with comorbidity 10/01/2024    History of stroke 10/01/2024    Mixed hyperlipidemia 10/01/2024    Encounter to discuss test results 10/01/2024    Cardiac pacemaker 12/11/2023    Atherosclerosis of aorta 12/11/2023    Status post unilateral below-knee amputation 10/30/2023    Type 2 diabetes mellitus with stage 2 chronic kidney disease, without long-term current use of insulin 10/30/2023    BMI 31.0-31.9,adult 10/30/2023    Depression 10/30/2023    Diabetes mellitus due to underlying condition with diabetic chronic kidney disease 10/30/2023    Hypertension       Review of patient's allergies indicates:   Allergen Reactions    Fish containing products      Other reaction(s): .         Current Outpatient Medications:     BD AUTOSHIELD DUO PEN NEEDLE 30 gauge x 3/16" Ndle, , Disp: , Rfl:     BD POSIFLUSH NORMAL SALINE 0.9 injection, , Disp: , Rfl:     ceFAZolin (ANCEF) 1 gram injection, Inject 2,000 mg (2 g total) into the vein every 8 (eight) hours., Disp: , Rfl:     ceFAZolin 2 g/50mL Dextrose IVPB (ANCEF) 2 gram/50 mL PgBk, Inject into the vein., Disp: , Rfl:     cholecalciferol, vitamin D3, (VITAMIN D3) 125 mcg (5,000 unit) Tab, Take 5,000 Units by mouth once daily., Disp: , Rfl:     doxycycline (VIBRAMYCIN) 100 MG Cap, Take 1 capsule (100 mg total) by mouth every 12 (twelve) hours., Disp: 60 capsule, Rfl: 2    ezetimibe (ZETIA) 10 mg tablet, Take 1 tablet (10 mg total) by mouth once daily., Disp: 30 tablet, Rfl: 0    famotidine (PEPCID) 20 MG tablet, Take 20 mg by mouth 2 (two) times daily., Disp: , Rfl:     heparin, porcine, PF, 10 unit/mL Syrg, , Disp: , Rfl:     multivitamin (THERAGRAN) per tablet, Take 1 tablet by mouth., Disp: , Rfl:     nicotine (NICODERM CQ) 21 mg/24 hr, 1 patch., Disp: , Rfl:     omega 3-dha-epa-fish oil (FISH OIL) 1,200 (144-216) mg Cap, Take 1 capsule by " mouth once daily., Disp: , Rfl:     ondansetron (ZOFRAN) 8 MG tablet, Take 1 tablet (8 mg total) by mouth every 8 (eight) hours as needed for Nausea., Disp: 30 tablet, Rfl: 0    oseltamivir (TAMIFLU) 75 MG capsule, Take 75 mg by mouth., Disp: , Rfl:     oxyCODONE-acetaminophen (PERCOCET) 5-325 mg per tablet, Take 1 tablet by mouth every 4 (four) hours as needed for Pain., Disp: 10 tablet, Rfl: 0    rifAMpin (RIFADIN) 300 MG capsule, Take 1 capsule (300 mg total) by mouth 2 (two) times a day., Disp: 68 capsule, Rfl: 0    citalopram (CELEXA) 10 MG tablet, Take 1 tablet (10 mg total) by mouth once daily., Disp: 90 tablet, Rfl: 1    empagliflozin (JARDIANCE) 25 mg tablet, Take 1 tablet (25 mg total) by mouth once daily., Disp: 90 tablet, Rfl: 1    insulin glargine U-100, Lantus, 100 unit/mL (3 mL) SubQ InPn pen, Inject 20 Units into the skin every evening., Disp: 18 mL, Rfl: 1    JANUVIA 100 mg Tab, Take 1 tablet (100 mg total) by mouth once daily., Disp: 90 tablet, Rfl: 1    levothyroxine (SYNTHROID) 75 MCG tablet, Take 1 tablet (75 mcg total) by mouth every morning., Disp: 90 tablet, Rfl: 1    losartan (COZAAR) 100 MG tablet, Take 1 tablet (100 mg total) by mouth once daily., Disp: 90 tablet, Rfl: 1    metFORMIN (GLUCOPHAGE) 1000 MG tablet, Take 1 tablet (1,000 mg total) by mouth 2 (two) times daily with meals., Disp: 180 tablet, Rfl: 1    Lab Results   Component Value Date    WBC 9.80 10/31/2024    HGB 10.2 (L) 10/31/2024    HCT 32.9 (L) 10/31/2024     10/31/2024    CHOL 132 10/30/2023    TRIG 84 10/30/2023    HDL 48 10/30/2023    ALT 10 10/31/2024    AST 37 10/31/2024     (L) 10/31/2024    K 4.4 10/31/2024    CL 98 10/31/2024    CREATININE 0.8 10/31/2024    BUN 10 10/31/2024    CO2 27 10/31/2024    TSH 2.178 10/30/2023    INR 1.0 10/29/2024    HGBA1C 6.5 (H) 10/30/2023       Review of Systems   Constitutional:  Negative for fatigue and fever.   HENT: Negative.  Negative for congestion, sneezing and sore  throat.    Eyes: Negative.    Respiratory:  Negative for cough, shortness of breath and wheezing.    Cardiovascular:  Negative for chest pain, palpitations and leg swelling.   Gastrointestinal:  Positive for nausea. Negative for abdominal pain and vomiting.   Genitourinary: Negative.    Musculoskeletal: Negative.  Negative for arthralgias.   Skin: Negative.  Negative for rash.   Neurological:  Negative for dizziness, weakness, light-headedness, numbness and headaches.   Hematological: Negative.    Psychiatric/Behavioral: Negative.         Objective:      Physical Exam  Constitutional:       Appearance: Normal appearance.      Comments: Patient wheelchair-bound     HENT:      Head: Normocephalic and atraumatic.      Nose: Nose normal.   Eyes:      Extraocular Movements: Extraocular movements intact.   Cardiovascular:      Rate and Rhythm: Normal rate and regular rhythm.   Pulmonary:      Effort: Pulmonary effort is normal.      Breath sounds: Normal breath sounds.   Musculoskeletal:      Cervical back: Normal range of motion.      Comments: Right below knee  amputation    Skin:     General: Skin is warm and dry.   Neurological:      General: No focal deficit present.      Mental Status: He is alert and oriented to person, place, and time.   Psychiatric:         Mood and Affect: Mood normal.         Assessment:       1. Septic shock    2. Type 2 diabetes mellitus with stage 2 chronic kidney disease, without long-term current use of insulin    3. Status post unilateral below-knee amputation    4. Olecranon bursitis of left elbow    5. Hypothyroidism, unspecified type    6. Primary hypertension    7. Depression, unspecified depression type    8. Atrial fibrillation, chronic        Plan:       Dhaval was seen today for follow-up.    Diagnoses and all orders for this visit:    Septic shock  - continue to follow up with Infectious Disease  - continue current regimen  - CBC ordered     Type 2 diabetes mellitus with stage 2  chronic kidney disease, without long-term current use of insulin  - A1C - ordered   -     metFORMIN (GLUCOPHAGE) 1000 MG tablet; Take 1 tablet (1,000 mg total) by mouth 2 (two) times daily with meals.  -     empagliflozin (JARDIANCE) 25 mg tablet; Take 1 tablet (25 mg total) by mouth once daily.  -     JANUVIA 100 mg Tab; Take 1 tablet (100 mg total) by mouth once daily.  -     insulin glargine U-100, Lantus, 100 unit/mL (3 mL) SubQ InPn pen; Inject 20 Units into the skin every evening.  - discussed medication compliance   - yearly eye exam recommended  - LDL goal <70  - discussed importance of frequent foot exam     Status post unilateral below-knee amputation  - continue to monitor     Olecranon bursitis of left elbow  - continue antibiotics as prescribed  - continue to follow up with Orthopedic    Hypothyroidism, unspecified type  - overdue for labs, plan to update at next appt  -     levothyroxine (SYNTHROID) 75 MCG tablet; Take 1 tablet (75 mcg total) by mouth every morning.  - TSH as ordered     Primary hypertension  -     losartan (COZAAR) 100 MG tablet; Take 1 tablet (100 mg total) by mouth once daily.  - Advised follow up with he ASAP to assess current medication regimen  - discussed dash diet, exercise/weight loss, and increased cardiovascular exercise   - monitor BP at home w/ goal <130/80     Afib   - patient denies going into Afib recently   -advised follow up with Cardiology as soon as possible    Depression, unspecified depression type  -     citalopram (CELEXA) 10 MG tablet; Take 1 tablet (10 mg total) by mouth once daily.  - Ed for SI/HI            Future Appointments       Date Provider Specialty Appt Notes    12/6/2024 Gokul Troy MD Orthopedics 3 wk L elbow    12/17/2024 Migel Watson MD Infectious Diseases 4 wk f/u    5/6/2025  Cardiology [facility]           I spent a total of 41 minutes on the day of the visit.This includes face to face time and non-face to face time preparing  to see the patient (eg, review of tests), obtaining and/or reviewing separately obtained history, documenting clinical information in the electronic or other health record, independently interpreting results and communicating results to the patient/family/caregiver, or care coordinator.     Portions of this note were dictated using voice recognition software and may contain dictation related errors in spelling / grammar / syntax not discovered on text review.     Kaitlin Piña PA-C

## 2024-11-27 NOTE — TELEPHONE ENCOUNTER
----- Message from Omari sent at 11/27/2024 12:05 PM CST -----  Type:  Patient Returning Call    Who Called:pt     Who Left Message for Patient:KEILA LOPEZ    Does the patient know what this is regarding?:no     Best Call Back Number:[phone]      Additional Information:

## 2024-11-27 NOTE — Clinical Note
Please notify patient that I did order some labs I would like him to complete in the next couple of weeks to check on his kidney function, A1c, and thyroid.  Thanks

## 2024-11-29 ENCOUNTER — TELEPHONE (OUTPATIENT)
Dept: FAMILY MEDICINE | Facility: CLINIC | Age: 64
End: 2024-11-29
Payer: MEDICARE

## 2024-11-29 NOTE — TELEPHONE ENCOUNTER
Spoke to Panfilo with Pharmacy who states RX Amiodarone is not listed on discharge paperwork or current med list. Advised med was discontinued at discharge by Evin Garcia MD. Pt had a f/u with Ayana JONES and med was not continued. Advised pharmacist pt will need to follow up with Cardiology or PCP to discuss amiodarone

## 2024-12-02 ENCOUNTER — DOCUMENTATION ONLY (OUTPATIENT)
Dept: INFECTIOUS DISEASES | Facility: CLINIC | Age: 64
End: 2024-12-02
Payer: MEDICARE

## 2024-12-02 ENCOUNTER — TELEPHONE (OUTPATIENT)
Dept: ORTHOPEDICS | Facility: CLINIC | Age: 64
End: 2024-12-02
Payer: MEDICARE

## 2024-12-02 ENCOUNTER — TELEPHONE (OUTPATIENT)
Dept: FAMILY MEDICINE | Facility: CLINIC | Age: 64
End: 2024-12-02
Payer: MEDICARE

## 2024-12-02 NOTE — PROGRESS NOTES
Patient called asking who was going to remove his PICC line at the end of his care on 12/05/24  . I advised patient I would call his  and [facility]   To advise both and one of them will remove PICC.    I spoke with Birgit ( [facility]  [phone]) and Blanca ( [facility]  [phone])  to advise it is okay to remove patient's PICC line  After his last dose of IV abx on 12/05/24; per Dr Watson's orders in chart.    [facility]  12/02/24

## 2024-12-02 NOTE — TELEPHONE ENCOUNTER
----- Message from Malaika sent at 12/2/2024  2:13 PM CST -----  Contact: Self  Type: Needs Medical Advice  Who Called:  [facility]  Best Call Back Number: [phone]  Additional Information: Pt has been in the hospital and rehab centers and his pharmacy is calling in regards to needing to get an updated medication list  sent to them so they can make sure pt has the correct information on what meds need to be filled. Can we please send this list to them at fax# [phone]

## 2024-12-02 NOTE — TELEPHONE ENCOUNTER
----- Message from Michelle sent at 12/2/2024  7:59 AM CST -----  Type:  Sooner Appointment Request    Caller is requesting a sooner appointment.  Caller declined first available appointment listed below.  Caller will not accept being placed on the waitlist and is requesting a message be sent to doctor.    Name of Caller:  pt wife   When is the first available appointment?  Ángel 3  Symptoms:  3 weeks f/u   Would the patient rather a call back or a response via [facility]? Call   Best Call Back Number:  [phone] (home)     Additional Information:  please advise

## 2024-12-03 RX ORDER — FAMOTIDINE 20 MG/1
20 TABLET, FILM COATED ORAL 2 TIMES DAILY
Qty: 180 TABLET | Refills: 3 | Status: SHIPPED | OUTPATIENT
Start: 2024-12-03

## 2024-12-03 RX ORDER — EZETIMIBE 10 MG/1
10 TABLET ORAL DAILY
Qty: 90 TABLET | Refills: 3 | Status: SHIPPED | OUTPATIENT
Start: 2024-12-03 | End: 2025-11-28

## 2024-12-03 NOTE — TELEPHONE ENCOUNTER
----- Message from Omari sent at 12/3/2024 12:27 PM CST -----  Regarding: refill  Type:  RX Refill Request    Who Called: pt     Refill or New Rx:refill    RX Name and Strength:famotidine (PEPCID) 20 MG tablet - - 10/26/2023 -   Sig - Route: Take 20 mg by mouth 2 (two) times daily. - Oral   Class: Historical Med     ezetimibe (ZETIA) 10 mg tablet 30 tablet 0 10/31/2024 11/30/2024   Sig - Route: Take 1 tablet (10 mg total) by mouth once daily. - Oral   Sent to pharmacy as: ezetimibe (ZETIA) 10 mg tablet   E-Prescribing Status: Receipt confirmed by pharmacy (10/31/2024 12:22 PM CDT)         How is the patient currently taking it? (ex. 1XDay):see above    Is this a 30 day or 90 day RX:see above    Preferred Pharmacy with phone number:      [facility] [place] - [id number]  [address] [place] [address]  Phone: [phone] Fax: [phone]        Local or Mail Order:local     Ordering Provider:garcia         Best Call Back Number:[phone]      Additional Information:   Please call to discuss.

## 2024-12-03 NOTE — TELEPHONE ENCOUNTER
No care due was identified.  [facility] Embedded Care Due Messages. Reference number: [id number].   12/03/2024 12:45:20 PM CST

## 2024-12-05 ENCOUNTER — TELEPHONE (OUTPATIENT)
Dept: ORTHOPEDICS | Facility: CLINIC | Age: 64
End: 2024-12-05
Payer: MEDICARE

## 2024-12-10 ENCOUNTER — HOSPITAL ENCOUNTER (OUTPATIENT)
Dept: CARDIOLOGY | Facility: CLINIC | Age: 64
Discharge: HOME | End: 2024-12-10
Attending: GENERAL PRACTICE
Payer: MEDICARE

## 2024-12-10 DIAGNOSIS — Z95.810 PRESENCE OF AUTOMATIC (IMPLANTABLE) CARDIAC DEFIBRILLATOR: ICD-10-CM

## 2024-12-10 PROCEDURE — 93295 DEV INTERROG REMOTE 1/2/MLT: CPT | Mod: S$GLB,,, | Performed by: INTERNAL MEDICINE

## 2024-12-10 PROCEDURE — 93296 REM INTERROG EVL PM/IDS: CPT | Mod: PN | Performed by: INTERNAL MEDICINE

## 2024-12-10 RX ORDER — ONDANSETRON HYDROCHLORIDE 8 MG/1
8 TABLET, FILM COATED ORAL EVERY 8 HOURS PRN
Qty: 30 TABLET | Refills: 0 | Status: SHIPPED | OUTPATIENT
Start: 2024-12-10

## 2024-12-13 ENCOUNTER — TELEPHONE (OUTPATIENT)
Dept: FAMILY MEDICINE | Facility: CLINIC | Age: 64
End: 2024-12-13
Payer: MEDICARE

## 2024-12-13 NOTE — TELEPHONE ENCOUNTER
----- Message from Allison sent at 12/13/2024  1:48 PM CST -----  Contact: nurse  Type:  Needs Medical Advice    Who Called:  Nurse  Symptoms (please be specific): pt is needing a virtual appt, pt is wheelchair bound and had an amputation. Pt would like it asap.     Would the patient rather a call back or a response via [facility]? call  Best Call Back Number:   Additional Information: please advise and thank you.

## 2024-12-13 NOTE — TELEPHONE ENCOUNTER
Spoke to HH nurse and advised for establishing care it is recommended to be seen in office. Follow up appts can be completed as VV. HH nurse states they are having transportation issues. Advsied nurse closer to 12-30-24 appt to contact the office if he is still having trouble getting to the office to discuss VV. HH nurse advised she will discuss with pt

## 2024-12-26 ENCOUNTER — TELEPHONE (OUTPATIENT)
Dept: FAMILY MEDICINE | Facility: CLINIC | Age: 64
End: 2024-12-26
Payer: MEDICARE

## 2024-12-26 NOTE — TELEPHONE ENCOUNTER
Patient states [facility] is requesting to reschedule his ECA appointment between the times of 10 AM and 1 PM.  Appointment rescheduled; patient agreed to new appointment date, time, and location.

## 2024-12-26 NOTE — TELEPHONE ENCOUNTER
Sarah Beth Villasenor Staff     ----- Message from Sarah Beth sent at 12/26/2024 12:24 PM CST -----  Contact: pt [phone]  Type: Needs Medical Advice  Who Called:  Pt     Best Call Back Number: [phone]  Additional Information: Pt asking if he can get his NP appt 12/30 moved to a later time due to transportation. No appt avail until Aug and pt stated he can not wait that long. Pls call back and advise

## 2025-01-09 ENCOUNTER — OFFICE VISIT (OUTPATIENT)
Dept: INFECTIOUS DISEASES | Facility: CLINIC | Age: 65
End: 2025-01-09
Payer: MEDICARE

## 2025-01-09 VITALS
HEART RATE: 102 BPM | TEMPERATURE: 98 F | BODY MASS INDEX: 30.21 KG/M2 | DIASTOLIC BLOOD PRESSURE: 78 MMHG | HEIGHT: 70 IN | WEIGHT: 211 LBS | OXYGEN SATURATION: 98 % | SYSTOLIC BLOOD PRESSURE: 132 MMHG

## 2025-01-09 DIAGNOSIS — M86.622: Primary | ICD-10-CM

## 2025-01-09 DIAGNOSIS — A49.02 MRSA INFECTION: ICD-10-CM

## 2025-01-09 DIAGNOSIS — L02.414 ABSCESS OF LEFT ELBOW: ICD-10-CM

## 2025-01-09 PROCEDURE — 99999 PR PBB SHADOW E&M-EST. PATIENT-LVL IV: CPT | Mod: PBBFAC,,, | Performed by: INTERNAL MEDICINE

## 2025-01-09 NOTE — PATIENT INSTRUCTIONS
Continue to offload the left elbow   Keep the appointment with the orthopedic surgeon on 02/14/2025   I will see again in about 8 weeks   Call me if you need refills for your antibiotics

## 2025-01-09 NOTE — PROGRESS NOTES
Subjective:      Reason for consult:     HPI: Dhaval Hernández is a 64 y.o. male   with history of diabetes mellitus, hypertension, hyperlipidemia, GERD and previous left BKA.  Also with history of AICD.  He presents to the ER 10/22/2024 with 4 day history of nausea, vomiting and diarrhea.  He had no fever.  In the ER vitals were abnormal with blood pressure 75/39, pulse 60, respiratory 18, temperature 97.6° oxygen saturation 92%.  WBC was 20 K with left shift.  Creatinine 5.1.  UA abnormal with 19 WBC and 4+ glucose.  Chest x-ray with no clear acute infiltrate.  CT abdomen and pelvis showed mild cardiomegaly and also cholelithiasis.  He was commenced on IV fluid and antibiotics.     Leukocytosis resolved and WBC down to 8.0.  Blood cultures have grown MSSA in 2/2 bottles.  HIDA scan was abnormal.  Surgery was consulted to evaluate for possible cholecystitis.  Notes reviewed.  No plan for surgical intervention at this time since patient had no abdominal pain and appears to be improving.  ID asked to assist with his care.     States his symptoms started with his left elbow hurting.  He has been leaning on the left elbow to transfer from bed to wheelchair.  Has a elbow ORIF and is fixed in a flexion position.  Has had persistent pain for the last 1 week with swelling.  His other symptoms followed after the elbow pain started.        Antibiotic history:  Vancomycin: 10/22/2024 x 1 day  Meropenem: 10/22/2024-10/23/2024  Cefazolin:  10/24/2024-  Rifampin: 11/01/2024-     Microbiology:    Blood culture 10/22/2024, 10/24/2024: MSSA 2/2  Blood culture 10/24/2024: NGTD    11/19/2024:  Recently hospitalized and treated for left elbow abscess with osteomyelitis complicated by MSSA bacteremia.  Improved post I&D and was ultimately discharged on cefazolin and rifampin to complete 6 week course 12/05/2024.  Sutures have been removed.  Here for follow up.    01/09/2025:  He missed 2 appointments with orthopedic surgeon because  "of transportation issues.  Left elbow wound continues to heal but not all the way completed.  Completed IV antibiotics and now on oral doxycycline which he is tolerating.   No other acute issues today.  He is also moving a little better with more strength in his upper extremities.    Review of patient's allergies indicates:   Allergen Reactions    Fish containing products      Other reaction(s): .     Past Medical History:   Diagnosis Date    Diabetes mellitus, type 2     GERD (gastroesophageal reflux disease)     Hyperlipidemia     Hypertension      Past Surgical History:   Procedure Laterality Date    FRACTURE SURGERY      INCISION AND DRAINAGE OF ABSCESS Left 10/27/2024    Procedure: INCISION AND DRAINAGE, ABSCESS;  Surgeon: Gokul Troy MD;  [facility]: [facility];  Service: Orthopedics;  Laterality: Left;  INCISION AND DRAIN OF LEFT ELBOW ABCESS    INSERTION OF PACEMAKER      LEG AMPUTATION        Social History     Tobacco Use    Smoking status: Every Day     Types: Cigarettes    Smokeless tobacco: Former     Types: Snuff     Quit date: 04/2024    Tobacco comments:     Pt used smokeless tobacco from 9919-6329 and quit. Started dipping tobacco throughout most of his 20's. Started smoking cigarettes again in 06/2024.    Substance Use Topics    Alcohol use: Not on file     Family History   Problem Relation Name Age of Onset    Arthritis Mother         Pertinent medications noted:     Review of Systems  10 system review unremarkable.    Outdoor activities:  Wheelchair-bound  Travel:  No recent travel  Implants:  Left elbow hardware  Antibiotic History:  As in HPI      Objective:      Height 5' 10" (1.778 m). Body mass index is 30.28 kg/m².  Physical Exam      General:  Elderly man sitting in wheelchair in no acute distress   Left elbow:  Flexion contracture.  Dry elbow wound. ?  Almost healed  CVS: S1 and 2 heard, no murmurs appreciated  Respiratory:  Clear to auscultation   Abdomen: Full, soft, nontender, no " palpable organomegaly   Skin: No rash appreciated  Psychiatric: Normal mood, speech,  demeanor     Wound:  Left elbow wound    VAD:  None      General Labs reviewed:  Lab Results   Component Value Date    WBC 9.80 10/31/2024    RBC 3.89 (L) 10/31/2024    HGB 10.2 (L) 10/31/2024    HCT 32.9 (L) 10/31/2024    MCV 85 10/31/2024    MCH 26.2 (L) 10/31/2024    MCHC 31.0 (L) 10/31/2024    RDW 18.5 (H) 10/31/2024     10/31/2024    MPV 10.1 10/31/2024    GRAN 6.2 10/31/2024    GRAN 63.1 10/31/2024    LYMPH 2.2 10/31/2024    LYMPH 22.1 10/31/2024    MONO 0.9 10/31/2024    MONO 9.4 10/31/2024    EOS 0.4 10/31/2024    BASO 0.05 10/31/2024    EOSINOPHIL 3.7 10/31/2024    BASOPHIL 0.5 10/31/2024     CMP  Sodium   Date Value Ref Range Status   10/31/2024 133 (L) 136 - 145 mmol/L Final     Potassium   Date Value Ref Range Status   10/31/2024 4.4 3.5 - 5.1 mmol/L Final     Chloride   Date Value Ref Range Status   10/31/2024 98 95 - 110 mmol/L Final     CO2   Date Value Ref Range Status   10/31/2024 27 23 - 29 mmol/L Final     Glucose   Date Value Ref Range Status   10/31/2024 141 (H) 70 - 110 mg/dL Final     BUN   Date Value Ref Range Status   10/31/2024 10 8 - 23 mg/dL Final     Creatinine   Date Value Ref Range Status   10/31/2024 0.8 0.5 - 1.4 mg/dL Final     Calcium   Date Value Ref Range Status   10/31/2024 8.5 (L) 8.7 - 10.5 mg/dL Final     Total Protein   Date Value Ref Range Status   10/31/2024 6.8 6.0 - 8.4 g/dL Final     Albumin   Date Value Ref Range Status   10/31/2024 2.9 (L) 3.5 - 5.2 g/dL Final     Total Bilirubin   Date Value Ref Range Status   10/31/2024 0.4 0.1 - 1.0 mg/dL Final     Comment:     For infants and newborns, interpretation of results should be based  on gestational age, weight and in agreement with clinical  observations.    Premature Infant recommended reference ranges:  Up to 24 hours.............<8.0 mg/dL  Up to 48 hours............<12.0 mg/dL  3-5 days..................<15.0 mg/dL  6-29  days.................<15.0 mg/dL       Alkaline Phosphatase   Date Value Ref Range Status   10/31/2024 227 (H) 55 - 135 U/L Final     AST   Date Value Ref Range Status   10/31/2024 37 10 - 40 U/L Final     ALT   Date Value Ref Range Status   10/31/2024 10 10 - 44 U/L Final     Anion Gap   Date Value Ref Range Status   10/31/2024 8 8 - 16 mmol/L Final     eGFR   Date Value Ref Range Status   10/31/2024 >60.0 >60 mL/min/1.73 m^2 Final           Micro:  Microbiology Results (last 7 days)       ** No results found for the last 168 hours. **          Imaging Reviewed:          Assessment:     Complicated MSSA bacteremia.  This was from left elbow infection.  TTE and ROSE both negative for valvular and lead vegetation.  Antibiotics as below.      2.  MRSA Left elbow abscess.  He had I&D 10/27/2024.  He has some retained hardware with probable clinical osteomyelitis.  Completed cefazolin and rifampin 600 mg b.i.d. for 6 weeks through 12/05/2024.  Now on doxycycline for chronic suppression.  Follow up with orthopedic surgeon 02/14/2025.     4. Right shoulder pain.  This is chronic associated with weakness.  Has mild DJD on x-ray.     5. Diabetes mellitus.  Management as per hospitalist.    I will see again in 8 weeks  Problem List Items Addressed This Visit    None       Plan:     As above      There are no diagnoses linked to this encounter.    This note was created using Dragon voice recognition software that occasionally misinterpreted phrases or words.

## 2025-01-29 DIAGNOSIS — E03.9 HYPOTHYROIDISM, UNSPECIFIED TYPE: ICD-10-CM

## 2025-01-29 DIAGNOSIS — N18.2 TYPE 2 DIABETES MELLITUS WITH STAGE 2 CHRONIC KIDNEY DISEASE, WITHOUT LONG-TERM CURRENT USE OF INSULIN: ICD-10-CM

## 2025-01-29 DIAGNOSIS — E11.22 TYPE 2 DIABETES MELLITUS WITH STAGE 2 CHRONIC KIDNEY DISEASE, WITHOUT LONG-TERM CURRENT USE OF INSULIN: ICD-10-CM

## 2025-01-30 DIAGNOSIS — N18.2 TYPE 2 DIABETES MELLITUS WITH STAGE 2 CHRONIC KIDNEY DISEASE, WITHOUT LONG-TERM CURRENT USE OF INSULIN: ICD-10-CM

## 2025-01-30 DIAGNOSIS — E11.22 TYPE 2 DIABETES MELLITUS WITH STAGE 2 CHRONIC KIDNEY DISEASE, WITHOUT LONG-TERM CURRENT USE OF INSULIN: ICD-10-CM

## 2025-01-30 DIAGNOSIS — E03.9 HYPOTHYROIDISM, UNSPECIFIED TYPE: ICD-10-CM

## 2025-01-30 RX ORDER — SITAGLIPTIN 100 MG/1
100 TABLET, FILM COATED ORAL DAILY
Qty: 90 TABLET | Refills: 1 | Status: SHIPPED | OUTPATIENT
Start: 2025-01-30 | End: 2025-07-29

## 2025-01-30 RX ORDER — METFORMIN HYDROCHLORIDE 1000 MG/1
1000 TABLET ORAL 2 TIMES DAILY WITH MEALS
Qty: 180 TABLET | Refills: 1 | Status: SHIPPED | OUTPATIENT
Start: 2025-01-30 | End: 2025-07-29

## 2025-01-30 RX ORDER — LEVOTHYROXINE SODIUM 75 UG/1
75 TABLET ORAL EVERY MORNING
Qty: 90 TABLET | Refills: 1 | Status: SHIPPED | OUTPATIENT
Start: 2025-01-30 | End: 2025-01-30 | Stop reason: SDUPTHER

## 2025-01-30 RX ORDER — LEVOTHYROXINE SODIUM 75 UG/1
75 TABLET ORAL EVERY MORNING
Qty: 90 TABLET | Refills: 1 | Status: SHIPPED | OUTPATIENT
Start: 2025-01-30 | End: 2025-07-29

## 2025-01-30 RX ORDER — METFORMIN HYDROCHLORIDE 1000 MG/1
1000 TABLET ORAL 2 TIMES DAILY WITH MEALS
Qty: 180 TABLET | Refills: 1 | Status: SHIPPED | OUTPATIENT
Start: 2025-01-30 | End: 2025-01-30 | Stop reason: SDUPTHER

## 2025-01-30 RX ORDER — SITAGLIPTIN 100 MG/1
100 TABLET, FILM COATED ORAL DAILY
Qty: 90 TABLET | Refills: 1 | Status: SHIPPED | OUTPATIENT
Start: 2025-01-30 | End: 2025-01-30 | Stop reason: SDUPTHER

## 2025-01-30 NOTE — TELEPHONE ENCOUNTER
----- Message from Nancie sent at 1/30/2025 11:12 AM CST -----  Contact: [facility]  Type: Needs Medical Advice  Who Called:  [facility], called to say she recd refill requests for pt but she only fills scripts for pts in nursing homes, if pt is in a home please let her know which one. [phone]

## 2025-02-05 PROBLEM — I27.20 PULMONARY HYPERTENSION: Status: ACTIVE | Noted: 2025-02-05

## 2025-02-05 PROBLEM — I42.9 CARDIOMYOPATHY, UNSPECIFIED TYPE: Status: ACTIVE | Noted: 2025-02-05

## 2025-02-06 ENCOUNTER — OFFICE VISIT (OUTPATIENT)
Dept: FAMILY MEDICINE | Facility: CLINIC | Age: 65
End: 2025-02-06
Payer: MEDICARE

## 2025-02-06 ENCOUNTER — LAB VISIT (OUTPATIENT)
Dept: LAB | Facility: HOSPITAL | Age: 65
End: 2025-02-06
Attending: STUDENT IN AN ORGANIZED HEALTH CARE EDUCATION/TRAINING PROGRAM
Payer: MEDICARE

## 2025-02-06 VITALS
HEART RATE: 72 BPM | DIASTOLIC BLOOD PRESSURE: 76 MMHG | OXYGEN SATURATION: 99 % | BODY MASS INDEX: 30.61 KG/M2 | WEIGHT: 213.31 LBS | SYSTOLIC BLOOD PRESSURE: 110 MMHG

## 2025-02-06 DIAGNOSIS — N18.2 TYPE 2 DIABETES MELLITUS WITH STAGE 2 CHRONIC KIDNEY DISEASE, WITHOUT LONG-TERM CURRENT USE OF INSULIN: ICD-10-CM

## 2025-02-06 DIAGNOSIS — M62.838 MUSCLE SPASM: ICD-10-CM

## 2025-02-06 DIAGNOSIS — E78.2 MIXED HYPERLIPIDEMIA: ICD-10-CM

## 2025-02-06 DIAGNOSIS — E11.22 TYPE 2 DIABETES MELLITUS WITH STAGE 2 CHRONIC KIDNEY DISEASE, WITHOUT LONG-TERM CURRENT USE OF INSULIN: ICD-10-CM

## 2025-02-06 DIAGNOSIS — E03.9 HYPOTHYROIDISM, UNSPECIFIED TYPE: ICD-10-CM

## 2025-02-06 DIAGNOSIS — I48.20 ATRIAL FIBRILLATION, CHRONIC: ICD-10-CM

## 2025-02-06 DIAGNOSIS — Z12.5 ENCOUNTER FOR PROSTATE CANCER SCREENING: ICD-10-CM

## 2025-02-06 DIAGNOSIS — Z89.519: ICD-10-CM

## 2025-02-06 DIAGNOSIS — I27.20 PULMONARY HYPERTENSION: ICD-10-CM

## 2025-02-06 DIAGNOSIS — I27.20 PULMONARY HYPERTENSION: Primary | ICD-10-CM

## 2025-02-06 DIAGNOSIS — I70.0 ATHEROSCLEROSIS OF AORTA: Chronic | ICD-10-CM

## 2025-02-06 LAB
ALBUMIN SERPL BCP-MCNC: 3.7 G/DL (ref 3.5–5.2)
ALP SERPL-CCNC: 71 U/L (ref 40–150)
ALT SERPL W/O P-5'-P-CCNC: 26 U/L (ref 10–44)
ANION GAP SERPL CALC-SCNC: 14 MMOL/L (ref 8–16)
AST SERPL-CCNC: 27 U/L (ref 10–40)
BASOPHILS # BLD AUTO: 0.07 K/UL (ref 0–0.2)
BASOPHILS NFR BLD: 0.8 % (ref 0–1.9)
BILIRUB SERPL-MCNC: 0.4 MG/DL (ref 0.1–1)
BUN SERPL-MCNC: 21 MG/DL (ref 8–23)
CALCIUM SERPL-MCNC: 9.3 MG/DL (ref 8.7–10.5)
CHLORIDE SERPL-SCNC: 106 MMOL/L (ref 95–110)
CHOLEST SERPL-MCNC: 99 MG/DL (ref 120–199)
CHOLEST/HDLC SERPL: 2.3 {RATIO} (ref 2–5)
CO2 SERPL-SCNC: 18 MMOL/L (ref 23–29)
COMPLEXED PSA SERPL-MCNC: 2.1 NG/ML (ref 0–4)
CREAT SERPL-MCNC: 1.3 MG/DL (ref 0.5–1.4)
DIFFERENTIAL METHOD BLD: ABNORMAL
EOSINOPHIL # BLD AUTO: 0.2 K/UL (ref 0–0.5)
EOSINOPHIL NFR BLD: 2 % (ref 0–8)
ERYTHROCYTE [DISTWIDTH] IN BLOOD BY AUTOMATED COUNT: 16.5 % (ref 11.5–14.5)
EST. GFR  (NO RACE VARIABLE): >60 ML/MIN/1.73 M^2
ESTIMATED AVG GLUCOSE: 131 MG/DL (ref 68–131)
GLUCOSE SERPL-MCNC: 123 MG/DL (ref 70–110)
HBA1C MFR BLD: 6.2 % (ref 4–5.6)
HCT VFR BLD AUTO: 39.9 % (ref 40–54)
HDLC SERPL-MCNC: 43 MG/DL (ref 40–75)
HDLC SERPL: 43.4 % (ref 20–50)
HGB BLD-MCNC: 12 G/DL (ref 14–18)
IMM GRANULOCYTES # BLD AUTO: 0.03 K/UL (ref 0–0.04)
IMM GRANULOCYTES NFR BLD AUTO: 0.3 % (ref 0–0.5)
LDLC SERPL CALC-MCNC: 46 MG/DL (ref 63–159)
LYMPHOCYTES # BLD AUTO: 2.7 K/UL (ref 1–4.8)
LYMPHOCYTES NFR BLD: 31.3 % (ref 18–48)
MCH RBC QN AUTO: 25.4 PG (ref 27–31)
MCHC RBC AUTO-ENTMCNC: 30.1 G/DL (ref 32–36)
MCV RBC AUTO: 84 FL (ref 82–98)
MONOCYTES # BLD AUTO: 0.8 K/UL (ref 0.3–1)
MONOCYTES NFR BLD: 9 % (ref 4–15)
NEUTROPHILS # BLD AUTO: 4.9 K/UL (ref 1.8–7.7)
NEUTROPHILS NFR BLD: 56.6 % (ref 38–73)
NONHDLC SERPL-MCNC: 56 MG/DL
NRBC BLD-RTO: 0 /100 WBC
PLATELET # BLD AUTO: 250 K/UL (ref 150–450)
PMV BLD AUTO: 10.5 FL (ref 9.2–12.9)
POTASSIUM SERPL-SCNC: 5.7 MMOL/L (ref 3.5–5.1)
PROT SERPL-MCNC: 7.8 G/DL (ref 6–8.4)
RBC # BLD AUTO: 4.73 M/UL (ref 4.6–6.2)
SODIUM SERPL-SCNC: 138 MMOL/L (ref 136–145)
T4 FREE SERPL-MCNC: 1.37 NG/DL (ref 0.71–1.51)
TRIGL SERPL-MCNC: 50 MG/DL (ref 30–150)
TSH SERPL DL<=0.005 MIU/L-ACNC: 1.27 UIU/ML (ref 0.4–4)
WBC # BLD AUTO: 8.64 K/UL (ref 3.9–12.7)

## 2025-02-06 PROCEDURE — 84443 ASSAY THYROID STIM HORMONE: CPT | Performed by: STUDENT IN AN ORGANIZED HEALTH CARE EDUCATION/TRAINING PROGRAM

## 2025-02-06 PROCEDURE — 85025 COMPLETE CBC W/AUTO DIFF WBC: CPT | Performed by: STUDENT IN AN ORGANIZED HEALTH CARE EDUCATION/TRAINING PROGRAM

## 2025-02-06 PROCEDURE — 3008F BODY MASS INDEX DOCD: CPT | Mod: CPTII,S$GLB,, | Performed by: STUDENT IN AN ORGANIZED HEALTH CARE EDUCATION/TRAINING PROGRAM

## 2025-02-06 PROCEDURE — 3288F FALL RISK ASSESSMENT DOCD: CPT | Mod: CPTII,S$GLB,, | Performed by: STUDENT IN AN ORGANIZED HEALTH CARE EDUCATION/TRAINING PROGRAM

## 2025-02-06 PROCEDURE — 84439 ASSAY OF FREE THYROXINE: CPT | Performed by: STUDENT IN AN ORGANIZED HEALTH CARE EDUCATION/TRAINING PROGRAM

## 2025-02-06 PROCEDURE — 84153 ASSAY OF PSA TOTAL: CPT | Performed by: STUDENT IN AN ORGANIZED HEALTH CARE EDUCATION/TRAINING PROGRAM

## 2025-02-06 PROCEDURE — 4010F ACE/ARB THERAPY RXD/TAKEN: CPT | Mod: CPTII,S$GLB,, | Performed by: STUDENT IN AN ORGANIZED HEALTH CARE EDUCATION/TRAINING PROGRAM

## 2025-02-06 PROCEDURE — 1101F PT FALLS ASSESS-DOCD LE1/YR: CPT | Mod: CPTII,S$GLB,, | Performed by: STUDENT IN AN ORGANIZED HEALTH CARE EDUCATION/TRAINING PROGRAM

## 2025-02-06 PROCEDURE — 99999 PR PBB SHADOW E&M-EST. PATIENT-LVL IV: CPT | Mod: PBBFAC,,, | Performed by: STUDENT IN AN ORGANIZED HEALTH CARE EDUCATION/TRAINING PROGRAM

## 2025-02-06 PROCEDURE — 3074F SYST BP LT 130 MM HG: CPT | Mod: CPTII,S$GLB,, | Performed by: STUDENT IN AN ORGANIZED HEALTH CARE EDUCATION/TRAINING PROGRAM

## 2025-02-06 PROCEDURE — 3044F HG A1C LEVEL LT 7.0%: CPT | Mod: CPTII,S$GLB,, | Performed by: STUDENT IN AN ORGANIZED HEALTH CARE EDUCATION/TRAINING PROGRAM

## 2025-02-06 PROCEDURE — 1160F RVW MEDS BY RX/DR IN RCRD: CPT | Mod: CPTII,S$GLB,, | Performed by: STUDENT IN AN ORGANIZED HEALTH CARE EDUCATION/TRAINING PROGRAM

## 2025-02-06 PROCEDURE — 3060F POS MICROALBUMINURIA REV: CPT | Mod: CPTII,S$GLB,, | Performed by: STUDENT IN AN ORGANIZED HEALTH CARE EDUCATION/TRAINING PROGRAM

## 2025-02-06 PROCEDURE — 36415 COLL VENOUS BLD VENIPUNCTURE: CPT | Mod: PO | Performed by: STUDENT IN AN ORGANIZED HEALTH CARE EDUCATION/TRAINING PROGRAM

## 2025-02-06 PROCEDURE — 3066F NEPHROPATHY DOC TX: CPT | Mod: CPTII,S$GLB,, | Performed by: STUDENT IN AN ORGANIZED HEALTH CARE EDUCATION/TRAINING PROGRAM

## 2025-02-06 PROCEDURE — 83036 HEMOGLOBIN GLYCOSYLATED A1C: CPT | Performed by: STUDENT IN AN ORGANIZED HEALTH CARE EDUCATION/TRAINING PROGRAM

## 2025-02-06 PROCEDURE — 3078F DIAST BP <80 MM HG: CPT | Mod: CPTII,S$GLB,, | Performed by: STUDENT IN AN ORGANIZED HEALTH CARE EDUCATION/TRAINING PROGRAM

## 2025-02-06 PROCEDURE — G2211 COMPLEX E/M VISIT ADD ON: HCPCS | Mod: S$GLB,,, | Performed by: STUDENT IN AN ORGANIZED HEALTH CARE EDUCATION/TRAINING PROGRAM

## 2025-02-06 PROCEDURE — 1159F MED LIST DOCD IN RCRD: CPT | Mod: CPTII,S$GLB,, | Performed by: STUDENT IN AN ORGANIZED HEALTH CARE EDUCATION/TRAINING PROGRAM

## 2025-02-06 PROCEDURE — 80053 COMPREHEN METABOLIC PANEL: CPT | Performed by: STUDENT IN AN ORGANIZED HEALTH CARE EDUCATION/TRAINING PROGRAM

## 2025-02-06 PROCEDURE — 80061 LIPID PANEL: CPT | Performed by: STUDENT IN AN ORGANIZED HEALTH CARE EDUCATION/TRAINING PROGRAM

## 2025-02-06 PROCEDURE — 1126F AMNT PAIN NOTED NONE PRSNT: CPT | Mod: CPTII,S$GLB,, | Performed by: STUDENT IN AN ORGANIZED HEALTH CARE EDUCATION/TRAINING PROGRAM

## 2025-02-06 PROCEDURE — 99214 OFFICE O/P EST MOD 30 MIN: CPT | Mod: S$GLB,,, | Performed by: STUDENT IN AN ORGANIZED HEALTH CARE EDUCATION/TRAINING PROGRAM

## 2025-02-06 RX ORDER — SITAGLIPTIN 100 MG/1
100 TABLET, FILM COATED ORAL DAILY
Qty: 90 TABLET | Refills: 3 | Status: SHIPPED | OUTPATIENT
Start: 2025-02-06

## 2025-02-06 RX ORDER — ATORVASTATIN CALCIUM 80 MG/1
80 TABLET, FILM COATED ORAL NIGHTLY
COMMUNITY
Start: 2025-01-27

## 2025-02-06 RX ORDER — METHOCARBAMOL 500 MG/1
500 TABLET, FILM COATED ORAL 2 TIMES DAILY PRN
Qty: 20 TABLET | Refills: 0 | Status: SHIPPED | OUTPATIENT
Start: 2025-02-06 | End: 2025-02-16

## 2025-02-06 RX ORDER — CARVEDILOL 12.5 MG/1
12.5 TABLET ORAL 2 TIMES DAILY
COMMUNITY
Start: 2025-01-27

## 2025-02-06 NOTE — PROGRESS NOTES
[facility]  Office Visit Note     SUBJECTIVE:   HPI: Dhaval Hernández  is a 64 y.o. male     Pressure injury - left elbow     Below information obtained from Dr. Jung' notes from her est care visit a several months prior     DM  Empagliflozin 25  Lantus 16U qhs   Januvia 100  Metformin 1000 bid     HTN  Amlodipine 5  Losartan 100     HLD  Atorvastatin 80  Asa     Hypothyroid  Levothyroxine 75     GERD  Famotidine 20     Cardiac arrhythmias s/p pacemaker - Dr. Diaz   Amiodarone  Follows with Cardiology and EP    CKD 2  Was seen by Nephrology, states no longer needed     Specialists:   - ID  - Cardiology   - Ortho     Lab Visit on 02/06/2025   Component Date Value Ref Range Status    WBC 02/06/2025 8.64  3.90 - 12.70 K/uL Final    RBC 02/06/2025 4.73  4.60 - 6.20 M/uL Final    Hemoglobin 02/06/2025 12.0 (L)  14.0 - 18.0 g/dL Final    Hematocrit 02/06/2025 39.9 (L)  40.0 - 54.0 % Final    MCV 02/06/2025 84  82 - 98 fL Final    MCH 02/06/2025 25.4 (L)  27.0 - 31.0 pg Final    MCHC 02/06/2025 30.1 (L)  32.0 - 36.0 g/dL Final    RDW 02/06/2025 16.5 (H)  11.5 - 14.5 % Final    Platelets 02/06/2025 250  150 - 450 K/uL Final    MPV 02/06/2025 10.5  9.2 - 12.9 fL Final    Immature Granulocytes 02/06/2025 0.3  0.0 - 0.5 % Final    Gran # (ANC) 02/06/2025 4.9  1.8 - 7.7 K/uL Final    Immature Grans (Abs) 02/06/2025 0.03  0.00 - 0.04 K/uL Final    Lymph # 02/06/2025 2.7  1.0 - 4.8 K/uL Final    Mono # 02/06/2025 0.8  0.3 - 1.0 K/uL Final    Eos # 02/06/2025 0.2  0.0 - 0.5 K/uL Final    Baso # 02/06/2025 0.07  0.00 - 0.20 K/uL Final    nRBC 02/06/2025 0  0 /100 WBC Final    Gran % 02/06/2025 56.6  38.0 - 73.0 % Final    Lymph % 02/06/2025 31.3  18.0 - 48.0 % Final    Mono % 02/06/2025 9.0  4.0 - 15.0 % Final    Eosinophil % 02/06/2025 2.0  0.0 - 8.0 % Final    Basophil % 02/06/2025 0.8  0.0 - 1.9 % Final    Differential Method 02/06/2025 Automated   Final    Sodium 02/06/2025 138  136 - 145 mmol/L Final  "   Potassium 02/06/2025 5.7 (H)  3.5 - 5.1 mmol/L Final    Chloride 02/06/2025 106  95 - 110 mmol/L Final    CO2 02/06/2025 18 (L)  23 - 29 mmol/L Final    Glucose 02/06/2025 123 (H)  70 - 110 mg/dL Final    BUN 02/06/2025 21  8 - 23 mg/dL Final    Creatinine 02/06/2025 1.3  0.5 - 1.4 mg/dL Final    Calcium 02/06/2025 9.3  8.7 - 10.5 mg/dL Final    Total Protein 02/06/2025 7.8  6.0 - 8.4 g/dL Final    Albumin 02/06/2025 3.7  3.5 - 5.2 g/dL Final    Total Bilirubin 02/06/2025 0.4  0.1 - 1.0 mg/dL Final    Alkaline Phosphatase 02/06/2025 71  40 - 150 U/L Final    AST 02/06/2025 27  10 - 40 U/L Final    ALT 02/06/2025 26  10 - 44 U/L Final    eGFR 02/06/2025 >60.0  >60 mL/min/1.73 m^2 Final    Anion Gap 02/06/2025 14  8 - 16 mmol/L Final    Hemoglobin A1C 02/06/2025 6.2 (H)  4.0 - 5.6 % Final    Estimated Avg Glucose 02/06/2025 131  68 - 131 mg/dL Final    Cholesterol 02/06/2025 99 (L)  120 - 199 mg/dL Final    Triglycerides 02/06/2025 50  30 - 150 mg/dL Final    HDL 02/06/2025 43  40 - 75 mg/dL Final    LDL Cholesterol 02/06/2025 46.0 (L)  63.0 - 159.0 mg/dL Final    HDL/Cholesterol Ratio 02/06/2025 43.4  20.0 - 50.0 % Final    Total Cholesterol/HDL Ratio 02/06/2025 2.3  2.0 - 5.0 Final    Non-HDL Cholesterol 02/06/2025 56  mg/dL Final    PSA, Screen 02/06/2025 2.1  0.00 - 4.00 ng/mL Final    TSH 02/06/2025 1.274  0.400 - 4.000 uIU/mL Final    Free T4 02/06/2025 1.37  0.71 - 1.51 ng/dL Final   No results displayed because visit has over 200 results.            Current Outpatient Medications on File Prior to Visit   Medication Sig Dispense Refill    atorvastatin (LIPITOR) 80 MG tablet Take 80 mg by mouth every evening.      BD AUTOSHIELD DUO PEN NEEDLE 30 gauge x 3/16" Ndle       BD POSIFLUSH NORMAL SALINE 0.9 injection       carvediloL (COREG) 12.5 MG tablet Take 12.5 mg by mouth 2 (two) times daily.      cholecalciferol, vitamin D3, (VITAMIN D3) 125 mcg (5,000 unit) Tab Take 5,000 Units by mouth once daily.      " citalopram (CELEXA) 10 MG tablet Take 1 tablet (10 mg total) by mouth once daily. 90 tablet 1    doxycycline (VIBRAMYCIN) 100 MG Cap Take 1 capsule (100 mg total) by mouth every 12 (twelve) hours. 60 capsule 2    ezetimibe (ZETIA) 10 mg tablet Take 1 tablet (10 mg total) by mouth once daily. 90 tablet 3    famotidine (PEPCID) 20 MG tablet Take 1 tablet (20 mg total) by mouth 2 (two) times daily. 180 tablet 3    insulin glargine U-100, Lantus, 100 unit/mL (3 mL) SubQ InPn pen Inject 20 Units into the skin every evening. 18 mL 1    levothyroxine (SYNTHROID) 75 MCG tablet Take 1 tablet (75 mcg total) by mouth every morning. 90 tablet 1    losartan (COZAAR) 100 MG tablet Take 1 tablet (100 mg total) by mouth once daily. 90 tablet 1    metFORMIN (GLUCOPHAGE) 1000 MG tablet Take 1 tablet (1,000 mg total) by mouth 2 (two) times daily with meals. 180 tablet 1    omega 3-dha-epa-fish oil (FISH OIL) 1,200 (144-216) mg Cap Take 1 capsule by mouth once daily.      ondansetron (ZOFRAN) 8 MG tablet Take 1 tablet (8 mg total) by mouth every 8 (eight) hours as needed for Nausea. 30 tablet 0     No current facility-administered medications on file prior to visit.     Past Medical History:   Diagnosis Date    Diabetes mellitus, type 2     GERD (gastroesophageal reflux disease)     Hyperlipidemia     Hypertension      Past Surgical History:   Procedure Laterality Date    FRACTURE SURGERY      INCISION AND DRAINAGE OF ABSCESS Left 10/27/2024    Procedure: INCISION AND DRAINAGE, ABSCESS;  Surgeon: Gokul Troy MD;  Location: [facility] OR;  Service: Orthopedics;  Laterality: Left;  INCISION AND DRAIN OF LEFT ELBOW ABCESS    INSERTION OF PACEMAKER      LEG AMPUTATION       Past Surgical History:   Procedure Laterality Date    FRACTURE SURGERY      INCISION AND DRAINAGE OF ABSCESS Left 10/27/2024    Procedure: INCISION AND DRAINAGE, ABSCESS;  Surgeon: Gokul Troy MD;  Location: [facility] OR;  Service: Orthopedics;  Laterality: Left;   "INCISION AND DRAIN OF LEFT ELBOW ABCESS    INSERTION OF PACEMAKER      LEG AMPUTATION       Family History   Problem Relation Name Age of Onset    Arthritis Mother         Marital Status:   Alcohol History:  has no history on file for alcohol use.  Tobacco History:  reports that he has been smoking cigarettes. He quit smokeless tobacco use about 10 months ago.  His smokeless tobacco use included snuff.  Drug History:  has no history on file for drug use.    Health Maintenance Topics with due status: Not Due       Topic Last Completion Date    TETANUS VACCINE 11/02/2020    Colorectal Cancer Screening 11/07/2023    PROSTATE-SPECIFIC ANTIGEN 02/06/2025    High Dose Statin 02/06/2025    Lipid Panel 02/06/2025    Hemoglobin A1c 02/06/2025    Diabetes Urine Screening 02/08/2025     Immunization History   Administered Date(s) Administered    COVID-19 MRNA, LN-S PF (MODERNA HALF 0.25 ML DOSE) 01/21/2022, 06/14/2022    COVID-19, MRNA, LN-S, PF (MODERNA FULL 0.5 ML DOSE) 06/29/2021, 07/27/2021    Influenza 10/24/2013, 11/25/2016, 11/16/2017, 02/13/2019, 11/06/2019    Influenza (FLUAD) - Quadrivalent - Adjuvanted - PF *Preferred* (65+) 10/30/2023    Influenza - Quadrivalent - PF *Preferred* (6 months and older) 11/02/2020    Influenza - Trivalent - Afluria, Fluzone MDV 01/21/2022    Pneumococcal Conjugate - 20 Valent 10/30/2023    Pneumococcal Polysaccharide - 23 Valent 02/13/2019    Td (Adult), Unspecified Formulation 10/24/2013    Tdap 11/02/2020       Review of patient's allergies indicates:   Allergen Reactions    Fish containing products      Other reaction(s): .       Current Outpatient Medications:     atorvastatin (LIPITOR) 80 MG tablet, Take 80 mg by mouth every evening., Disp: , Rfl:     BD AUTOSHIELD DUO PEN NEEDLE 30 gauge x 3/16" Ndle, , Disp: , Rfl:     BD POSIFLUSH NORMAL SALINE 0.9 injection, , Disp: , Rfl:     carvediloL (COREG) 12.5 MG tablet, Take 12.5 mg by mouth 2 (two) times daily., Disp: , Rfl:    "  cholecalciferol, vitamin D3, (VITAMIN D3) 125 mcg (5,000 unit) Tab, Take 5,000 Units by mouth once daily., Disp: , Rfl:     citalopram (CELEXA) 10 MG tablet, Take 1 tablet (10 mg total) by mouth once daily., Disp: 90 tablet, Rfl: 1    doxycycline (VIBRAMYCIN) 100 MG Cap, Take 1 capsule (100 mg total) by mouth every 12 (twelve) hours., Disp: 60 capsule, Rfl: 2    ezetimibe (ZETIA) 10 mg tablet, Take 1 tablet (10 mg total) by mouth once daily., Disp: 90 tablet, Rfl: 3    famotidine (PEPCID) 20 MG tablet, Take 1 tablet (20 mg total) by mouth 2 (two) times daily., Disp: 180 tablet, Rfl: 3    insulin glargine U-100, Lantus, 100 unit/mL (3 mL) SubQ InPn pen, Inject 20 Units into the skin every evening., Disp: 18 mL, Rfl: 1    levothyroxine (SYNTHROID) 75 MCG tablet, Take 1 tablet (75 mcg total) by mouth every morning., Disp: 90 tablet, Rfl: 1    losartan (COZAAR) 100 MG tablet, Take 1 tablet (100 mg total) by mouth once daily., Disp: 90 tablet, Rfl: 1    metFORMIN (GLUCOPHAGE) 1000 MG tablet, Take 1 tablet (1,000 mg total) by mouth 2 (two) times daily with meals., Disp: 180 tablet, Rfl: 1    omega 3-dha-epa-fish oil (FISH OIL) 1,200 (144-216) mg Cap, Take 1 capsule by mouth once daily., Disp: , Rfl:     ondansetron (ZOFRAN) 8 MG tablet, Take 1 tablet (8 mg total) by mouth every 8 (eight) hours as needed for Nausea., Disp: 30 tablet, Rfl: 0    empagliflozin (JARDIANCE) 25 mg tablet, Take 1 tablet (25 mg total) by mouth once daily., Disp: 90 tablet, Rfl: 3    JANUVIA 100 mg Tab, Take 1 tablet (100 mg total) by mouth once daily., Disp: 90 tablet, Rfl: 3    methocarbamoL (ROBAXIN) 500 MG Tab, Take 1 tablet (500 mg total) by mouth 2 (two) times daily as needed (muscle spasm)., Disp: 20 tablet, Rfl: 0    Review of Systems   Constitutional:  Negative for chills and fever.   Respiratory:  Negative for shortness of breath.    Cardiovascular:  Negative for chest pain.   Gastrointestinal:  Negative for abdominal pain, nausea and  vomiting.   Musculoskeletal:  Positive for gait problem.   Integumentary:  Positive for wound.       OBJECTIVE:      Vitals:    02/06/25 1021   BP: 110/76   BP Location: Right arm   Patient Position: Sitting   Pulse: 72   SpO2: 99%   Weight: 96.8 kg (213 lb 4.8 oz)     Physical Exam  Constitutional:       General: He is not in acute distress.     Appearance: He is obese. He is not ill-appearing or toxic-appearing.   HENT:      Head: Normocephalic and atraumatic.      Mouth/Throat:      Mouth: Mucous membranes are moist.      Pharynx: Uvula midline. No pharyngeal swelling.   Cardiovascular:      Rate and Rhythm: Normal rate and regular rhythm.   Pulmonary:      Effort: Pulmonary effort is normal. No tachypnea, bradypnea, accessory muscle usage, prolonged expiration or respiratory distress.      Breath sounds: Normal breath sounds. No stridor. No wheezing, rhonchi or rales.   Musculoskeletal:      Comments: Below knee amputation of left leg   In wheelchair    Skin:     Comments: Left elbow as a small opening - about 2 mm in size that is draining discharge    Neurological:      General: No focal deficit present.      Mental Status: He is alert.   Psychiatric:         Mood and Affect: Mood normal.         Behavior: Behavior normal.        Assessment:       1. Pulmonary hypertension    2. Cardiomyopathy, unspecified type    3. Type 2 diabetes mellitus with stage 2 chronic kidney disease, without long-term current use of insulin    4. Severe obesity (BMI 35.0-39.9) with comorbidity    5. Atrial fibrillation, chronic    6. Status post unilateral below-knee amputation    7. Hypothyroidism, unspecified type    8. Mixed hyperlipidemia    9. Atherosclerosis of aorta    10. Encounter for prostate cancer screening    11. Muscle spasm        Plan:       Pulmonary hypertension  -     CBC Auto Differential; Future; Expected date: 02/06/2025  -     Comprehensive Metabolic Panel; Future; Expected date: 02/06/2025  - Stable, continue  to monitor   - Continue to follow up with cardiology     Type 2 diabetes mellitus with stage 2 chronic kidney disease, without long-term current use of insulin  -     Hemoglobin A1C; Future; Expected date: 02/06/2025  -     Microalbumin/Creatinine Ratio, Urine; Future; Expected date: 02/06/2025  -     JANUVIA 100 mg Tab; Take 1 tablet (100 mg total) by mouth once daily.  Dispense: 90 tablet; Refill: 3  -     empagliflozin (JARDIANCE) 25 mg tablet; Take 1 tablet (25 mg total) by mouth once daily.  Dispense: 90 tablet; Refill: 3  -     Ambulatory referral/consult to Optometry; Future; Expected date: 02/13/2025  - Stable  - Continue with the current regimen     Atrial fibrillation, chronic  -     Comprehensive Metabolic Panel; Future; Expected date: 02/06/2025  - Has implantable cardioverter-defibrillator in place   - Stable, continue to monitor   - Continue with amiodarone and ASA    Status post unilateral below-knee amputation  - Stable  - Continue to monitor     Hypothyroidism, unspecified type  -     TSH; Future; Expected date: 02/06/2025  -     T4, Free; Future; Expected date: 02/06/2025    Mixed hyperlipidemia  -     Lipid Panel; Future; Expected date: 02/06/2025  - Continued Atorvastatin for cholesterol management.    Atherosclerosis of aorta  -     Lipid Panel; Future; Expected date: 02/06/2025  - Continued Atorvastatin    Encounter for prostate cancer screening  -     PSA, Screening; Future; Expected date: 02/06/2025    Muscle spasm  -     methocarbamoL (ROBAXIN) 500 MG Tab; Take 1 tablet (500 mg total) by mouth 2 (two) times daily as needed (muscle spasm).  Dispense: 20 tablet; Refill: 0  - Advised patient to maintain adequate hydration.  - Prescribed a muscle relaxant medication to help with spasms.    PRESSURE INJURY, STAGE 3:  - Evaluated persistent pressure injury on left elbow, noting ongoing drainage and opening.  - Inquired about fever or chills related to the ulcer and patient denied   - Advised  patient to continue keeping the wound covered due to persistent drainage.  - Noted upcoming appointments with orthopedic and infectious disease specialists for further management.    TRANSPORTATION ISSUES:  - Acknowledged transportation issues affecting medical appointments.  - Suggested coordinating future appointments with spouse to address these concerns.    RSV VACCINATION:  - Recommend RSV vaccine due to patient's age and comorbidities.  - Provided information on the vaccine's importance for patients over 60 and its effectiveness.    SHINGLES VACCINATION:  - Recommend shingles vaccine due to patient's age and comorbidities.  - Educated on shingles virus, its potential complications including vision and hearing loss, and the benefits of vaccination.    FOLLOW UP:  - Follow up with physician assistant in 6 months and with provider in 1 year.  - Contact the office if medical attention is needed before scheduled follow-ups.         Counseled on age and gender appropriate medical preventative services, including cancer screenings, immunizations, overall nutritional health, need for a consistent exercise regimen and an overall push towards maintaining a vigorous and active lifestyle.        Maritza Galindo M.D.  2/8/2025    This note was created using [employer] [place] voice recognition software that occasionally misinterprets phrases or words

## 2025-02-07 DIAGNOSIS — E87.5 HYPERKALEMIA: Primary | ICD-10-CM

## 2025-02-08 ENCOUNTER — LAB VISIT (OUTPATIENT)
Dept: LAB | Facility: HOSPITAL | Age: 65
End: 2025-02-08
Attending: STUDENT IN AN ORGANIZED HEALTH CARE EDUCATION/TRAINING PROGRAM
Payer: MEDICARE

## 2025-02-08 DIAGNOSIS — E87.5 HYPERKALEMIA: ICD-10-CM

## 2025-02-08 LAB
ANION GAP SERPL CALC-SCNC: 11 MMOL/L (ref 8–16)
BUN SERPL-MCNC: 21 MG/DL (ref 8–23)
CALCIUM SERPL-MCNC: 9.4 MG/DL (ref 8.7–10.5)
CHLORIDE SERPL-SCNC: 106 MMOL/L (ref 95–110)
CO2 SERPL-SCNC: 21 MMOL/L (ref 23–29)
CREAT SERPL-MCNC: 1.1 MG/DL (ref 0.5–1.4)
EST. GFR  (NO RACE VARIABLE): >60 ML/MIN/1.73 M^2
GLUCOSE SERPL-MCNC: 101 MG/DL (ref 70–110)
MAGNESIUM SERPL-MCNC: 1.5 MG/DL (ref 1.6–2.6)
POTASSIUM SERPL-SCNC: 5.3 MMOL/L (ref 3.5–5.1)
SODIUM SERPL-SCNC: 138 MMOL/L (ref 136–145)

## 2025-02-08 PROCEDURE — 36415 COLL VENOUS BLD VENIPUNCTURE: CPT | Mod: PO | Performed by: STUDENT IN AN ORGANIZED HEALTH CARE EDUCATION/TRAINING PROGRAM

## 2025-02-08 PROCEDURE — 80048 BASIC METABOLIC PNL TOTAL CA: CPT | Performed by: STUDENT IN AN ORGANIZED HEALTH CARE EDUCATION/TRAINING PROGRAM

## 2025-02-08 PROCEDURE — 83735 ASSAY OF MAGNESIUM: CPT | Performed by: STUDENT IN AN ORGANIZED HEALTH CARE EDUCATION/TRAINING PROGRAM

## 2025-02-09 DIAGNOSIS — E87.5 HYPERKALEMIA: Primary | ICD-10-CM

## 2025-02-09 DIAGNOSIS — I10 PRIMARY HYPERTENSION: ICD-10-CM

## 2025-02-09 RX ORDER — HYDROCHLOROTHIAZIDE 12.5 MG/1
12.5 TABLET ORAL DAILY
Qty: 90 TABLET | Refills: 3 | Status: SHIPPED | OUTPATIENT
Start: 2025-02-09 | End: 2026-02-09

## 2025-02-09 RX ORDER — LOSARTAN POTASSIUM 100 MG/1
50 TABLET ORAL DAILY
Start: 2025-02-09 | End: 2025-08-08

## 2025-02-11 LAB
OHS CV AF BURDEN PERCENT: < 1
OHS CV DC REMOTE DEVICE TYPE: NORMAL
OHS CV ICD SHOCK: NO
OHS CV RV PACING PERCENT: 0.01 %

## 2025-02-14 ENCOUNTER — HOSPITAL ENCOUNTER (OUTPATIENT)
Dept: RADIOLOGY | Facility: HOSPITAL | Age: 65
Discharge: HOME | End: 2025-02-14
Attending: ORTHOPAEDIC SURGERY
Payer: MEDICARE

## 2025-02-14 ENCOUNTER — OFFICE VISIT (OUTPATIENT)
Dept: ORTHOPEDICS | Facility: CLINIC | Age: 65
End: 2025-02-14
Payer: MEDICARE

## 2025-02-14 VITALS — HEIGHT: 70 IN | BODY MASS INDEX: 30.55 KG/M2 | WEIGHT: 213.38 LBS

## 2025-02-14 DIAGNOSIS — M70.22 OLECRANON BURSITIS OF LEFT ELBOW: ICD-10-CM

## 2025-02-14 DIAGNOSIS — M70.22 OLECRANON BURSITIS OF LEFT ELBOW: Primary | ICD-10-CM

## 2025-02-14 DIAGNOSIS — T84.498A FAILED ORTHOPEDIC IMPLANT, INITIAL ENCOUNTER: Primary | ICD-10-CM

## 2025-02-14 PROCEDURE — 73070 X-RAY EXAM OF ELBOW: CPT | Mod: TC,PO,LT

## 2025-02-14 PROCEDURE — 73070 X-RAY EXAM OF ELBOW: CPT | Mod: 26,LT,, | Performed by: RADIOLOGY

## 2025-02-14 NOTE — PROGRESS NOTES
"Status/Diagnosis: Left elbow abscess with retained deep HW.  Date of Surgery:   10/27/2024: Left elbow I&D with partial HWR.  Date of Injury: none  Return visit: 2 weeks postop  X-rays on Return: none      Present History:  Dhaval Hernández is a 64 y.o. male who returns today after last being seen in mid November.  Patient given instructions to return after 3 weeks however has not been seen since that time.  Overall doing well.  0/10 pain.  He was seen by the Infectious Disease team 1 month ago.  Noted to have some new onset callus formation with questionable pinpoint drainage at the previous surgical site.  Denies any warmth or erythema, edema.  Patient never did obtain offloading elbow pad.      Past Medical History:   Diagnosis Date    Diabetes mellitus, type 2     GERD (gastroesophageal reflux disease)     Hyperlipidemia     Hypertension        Past Surgical History:   Procedure Laterality Date    FRACTURE SURGERY      INCISION AND DRAINAGE OF ABSCESS Left 10/27/2024    Procedure: INCISION AND DRAINAGE, ABSCESS;  Surgeon: Gokul Troy MD;  Location: [facility];  Service: Orthopedics;  Laterality: Left;  INCISION AND DRAIN OF LEFT ELBOW ABCESS    INSERTION OF PACEMAKER      LEG AMPUTATION         Current Outpatient Medications   Medication Sig    atorvastatin (LIPITOR) 80 MG tablet Take 80 mg by mouth every evening.    BD AUTOSHIELD DUO PEN NEEDLE 30 gauge x 3/16" Ndle     BD POSIFLUSH NORMAL SALINE 0.9 injection     carvediloL (COREG) 12.5 MG tablet Take 12.5 mg by mouth 2 (two) times daily.    cholecalciferol, vitamin D3, (VITAMIN D3) 125 mcg (5,000 unit) Tab Take 5,000 Units by mouth once daily.    citalopram (CELEXA) 10 MG tablet Take 1 tablet (10 mg total) by mouth once daily.    doxycycline (VIBRAMYCIN) 100 MG Cap Take 1 capsule (100 mg total) by mouth every 12 (twelve) hours.    empagliflozin (JARDIANCE) 25 mg tablet Take 1 tablet (25 mg total) by mouth once daily.    ezetimibe (ZETIA) 10 mg tablet " Take 1 tablet (10 mg total) by mouth once daily.    famotidine (PEPCID) 20 MG tablet Take 1 tablet (20 mg total) by mouth 2 (two) times daily.    hydroCHLOROthiazide 12.5 MG Tab Take 1 tablet (12.5 mg total) by mouth once daily.    insulin glargine U-100, Lantus, 100 unit/mL (3 mL) SubQ InPn pen Inject 20 Units into the skin every evening.    JANUVIA 100 mg Tab Take 1 tablet (100 mg total) by mouth once daily.    levothyroxine (SYNTHROID) 75 MCG tablet Take 1 tablet (75 mcg total) by mouth every morning.    losartan (COZAAR) 100 MG tablet Take 0.5 tablets (50 mg total) by mouth once daily.    metFORMIN (GLUCOPHAGE) 1000 MG tablet Take 1 tablet (1,000 mg total) by mouth 2 (two) times daily with meals.    methocarbamoL (ROBAXIN) 500 MG Tab Take 1 tablet (500 mg total) by mouth 2 (two) times daily as needed (muscle spasm).    omega 3-dha-epa-fish oil (FISH OIL) 1,200 (144-216) mg Cap Take 1 capsule by mouth once daily.    ondansetron (ZOFRAN) 8 MG tablet Take 1 tablet (8 mg total) by mouth every 8 (eight) hours as needed for Nausea.     No current facility-administered medications for this visit.       Social History     Socioeconomic History    Marital status:    Tobacco Use    Smoking status: Every Day     Types: Cigarettes    Smokeless tobacco: Former     Types: Snuff     Quit date: 04/2024    Tobacco comments:     Pt used smokeless tobacco from 2422-0526 and quit. Started dipping tobacco throughout most of his 20's. Started smoking cigarettes again in 06/2024.      Social Drivers of Health     Financial Resource Strain: Low Risk  (10/22/2024)    Overall Financial Resource Strain (CARDIA)     Difficulty of Paying Living Expenses: Not hard at all   Food Insecurity: No Food Insecurity (10/22/2024)    Hunger Vital Sign     Worried About Running Out of Food in the Last Year: Never true     Ran Out of Food in the Last Year: Never true   Transportation Needs: No Transportation Needs (10/22/2024)    TRANSPORTATION  NEEDS     Transportation : No   Physical Activity: Inactive (10/22/2024)    Exercise Vital Sign     Days of Exercise per Week: 0 days     Minutes of Exercise per Session: 0 min   Stress: No Stress Concern Present (10/22/2024)    [employer] [place] [employer] - Occupational Stress Questionnaire     Feeling of Stress : Not at all   Housing Stability: Unknown (10/22/2024)    Housing Stability Vital Sign     Unable to Pay for Housing in the Last Year: No     Homeless in the Last Year: No       Physical exam:  There were no vitals filed for this visit.  There is no height or weight on file to calculate BMI.  General: In no apparent distress; well developed and well nourished.  HEENT: normocephalic; atraumatic.  Cardiovascular: regular rate.  Respiratory: no increased work of breathing.  Musculoskeletal:   Inspection:   Previous surgical site is well healed.  There is a small amount of callus along the more distal margin of the surgical site.  No exposed hardware however there is a small 2 x 2 mm area of delayed thought to be consistent with residual tension wire fixation.  No fluctuance or drainage noted. Baseline 90 degree flexion contracture/ankylosis elbow joint.  30° supination and 45° pronation.  First dorsal interossei atrophy, unchanged.  Ulnar nerve sensation again improved.  Palpable radial pulse.                   Imaging Studies/Outside documentation:  I have ordered/reviewed/interpreted the following images/outside documentation:  3-views Left elbow:  On my independent review, as previously noted the patient with a history of extensive distal humerus fracture and olecranon osteotomy ORIF.  Removal of proximal margin of the olecranon screw with tension band wiring.  Previously noted significant heterotopic ossification with ankylosis of the ulnar humeral and radiocapitellar joints.  Lucency involving the olecranon osteotomy, unchanged.  No evidence of bony erosion to suggest osteomyelitis.         Assessment:  Dhaval Hernández is a 64 y.o. male with Left elbow abscess with retained deep HW.     Plan:   Clinical and radiographic were discussed.  Patient has not been seen in several months.  Questionable area of residual hardware prominence involving the cut tension wire.  To avoid continued irritation and possible recurrent wound infection, we discussed removal of as much of the wire as possible into potentially tamped this into the bone to avoid future issues.      Operative vs nonoperative treatment options were described. These include but are not limited to bleeding; infection; damage to surrounding nerves or vessels; persistent pain, stiffness; recurrent deformity; need for additional procedures; amputation; blood clots; pulmonary embolus; cardiac events; stroke; and the general risks of anesthesia including anesthetic death.   We also reviewed postop protocol as well as limitations and expectations. Patient understands and desires to proceed with surgical intervention.   Explained risks, benefits, and alternative to the patient. Asked if any questions--none.  Surgery to include but not limited to:   Left elbow hardware removal; surgery as indicated.    Surgery tentative plan for 02/28/2025.    We will obtain preoperative PCP clearance.    Understanding.  All questions were answered.      This note was created using voice recognition software and may contain grammatical errors.

## 2025-02-18 ENCOUNTER — RESULTS FOLLOW-UP (OUTPATIENT)
Dept: FAMILY MEDICINE | Facility: CLINIC | Age: 65
End: 2025-02-18

## 2025-02-18 RX ORDER — MUPIROCIN 20 MG/G
OINTMENT TOPICAL
OUTPATIENT
Start: 2025-02-18

## 2025-02-20 RX ORDER — NAPROXEN SODIUM 220 MG/1
81 TABLET, FILM COATED ORAL DAILY
COMMUNITY

## 2025-02-24 ENCOUNTER — TELEPHONE (OUTPATIENT)
Dept: ORTHOPEDICS | Facility: CLINIC | Age: 65
End: 2025-02-24
Payer: MEDICARE

## 2025-02-24 NOTE — TELEPHONE ENCOUNTER
----- Message from Kiran sent at 2/24/2025  8:25 AM CST -----  Patient would like a callback regarding rescheduling his surgery on 03/14/25 [phone]

## 2025-02-25 RX ORDER — DOXYCYCLINE 100 MG/1
100 CAPSULE ORAL EVERY 12 HOURS
Qty: 60 CAPSULE | Refills: 2 | Status: SHIPPED | OUTPATIENT
Start: 2025-02-25

## 2025-02-26 ENCOUNTER — RESULTS FOLLOW-UP (OUTPATIENT)
Dept: FAMILY MEDICINE | Facility: CLINIC | Age: 65
End: 2025-02-26
Payer: MEDICARE

## 2025-02-26 ENCOUNTER — LAB VISIT (OUTPATIENT)
Dept: LAB | Facility: HOSPITAL | Age: 65
End: 2025-02-26
Attending: STUDENT IN AN ORGANIZED HEALTH CARE EDUCATION/TRAINING PROGRAM
Payer: MEDICARE

## 2025-02-26 DIAGNOSIS — R79.9 HIGH BLOOD UREA NITROGEN (BUN): Primary | ICD-10-CM

## 2025-02-26 DIAGNOSIS — E87.5 HYPERKALEMIA: ICD-10-CM

## 2025-02-26 LAB
ANION GAP SERPL CALC-SCNC: 13 MMOL/L (ref 8–16)
BUN SERPL-MCNC: 33 MG/DL (ref 8–23)
CALCIUM SERPL-MCNC: 9.1 MG/DL (ref 8.7–10.5)
CHLORIDE SERPL-SCNC: 105 MMOL/L (ref 95–110)
CO2 SERPL-SCNC: 19 MMOL/L (ref 23–29)
CREAT SERPL-MCNC: 1.3 MG/DL (ref 0.5–1.4)
EST. GFR  (NO RACE VARIABLE): >60 ML/MIN/1.73 M^2
GLUCOSE SERPL-MCNC: 114 MG/DL (ref 70–110)
POTASSIUM SERPL-SCNC: 4.8 MMOL/L (ref 3.5–5.1)
SODIUM SERPL-SCNC: 137 MMOL/L (ref 136–145)

## 2025-02-26 PROCEDURE — 80048 BASIC METABOLIC PNL TOTAL CA: CPT | Performed by: STUDENT IN AN ORGANIZED HEALTH CARE EDUCATION/TRAINING PROGRAM

## 2025-02-26 PROCEDURE — 36415 COLL VENOUS BLD VENIPUNCTURE: CPT | Mod: PO | Performed by: STUDENT IN AN ORGANIZED HEALTH CARE EDUCATION/TRAINING PROGRAM

## 2025-02-27 NOTE — TELEPHONE ENCOUNTER
Reviewed attached results with patient/patient's wife, both verbally indicated understanding.  Patient denies symptoms.  States he relies on a transportation company to bring him in for appointments and he must give one week notice to the transportation company.  Patient states he has several other appointments next week. Requesting to know if labs can be performed on 3/11/35 when he comes in for appointment with ROBERT Piña.  Will send follow up message to Dr. Galindo.

## 2025-03-06 ENCOUNTER — OFFICE VISIT (OUTPATIENT)
Dept: INFECTIOUS DISEASES | Facility: CLINIC | Age: 65
End: 2025-03-06
Payer: MEDICARE

## 2025-03-06 VITALS
HEIGHT: 70 IN | SYSTOLIC BLOOD PRESSURE: 118 MMHG | BODY MASS INDEX: 30.49 KG/M2 | OXYGEN SATURATION: 97 % | TEMPERATURE: 97 F | HEART RATE: 74 BPM | DIASTOLIC BLOOD PRESSURE: 70 MMHG | WEIGHT: 213 LBS

## 2025-03-06 DIAGNOSIS — M86.622: Primary | ICD-10-CM

## 2025-03-06 DIAGNOSIS — A49.02 MRSA INFECTION: ICD-10-CM

## 2025-03-06 PROCEDURE — 99999 PR PBB SHADOW E&M-EST. PATIENT-LVL IV: CPT | Mod: PBBFAC,,, | Performed by: INTERNAL MEDICINE

## 2025-03-06 NOTE — PROGRESS NOTES
Subjective:      Reason for consult:     HPI: Dhaval Hernández is a 64 y.o. male   with history of diabetes mellitus, hypertension, hyperlipidemia, GERD and previous left BKA.  Also with history of AICD.  He presents to the ER 10/22/2024 with 4 day history of nausea, vomiting and diarrhea.  He had no fever.  In the ER vitals were abnormal with blood pressure 75/39, pulse 60, respiratory 18, temperature 97.6° oxygen saturation 92%.  WBC was 20 K with left shift.  Creatinine 5.1.  UA abnormal with 19 WBC and 4+ glucose.  Chest x-ray with no clear acute infiltrate.  CT abdomen and pelvis showed mild cardiomegaly and also cholelithiasis.  He was commenced on IV fluid and antibiotics.     Leukocytosis resolved and WBC down to 8.0.  Blood cultures have grown MSSA in 2/2 bottles.  HIDA scan was abnormal.  Surgery was consulted to evaluate for possible cholecystitis.  Notes reviewed.  No plan for surgical intervention at this time since patient had no abdominal pain and appears to be improving.  ID asked to assist with his care.     States his symptoms started with his left elbow hurting.  He has been leaning on the left elbow to transfer from bed to wheelchair.  Has a elbow ORIF and is fixed in a flexion position.  Has had persistent pain for the last 1 week with swelling.  His other symptoms followed after the elbow pain started.        Antibiotic history:  Vancomycin: 10/22/2024 x 1 day  Meropenem: 10/22/2024-10/23/2024  Cefazolin:  10/24/2024-  Rifampin: 11/01/2024-     Microbiology:    Blood culture 10/22/2024, 10/24/2024: MSSA 2/2  Blood culture 10/24/2024: NGTD    11/19/2024:  Recently hospitalized and treated for left elbow abscess with osteomyelitis complicated by MSSA bacteremia.  Improved post I&D and was ultimately discharged on cefazolin and rifampin to complete 6 week course 12/05/2024.  Sutures have been removed.  Here for follow up.    01/09/2025:  He missed 2 appointments with orthopedic surgeon because  "of transportation issues.  Left elbow wound continues to heal but not all the way completed.  Completed IV antibiotics and now on oral doxycycline which he is tolerating.   No other acute issues today.  He is also moving a little better with more strength in his upper extremities.    03/06/2025:  Here for follow up.  No acute issues since last visit.  Has been seen by orthopedic surgeon with plan to clip the protruding wire.  Waiting on clearance from his PCP.  Tolerating doxycycline okay with no issues.    Review of patient's allergies indicates:   Allergen Reactions    Fish containing products      Other reaction(s): .     Past Medical History:   Diagnosis Date    Diabetes mellitus, type 2     GERD (gastroesophageal reflux disease)     Hyperlipidemia     Hypertension      Past Surgical History:   Procedure Laterality Date    FRACTURE SURGERY      INCISION AND DRAINAGE OF ABSCESS Left 10/27/2024    Procedure: INCISION AND DRAINAGE, ABSCESS;  Surgeon: Gokul Troy MD;  Location: [facility];  Service: Orthopedics;  Laterality: Left;  INCISION AND DRAIN OF LEFT ELBOW ABCESS    INSERTION OF PACEMAKER      LEG AMPUTATION        Social History     Tobacco Use    Smoking status: Every Day     Types: Cigarettes    Smokeless tobacco: Former     Types: Snuff     Quit date: 04/2024    Tobacco comments:     Pt used smokeless tobacco from 2553-8426 and quit. Started dipping tobacco throughout most of his 20's. Started smoking cigarettes again in 06/2024.    Substance Use Topics    Alcohol use: Not on file     Family History   Problem Relation Name Age of Onset    Arthritis Mother         Pertinent medications noted:     Review of Systems  10 system review unremarkable.    Outdoor activities:  Wheelchair-bound  Travel:  No recent travel  Implants:  Left elbow hardware  Antibiotic History:  As in HPI      Objective:      Blood pressure 118/70, pulse 74, temperature 97.3 °F (36.3 °C), temperature source Temporal, height 5' 10" " (1.778 m), weight 96.6 kg (213 lb), SpO2 97%. Body mass index is 30.56 kg/m².  Physical Exam      General:  Elderly man sitting in wheelchair in no acute distress   Left elbow:  Flexion contracture.  Dry elbow wound. ?  Almost healed  CVS: S1 and 2 heard, no murmurs appreciated  Respiratory:  Clear to auscultation   Abdomen: Full, soft, nontender, no palpable organomegaly   Skin: No rash appreciated  Psychiatric: Normal mood, speech,  demeanor     Wound:  Left elbow scab    VAD:  None      General Labs reviewed:  Lab Results   Component Value Date    WBC 8.64 02/06/2025    RBC 4.73 02/06/2025    HGB 12.0 (L) 02/06/2025    HCT 39.9 (L) 02/06/2025    MCV 84 02/06/2025    MCH 25.4 (L) 02/06/2025    MCHC 30.1 (L) 02/06/2025    RDW 16.5 (H) 02/06/2025     02/06/2025    MPV 10.5 02/06/2025    GRAN 4.9 02/06/2025    GRAN 56.6 02/06/2025    LYMPH 2.7 02/06/2025    LYMPH 31.3 02/06/2025    MONO 0.8 02/06/2025    MONO 9.0 02/06/2025    EOS 0.2 02/06/2025    BASO 0.07 02/06/2025    EOSINOPHIL 2.0 02/06/2025    BASOPHIL 0.8 02/06/2025     CMP  Sodium   Date Value Ref Range Status   02/26/2025 137 136 - 145 mmol/L Final     Potassium   Date Value Ref Range Status   02/26/2025 4.8 3.5 - 5.1 mmol/L Final     Chloride   Date Value Ref Range Status   02/26/2025 105 95 - 110 mmol/L Final     CO2   Date Value Ref Range Status   02/26/2025 19 (L) 23 - 29 mmol/L Final     Glucose   Date Value Ref Range Status   02/26/2025 114 (H) 70 - 110 mg/dL Final     BUN   Date Value Ref Range Status   02/26/2025 33 (H) 8 - 23 mg/dL Final     Creatinine   Date Value Ref Range Status   02/26/2025 1.3 0.5 - 1.4 mg/dL Final     Calcium   Date Value Ref Range Status   02/26/2025 9.1 8.7 - 10.5 mg/dL Final     Total Protein   Date Value Ref Range Status   02/06/2025 7.8 6.0 - 8.4 g/dL Final     Albumin   Date Value Ref Range Status   02/06/2025 3.7 3.5 - 5.2 g/dL Final     Total Bilirubin   Date Value Ref Range Status   02/06/2025 0.4 0.1 - 1.0  mg/dL Final     Comment:     For infants and newborns, interpretation of results should be based  on gestational age, weight and in agreement with clinical  observations.    Premature Infant recommended reference ranges:  Up to 24 hours.............<8.0 mg/dL  Up to 48 hours............<12.0 mg/dL  3-5 days..................<15.0 mg/dL  6-29 days.................<15.0 mg/dL       Alkaline Phosphatase   Date Value Ref Range Status   02/06/2025 71 40 - 150 U/L Final     AST   Date Value Ref Range Status   02/06/2025 27 10 - 40 U/L Final     ALT   Date Value Ref Range Status   02/06/2025 26 10 - 44 U/L Final     Anion Gap   Date Value Ref Range Status   02/26/2025 13 8 - 16 mmol/L Final     eGFR   Date Value Ref Range Status   02/26/2025 >60.0 >60 mL/min/1.73 m^2 Final           Micro:  Microbiology Results (last 7 days)       ** No results found for the last 168 hours. **          Imaging Reviewed:          Assessment:     Complicated MSSA bacteremia.  This was from left elbow infection.  TTE and ROSE both negative for valvular and lead vegetation.  Antibiotics as below.      2.  MRSA Left elbow abscess.  He had I&D 10/27/2024.  He has some retained hardware with probable clinical osteomyelitis.  Completed cefazolin and rifampin 600 mg b.i.d. for 6 weeks through 12/05/2024.  Now on doxycycline for chronic suppression.  Pending clipping of protruding wire which is course in the wound not to heal completely.  He will still be at risk for recurrent/relapse of infection since some of the while we will still be retained.  We will need to stay on suppressive doxycycline.     4. Right shoulder pain.  This is chronic associated with weakness.  Has mild DJD on x-ray.     5. Diabetes mellitus.  Management as per hospitalist.    I will see again in 2 months.  Thereafter we will see him every 3-4 months or so.  Problem List Items Addressed This Visit    None       Plan:     As above      There are no diagnoses linked to this  encounter.    This note was created using Dragon voice recognition software that occasionally misinterpreted phrases or words.

## 2025-03-06 NOTE — PATIENT INSTRUCTIONS
Continue doxycycline   I will plan to see you again in about 2 months.  Please call with any questions

## 2025-03-07 ENCOUNTER — TELEPHONE (OUTPATIENT)
Dept: FAMILY MEDICINE | Facility: CLINIC | Age: 65
End: 2025-03-07
Payer: MEDICARE

## 2025-03-07 NOTE — TELEPHONE ENCOUNTER
----- Message from Robbie sent at 3/7/2025 10:18 AM CST -----  Contact: Self  Type: Needs Medical AdviceWho Called:  PatientBe Call Back Number: [phone] Information: Patient is requesting a call back as soon as possible from Magda, regarding his labs for 3/11

## 2025-03-11 ENCOUNTER — LAB VISIT (OUTPATIENT)
Dept: LAB | Facility: HOSPITAL | Age: 65
End: 2025-03-11
Attending: STUDENT IN AN ORGANIZED HEALTH CARE EDUCATION/TRAINING PROGRAM
Payer: MEDICARE

## 2025-03-11 ENCOUNTER — OFFICE VISIT (OUTPATIENT)
Dept: FAMILY MEDICINE | Facility: CLINIC | Age: 65
End: 2025-03-11
Payer: MEDICARE

## 2025-03-11 VITALS
OXYGEN SATURATION: 99 % | BODY MASS INDEX: 30.49 KG/M2 | SYSTOLIC BLOOD PRESSURE: 136 MMHG | WEIGHT: 212.94 LBS | DIASTOLIC BLOOD PRESSURE: 78 MMHG | HEIGHT: 70 IN | TEMPERATURE: 98 F | RESPIRATION RATE: 16 BRPM | HEART RATE: 80 BPM

## 2025-03-11 DIAGNOSIS — Z95.810 ICD (IMPLANTABLE CARDIOVERTER-DEFIBRILLATOR) IN PLACE: ICD-10-CM

## 2025-03-11 DIAGNOSIS — I70.0 ATHEROSCLEROSIS OF AORTA: Chronic | ICD-10-CM

## 2025-03-11 DIAGNOSIS — I10 PRIMARY HYPERTENSION: ICD-10-CM

## 2025-03-11 DIAGNOSIS — I42.9 CARDIOMYOPATHY, UNSPECIFIED TYPE: ICD-10-CM

## 2025-03-11 DIAGNOSIS — Z95.0 CARDIAC PACEMAKER: Chronic | ICD-10-CM

## 2025-03-11 DIAGNOSIS — Z86.73 HISTORY OF STROKE: ICD-10-CM

## 2025-03-11 DIAGNOSIS — E11.22 TYPE 2 DIABETES MELLITUS WITH STAGE 2 CHRONIC KIDNEY DISEASE, WITHOUT LONG-TERM CURRENT USE OF INSULIN: ICD-10-CM

## 2025-03-11 DIAGNOSIS — I48.20 ATRIAL FIBRILLATION, CHRONIC: ICD-10-CM

## 2025-03-11 DIAGNOSIS — I25.10 CVD (CARDIOVASCULAR DISEASE): ICD-10-CM

## 2025-03-11 DIAGNOSIS — I27.20 PULMONARY HYPERTENSION: ICD-10-CM

## 2025-03-11 DIAGNOSIS — N18.2 TYPE 2 DIABETES MELLITUS WITH STAGE 2 CHRONIC KIDNEY DISEASE, WITHOUT LONG-TERM CURRENT USE OF INSULIN: ICD-10-CM

## 2025-03-11 DIAGNOSIS — Z01.818 PREOPERATIVE CLEARANCE: Primary | ICD-10-CM

## 2025-03-11 DIAGNOSIS — R79.9 HIGH BLOOD UREA NITROGEN (BUN): ICD-10-CM

## 2025-03-11 LAB
BASOPHILS # BLD AUTO: 0.06 K/UL (ref 0–0.2)
BASOPHILS NFR BLD: 0.7 % (ref 0–1.9)
DIFFERENTIAL METHOD BLD: ABNORMAL
EOSINOPHIL # BLD AUTO: 0.1 K/UL (ref 0–0.5)
EOSINOPHIL NFR BLD: 1.6 % (ref 0–8)
ERYTHROCYTE [DISTWIDTH] IN BLOOD BY AUTOMATED COUNT: 17.2 % (ref 11.5–14.5)
HCT VFR BLD AUTO: 38.7 % (ref 40–54)
HGB BLD-MCNC: 11.9 G/DL (ref 14–18)
IMM GRANULOCYTES # BLD AUTO: 0.02 K/UL (ref 0–0.04)
IMM GRANULOCYTES NFR BLD AUTO: 0.2 % (ref 0–0.5)
LYMPHOCYTES # BLD AUTO: 2.9 K/UL (ref 1–4.8)
LYMPHOCYTES NFR BLD: 34.4 % (ref 18–48)
MCH RBC QN AUTO: 25.5 PG (ref 27–31)
MCHC RBC AUTO-ENTMCNC: 30.7 G/DL (ref 32–36)
MCV RBC AUTO: 83 FL (ref 82–98)
MONOCYTES # BLD AUTO: 0.8 K/UL (ref 0.3–1)
MONOCYTES NFR BLD: 9.3 % (ref 4–15)
NEUTROPHILS # BLD AUTO: 4.6 K/UL (ref 1.8–7.7)
NEUTROPHILS NFR BLD: 53.8 % (ref 38–73)
NRBC BLD-RTO: 0 /100 WBC
OHS QRS DURATION: 84 MS
OHS QTC CALCULATION: 432 MS
PLATELET # BLD AUTO: 228 K/UL (ref 150–450)
PMV BLD AUTO: 11.1 FL (ref 9.2–12.9)
RBC # BLD AUTO: 4.67 M/UL (ref 4.6–6.2)
WBC # BLD AUTO: 8.5 K/UL (ref 3.9–12.7)

## 2025-03-11 PROCEDURE — 99999 PR PBB SHADOW E&M-EST. PATIENT-LVL IV: CPT | Mod: PBBFAC,,,

## 2025-03-11 PROCEDURE — 93005 ELECTROCARDIOGRAM TRACING: CPT | Mod: S$GLB,,,

## 2025-03-11 PROCEDURE — 80048 BASIC METABOLIC PNL TOTAL CA: CPT | Performed by: STUDENT IN AN ORGANIZED HEALTH CARE EDUCATION/TRAINING PROGRAM

## 2025-03-11 PROCEDURE — 85025 COMPLETE CBC W/AUTO DIFF WBC: CPT | Performed by: STUDENT IN AN ORGANIZED HEALTH CARE EDUCATION/TRAINING PROGRAM

## 2025-03-11 PROCEDURE — 93010 ELECTROCARDIOGRAM REPORT: CPT | Mod: S$GLB,,, | Performed by: INTERNAL MEDICINE

## 2025-03-11 PROCEDURE — 83880 ASSAY OF NATRIURETIC PEPTIDE: CPT | Performed by: STUDENT IN AN ORGANIZED HEALTH CARE EDUCATION/TRAINING PROGRAM

## 2025-03-11 PROCEDURE — 36415 COLL VENOUS BLD VENIPUNCTURE: CPT | Mod: PO | Performed by: STUDENT IN AN ORGANIZED HEALTH CARE EDUCATION/TRAINING PROGRAM

## 2025-03-11 RX ORDER — LOSARTAN POTASSIUM 50 MG/1
50 TABLET ORAL DAILY
Qty: 90 TABLET | Refills: 3 | Status: SHIPPED | OUTPATIENT
Start: 2025-03-11 | End: 2025-03-12

## 2025-03-11 NOTE — Clinical Note
Hello, I saw patient in clinic today for preop clearance. I discussed with patient and his wife due to history of sleep apnea I suggest CXR prior to surgery. Family does not believe he will be able to complete prior to surgery on Friday. I also recommend cardiac clearance given his history of CVA, heart attack, cardiomyopathy, and AFIB. I will message his cardiologist to see if they will do clearance without seeing him. Thanks,  Kaitlin

## 2025-03-11 NOTE — Clinical Note
Hello, patient is schedule for orthopedic surgery this Friday. I recommend cardiac clearance. However, patient has transportation issues. I suspect lynsey would prefer an appt to address this - if so can someone assist getting him scheduled in a timely manner.  Thanks, Kaitlin

## 2025-03-11 NOTE — PROGRESS NOTES
Subjective:       Patient ID:  [id number]     Chief Complaint: Pre-op Exam      History of Present Illness    Mr. Casillas presents today for surgical clearance for left elbow procedure. He has a wire protruding from his left elbow causing tenderness, pain, and preventing proper healing. He has a history of severe infection in the left elbow requiring surgical drainage and screw removal. The wire was inadvertently left unclipped during the procedure. He has a history of myocardial infarction with three episodes requiring defibrillation. He has had a pacemaker since 2016 and has atrial fibrillation. His last cardiology visit was in October. He has a history of watershed stroke and sleep apnea but is not using CPAP. His diabetes is well-controlled with A1C of 6.2. He is a former smoker and  who quit after his heart attack. He denies current alcohol use.   No other concerns.          - Any previous complication with general anesthesia in the patient or in the family? No   - Blood clotting disorder: denies   - ASA physical status: ASA III  - Revised cardiac risk index (RCRI): high risk, >11%  - Barrios Perioperative Risk: 1.19%  - Specific lab work : BMP and CBC  - Any history of diabetes/smoking/alcohol consumption: Yes, Type II DM.  - History of arrhythmia or abnormal heart structures: Yes, afib, cardiomyopathy, stroke   - Activity level: limited activity 2/2 left AKA  - Previous EKG or echocardiogram - update EKG today   - Smoking- denies   - Sleep apnea or underlying lung abnormalities  - Yes, sleep apnea    Past Medical History:   Diagnosis Date    Diabetes mellitus, type 2     GERD (gastroesophageal reflux disease)     Hyperlipidemia     Hypertension       Active Problem List with Overview Notes    Diagnosis Date Noted    Pulmonary hypertension 02/05/2025    Cardiomyopathy, unspecified type 02/05/2025    Olecranon bursitis of left elbow 10/24/2024    Tobacco dependency 10/22/2024    Septic shock  10/22/2024    High anion gap metabolic acidosis 10/22/2024    SIRIA (acute kidney injury) 10/22/2024    Hyperkalemia 10/22/2024    Atrial fibrillation, chronic 10/22/2024    ICD (implantable cardioverter-defibrillator) in place 10/01/2024    Severe obesity (BMI 35.0-39.9) with comorbidity 10/01/2024    History of stroke 10/01/2024    Mixed hyperlipidemia 10/01/2024    Encounter to discuss test results 10/01/2024    Cardiac pacemaker 12/11/2023    Atherosclerosis of aorta 12/11/2023    Status post unilateral below-knee amputation 10/30/2023    Type 2 diabetes mellitus with stage 2 chronic kidney disease, without long-term current use of insulin 10/30/2023    BMI 31.0-31.9,adult 10/30/2023    Depression 10/30/2023    Diabetes mellitus due to underlying condition with diabetic chronic kidney disease 10/30/2023    Hypertension       Review of patient's allergies indicates:   Allergen Reactions    Fish containing products      Other reaction(s): .       Current Medications[1]    Lab Results   Component Value Date    WBC 8.64 02/06/2025    HGB 12.0 (L) 02/06/2025    HCT 39.9 (L) 02/06/2025     02/06/2025    CHOL 99 (L) 02/06/2025    TRIG 50 02/06/2025    HDL 43 02/06/2025    ALT 26 02/06/2025    AST 27 02/06/2025     02/26/2025    K 4.8 02/26/2025     02/26/2025    CREATININE 1.3 02/26/2025    BUN 33 (H) 02/26/2025    CO2 19 (L) 02/26/2025    TSH 1.274 02/06/2025    PSA 2.1 02/06/2025    INR 1.0 10/29/2024    HGBA1C 6.2 (H) 02/06/2025       Review of Systems   Constitutional:  Negative for fatigue and fever.   HENT: Negative.  Negative for congestion, sneezing and sore throat.    Eyes: Negative.    Respiratory:  Negative for cough, shortness of breath and wheezing.    Cardiovascular:  Negative for chest pain, palpitations and leg swelling.   Gastrointestinal:  Negative for abdominal pain, nausea and vomiting.   Genitourinary: Negative.    Musculoskeletal: Negative.  Negative for arthralgias.   Skin:  Negative.  Negative for rash.   Neurological:  Negative for dizziness, weakness, light-headedness, numbness and headaches.   Hematological: Negative.    Psychiatric/Behavioral: Negative.         Objective:      Physical Exam  Constitutional:       Appearance: Normal appearance.   HENT:      Head: Normocephalic and atraumatic.      Nose: Nose normal.   Eyes:      Extraocular Movements: Extraocular movements intact.   Cardiovascular:      Rate and Rhythm: Normal rate and regular rhythm.   Pulmonary:      Effort: Pulmonary effort is normal.      Breath sounds: Normal breath sounds.   Musculoskeletal:      Cervical back: Normal range of motion.      Left Lower Extremity: Left leg is amputated above knee.   Skin:     General: Skin is warm and dry.   Neurological:      General: No focal deficit present.      Mental Status: He is alert and oriented to person, place, and time.   Psychiatric:         Mood and Affect: Mood normal.         Assessment:       1. Preoperative clearance    2. Primary hypertension    3. Pulmonary hypertension    4. Type 2 diabetes mellitus with stage 2 chronic kidney disease, without long-term current use of insulin    5. Atrial fibrillation, chronic    6. CVD (cardiovascular disease)    7. ICD (implantable cardioverter-defibrillator) in place    8. Cardiac pacemaker    9. Atherosclerosis of aorta    10. Cardiomyopathy, unspecified type    11. History of stroke        Plan:       Dhaval was seen today for pre-op exam.    Diagnoses and all orders for this visit:    RCRI risk factors include:  CAD, CHF, CVA, DM on insulin. As such, per RCRI the risk of cardiac death, nonfatal myocardial infarction, or nonfatal cardiac arrest is high, and the risk of myocardial infarction, pulmonary edema, ventricular fibrillation, primary cardiac arrest, or complete heart block is > 11%. Overall this patient can be considered High risk for this intermediate risk procedure. Cardiac clearance is recommended prior to  proceeding with surgery.     Patient has a history RIYA. I recommend patient complete CXR prior to surgery. Patient is a non-smoker. We discussed the benefits of early mobilization and deep breathing after surgery.      Screened patient for alcohol misuse, use of illicit drugs, and personal or family history of anesthetic complications or bleeding diathesis and no substantial concerns were identified.      All current medications were reviewed and at this time.  No changes to medications are recommended prior to surgery.      I recommend use of standard pre-op and post-op precautions for this patient.  From a primary care perspective patient is medically optimized for this procedure. I recommend cardiac clearance.      Preoperative clearance  -     EKG 12-lead  -     losartan (COZAAR) 50 MG tablet; Take 1 tablet (50 mg total) by mouth once daily.  -     X-Ray Chest 1 View; Future    Primary hypertension    Pulmonary hypertension    Type 2 diabetes mellitus with stage 2 chronic kidney disease, without long-term current use of insulin    Atrial fibrillation, chronic    CVD (cardiovascular disease)    ICD (implantable cardioverter-defibrillator) in place    Cardiac pacemaker    Atherosclerosis of aorta    Cardiomyopathy, unspecified type    History of stroke     Recommend patient continue with current management for above conditions.     Keep upcoming primary care appt.            Future Appointments       Date Provider Specialty Appt Notes    3/28/2025 Gokul Troy MD Orthopedics L elbow    5/6/2025  Cardiology [facility]    5/8/2025 Migel Watson MD Infectious Diseases 2 mo f/u    6/17/2025 Nehemiah Orozco MD Ophthalmology Type 2 diabetes mellitus with stage 2 chronic kidney disease, without long-term current use of insulin [E11.22, N18.2]    6/30/2025 Kaitlin Piña PA-C Family Medicine 6 mo follow up    2/6/2026 Maritza Galindo MD Family Medicine annual           I spent a total of 59 minutes on  "the day of the visit.This includes face to face time and non-face to face time preparing to see the patient (eg, review of tests), obtaining and/or reviewing separately obtained history, documenting clinical information in the electronic or other health record, independently interpreting results and communicating results to the patient/family/caregiver, or care coordinator.      Portions of this note were dictated using voice recognition software and may contain dictation related errors in spelling / grammar / syntax not discovered on text review.     Kaitlin Piña PA-C         [1]   Current Outpatient Medications:     atorvastatin (LIPITOR) 80 MG tablet, Take 80 mg by mouth every evening., Disp: , Rfl:     BD AUTOSHIELD DUO PEN NEEDLE 30 gauge x 3/16" Ndle, , Disp: , Rfl:     BD POSIFLUSH NORMAL SALINE 0.9 injection, , Disp: , Rfl:     carvediloL (COREG) 12.5 MG tablet, Take 12.5 mg by mouth 2 (two) times daily., Disp: , Rfl:     cholecalciferol, vitamin D3, (VITAMIN D3) 125 mcg (5,000 unit) Tab, Take 5,000 Units by mouth once daily., Disp: , Rfl:     citalopram (CELEXA) 10 MG tablet, Take 1 tablet (10 mg total) by mouth once daily., Disp: 90 tablet, Rfl: 1    doxycycline (VIBRAMYCIN) 100 MG Cap, Take 1 capsule (100 mg total) by mouth every 12 (twelve) hours., Disp: 60 capsule, Rfl: 2    empagliflozin (JARDIANCE) 25 mg tablet, Take 1 tablet (25 mg total) by mouth once daily., Disp: 90 tablet, Rfl: 3    ezetimibe (ZETIA) 10 mg tablet, Take 1 tablet (10 mg total) by mouth once daily., Disp: 90 tablet, Rfl: 3    famotidine (PEPCID) 20 MG tablet, Take 1 tablet (20 mg total) by mouth 2 (two) times daily., Disp: 180 tablet, Rfl: 3    hydroCHLOROthiazide 12.5 MG Tab, Take 1 tablet (12.5 mg total) by mouth once daily., Disp: 90 tablet, Rfl: 3    insulin glargine U-100, Lantus, 100 unit/mL (3 mL) SubQ InPn pen, Inject 20 Units into the skin every evening., Disp: 18 mL, Rfl: 1    JANUVIA 100 mg Tab, Take 1 tablet (100 mg " total) by mouth once daily., Disp: 90 tablet, Rfl: 3    levothyroxine (SYNTHROID) 75 MCG tablet, Take 1 tablet (75 mcg total) by mouth every morning., Disp: 90 tablet, Rfl: 1    metFORMIN (GLUCOPHAGE) 1000 MG tablet, Take 1 tablet (1,000 mg total) by mouth 2 (two) times daily with meals., Disp: 180 tablet, Rfl: 1    ondansetron (ZOFRAN) 8 MG tablet, Take 1 tablet (8 mg total) by mouth every 8 (eight) hours as needed for Nausea., Disp: 30 tablet, Rfl: 0    aspirin 81 MG Chew, Take 81 mg by mouth once daily. (Patient not taking: Reported on 3/11/2025), Disp: , Rfl:     losartan (COZAAR) 50 MG tablet, Take 1 tablet (50 mg total) by mouth once daily., Disp: 90 tablet, Rfl: 3    omega 3-dha-epa-fish oil (FISH OIL) 1,200 (144-216) mg Cap, Take 1 capsule by mouth once daily. (Patient not taking: Reported on 3/11/2025), Disp: , Rfl:

## 2025-03-12 ENCOUNTER — RESULTS FOLLOW-UP (OUTPATIENT)
Dept: FAMILY MEDICINE | Facility: CLINIC | Age: 65
End: 2025-03-12

## 2025-03-12 DIAGNOSIS — E87.5 HYPERKALEMIA: ICD-10-CM

## 2025-03-12 LAB
ANION GAP SERPL CALC-SCNC: 11 MMOL/L (ref 8–16)
BNP SERPL-MCNC: 37 PG/ML (ref 0–99)
BUN SERPL-MCNC: 29 MG/DL (ref 8–23)
CALCIUM SERPL-MCNC: 9.5 MG/DL (ref 8.7–10.5)
CHLORIDE SERPL-SCNC: 106 MMOL/L (ref 95–110)
CO2 SERPL-SCNC: 20 MMOL/L (ref 23–29)
CREAT SERPL-MCNC: 1.2 MG/DL (ref 0.5–1.4)
EST. GFR  (NO RACE VARIABLE): >60 ML/MIN/1.73 M^2
GLUCOSE SERPL-MCNC: 101 MG/DL (ref 70–110)
POTASSIUM SERPL-SCNC: 5.4 MMOL/L (ref 3.5–5.1)
SODIUM SERPL-SCNC: 137 MMOL/L (ref 136–145)

## 2025-03-12 RX ORDER — HYDROCHLOROTHIAZIDE 12.5 MG/1
25 TABLET ORAL DAILY
Start: 2025-03-12 | End: 2026-03-12

## 2025-03-13 RX ORDER — MIDAZOLAM HYDROCHLORIDE 1 MG/ML
.5-4 INJECTION, SOLUTION INTRAMUSCULAR; INTRAVENOUS
OUTPATIENT
Start: 2025-03-13

## 2025-03-13 RX ORDER — FENTANYL CITRATE 50 UG/ML
25-200 INJECTION, SOLUTION INTRAMUSCULAR; INTRAVENOUS
Refills: 0 | OUTPATIENT
Start: 2025-03-13

## 2025-03-13 NOTE — OR NURSING
"Patient seen by PCP 3/11. Did NOT clear patient for surgery tomorrow, 3/14, due to requesting cardiac clearance. She has patient listed as "high risk" listed in office note. Dr. rToy's office made aware.  "

## 2025-03-19 ENCOUNTER — HOSPITAL ENCOUNTER (OUTPATIENT)
Dept: RADIOLOGY | Facility: CLINIC | Age: 65
Discharge: HOME | End: 2025-03-19
Payer: MEDICARE

## 2025-03-19 ENCOUNTER — RESULTS FOLLOW-UP (OUTPATIENT)
Dept: FAMILY MEDICINE | Facility: CLINIC | Age: 65
End: 2025-03-19

## 2025-03-19 DIAGNOSIS — Z01.818 PREOPERATIVE CLEARANCE: ICD-10-CM

## 2025-03-19 PROCEDURE — 71045 X-RAY EXAM CHEST 1 VIEW: CPT | Mod: TC,FY,PO

## 2025-03-19 PROCEDURE — 71045 X-RAY EXAM CHEST 1 VIEW: CPT | Mod: 26,,, | Performed by: RADIOLOGY

## 2025-03-21 DIAGNOSIS — M70.22 OLECRANON BURSITIS OF LEFT ELBOW: Primary | ICD-10-CM

## 2025-03-21 DIAGNOSIS — E87.5 HYPERKALEMIA: ICD-10-CM

## 2025-03-21 RX ORDER — HYDROCHLOROTHIAZIDE 12.5 MG/1
25 TABLET ORAL DAILY
Qty: 180 TABLET | Refills: 1 | Status: SHIPPED | OUTPATIENT
Start: 2025-03-21 | End: 2025-06-30

## 2025-03-25 ENCOUNTER — ANESTHESIA EVENT (OUTPATIENT)
Dept: SURGERY | Facility: HOSPITAL | Age: 65
End: 2025-03-25
Payer: MEDICARE

## 2025-03-27 ENCOUNTER — CLINICAL SUPPORT (OUTPATIENT)
Dept: CARDIOLOGY | Facility: CLINIC | Age: 65
End: 2025-03-27

## 2025-03-27 ENCOUNTER — TELEPHONE (OUTPATIENT)
Dept: CARDIOLOGY | Facility: CLINIC | Age: 65
End: 2025-03-27
Payer: MEDICARE

## 2025-03-27 ENCOUNTER — HOSPITAL ENCOUNTER (OUTPATIENT)
Dept: CARDIOLOGY | Facility: CLINIC | Age: 65
Discharge: HOME | End: 2025-03-27
Attending: GENERAL PRACTICE
Payer: MEDICARE

## 2025-03-27 ENCOUNTER — TELEPHONE (OUTPATIENT)
Dept: ORTHOPEDICS | Facility: CLINIC | Age: 65
End: 2025-03-27
Payer: MEDICARE

## 2025-03-27 DIAGNOSIS — Z95.810 PRESENCE OF AUTOMATIC (IMPLANTABLE) CARDIAC DEFIBRILLATOR: ICD-10-CM

## 2025-03-27 DIAGNOSIS — T84.498A FAILED ORTHOPEDIC IMPLANT, INITIAL ENCOUNTER: Primary | ICD-10-CM

## 2025-03-27 PROCEDURE — 93296 REM INTERROG EVL PM/IDS: CPT | Mod: PN | Performed by: GENERAL PRACTICE

## 2025-03-27 PROCEDURE — 93295 DEV INTERROG REMOTE 1/2/MLT: CPT | Mod: S$GLB,,, | Performed by: GENERAL PRACTICE

## 2025-03-27 NOTE — TELEPHONE ENCOUNTER
----- Message from Med Assistant Ángel sent at 3/27/2025  8:07 AM CDT -----  Contact: patient  Pt having procedure tomorrow and does not know what meds he can or cannot take.Call back number is [phone]

## 2025-03-27 NOTE — TELEPHONE ENCOUNTER
----- Message from Moncho sent at 3/27/2025  8:28 AM CDT -----  Regarding: clearance  Contact: [facility]  Type:  Patient Returning CallWho Called:Estefani with [facility]Whened[facility] Left Message for Patient:staffDoes the patient know what this is regarding?:clearance/ patient is scheduled for procedure tomorrowWould the patient rather a call back or a response via [facility]? Best Call Back Number:[phone] Information: fax# [phone]

## 2025-03-27 NOTE — LETTER
"     2025    Dhaval Hernández  [address]  [place] [address]             [facility] [place]  [address]  [address]  [place] [address]  Phone: [phone]  Fax: [phone] Patient: Dhaval Hernández  : 1960  Referring Doctor: dr schreiber  Type of procedure: removal of hardware, upper extremity    Current Outpatient Medications   Medication Sig    aspirin 81 MG Chew Take 81 mg by mouth once daily. (Patient not taking: Reported on 3/11/2025)    atorvastatin (LIPITOR) 80 MG tablet Take 80 mg by mouth every evening.    BD AUTOSHIELD DUO PEN NEEDLE 30 gauge x 3/16" Ndle     BD POSIFLUSH NORMAL SALINE 0.9 injection     carvediloL (COREG) 12.5 MG tablet Take 12.5 mg by mouth 2 (two) times daily.    cholecalciferol, vitamin D3, (VITAMIN D3) 125 mcg (5,000 unit) Tab Take 5,000 Units by mouth once daily.    citalopram (CELEXA) 10 MG tablet Take 1 tablet (10 mg total) by mouth once daily.    doxycycline (VIBRAMYCIN) 100 MG Cap Take 1 capsule (100 mg total) by mouth every 12 (twelve) hours.    empagliflozin (JARDIANCE) 25 mg tablet Take 1 tablet (25 mg total) by mouth once daily.    ezetimibe (ZETIA) 10 mg tablet Take 1 tablet (10 mg total) by mouth once daily.    famotidine (PEPCID) 20 MG tablet Take 1 tablet (20 mg total) by mouth 2 (two) times daily.    hydroCHLOROthiazide 12.5 MG Tab Take 2 tablets (25 mg total) by mouth once daily.    insulin glargine U-100, Lantus, 100 unit/mL (3 mL) SubQ InPn pen Inject 20 Units into the skin every evening.    JANUVIA 100 mg Tab Take 1 tablet (100 mg total) by mouth once daily.    levothyroxine (SYNTHROID) 75 MCG tablet Take 1 tablet (75 mcg total) by mouth every morning.    metFORMIN (GLUCOPHAGE) 1000 MG tablet Take 1 tablet (1,000 mg total) by mouth 2 (two) times daily with meals.    omega 3-dha-epa-fish oil (FISH OIL) 1,200 (144-216) mg Cap Take 1 capsule by mouth once daily. (Patient not taking: " Reported on 3/11/2025)    ondansetron (ZOFRAN) 8 MG tablet Take 1 tablet (8 mg total) by mouth every 8 (eight) hours as needed for Nausea.     No current facility-administered medications for this visit.       This patient has been assessed for risk factors for clearance of surgery with the following stipulations:    ___ No contraindications  __x_ Recommendations for antiplatelet/anticoagulant medications: OK TO STOP ASA FOR 7 DAYS PRIOR  __x_ Cleared for surgery with MODERATE RISK  ___ Not cleared for surgery due to the following reasons:      If you have any questions regarding the above, please contact my office at [phone].    Sincerely,             SHARLA Jensen, NATHANBC

## 2025-03-28 ENCOUNTER — HOSPITAL ENCOUNTER (OUTPATIENT)
Facility: HOSPITAL | Age: 65
Discharge: HOME | End: 2025-03-28
Attending: ORTHOPAEDIC SURGERY | Admitting: ORTHOPAEDIC SURGERY
Payer: MEDICARE

## 2025-03-28 ENCOUNTER — ANESTHESIA (OUTPATIENT)
Dept: SURGERY | Facility: HOSPITAL | Age: 65
End: 2025-03-28
Payer: MEDICARE

## 2025-03-28 DIAGNOSIS — Z01.818 PREOP TESTING: ICD-10-CM

## 2025-03-28 DIAGNOSIS — T84.498A FAILED ORTHOPEDIC IMPLANT, INITIAL ENCOUNTER: Primary | ICD-10-CM

## 2025-03-28 DIAGNOSIS — M70.22 OLECRANON BURSITIS OF LEFT ELBOW: ICD-10-CM

## 2025-03-28 LAB
POCT GLUCOSE: 112 MG/DL (ref 70–110)
POCT GLUCOSE: 142 MG/DL (ref 70–110)

## 2025-03-28 PROCEDURE — 25000003 PHARM REV CODE 250: Performed by: ANESTHESIOLOGY

## 2025-03-28 PROCEDURE — 37000009 HC ANESTHESIA EA ADD 15 MINS: Performed by: ORTHOPAEDIC SURGERY

## 2025-03-28 PROCEDURE — 63600175 PHARM REV CODE 636 W HCPCS: Performed by: ORTHOPAEDIC SURGERY

## 2025-03-28 PROCEDURE — 71000015 HC POSTOP RECOV 1ST HR: Performed by: ORTHOPAEDIC SURGERY

## 2025-03-28 PROCEDURE — 25000003 PHARM REV CODE 250: Performed by: NURSE ANESTHETIST, CERTIFIED REGISTERED

## 2025-03-28 PROCEDURE — 63600175 PHARM REV CODE 636 W HCPCS: Performed by: NURSE ANESTHETIST, CERTIFIED REGISTERED

## 2025-03-28 PROCEDURE — 71000033 HC RECOVERY, INTIAL HOUR: Performed by: ORTHOPAEDIC SURGERY

## 2025-03-28 PROCEDURE — 94799 UNLISTED PULMONARY SVC/PX: CPT

## 2025-03-28 PROCEDURE — 71000039 HC RECOVERY, EACH ADD'L HOUR: Performed by: ORTHOPAEDIC SURGERY

## 2025-03-28 PROCEDURE — 27200651 HC AIRWAY, LMA: Performed by: ANESTHESIOLOGY

## 2025-03-28 PROCEDURE — 36000706: Performed by: ORTHOPAEDIC SURGERY

## 2025-03-28 PROCEDURE — 82962 GLUCOSE BLOOD TEST: CPT | Performed by: ORTHOPAEDIC SURGERY

## 2025-03-28 PROCEDURE — 20680 REMOVAL OF IMPLANT DEEP: CPT | Mod: ,,, | Performed by: ORTHOPAEDIC SURGERY

## 2025-03-28 PROCEDURE — 25000003 PHARM REV CODE 250: Performed by: ORTHOPAEDIC SURGERY

## 2025-03-28 PROCEDURE — 36000707: Performed by: ORTHOPAEDIC SURGERY

## 2025-03-28 PROCEDURE — 37000008 HC ANESTHESIA 1ST 15 MINUTES: Performed by: ORTHOPAEDIC SURGERY

## 2025-03-28 RX ORDER — ONDANSETRON HYDROCHLORIDE 2 MG/ML
INJECTION, SOLUTION INTRAMUSCULAR; INTRAVENOUS
Status: DISCONTINUED | OUTPATIENT
Start: 2025-03-28 | End: 2025-03-28

## 2025-03-28 RX ORDER — MUPIROCIN 20 MG/G
OINTMENT TOPICAL
Status: DISCONTINUED | OUTPATIENT
Start: 2025-03-28 | End: 2025-03-28 | Stop reason: HOSPADM

## 2025-03-28 RX ORDER — EPHEDRINE SULFATE 50 MG/ML
INJECTION, SOLUTION INTRAVENOUS
Status: DISCONTINUED | OUTPATIENT
Start: 2025-03-28 | End: 2025-03-28

## 2025-03-28 RX ORDER — MIDAZOLAM HYDROCHLORIDE 1 MG/ML
INJECTION INTRAMUSCULAR; INTRAVENOUS
Status: DISCONTINUED | OUTPATIENT
Start: 2025-03-28 | End: 2025-03-28

## 2025-03-28 RX ORDER — OXYCODONE HYDROCHLORIDE 5 MG/1
5 TABLET ORAL ONCE AS NEEDED
Status: COMPLETED | OUTPATIENT
Start: 2025-03-28 | End: 2025-03-28

## 2025-03-28 RX ORDER — CEFAZOLIN 2 G/1
2 INJECTION, POWDER, FOR SOLUTION INTRAMUSCULAR; INTRAVENOUS
Status: COMPLETED | OUTPATIENT
Start: 2025-03-28 | End: 2025-03-28

## 2025-03-28 RX ORDER — OXYCODONE AND ACETAMINOPHEN 5; 325 MG/1; MG/1
1 TABLET ORAL EVERY 6 HOURS PRN
Qty: 28 TABLET | Refills: 0 | Status: SHIPPED | OUTPATIENT
Start: 2025-03-28 | End: 2025-04-04

## 2025-03-28 RX ORDER — LIDOCAINE HYDROCHLORIDE 10 MG/ML
1 INJECTION, SOLUTION EPIDURAL; INFILTRATION; INTRACAUDAL; PERINEURAL ONCE
Status: DISCONTINUED | OUTPATIENT
Start: 2025-03-28 | End: 2025-03-28 | Stop reason: HOSPADM

## 2025-03-28 RX ORDER — KETAMINE HCL IN 0.9 % NACL 50 MG/5 ML
SYRINGE (ML) INTRAVENOUS
Status: DISCONTINUED | OUTPATIENT
Start: 2025-03-28 | End: 2025-03-28

## 2025-03-28 RX ORDER — DEXAMETHASONE SODIUM PHOSPHATE 4 MG/ML
INJECTION, SOLUTION INTRA-ARTICULAR; INTRALESIONAL; INTRAMUSCULAR; INTRAVENOUS; SOFT TISSUE
Status: DISCONTINUED | OUTPATIENT
Start: 2025-03-28 | End: 2025-03-28

## 2025-03-28 RX ORDER — LIDOCAINE HYDROCHLORIDE 20 MG/ML
INJECTION INTRAVENOUS
Status: DISCONTINUED | OUTPATIENT
Start: 2025-03-28 | End: 2025-03-28

## 2025-03-28 RX ORDER — PROPOFOL 10 MG/ML
VIAL (ML) INTRAVENOUS
Status: DISCONTINUED | OUTPATIENT
Start: 2025-03-28 | End: 2025-03-28

## 2025-03-28 RX ORDER — FENTANYL CITRATE 50 UG/ML
INJECTION, SOLUTION INTRAMUSCULAR; INTRAVENOUS
Status: DISCONTINUED | OUTPATIENT
Start: 2025-03-28 | End: 2025-03-28

## 2025-03-28 RX ORDER — ONDANSETRON HYDROCHLORIDE 8 MG/1
8 TABLET, FILM COATED ORAL EVERY 12 HOURS PRN
Qty: 20 TABLET | Refills: 0 | Status: SHIPPED | OUTPATIENT
Start: 2025-03-28 | End: 2025-04-07

## 2025-03-28 RX ORDER — ONDANSETRON HYDROCHLORIDE 2 MG/ML
4 INJECTION, SOLUTION INTRAVENOUS ONCE AS NEEDED
Status: DISCONTINUED | OUTPATIENT
Start: 2025-03-28 | End: 2025-03-28 | Stop reason: HOSPADM

## 2025-03-28 RX ORDER — FENTANYL CITRATE 50 UG/ML
25 INJECTION, SOLUTION INTRAMUSCULAR; INTRAVENOUS EVERY 5 MIN PRN
Status: DISCONTINUED | OUTPATIENT
Start: 2025-03-28 | End: 2025-03-28 | Stop reason: HOSPADM

## 2025-03-28 RX ADMIN — SODIUM CHLORIDE, SODIUM GLUCONATE, SODIUM ACETATE, POTASSIUM CHLORIDE AND MAGNESIUM CHLORIDE: 526; 502; 368; 37; 30 INJECTION, SOLUTION INTRAVENOUS at 07:03

## 2025-03-28 RX ADMIN — MIDAZOLAM HYDROCHLORIDE 2 MG: 1 INJECTION, SOLUTION INTRAMUSCULAR; INTRAVENOUS at 07:03

## 2025-03-28 RX ADMIN — OXYCODONE 5 MG: 5 TABLET ORAL at 08:03

## 2025-03-28 RX ADMIN — CEFAZOLIN 2 G: 2 INJECTION, POWDER, FOR SOLUTION INTRAMUSCULAR; INTRAVENOUS at 07:03

## 2025-03-28 RX ADMIN — ONDANSETRON 4 MG: 2 INJECTION INTRAMUSCULAR; INTRAVENOUS at 07:03

## 2025-03-28 RX ADMIN — DEXAMETHASONE SODIUM PHOSPHATE 4 MG: 4 INJECTION, SOLUTION INTRA-ARTICULAR; INTRALESIONAL; INTRAMUSCULAR; INTRAVENOUS; SOFT TISSUE at 07:03

## 2025-03-28 RX ADMIN — LIDOCAINE HYDROCHLORIDE 75 MG: 20 INJECTION, SOLUTION INTRAVENOUS at 07:03

## 2025-03-28 RX ADMIN — FENTANYL CITRATE 50 MCG: 50 INJECTION, SOLUTION INTRAMUSCULAR; INTRAVENOUS at 07:03

## 2025-03-28 RX ADMIN — SODIUM CHLORIDE, SODIUM GLUCONATE, SODIUM ACETATE, POTASSIUM CHLORIDE AND MAGNESIUM CHLORIDE: 526; 502; 368; 37; 30 INJECTION, SOLUTION INTRAVENOUS at 06:03

## 2025-03-28 RX ADMIN — Medication 25 MG: at 07:03

## 2025-03-28 RX ADMIN — EPHEDRINE SULFATE 15 MG: 50 INJECTION, SOLUTION INTRAMUSCULAR; INTRAVENOUS; SUBCUTANEOUS at 07:03

## 2025-03-28 RX ADMIN — PROPOFOL 150 MG: 10 INJECTION, EMULSION INTRAVENOUS at 07:03

## 2025-03-28 RX ADMIN — EPHEDRINE SULFATE 10 MG: 50 INJECTION, SOLUTION INTRAMUSCULAR; INTRAVENOUS; SUBCUTANEOUS at 07:03

## 2025-03-28 RX ADMIN — MUPIROCIN 1 G: 20 OINTMENT TOPICAL at 06:03

## 2025-03-28 NOTE — BRIEF OP NOTE
[facility]  Brief Operative Note    SUMMARY     Surgery Date: 3/28/2025     Surgeons and Role:     * Gokul Troy MD - Primary    Assisting Surgeon: None    Pre-op Diagnosis:  Olecranon bursitis of left elbow [M70.22]  Failed orthopedic implant, initial encounter [T84.498A]    Post-op Diagnosis: Post-Op Diagnosis Codes:     * Olecranon bursitis of left elbow [M70.22]     * Failed orthopedic implant, initial encounter [T84.498A]    Procedure(s) (LRB):  REMOVAL, HARDWARE, UPPER EXTREMITY (Left)    Operative Findings:   Prominent K-wire from previous tension band construct.  Adjacent to ulnar nerve.  Excisional debridement with hardware removal performed.      Estimated Blood Loss: 5mL.         Specimens:   Specimen (24h ago, onward)      None

## 2025-03-28 NOTE — H&P
"Status/Diagnosis: Left elbow abscess with retained deep HW.  Date of Surgery:   10/27/2024: Left elbow I&D with partial HWR.  Date of Injury: none  Return visit: 2 weeks postop  X-rays on Return: none        Present History:  Dhaval Hernández is a 64 y.o. male who returns today after last being seen in mid November.  Patient given instructions to return after 3 weeks however has not been seen since that time.  Overall doing well.  0/10 pain.  He was seen by the Infectious Disease team 1 month ago.  Noted to have some new onset callus formation with questionable pinpoint drainage at the previous surgical site.  Denies any warmth or erythema, edema.  Patient never did obtain offloading elbow pad.             Past Medical History:   Diagnosis Date    Diabetes mellitus, type 2      GERD (gastroesophageal reflux disease)      Hyperlipidemia      Hypertension                 Past Surgical History:   Procedure Laterality Date    FRACTURE SURGERY        INCISION AND DRAINAGE OF ABSCESS Left 10/27/2024     Procedure: INCISION AND DRAINAGE, ABSCESS;  Surgeon: Gokul Troy MD;  Location: [facility];  Service: Orthopedics;  Laterality: Left;  INCISION AND DRAIN OF LEFT ELBOW ABCESS    INSERTION OF PACEMAKER        LEG AMPUTATION                  Current Outpatient Medications   Medication Sig    atorvastatin (LIPITOR) 80 MG tablet Take 80 mg by mouth every evening.    BD AUTOSHIELD DUO PEN NEEDLE 30 gauge x 3/16" Ndle      BD POSIFLUSH NORMAL SALINE 0.9 injection      carvediloL (COREG) 12.5 MG tablet Take 12.5 mg by mouth 2 (two) times daily.    cholecalciferol, vitamin D3, (VITAMIN D3) 125 mcg (5,000 unit) Tab Take 5,000 Units by mouth once daily.    citalopram (CELEXA) 10 MG tablet Take 1 tablet (10 mg total) by mouth once daily.    doxycycline (VIBRAMYCIN) 100 MG Cap Take 1 capsule (100 mg total) by mouth every 12 (twelve) hours.    empagliflozin (JARDIANCE) 25 mg tablet Take 1 tablet (25 mg total) by mouth once daily. "    ezetimibe (ZETIA) 10 mg tablet Take 1 tablet (10 mg total) by mouth once daily.    famotidine (PEPCID) 20 MG tablet Take 1 tablet (20 mg total) by mouth 2 (two) times daily.    hydroCHLOROthiazide 12.5 MG Tab Take 1 tablet (12.5 mg total) by mouth once daily.    insulin glargine U-100, Lantus, 100 unit/mL (3 mL) SubQ InPn pen Inject 20 Units into the skin every evening.    JANUVIA 100 mg Tab Take 1 tablet (100 mg total) by mouth once daily.    levothyroxine (SYNTHROID) 75 MCG tablet Take 1 tablet (75 mcg total) by mouth every morning.    losartan (COZAAR) 100 MG tablet Take 0.5 tablets (50 mg total) by mouth once daily.    metFORMIN (GLUCOPHAGE) 1000 MG tablet Take 1 tablet (1,000 mg total) by mouth 2 (two) times daily with meals.    methocarbamoL (ROBAXIN) 500 MG Tab Take 1 tablet (500 mg total) by mouth 2 (two) times daily as needed (muscle spasm).    omega 3-dha-epa-fish oil (FISH OIL) 1,200 (144-216) mg Cap Take 1 capsule by mouth once daily.    ondansetron (ZOFRAN) 8 MG tablet Take 1 tablet (8 mg total) by mouth every 8 (eight) hours as needed for Nausea.      No current facility-administered medications for this visit.         Social History            Socioeconomic History    Marital status:    Tobacco Use    Smoking status: Every Day       Types: Cigarettes    Smokeless tobacco: Former       Types: Snuff       Quit date: 04/2024    Tobacco comments:       Pt used smokeless tobacco from 4087-6263 and quit. Started dipping tobacco throughout most of his 20's. Started smoking cigarettes again in 06/2024.       Social Drivers of Health           Financial Resource Strain: Low Risk  (10/22/2024)     Overall Financial Resource Strain (CARDIA)      Difficulty of Paying Living Expenses: Not hard at all   Food Insecurity: No Food Insecurity (10/22/2024)     Hunger Vital Sign      Worried About Running Out of Food in the Last Year: Never true      Ran Out of Food in the Last Year: Never true   Transportation  Needs: No Transportation Needs (10/22/2024)     TRANSPORTATION NEEDS      Transportation : No   Physical Activity: Inactive (10/22/2024)     Exercise Vital Sign      Days of Exercise per Week: 0 days      Minutes of Exercise per Session: 0 min   Stress: No Stress Concern Present (10/22/2024)     [employer] - Occupational Stress Questionnaire      Feeling of Stress : Not at all   Housing Stability: Unknown (10/22/2024)     Housing Stability Vital Sign      Unable to Pay for Housing in the Last Year: No      Homeless in the Last Year: No         Physical exam:  There were no vitals filed for this visit.  There is no height or weight on file to calculate BMI.  General: In no apparent distress; well developed and well nourished.  HEENT: normocephalic; atraumatic.  Cardiovascular: regular rate.  Respiratory: no increased work of breathing.  Musculoskeletal:   Inspection:   Previous surgical site is well healed.  There is a small amount of callus along the more distal margin of the surgical site.  No exposed hardware however there is a small 2 x 2 mm area of delayed thought to be consistent with residual tension wire fixation.  No fluctuance or drainage noted. Baseline 90 degree flexion contracture/ankylosis elbow joint.  30° supination and 45° pronation.  First dorsal interossei atrophy, unchanged.  Ulnar nerve sensation again improved.  Palpable radial pulse.                   Imaging Studies/Outside documentation:  I have ordered/reviewed/interpreted the following images/outside documentation:  3-views Left elbow:  On my independent review, as previously noted the patient with a history of extensive distal humerus fracture and olecranon osteotomy ORIF.  Removal of proximal margin of the olecranon screw with tension band wiring.  Previously noted significant heterotopic ossification with ankylosis of the ulnar humeral and radiocapitellar joints.  Lucency involving the olecranon osteotomy,  unchanged.  No evidence of bony erosion to suggest osteomyelitis.        Assessment:  Dhaval Hernández is a 64 y.o. male with Left elbow abscess with retained deep HW.     Plan:   Clinical and radiographic were discussed.  Patient has not been seen in several months.  Questionable area of residual hardware prominence involving the cut tension wire.  To avoid continued irritation and possible recurrent wound infection, we discussed removal of as much of the wire as possible into potentially tamped this into the bone to avoid future issues.       Operative vs nonoperative treatment options were described. These include but are not limited to bleeding; infection; damage to surrounding nerves or vessels; persistent pain, stiffness; recurrent deformity; need for additional procedures; amputation; blood clots; pulmonary embolus; cardiac events; stroke; and the general risks of anesthesia including anesthetic death.   We also reviewed postop protocol as well as limitations and expectations. Patient understands and desires to proceed with surgical intervention.   Explained risks, benefits, and alternative to the patient. Asked if any questions--none.  Surgery to include but not limited to:   Left elbow hardware removal; surgery as indicated.     Surgery tentative plan for 02/28/2025.    We will obtain preoperative PCP clearance.    Understanding.  All questions were answered.        This note was created using voice recognition software and may contain grammatical errors.

## 2025-03-28 NOTE — PLAN OF CARE
"Released from Pacu per Anesthesia when criteria met pain controlled skin w+d No nausea No emesis dsg dry intact ace wrap +2 radial pulse in l wrist noted cap refill <3 sec  bs in recovery finger stick 142 skin w+d pt states he doesn't ck at home as he should " voided 600 urinal  aaox4 encouraged deep breaths instr on IS encouraged use  Pt has all belongings in post op    "

## 2025-03-28 NOTE — TRANSFER OF CARE
"Anesthesia Transfer of Care Note    Patient: Dhaval Hernández    Procedure(s) Performed: Procedure(s) (LRB):  REMOVAL, HARDWARE, UPPER EXTREMITY (Left)    Patient location: PACU    Anesthesia Type: general    Transport from OR: Transported from OR on 2-3 L/min O2 by NC with adequate spontaneous ventilation    Post pain: adequate analgesia    Post assessment: no apparent anesthetic complications and tolerated procedure well    Post vital signs: stable    Level of consciousness: sedated and responds to stimulation    Nausea/Vomiting: no nausea/vomiting    Complications: none    Transfer of care protocol was followed      Last vitals: Visit Vitals  BP (!) 185/91 (BP Location: Right arm, Patient Position: Lying)   Pulse 70   Temp 36.4 °C (97.5 °F) (Skin)   Resp 18   Ht 5' 10" (1.778 m)   Wt 96.6 kg (213 lb)   SpO2 98%   BMI 30.56 kg/m²     "

## 2025-03-28 NOTE — CARE UPDATE
03/28/25 0703   Patient Assessment/Suction   Level of Consciousness (AVPU) alert   Respiratory Effort Normal;Unlabored   PRE-TX-O2   Device (Oxygen Therapy) room air   Incentive Spirometer   $ Incentive Spirometer Charges done with encouragement   Incentive Spirometer Predicted Level (mL) 2080   Administration (IS) proper technique demonstrated   Number of Repetitions (IS) 5   Level Incentive Spirometer (mL) 2500   Patient Tolerance (IS) good

## 2025-03-28 NOTE — ANESTHESIA PREPROCEDURE EVALUATION
03/28/2025  Dhaval Hernández is a 64 y.o., male.      Pre-op Assessment    I have reviewed the Patient Summary Reports.     I have reviewed the Nursing Notes. I have reviewed the NPO Status.   I have reviewed the Medications.     Review of Systems  Anesthesia Hx:  No problems with previous Anesthesia                Social:  Former Smoker       Cardiovascular:    Pacemaker Hypertension           hyperlipidemia                         Hypertension     Atrial Fibrillation     Renal/:  Chronic Renal Disease        Kidney Function/Disease             Hepatic/GI:     GERD         Gerd          Endocrine:  Diabetes    Diabetes                    Obesity / BMI > 30  Psych:  Psychiatric History                  Physical Exam  General: Well nourished, Cooperative, Alert and Oriented    Airway:  Mallampati: II   Mouth Opening: Normal  TM Distance: Normal  Tongue: Normal  Neck ROM: Normal ROM    Dental:  Periodontal disease  Missing upper and lower   Chest/Lungs:  Normal Respiratory Rate    Heart:  Rate: Normal        Anesthesia Plan  Type of Anesthesia, risks & benefits discussed:    Anesthesia Type: Gen ETT, Gen Supraglottic Airway  Intra-op Monitoring Plan: Standard ASA Monitors  Post Op Pain Control Plan: multimodal analgesia and IV/PO Opioids PRN  Induction:  IV  Airway Plan: , Post-Induction  Informed Consent: Informed consent signed with the Patient and all parties understand the risks and agree with anesthesia plan.  All questions answered.   ASA Score: 3  Day of Surgery Review of History & Physical: H&P Update referred to the surgeon/provider.    Ready For Surgery From Anesthesia Perspective.     .

## 2025-03-28 NOTE — PLAN OF CARE
Meets criteria for discharge. Discharged to home with wife. Denies nausea. Tolerating fluids. Denies pain. Transfers without difficulty. Voiding without difficulty. Discharge instructions reviewed and printed handout given. Verbalized understanding.

## 2025-03-28 NOTE — DISCHARGE INSTRUCTIONS
"Discharge Instructions: After Your Surgery/Procedure  Youve just had surgery. During surgery you were given medicine called anesthesia to keep you relaxed and free of pain. After surgery you may have some pain or nausea. This is common. Here are some tips for feeling better and getting well after surgery.     Stay on schedule with your medication.   Going home  Your doctor or nurse will show you how to take care of yourself when you go home. He or she will also answer your questions. Have an adult family member or friend drive you home.      For your safety we recommend these precaution for the first 24 hours after your procedure:  Do not drive or use heavy equipment.  Do not make important decisions or sign legal papers.  Do not drink alcohol.  Have someone stay with you, if needed. He or she can watch for problems and help keep you safe.  Your concentration, balance, coordination, and judgement may be impaired for many hours after anesthesia.  Use caution when ambulating or standing up.     You may feel weak and "washed out" after anesthesia and surgery.      Subtle residual effects of general anesthesia or sedation with regional / local anesthesia can last more than 24 hours.  Rest for the remainder of the day or longer if your Doctor/Surgeon has advised you to do so.  Although you may feel normal within the first 24 hours, your reflexes and mental ability may be impaired without you realizing it.  You may feel dizzy, lightheaded or sleepy for 24 hours or longer.      Be sure to go to all follow-up visits with your doctor. And rest after your surgery for as long as your doctor tells you to.  Coping with pain  If you have pain after surgery, pain medicine will help you feel better. Take it as told, before pain becomes severe. Also, ask your doctor or pharmacist about other ways to control pain. This might be with heat, ice, or relaxation. And follow any other instructions your surgeon or nurse gives you.  Tips " for taking pain medicine  To get the best relief possible, remember these points:  Pain medicines can upset your stomach. Taking them with a little food may help.  Most pain relievers taken by mouth need at least 20 to 30 minutes to start to work.  Taking medicine on a schedule can help you remember to take it. Try to time your medicine so that you can take it before starting an activity. This might be before you get dressed, go for a walk, or sit down for dinner.  Constipation is a common side effect of pain medicines. Call your doctor before taking any medicines such as laxatives or stool softeners to help ease constipation. Also ask if you should skip any foods. Drinking lots of fluids and eating foods such as fruits and vegetables that are high in fiber can also help. Remember, do not take laxatives unless your surgeon has prescribed them.  Drinking alcohol and taking pain medicine can cause dizziness and slow your breathing. It can even be deadly. Do not drink alcohol while taking pain medicine.  Pain medicine can make you react more slowly to things. Do not drive or run machinery while taking pain medicine.  Your health care provider may tell you to take acetaminophen to help ease your pain. Ask him or her how much you are supposed to take each day. Acetaminophen or other pain relievers may interact with your prescription medicines or other over-the-counter (OTC) drugs. Some prescription medicines have acetaminophen and other ingredients. Using both prescription and OTC acetaminophen for pain can cause you to overdose. Read the labels on your OTC medicines with care. This will help you to clearly know the list of ingredients, how much to take, and any warnings. It may also help you not take too much acetaminophen. If you have questions or do not understand the information, ask your pharmacist or health care provider to explain it to you before you take the OTC medicine.  Managing nausea  Some people have an  upset stomach after surgery. This is often because of anesthesia, pain, or pain medicine, or the stress of surgery. These tips will help you handle nausea and eat healthy foods as you get better. If you were on a special food plan before surgery, ask your doctor if you should follow it while you get better. These tips may help:  Do not push yourself to eat. Your body will tell you when to eat and how much.  Start off with clear liquids and soup. They are easier to digest.  Next try semi-solid foods, such as mashed potatoes, applesauce, and gelatin, as you feel ready.  Slowly move to solid foods. Dont eat fatty, rich, or spicy foods at first.  Do not force yourself to have 3 large meals a day. Instead eat smaller amounts more often.  Take pain medicines with a small amount of solid food, such as crackers or toast, to avoid nausea.     Call your surgeon if  You still have pain an hour after taking medicine. The medicine may not be strong enough.  You feel too sleepy, dizzy, or groggy. The medicine may be too strong.  You have side effects like nausea, vomiting, or skin changes, such as rash, itching, or hives.       If you have obstructive sleep apnea  You were given anesthesia medicine during surgery to keep you comfortable and free of pain. After surgery, you may have more apnea spells because of this medicine and other medicines you were given. The spells may last longer than usual.   At home:  Keep using the continuous positive airway pressure (CPAP) device when you sleep. Unless your health care provider tells you not to, use it when you sleep, day or night. CPAP is a common device used to treat obstructive sleep apnea.  Talk with your provider before taking any pain medicine, muscle relaxants, or sedatives. Your provider will tell you about the possible dangers of taking these medicines.  © 4840-2875 [employer]. [address] [place] [address], [place] [address]. All rights reserved. This information is  not intended as a substitute for professional medical care. Always follow your healthcare professional's instructions.    Post op instructions for prevention of DVT  What is deep vein thrombosis?  Deep vein thrombosis (DVT) is the medical term for blood clots in the deep veins of the leg.  These blood clots can be dangerous.  A DVT can block a blood vessel and keep blood from getting where it needs to go.  Another problem is that the clot can travel to other parts of the body such as the lungs.  A clot that travels to the lungs is called a pulmonary embolus (PE) and can cause serious problems with breathing which can lead to death.  Am I at risk for DVT/PE?  If you are not very active, you are at risk of DVT.  Anyone confined to bed, sitting for long periods of time, recovering from surgery, etc. increases the risk of DVT.  Other risk factors are cancer diagnosis, certain medications, estrogen replacement in any form,older age, obesity, pregnancy, smoking, history of clotting disorders, and dehydration.  How will I know if I have a DVT?  Swelling in the lower leg  Pain  Warmth, redness, hardness or bulging of the vein  If you have any of these symptoms, call your doctors office right away.  Some people will not have any symptoms until the clot moves to the lungs.  What are the symptoms of a PE?  Panting, shortness of breath, or trouble breathing  Sharp, knife-like chest pain when you breathe  Coughing or coughing up blood  Rapid heartbeat  If you have any of these symptoms or get worse quickly, call 911 for emergency treatment.  How can I prevent a DVT?  Avoid long periods of inactivity and dont cross your legs--get up and walk around every hour or so.  Stay active--walking after surgery is highly encouraged.  This means you should get out of the house and walk in the neighborhood.  Going up and down stairs will not impair healing (unless advised against such activity by your doctor).    Drink plenty of  noncaffeinated, nonalcoholic fluids each day to prevent dehydration.  Wear special support stockings, if they have been advised by your doctor.  If you travel, stop at least once an hour and walk around.  Avoid smoking (assistance with stopping is available through your healthcare provider)  Always notify your doctor if you are not able to follow the post operative instructions that are given to you at the time of discharge.  It may be necessary to prescribe one of the medications available to prevent DVT.

## 2025-03-28 NOTE — OP NOTE
Date of Surgery: 03/28/2025    Patient: Dhaval Hernández    Pre-Operative Diagnosis:  Retained deep orthopedic implant, Left elbow.    Post-Operative Diagnosis:  Retained deep orthopedic implant, Left elbow.    Procedures:  Removal of deep orthopedic implant, Left elbow.    Surgeon: Gokul Troy MD    Assist: TIM Dennis.    Anesthesia: LMA with local.    Estimated Blood Loss: <5mL.    Drains: None.    Findings:   Prominent K-wire from previous tension band construct.  Adjacent ulnar nerve.  Excisional debridement of sinus tract with hardware removal.    Implants:   None.    Operative Indication:  Dhaval Hernández is a 64 y.o. male with history of distant left elbow ORIF in 1979.  Since that time patient has auto fused but is able to do well from a functional standpoint.    More recently patient developed a left elbow abscess and symptomatic hardware requiring I&D with partial hardware removal in October 2024.    Patient did well postoperatively however over the last 1-2 months has had progressively more symptomatic prominence left elbow hardware from prominent K-wire from previous tension band construct.    Operative versus non operative treatment options were discussed.  Risks and benefits were described.  These include but are not limited to bleeding; infection; damage to surrounding nerves or vessels; persistent pain, stiffness; need for additional procedures; amputation; blood clots; pulmonary embolus; cardiac events; stroke; and the general risks of anesthesia including anesthetic death.   We also reviewed postop protocol as well as limitations and expectations. Patient understands and desires to proceed with surgical intervention. Consent was obtained and the left elbow was marked.    Operative Procedure:  The patient was transferred to the operating room, transferred to the OR table in a supine position then placed under general endotracheal anesthesia by the anesthesiology service. All bony  prominences were appropriately padded.  Patient was secured to the table.  An SCD was placed on the contralateral lower limb.  The patient was prepped and draped in usual sterile fashion.  A time-out was then called.  The patient, procedure, surgical site was verified and everyone was in agreement.  Preoperative IV antibiotics were administered.   A #15 blade was used to make an approximately 3 cm elliptical incision for excisional debridement of previous sinus tract with underlying prominent K-wire construct.  Special care was taken to not dissect deep and medial at the level of the ulnar nerve.  The prominent K-wire was identified and cut as part of planned hardware removal.  The small remaining amount of wire was tamped down to prevent any recurrent prominence.  The wound was copiously irrigated and closed in a standard layered fashion using 3-0 Monocryl subcuticularly, and 3-0 nylon on the skin.   Sterile dressings including Xeroform, 4 x 4 gauze, Webril, and a an Ace bandage were placed.  Patient was reversed from anesthesia and transferred to recovery in stable condition.    Gokul Troy MD

## 2025-03-28 NOTE — DISCHARGE SUMMARY
[facility]  Discharge Note  Short Stay    Procedure(s) (LRB):  REMOVAL, HARDWARE, UPPER EXTREMITY (Left)      OUTCOME: Patient tolerated treatment/procedure well without complication and is now ready for discharge.    DISPOSITION: Home or Self Care    FINAL DIAGNOSIS: Symptomatic Left elbow hardware.    FOLLOWUP: In clinic    DISCHARGE INSTRUCTIONS:    Discharge Procedure Orders   Diet diabetic     Notify your health care provider if you experience any of the following:  temperature >100.4     Notify your health care provider if you experience any of the following:  persistent nausea and vomiting or diarrhea     Notify your health care provider if you experience any of the following:  severe uncontrolled pain     Notify your health care provider if you experience any of the following:  redness, tenderness, or signs of infection (pain, swelling, redness, odor or green/yellow discharge around incision site)     Leave dressing on - Keep it clean, dry, and intact until clinic visit     Activity as tolerated        TIME SPENT ON DISCHARGE: 15 minutes

## 2025-03-28 NOTE — ANESTHESIA PROCEDURE NOTES
Intubation    Date/Time: 3/28/2025 7:13 AM    Performed by: Panfilo Cast CRNA  Authorized by: Tee Quick MD    Intubation:     Induction:  Intravenous    Intubated:  Postinduction    Mask Ventilation:  Easy mask    Attempts:  1    Attempted By:  CRNA    Difficult Airway Encountered?: No      Complications:  None    Airway Device:  Supraglottic airway/LMA    Airway Device Size:  4.0    Style/Cuff Inflation:  Cuffed (inflated to minimal occlusive pressure)    Secured at:  The lips    Placement Verified By:  Capnometry    Complicating Factors:  None

## 2025-03-28 NOTE — ANESTHESIA POSTPROCEDURE EVALUATION
Anesthesia Post Evaluation    Patient: Dhaval Hernández    Procedure(s) Performed: Procedure(s) (LRB):  REMOVAL, HARDWARE, UPPER EXTREMITY (Left)    Final Anesthesia Type: general      Patient location during evaluation: PACU  Patient participation: Yes- Able to Participate  Level of consciousness: awake and alert  Post-procedure vital signs: reviewed and stable  Pain management: adequate  Airway patency: patent    PONV status at discharge: No PONV  Anesthetic complications: no      Cardiovascular status: hemodynamically stable  Respiratory status: unassisted and room air  Hydration status: euvolemic  Follow-up not needed.              Vitals Value Taken Time   /62 03/28/25 08:19   Temp 36.8 °C (98.2 °F) 03/28/25 08:15   Pulse 76 03/28/25 08:22   Resp 8 03/28/25 08:22   SpO2 99 % 03/28/25 08:22   Vitals shown include unfiled device data.      No case tracking events are documented in the log.      Pain/Deandra Score: No data recorded

## 2025-03-28 NOTE — PLAN OF CARE
Pre-op complete. Wife at bedside. Text notifications initiated. Pt denies need for safe. Belongings with wheelchair postop.

## 2025-03-31 ENCOUNTER — TELEPHONE (OUTPATIENT)
Dept: FAMILY MEDICINE | Facility: CLINIC | Age: 65
End: 2025-03-31
Payer: MEDICARE

## 2025-03-31 VITALS
BODY MASS INDEX: 30.49 KG/M2 | TEMPERATURE: 98 F | HEIGHT: 70 IN | HEART RATE: 78 BPM | RESPIRATION RATE: 16 BRPM | DIASTOLIC BLOOD PRESSURE: 72 MMHG | WEIGHT: 213 LBS | OXYGEN SATURATION: 94 % | SYSTOLIC BLOOD PRESSURE: 162 MMHG

## 2025-03-31 NOTE — TELEPHONE ENCOUNTER
----- Message from Michelle sent at 3/31/2025 11:16 AM CDT -----  Type: Needs Medical AdviceWho Called:  ptBest Call Back Number: [phone] Information: pt requesting call back in regards to following up please advise

## 2025-04-01 ENCOUNTER — TELEPHONE (OUTPATIENT)
Dept: FAMILY MEDICINE | Facility: CLINIC | Age: 65
End: 2025-04-01
Payer: MEDICARE

## 2025-04-01 NOTE — TELEPHONE ENCOUNTER
Patient states Dr. Galindo advised for him to have follow up labs perform after he has surgery on his elbow.  Patient states he has had surgery and everything went well.  Requesting to schedule lab appointment, states has to give transportation a week's notice of appointment.  Upon further assessment the following was noted:     Maritza Galindo MD  3/12/2025  9:57 PM CDT       Please let him know that due to chronically high potassium level, I am going to switch his medications.  Stop taking losartan   I am going to increase HCTZ from 12.5 mg daily to 25 mg daily      Help him with repeat lab work as soon as he can (he has ride issues fyi)     Nurse visit for BP check in 4-6 weeks thanks.        Lab appointment & nurse visit scheduled on same date (4/9/25) due to transportation issues.  Patient agreed to appointment date, time, and location.

## 2025-04-01 NOTE — TELEPHONE ENCOUNTER
Sarah Beth Villasenor Staff     ----- Message from Sarah Beth sent at 4/1/2025  8:44 AM CDT -----  Contact: pt [phone]  Type:  Patient Returning CallWho Called:  Pt Who Left Message for Patient:  Sabiha the patient know what this is regarding?:  Yes Best Call Back Number:  [phone] Information:  Pt returning call from yesterday/ Pt stated he needs to have labs after completing surgery and is asking when this needs to be done  ----- Message -----  From: Sarah Beth Villasenor  Sent: 4/1/2025   8:46 AM CDT  To: Mateo Tiwari Staff    Type:  Patient Returning CallWho Called:  Pt Who Left Message for Patient:  Sabiha the patient know what this is regarding?:  Yes Best Call Back Number:  [phone] Information:  Pt returning call from yesterday/ Pt stated he needs to labs after completing surgery and is asking when this needs to be done

## 2025-04-09 ENCOUNTER — TELEPHONE (OUTPATIENT)
Dept: FAMILY MEDICINE | Facility: CLINIC | Age: 65
End: 2025-04-09

## 2025-04-09 ENCOUNTER — LAB VISIT (OUTPATIENT)
Dept: LAB | Facility: HOSPITAL | Age: 65
End: 2025-04-09
Attending: STUDENT IN AN ORGANIZED HEALTH CARE EDUCATION/TRAINING PROGRAM
Payer: MEDICARE

## 2025-04-09 ENCOUNTER — RESULTS FOLLOW-UP (OUTPATIENT)
Dept: FAMILY MEDICINE | Facility: CLINIC | Age: 65
End: 2025-04-09

## 2025-04-09 ENCOUNTER — CLINICAL SUPPORT (OUTPATIENT)
Dept: FAMILY MEDICINE | Facility: CLINIC | Age: 65
End: 2025-04-09
Payer: MEDICARE

## 2025-04-09 VITALS — SYSTOLIC BLOOD PRESSURE: 136 MMHG | DIASTOLIC BLOOD PRESSURE: 68 MMHG | HEART RATE: 73 BPM

## 2025-04-09 DIAGNOSIS — E87.5 HYPERKALEMIA: ICD-10-CM

## 2025-04-09 DIAGNOSIS — I10 PRIMARY HYPERTENSION: Primary | ICD-10-CM

## 2025-04-09 DIAGNOSIS — E87.5 HYPERKALEMIA: Primary | ICD-10-CM

## 2025-04-09 LAB
ANION GAP (OHS): 8 MMOL/L (ref 8–16)
BUN SERPL-MCNC: 27 MG/DL (ref 8–23)
CALCIUM SERPL-MCNC: 9.5 MG/DL (ref 8.7–10.5)
CHLORIDE SERPL-SCNC: 106 MMOL/L (ref 95–110)
CO2 SERPL-SCNC: 26 MMOL/L (ref 23–29)
CREAT SERPL-MCNC: 1.4 MG/DL (ref 0.5–1.4)
GFR SERPLBLD CREATININE-BSD FMLA CKD-EPI: 55 ML/MIN/1.73/M2
GLUCOSE SERPL-MCNC: 119 MG/DL (ref 70–110)
POTASSIUM SERPL-SCNC: 5.2 MMOL/L (ref 3.5–5.1)
SODIUM SERPL-SCNC: 140 MMOL/L (ref 136–145)

## 2025-04-09 PROCEDURE — 99999 PR PBB SHADOW E&M-EST. PATIENT-LVL II: CPT | Mod: PBBFAC,,,

## 2025-04-09 PROCEDURE — 99499 UNLISTED E&M SERVICE: CPT | Mod: S$GLB,,, | Performed by: STUDENT IN AN ORGANIZED HEALTH CARE EDUCATION/TRAINING PROGRAM

## 2025-04-09 PROCEDURE — 36415 COLL VENOUS BLD VENIPUNCTURE: CPT | Mod: PO

## 2025-04-09 PROCEDURE — 82374 ASSAY BLOOD CARBON DIOXIDE: CPT

## 2025-04-09 NOTE — PROGRESS NOTES
"Dhaval Hernández 64 y.o. male is here today for Blood Pressure check.   History of HTN yes.    Review of patient's allergies indicates:   Allergen Reactions    Fish containing products      Other reaction(s): .     Creatinine   Date Value Ref Range Status   03/11/2025 1.2 0.5 - 1.4 mg/dL Final     Sodium   Date Value Ref Range Status   03/11/2025 137 136 - 145 mmol/L Final     Potassium   Date Value Ref Range Status   03/11/2025 5.4 (H) 3.5 - 5.1 mmol/L Final   ]  Patient verifies taking blood pressure medications on a regular basis at the same time of the day.   Current Medications[1]  Does patient have record of home blood pressure readings yes.   Last dose of blood pressure medication was taken at yesterday morning.  Patient is asymptomatic.       BP: (!) 146/70 , Pulse: 73 .    Blood pressure reading after 15 minutes was 136/68, Pulse 73.  Dr. shin notified.            [1]   Current Outpatient Medications:     aspirin 81 MG Chew, Take 81 mg by mouth once daily. (Patient not taking: Reported on 3/11/2025), Disp: , Rfl:     atorvastatin (LIPITOR) 80 MG tablet, Take 80 mg by mouth every evening., Disp: , Rfl:     BD AUTOSHIELD DUO PEN NEEDLE 30 gauge x 3/16" Ndle, , Disp: , Rfl:     BD POSIFLUSH NORMAL SALINE 0.9 injection, , Disp: , Rfl:     carvediloL (COREG) 12.5 MG tablet, Take 12.5 mg by mouth 2 (two) times daily., Disp: , Rfl:     cholecalciferol, vitamin D3, (VITAMIN D3) 125 mcg (5,000 unit) Tab, Take 5,000 Units by mouth once daily., Disp: , Rfl:     citalopram (CELEXA) 10 MG tablet, Take 1 tablet (10 mg total) by mouth once daily. (Patient taking differently: Take 10 mg by mouth every evening.), Disp: 90 tablet, Rfl: 1    doxycycline (VIBRAMYCIN) 100 MG Cap, Take 1 capsule (100 mg total) by mouth every 12 (twelve) hours., Disp: 60 capsule, Rfl: 2    empagliflozin (JARDIANCE) 25 mg tablet, Take 1 tablet (25 mg total) by mouth once daily., Disp: 90 tablet, Rfl: 3    ezetimibe (ZETIA) 10 mg tablet, Take 1 " tablet (10 mg total) by mouth once daily., Disp: 90 tablet, Rfl: 3    famotidine (PEPCID) 20 MG tablet, Take 1 tablet (20 mg total) by mouth 2 (two) times daily., Disp: 180 tablet, Rfl: 3    hydroCHLOROthiazide 12.5 MG Tab, Take 2 tablets (25 mg total) by mouth once daily., Disp: 180 tablet, Rfl: 1    insulin glargine U-100, Lantus, 100 unit/mL (3 mL) SubQ InPn pen, Inject 20 Units into the skin every evening., Disp: 18 mL, Rfl: 1    JANUVIA 100 mg Tab, Take 1 tablet (100 mg total) by mouth once daily., Disp: 90 tablet, Rfl: 3    levothyroxine (SYNTHROID) 75 MCG tablet, Take 1 tablet (75 mcg total) by mouth every morning., Disp: 90 tablet, Rfl: 1    metFORMIN (GLUCOPHAGE) 1000 MG tablet, Take 1 tablet (1,000 mg total) by mouth 2 (two) times daily with meals., Disp: 180 tablet, Rfl: 1    omega 3-dha-epa-fish oil (FISH OIL) 1,200 (144-216) mg Cap, Take 1 capsule by mouth once daily. (Patient not taking: Reported on 3/11/2025), Disp: , Rfl:     ondansetron (ZOFRAN) 8 MG tablet, Take 1 tablet (8 mg total) by mouth every 8 (eight) hours as needed for Nausea., Disp: 30 tablet, Rfl: 0

## 2025-04-09 NOTE — TELEPHONE ENCOUNTER
Pt seen in clinic today for BP check. Pt states that he is taking BP medications. States taking hydroChlorothiazide 12.5 one in morning and one in afternoon, Notified patient he should be taking 2 tablets once daily. States he will start taking medication correctly. Pts blood pressure today in clinic is 146/70 pulse 73.  Blood pressure reading after 15 minutes was 136/68, Pulse 73.  Please advise if you would like to change anything.      Thanks.  Tika

## 2025-04-11 ENCOUNTER — OFFICE VISIT (OUTPATIENT)
Dept: ORTHOPEDICS | Facility: CLINIC | Age: 65
End: 2025-04-11
Payer: MEDICARE

## 2025-04-11 DIAGNOSIS — T84.498A FAILED ORTHOPEDIC IMPLANT, INITIAL ENCOUNTER: Primary | ICD-10-CM

## 2025-04-11 PROCEDURE — 99999 PR PBB SHADOW E&M-EST. PATIENT-LVL III: CPT | Mod: PBBFAC,,, | Performed by: ORTHOPAEDIC SURGERY

## 2025-04-11 NOTE — PROGRESS NOTES
"Status/Diagnosis: Left elbow abscess with retained deep HW.  Date of Surgery:   10/27/2024; 03/28/2025: Left elbow I&D with partial HWR.  Date of Injury: none  Return visit: PRN  X-rays on Return: pending patient complaint      Present History:  Dhaval Hernández is a 64 y.o. male who returns today for 2 week postop visit after tension wire hardware removal.  Doing well.  0/10 pain.  Never obtained offloading elbow pad as instructed.  Denies any drainage, fever, chills.        Past Medical History:   Diagnosis Date    Diabetes mellitus, type 2     GERD (gastroesophageal reflux disease)     Hyperlipidemia     Hypertension        Past Surgical History:   Procedure Laterality Date    FRACTURE SURGERY      INCISION AND DRAINAGE OF ABSCESS Left 10/27/2024    Procedure: INCISION AND DRAINAGE, ABSCESS;  Surgeon: Gokul Troy MD;  Location: [facility] OR;  Service: Orthopedics;  Laterality: Left;  INCISION AND DRAIN OF LEFT ELBOW ABCESS    INSERTION OF PACEMAKER      LEG AMPUTATION      REMOVAL,ORTHOPEDIC HARDWARE,UPPER EXTREMITY Left 3/28/2025    Procedure: REMOVAL, HARDWARE, UPPER EXTREMITY;  Surgeon: Gokul Troy MD;  Location: [facility] OR;  Service: Orthopedics;  Laterality: Left;  large c-arm; removal of previous tension band wire construct; need pin cutter, rasp; tamp, mallet.       Current Outpatient Medications   Medication Sig    atorvastatin (LIPITOR) 80 MG tablet Take 80 mg by mouth every evening.    BD AUTOSHIELD DUO PEN NEEDLE 30 gauge x 3/16" Ndle     BD POSIFLUSH NORMAL SALINE 0.9 injection     carvediloL (COREG) 12.5 MG tablet Take 12.5 mg by mouth 2 (two) times daily.    cholecalciferol, vitamin D3, (VITAMIN D3) 125 mcg (5,000 unit) Tab Take 5,000 Units by mouth once daily.    citalopram (CELEXA) 10 MG tablet Take 1 tablet (10 mg total) by mouth once daily. (Patient taking differently: Take 10 mg by mouth every evening.)    doxycycline (VIBRAMYCIN) 100 MG Cap Take 1 capsule (100 mg total) by mouth every " 12 (twelve) hours.    empagliflozin (JARDIANCE) 25 mg tablet Take 1 tablet (25 mg total) by mouth once daily.    ezetimibe (ZETIA) 10 mg tablet Take 1 tablet (10 mg total) by mouth once daily.    famotidine (PEPCID) 20 MG tablet Take 1 tablet (20 mg total) by mouth 2 (two) times daily.    hydroCHLOROthiazide 12.5 MG Tab Take 2 tablets (25 mg total) by mouth once daily.    insulin glargine U-100, Lantus, 100 unit/mL (3 mL) SubQ InPn pen Inject 20 Units into the skin every evening.    JANUVIA 100 mg Tab Take 1 tablet (100 mg total) by mouth once daily.    levothyroxine (SYNTHROID) 75 MCG tablet Take 1 tablet (75 mcg total) by mouth every morning.    metFORMIN (GLUCOPHAGE) 1000 MG tablet Take 1 tablet (1,000 mg total) by mouth 2 (two) times daily with meals.    ondansetron (ZOFRAN) 8 MG tablet Take 1 tablet (8 mg total) by mouth every 8 (eight) hours as needed for Nausea.    aspirin 81 MG Chew Take 81 mg by mouth once daily. (Patient not taking: Reported on 4/11/2025)    omega 3-dha-epa-fish oil (FISH OIL) 1,200 (144-216) mg Cap Take 1 capsule by mouth once daily. (Patient not taking: Reported on 3/11/2025)     No current facility-administered medications for this visit.       Social History     Socioeconomic History    Marital status:    Tobacco Use    Smoking status: Former     Types: Cigarettes    Smokeless tobacco: Former     Types: Snuff     Quit date: 04/2024    Tobacco comments:     Pt used smokeless tobacco from [id number] and quit. Started dipping tobacco throughout most of his 20's. Started smoking cigarettes again in 06/2024.      Social Drivers of Health     Financial Resource Strain: Low Risk  (10/22/2024)    Overall Financial Resource Strain (CARDIA)     Difficulty of Paying Living Expenses: Not hard at all   Food Insecurity: No Food Insecurity (10/22/2024)    Hunger Vital Sign     Worried About Running Out of Food in the Last Year: Never true     Ran Out of Food in the Last Year: Never true    Transportation Needs: No Transportation Needs (10/22/2024)    TRANSPORTATION NEEDS     Transportation : No   Physical Activity: Inactive (10/22/2024)    Exercise Vital Sign     Days of Exercise per Week: 0 days     Minutes of Exercise per Session: 0 min   Stress: No Stress Concern Present (10/22/2024)    [employer] - Occupational Stress Questionnaire     Feeling of Stress : Not at all   Housing Stability: Unknown (10/22/2024)    Housing Stability Vital Sign     Unable to Pay for Housing in the Last Year: No     Homeless in the Last Year: No       Physical exam:  There were no vitals filed for this visit.  There is no height or weight on file to calculate BMI.  General: In no apparent distress; well developed and well nourished.  HEENT: normocephalic; atraumatic.  Cardiovascular: regular rate.  Respiratory: no increased work of breathing.  Musculoskeletal:   Inspection:   Surgical site is well healed with nylon sutures in place.  No residual area of prominence.  No warmth or erythema.  No signs or symptoms of infection. Baseline 90 degree flexion contracture/ankylosis elbow joint.  30° supination and 45° pronation.  First dorsal interossei atrophy, unchanged.  Ulnar nerve sensation remains improved.  Palpable radial pulse.                   Imaging Studies/Outside documentation:  I have ordered/reviewed/interpreted the following images/outside documentation:  No new imaging today.        Assessment:  Dhaval Hernández is a 64 y.o. male with Left elbow abscess with retained deep HW.     Plan:   Patient doing extremely well 2 weeks postop.  Sutures removed and Steri-Strips were placed.    Reinforced the importance of obtaining the offloading elbow pad to mitigate any future problems.    Weightbear as tolerated left upper extremity.    Patient voiced understanding all questions were answered.  Patient did mentioned wanting to start walking again with his left lower extremity prosthesis.   Patient we will contact my office with an update going forward at which time we will place a new PT referral.    Patient voiced understanding.  All questions were answered.      This note was created using voice recognition software and may contain grammatical errors.

## 2025-04-12 ENCOUNTER — NURSE TRIAGE (OUTPATIENT)
Dept: ADMINISTRATIVE | Facility: CLINIC | Age: 65
End: 2025-04-12
Payer: MEDICARE

## 2025-04-12 NOTE — TELEPHONE ENCOUNTER
Call x 2 no answer. No option to leave VM.    Reason for Disposition   No answer.  First attempt to contact caller.  Follow-up call scheduled within 15 minutes.    Protocols used: No Contact or Duplicate Contact Call-A-AH

## 2025-04-14 ENCOUNTER — TELEPHONE (OUTPATIENT)
Dept: FAMILY MEDICINE | Facility: CLINIC | Age: 65
End: 2025-04-14
Payer: MEDICARE

## 2025-04-14 NOTE — TELEPHONE ENCOUNTER
----- Message from Tiffani sent at 4/14/2025 11:13 AM CDT -----  Type:  Needs Medical AdviceWho Called: ptSymptoms (please be specific): redness on hands How long has patient had these symptoms:  1 weekPharmacy name and phone #:  [facility] - [place] - [address] [address]Ilfjh: [phone] Fax: [phone] the patient rather a call back or a response via [facility]? Call [facility] Call Back Number: [phone] Information: pt just started taking hydroCHLOROthiazide 12.5 MG Tab and said now he has some redness and swelling on his right hand and arm. Left side only one finger has some redness    Please call Back to advise. Thanks!

## 2025-04-14 NOTE — TELEPHONE ENCOUNTER
Pt states that he is unsure how to attach a photo to a message. He will have his wife send a picture once she gets home later this evening. Pt urged to send the picture so Dr. Galindo can review; pt verbalized understanding

## 2025-04-14 NOTE — TELEPHONE ENCOUNTER
----- Message from Maritza Galindo MD sent at 4/14/2025 12:38 PM CDT -----  Contact: PT  Please call and see how he doing with his hand color changeHe has limited transportation and probably won't be able to come in personHelp him with e-visit for this and advise him to attach a photo thanks  ----- Message -----  From: Aziza Ruiz  Sent: 4/12/2025  10:49 AM CDT  To: Maritza Galindo MD    Type:  Needs Medical AdviceWho Called: PT Symptoms (please be specific):  HANDS ARE TURNING RED. PT BELIEVES THAT HE MAY BE HAVING A REACTION TO HIS NEW RX hydroCHLOROthiazide 12.5 MG Tab How long has patient had these symptoms: ONE WEEK AGO [facility] [place] [place] [facility] [address] [place] [address]Jwfxw: [phone] Fax: [phone] the patient rather a call back or a response via [facility]? CALL Best Call Back Number: [phone] Information: THANK YOU PT D/C BEFORE TRANS TO [facility] CALL [facility] ON CALL WILL CALL THE PT BACK WITHIN 15 MINS Symptom: Allergic Reaction (General)Outcome: Instruct patient to Call 911 NOW!Reason: Bluish color of lips or face nowThe caller rejected this outcome.

## 2025-04-14 NOTE — TELEPHONE ENCOUNTER
Pt states he started taking HCTZ 3/21; the next day his R index finger turned bright red, the redness stops once it's at his palm then begins again at his wrist and stops at his elbow. Pt states he feels fine overall. He checked his BP but can only remember his systolic which was 140. He denies pain, pruritus, or discomfort. He states that the temp of his skin is normal.

## 2025-04-16 ENCOUNTER — TELEPHONE (OUTPATIENT)
Dept: FAMILY MEDICINE | Facility: CLINIC | Age: 65
End: 2025-04-16
Payer: MEDICARE

## 2025-04-16 NOTE — TELEPHONE ENCOUNTER
Patient states Mrs. Man walked him through how to send a picture to the provider via the portal.  Patient states he sent the picture and wants to confirm if it was received.  Writer do not see where a picture has been received.  Patient states he will attempt to send picture again.       Lisette Carranza Staff     ----- Message from Lisette sent at 4/16/2025  1:36 PM CDT -----  Type:  Needs Medical Advice    Who Called: pt  Would the patient rather a call back or a response via [facility]? Call back  Best Call Back Number: [phone]   Additional Information: pt st he sent something thru [facility] & wants to know was it received. please call to discuss

## 2025-04-24 ENCOUNTER — TELEPHONE (OUTPATIENT)
Dept: FAMILY MEDICINE | Facility: CLINIC | Age: 65
End: 2025-04-24
Payer: MEDICARE

## 2025-04-24 ENCOUNTER — PATIENT MESSAGE (OUTPATIENT)
Dept: FAMILY MEDICINE | Facility: CLINIC | Age: 65
End: 2025-04-24
Payer: MEDICARE

## 2025-04-24 NOTE — TELEPHONE ENCOUNTER
Patient/patient's wife notified to hold HCTZ related to possible reaction.  Advised to continue Carvedilol as ordered for hypertension.  Both the patient and patient's wife verbalized understanding.       Maritza Galindo MD to Me  (Selected Message)        4/24/25 10:25 AM  Please inform the patient to hold the medication until next week. Thank you.

## 2025-04-24 NOTE — TELEPHONE ENCOUNTER
Please see symptoms listed below.  Patient scheduled appointment next week, states unable to come in any sooner due to transportation requires one week advance notice.  Writer attempted to walk patient/patient's wife through how to send a photograph of area via [facility].  They were unable to do so.  They are not familiar with how to work the portal, even while walking through each step during call they were confused as to what to do.  Unsure if patient would be able to complete virtual appointment due to above.  Patient states he believes the symptoms are due to new blood pressure medication.  Will send follow up message to Dr. Galindo for further guidance.          Malaika Mclean Staff     ----- Message from Malaika sent at 4/24/2025  9:40 AM CDT -----  Contact: Self  Type: Needs Medical Advice  Who Called:  Patient    Symptoms (please be specific):  red, breaking out and peeling on his arm, by his ear and moving to the other hand    How long has patient had these symptoms:  almost 2 wks now    [facility] and phone #[facility]  [facility] [place] [facility] [address] [facility] [address] [facility] [place] [address]Jvnnu: [phone] Fax: [phone]    Best Call Back Number: [phone]    Additional Information: Pt stated they tried to figure out how to send a picture of this on his arm, but can't figure it out and made an appt with ROBERT Deutsch on Thursday of next week, but they are worried it may be the new BP medications that is causing him to break out like this and all OTC medications/topical creams are not helping and now it it keeps spreading. Can we please call pt back to advise. Thank You

## 2025-05-01 ENCOUNTER — OFFICE VISIT (OUTPATIENT)
Dept: FAMILY MEDICINE | Facility: CLINIC | Age: 65
End: 2025-05-01
Payer: MEDICARE

## 2025-05-01 VITALS
DIASTOLIC BLOOD PRESSURE: 70 MMHG | BODY MASS INDEX: 31.28 KG/M2 | RESPIRATION RATE: 12 BRPM | WEIGHT: 218.5 LBS | HEIGHT: 70 IN | OXYGEN SATURATION: 98 % | HEART RATE: 69 BPM | TEMPERATURE: 98 F | SYSTOLIC BLOOD PRESSURE: 128 MMHG

## 2025-05-01 DIAGNOSIS — R21 RASH: Primary | ICD-10-CM

## 2025-05-01 DIAGNOSIS — I10 PRIMARY HYPERTENSION: ICD-10-CM

## 2025-05-01 PROCEDURE — 1159F MED LIST DOCD IN RCRD: CPT | Mod: CPTII,S$GLB,,

## 2025-05-01 PROCEDURE — 1160F RVW MEDS BY RX/DR IN RCRD: CPT | Mod: CPTII,S$GLB,,

## 2025-05-01 PROCEDURE — 3078F DIAST BP <80 MM HG: CPT | Mod: CPTII,S$GLB,,

## 2025-05-01 PROCEDURE — 3074F SYST BP LT 130 MM HG: CPT | Mod: CPTII,S$GLB,,

## 2025-05-01 PROCEDURE — 3060F POS MICROALBUMINURIA REV: CPT | Mod: CPTII,S$GLB,,

## 2025-05-01 PROCEDURE — G2211 COMPLEX E/M VISIT ADD ON: HCPCS | Mod: S$GLB,,,

## 2025-05-01 PROCEDURE — 99214 OFFICE O/P EST MOD 30 MIN: CPT | Mod: S$GLB,,,

## 2025-05-01 PROCEDURE — 3008F BODY MASS INDEX DOCD: CPT | Mod: CPTII,S$GLB,,

## 2025-05-01 PROCEDURE — 99999 PR PBB SHADOW E&M-EST. PATIENT-LVL V: CPT | Mod: PBBFAC,,,

## 2025-05-01 PROCEDURE — 1101F PT FALLS ASSESS-DOCD LE1/YR: CPT | Mod: CPTII,S$GLB,,

## 2025-05-01 PROCEDURE — 3288F FALL RISK ASSESSMENT DOCD: CPT | Mod: CPTII,S$GLB,,

## 2025-05-01 PROCEDURE — 3066F NEPHROPATHY DOC TX: CPT | Mod: CPTII,S$GLB,,

## 2025-05-01 PROCEDURE — 3044F HG A1C LEVEL LT 7.0%: CPT | Mod: CPTII,S$GLB,,

## 2025-05-01 PROCEDURE — 4010F ACE/ARB THERAPY RXD/TAKEN: CPT | Mod: CPTII,S$GLB,,

## 2025-05-01 NOTE — PROGRESS NOTES
Subjective:       Patient ID:  [id number]     Chief Complaint: Rash (On arms)      History of Present Illness    Mr. Casillas presents today for evaluation of a rash. He developed a rash after starting hydrochlorothiazide following arm surgery. The rash initially presented on one hand, then spread to the contralateral hand the following morning, sparing fingertips. The condition progressed to include blisters and loss of pigmentation on arm. He denies associated pain or pruritus. He discontinued hydrochlorothiazide on April 14th per medical advice. He is currently on long-term antibiotic therapy due to previous elbow infection with concern for potential blood infection.  No other concerns today.    Past Medical History:   Diagnosis Date    Diabetes mellitus, type 2     GERD (gastroesophageal reflux disease)     Hyperlipidemia     Hypertension       Active Problem List with Overview Notes    Diagnosis Date Noted    Failed orthopedic implant 03/28/2025    Pulmonary hypertension 02/05/2025    Cardiomyopathy, unspecified type 02/05/2025    Olecranon bursitis of left elbow 10/24/2024    Tobacco dependency 10/22/2024    Septic shock 10/22/2024    High anion gap metabolic acidosis 10/22/2024    SIRIA (acute kidney injury) 10/22/2024    Hyperkalemia 10/22/2024    Atrial fibrillation, chronic 10/22/2024    ICD (implantable cardioverter-defibrillator) in place 10/01/2024    Severe obesity (BMI 35.0-39.9) with comorbidity 10/01/2024    History of stroke 10/01/2024    Mixed hyperlipidemia 10/01/2024    Encounter to discuss test results 10/01/2024    Cardiac pacemaker 12/11/2023    Atherosclerosis of aorta 12/11/2023    Status post unilateral below-knee amputation 10/30/2023    Type 2 diabetes mellitus with stage 2 chronic kidney disease, without long-term current use of insulin 10/30/2023    BMI 31.0-31.9,adult 10/30/2023    Depression 10/30/2023    Diabetes mellitus due to underlying condition with diabetic chronic kidney disease  10/30/2023    Hypertension       Review of patient's allergies indicates:   Allergen Reactions    Fish containing products      Other reaction(s): .       Current Medications[1]    Lab Results   Component Value Date    WBC 8.50 03/11/2025    HGB 11.9 (L) 03/11/2025    HCT 38.7 (L) 03/11/2025     03/11/2025    CHOL 99 (L) 02/06/2025    TRIG 50 02/06/2025    HDL 43 02/06/2025    ALT 26 02/06/2025    AST 27 02/06/2025     04/09/2025    K 5.2 (H) 04/09/2025     04/09/2025    CREATININE 1.4 04/09/2025    BUN 27 (H) 04/09/2025    CO2 26 04/09/2025    TSH 1.274 02/06/2025    PSA 2.1 02/06/2025    INR 1.0 10/29/2024    HGBA1C 6.2 (H) 02/06/2025       Review of Systems    Objective:      Physical Exam  Constitutional:       Appearance: Normal appearance.   HENT:      Head: Normocephalic and atraumatic.      Nose: Nose normal.   Eyes:      Extraocular Movements: Extraocular movements intact.   Cardiovascular:      Rate and Rhythm: Normal rate and regular rhythm.   Pulmonary:      Effort: Pulmonary effort is normal.      Breath sounds: Normal breath sounds.   Musculoskeletal:      Cervical back: Normal range of motion.      Left Lower Extremity: Left leg is amputated above knee.   Skin:     General: Skin is warm and dry.      Findings: Rash present. Rash is crusting.   Neurological:      General: No focal deficit present.      Mental Status: He is alert and oriented to person, place, and time.   Psychiatric:         Mood and Affect: Mood normal.                               Assessment:       1. Rash    2. Primary hypertension        Plan:       Dhaval was seen today for rash.    Diagnoses and all orders for this visit:    Rash  - Mr. Casillas reports skin peeling and redness that worsened over time after starting hydrochlorothiazide.  - Examination revealed blistered and scabbed eruption that is neither painful nor itchy.  - Discussed that hydrochlorothiazide is likely the cause of the skin eruption, as it  "can cause rashes.  - Ordered dermatology e-consult for expert opinion on rash etiology and management, with possible recommendation for steroid treatment.  - Advised patient to contact the office if dermatology recommends in-person evaluation after e-consult.    - Mr. Casillas experiences erythema and desquamation after sun exposure, suggesting a phototoxic response rather than a typical sunburn.  - Both hydrochlorothiazide and doxycycline can increase skin sensitivity to sun (photosensitivity) and cause similar rashes.  - This photosensitivity reaction is likely contributing to the patient's current skin condition.  - The dermatology e-consult will also evaluate this aspect and consider appropriate treatment options.    Primary hypertension  - well controlled   - discussed dash diet, exercise/weight loss, and increased cardiovascular exercise   - monitor BP at home w/ goal <130/80             Future Appointments       Date Provider Specialty Appt Notes    5/6/2025  Cardiology [facility]    5/8/2025 Migel Watson MD Infectious Diseases 2 mo f/u    5/12/2025  Lab Hyperkalemia [E87.5]    6/17/2025 Nehemiah Orozco MD Ophthalmology Type 2 diabetes mellitus with stage 2 chronic kidney disease, without long-term current use of insulin [E11.22, N18.2]    6/30/2025 Kaitlin Piña PA-C Family Medicine 6 mo follow up    2/6/2026 Maritza Galindo MD Family Medicine annual               Portions of this note were dictated using voice recognition software and may contain dictation related errors in spelling / grammar / syntax not discovered on text review.     Kaitlin Piña PA-C         [1]   Current Outpatient Medications:     aspirin 81 MG Chew, Take 81 mg by mouth once daily., Disp: , Rfl:     atorvastatin (LIPITOR) 80 MG tablet, Take 80 mg by mouth every evening., Disp: , Rfl:     BD AUTOSHIELD DUO PEN NEEDLE 30 gauge x 3/16" Ndle, , Disp: , Rfl:     BD POSIFLUSH NORMAL SALINE 0.9 injection, , Disp: , Rfl: "     carvediloL (COREG) 12.5 MG tablet, Take 12.5 mg by mouth 2 (two) times daily., Disp: , Rfl:     cholecalciferol, vitamin D3, (VITAMIN D3) 125 mcg (5,000 unit) Tab, Take 5,000 Units by mouth once daily., Disp: , Rfl:     citalopram (CELEXA) 10 MG tablet, Take 1 tablet (10 mg total) by mouth once daily. (Patient taking differently: Take 10 mg by mouth every evening.), Disp: 90 tablet, Rfl: 1    doxycycline (VIBRAMYCIN) 100 MG Cap, Take 1 capsule (100 mg total) by mouth every 12 (twelve) hours., Disp: 60 capsule, Rfl: 2    empagliflozin (JARDIANCE) 25 mg tablet, Take 1 tablet (25 mg total) by mouth once daily., Disp: 90 tablet, Rfl: 3    ezetimibe (ZETIA) 10 mg tablet, Take 1 tablet (10 mg total) by mouth once daily., Disp: 90 tablet, Rfl: 3    famotidine (PEPCID) 20 MG tablet, Take 1 tablet (20 mg total) by mouth 2 (two) times daily., Disp: 180 tablet, Rfl: 3    insulin glargine U-100, Lantus, 100 unit/mL (3 mL) SubQ InPn pen, Inject 20 Units into the skin every evening., Disp: 18 mL, Rfl: 1    JANUVIA 100 mg Tab, Take 1 tablet (100 mg total) by mouth once daily., Disp: 90 tablet, Rfl: 3    levothyroxine (SYNTHROID) 75 MCG tablet, Take 1 tablet (75 mcg total) by mouth every morning., Disp: 90 tablet, Rfl: 1    metFORMIN (GLUCOPHAGE) 1000 MG tablet, Take 1 tablet (1,000 mg total) by mouth 2 (two) times daily with meals., Disp: 180 tablet, Rfl: 1    omega 3-dha-epa-fish oil (FISH OIL) 1,200 (144-216) mg Cap, Take 1 capsule by mouth once daily., Disp: , Rfl:     ondansetron (ZOFRAN) 8 MG tablet, Take 1 tablet (8 mg total) by mouth every 8 (eight) hours as needed for Nausea., Disp: 30 tablet, Rfl: 0    hydroCHLOROthiazide 12.5 MG Tab, Take 2 tablets (25 mg total) by mouth once daily. (Patient not taking: Reported on 5/1/2025), Disp: 180 tablet, Rfl: 1

## 2025-05-03 ENCOUNTER — E-CONSULT (OUTPATIENT)
Dept: DERMATOLOGY | Facility: CLINIC | Age: 65
End: 2025-05-03
Payer: MEDICARE

## 2025-05-03 DIAGNOSIS — L56.0 PHOTOTOXIC DRUG ERUPTION: Primary | ICD-10-CM

## 2025-05-03 PROCEDURE — 99451 NTRPROF PH1/NTRNET/EHR 5/>: CPT | Mod: ,,, | Performed by: STUDENT IN AN ORGANIZED HEALTH CARE EDUCATION/TRAINING PROGRAM

## 2025-05-03 NOTE — CONSULTS
[facility]  Response for E-Consult     Patient Name: Dhaval Hernández  MRN: [id number]  Primary Care Provider: Maritza Galindo MD   Requesting Provider: Kaitlin Piña PA-C  E-Consult to Dermatology  Consult performed by: Maddy Dias MD  Consult ordered by: Kaitlin Piña PA-C  Reason for consult: Rash  Assessment/Recommendations: Thank you for this consult. This is consistent with a phototoxic drug eruption due to HCTZ. I recommend photoprotection or adjusting the antihypertensive.           Recommendation: as above     Additional future steps to consider: Reconsult if the condition worsens or fails to improve       Total time of Consultation: 5 minute    I did not speak to the requesting provider verbally about this.     *This eConsult is based on the clinical data available to me and is furnished without benefit of a physical examination. The eConsult will need to be interpreted in light of any clinical issues or changes in patient status not available to me at the time of filing this eConsults. Significant changes in patient condition or level of acuity should result in immediate formal consultation and reevaluation. Please alert me if you have further questions.    Thank you for this eConsult referral.     Maddy Dias MD  [facility]

## 2025-05-05 DIAGNOSIS — Z95.0 CARDIAC PACEMAKER: Primary | Chronic | ICD-10-CM

## 2025-05-06 ENCOUNTER — HOSPITAL ENCOUNTER (OUTPATIENT)
Dept: CARDIOLOGY | Facility: CLINIC | Age: 65
Discharge: HOME | End: 2025-05-06
Attending: INTERNAL MEDICINE
Payer: MEDICARE

## 2025-05-06 DIAGNOSIS — Z95.0 CARDIAC PACEMAKER: Chronic | ICD-10-CM

## 2025-05-06 LAB
BATTERY VOLTAGE (V): 2.96 V
IMPEDANCE RA LEAD: 361 OHMS
THRESHOLD MS RA LEAD: 0.4 MS
THRESHOLD V RA LEAD: 1.2 V

## 2025-05-07 DIAGNOSIS — F32.A DEPRESSION, UNSPECIFIED DEPRESSION TYPE: ICD-10-CM

## 2025-05-07 DIAGNOSIS — E11.22 TYPE 2 DIABETES MELLITUS WITH STAGE 2 CHRONIC KIDNEY DISEASE, WITHOUT LONG-TERM CURRENT USE OF INSULIN: ICD-10-CM

## 2025-05-07 DIAGNOSIS — E03.9 HYPOTHYROIDISM, UNSPECIFIED TYPE: ICD-10-CM

## 2025-05-07 DIAGNOSIS — N18.2 TYPE 2 DIABETES MELLITUS WITH STAGE 2 CHRONIC KIDNEY DISEASE, WITHOUT LONG-TERM CURRENT USE OF INSULIN: ICD-10-CM

## 2025-05-07 DIAGNOSIS — E87.5 HYPERKALEMIA: ICD-10-CM

## 2025-05-08 ENCOUNTER — OFFICE VISIT (OUTPATIENT)
Dept: INFECTIOUS DISEASES | Facility: CLINIC | Age: 65
End: 2025-05-08
Payer: MEDICARE

## 2025-05-08 VITALS
DIASTOLIC BLOOD PRESSURE: 70 MMHG | BODY MASS INDEX: 31.21 KG/M2 | HEIGHT: 70 IN | WEIGHT: 218 LBS | OXYGEN SATURATION: 97 % | SYSTOLIC BLOOD PRESSURE: 122 MMHG | HEART RATE: 82 BPM | TEMPERATURE: 98 F

## 2025-05-08 DIAGNOSIS — M25.511 CHRONIC RIGHT SHOULDER PAIN: ICD-10-CM

## 2025-05-08 DIAGNOSIS — M86.622: Primary | ICD-10-CM

## 2025-05-08 DIAGNOSIS — A49.01 MSSA (METHICILLIN SUSCEPTIBLE STAPHYLOCOCCUS AUREUS) INFECTION: ICD-10-CM

## 2025-05-08 DIAGNOSIS — G89.29 CHRONIC RIGHT SHOULDER PAIN: ICD-10-CM

## 2025-05-08 PROCEDURE — 99999 PR PBB SHADOW E&M-EST. PATIENT-LVL IV: CPT | Mod: PBBFAC,,, | Performed by: INTERNAL MEDICINE

## 2025-05-08 RX ORDER — ATORVASTATIN CALCIUM 80 MG/1
80 TABLET, FILM COATED ORAL NIGHTLY
Qty: 90 TABLET | Refills: 3 | Status: SHIPPED | OUTPATIENT
Start: 2025-05-08

## 2025-05-08 RX ORDER — CITALOPRAM 10 MG/1
10 TABLET ORAL DAILY
Qty: 90 TABLET | Refills: 1 | Status: SHIPPED | OUTPATIENT
Start: 2025-05-08 | End: 2025-11-04

## 2025-05-08 RX ORDER — LEVOTHYROXINE SODIUM 75 UG/1
75 TABLET ORAL EVERY MORNING
Qty: 90 TABLET | Refills: 1 | Status: SHIPPED | OUTPATIENT
Start: 2025-05-08 | End: 2025-11-04

## 2025-05-08 RX ORDER — INSULIN GLARGINE 100 [IU]/ML
20 INJECTION, SOLUTION SUBCUTANEOUS NIGHTLY
Qty: 18 ML | Refills: 1 | Status: SHIPPED | OUTPATIENT
Start: 2025-05-08 | End: 2025-11-04

## 2025-05-08 RX ORDER — DOXYCYCLINE 100 MG/1
CAPSULE ORAL
Qty: 60 CAPSULE | Refills: 0 | Status: SHIPPED | OUTPATIENT
Start: 2025-05-08

## 2025-05-08 RX ORDER — FAMOTIDINE 20 MG/1
20 TABLET, FILM COATED ORAL 2 TIMES DAILY
Qty: 180 TABLET | Refills: 3 | Status: SHIPPED | OUTPATIENT
Start: 2025-05-08

## 2025-05-08 RX ORDER — CARVEDILOL 12.5 MG/1
12.5 TABLET ORAL 2 TIMES DAILY
Qty: 180 TABLET | Refills: 3 | Status: SHIPPED | OUTPATIENT
Start: 2025-05-08

## 2025-05-08 RX ORDER — METFORMIN HYDROCHLORIDE 1000 MG/1
1000 TABLET ORAL 2 TIMES DAILY WITH MEALS
Qty: 180 TABLET | Refills: 1 | Status: SHIPPED | OUTPATIENT
Start: 2025-05-08 | End: 2025-11-04

## 2025-05-08 RX ORDER — EZETIMIBE 10 MG/1
10 TABLET ORAL DAILY
Qty: 90 TABLET | Refills: 3 | Status: SHIPPED | OUTPATIENT
Start: 2025-05-08 | End: 2026-05-03

## 2025-05-08 RX ORDER — HYDROCHLOROTHIAZIDE 12.5 MG/1
25 TABLET ORAL DAILY
Qty: 180 TABLET | Refills: 1 | OUTPATIENT
Start: 2025-05-08 | End: 2026-05-08

## 2025-05-08 NOTE — PROGRESS NOTES
Subjective:      Reason for consult:     HPI: Dhaval Hernández is a 64 y.o. male   with history of diabetes mellitus, hypertension, hyperlipidemia, GERD and previous left BKA.  Also with history of AICD.  He presents to the ER 10/22/2024 with 4 day history of nausea, vomiting and diarrhea.  He had no fever.  In the ER vitals were abnormal with blood pressure 75/39, pulse 60, respiratory 18, temperature 97.6° oxygen saturation 92%.  WBC was 20 K with left shift.  Creatinine 5.1.  UA abnormal with 19 WBC and 4+ glucose.  Chest x-ray with no clear acute infiltrate.  CT abdomen and pelvis showed mild cardiomegaly and also cholelithiasis.  He was commenced on IV fluid and antibiotics.     Leukocytosis resolved and WBC down to 8.0.  Blood cultures have grown MSSA in 2/2 bottles.  HIDA scan was abnormal.  Surgery was consulted to evaluate for possible cholecystitis.  Notes reviewed.  No plan for surgical intervention at this time since patient had no abdominal pain and appears to be improving.  ID asked to assist with his care.     States his symptoms started with his left elbow hurting.  He has been leaning on the left elbow to transfer from bed to wheelchair.  Has a elbow ORIF and is fixed in a flexion position.  Has had persistent pain for the last 1 week with swelling.  His other symptoms followed after the elbow pain started.       11/19/2024:  Recently hospitalized and treated for left elbow abscess with osteomyelitis complicated by MSSA bacteremia.  Improved post I&D and was ultimately discharged on cefazolin and rifampin to complete 6 week course 12/05/2024.  Sutures have been removed.  Here for follow up.    01/09/2025:  He missed 2 appointments with orthopedic surgeon because of transportation issues.  Left elbow wound continues to heal but not all the way completed.  Completed IV antibiotics and now on oral doxycycline which he is tolerating.   No other acute issues today.  He is also moving a little better  with more strength in his upper extremities.    03/06/2025:  Here for follow up.  No acute issues since last visit.  Has been seen by orthopedic surgeon with plan to clip the protruding wire.  Waiting on clearance from his PCP.  Tolerating doxycycline okay with no issues.    05/08/2025:  Since last visit had surgery with removal of the protruding K-wire from the left elbow 3/28/25.  Developed dermatitis bilateral hands around 04/14/2025 that progress.  This was thought to be secondary to hydrochlorothiazide which was prescribed newly after losartan was stopped due to hyperkalemia.  It has improved off hydrochlorothiazide and with topical cream followed by Neosporin ointment.  Left elbow much better with no pain.  Wound has healed.    Review of patient's allergies indicates:   Allergen Reactions    Fish containing products      Other reaction(s): .     Past Medical History:   Diagnosis Date    Diabetes mellitus, type 2     GERD (gastroesophageal reflux disease)     Hyperlipidemia     Hypertension      Past Surgical History:   Procedure Laterality Date    FRACTURE SURGERY      INCISION AND DRAINAGE OF ABSCESS Left 10/27/2024    Procedure: INCISION AND DRAINAGE, ABSCESS;  Surgeon: Gokul Troy MD;  Location: [facility] OR;  Service: Orthopedics;  Laterality: Left;  INCISION AND DRAIN OF LEFT ELBOW ABCESS    INSERTION OF PACEMAKER      LEG AMPUTATION      REMOVAL,ORTHOPEDIC HARDWARE,UPPER EXTREMITY Left 3/28/2025    Procedure: REMOVAL, HARDWARE, UPPER EXTREMITY;  Surgeon: Gokul Troy MD;  Location: [facility] OR;  Service: Orthopedics;  Laterality: Left;  large c-arm; removal of previous tension band wire construct; need pin cutter, rasp; tamp, mallet.      Social History     Tobacco Use    Smoking status: Former     Types: Cigarettes    Smokeless tobacco: Former     Types: Snuff     Quit date: 04/2024    Tobacco comments:     Pt used smokeless tobacco from 4253-3672 and quit. Started dipping tobacco throughout most of  "his 20's. Started smoking cigarettes again in 06/2024.    Substance Use Topics    Alcohol use: Not on file     Family History   Problem Relation Name Age of Onset    Arthritis Mother         Pertinent medications noted:     Review of Systems  10 system review unremarkable.    Outdoor activities:  Wheelchair-bound  Travel:  No recent travel  Implants:  Left elbow hardware  Antibiotic History:  As in HPI      Objective:      Blood pressure 122/70, pulse 82, temperature 98.1 °F (36.7 °C), height 5' 10" (1.778 m), weight 98.9 kg (218 lb), SpO2 97%. Body mass index is 31.28 kg/m².  Physical Exam      General:  Elderly man sitting in wheelchair in no acute distress   Left elbow:  Flexion contracture.  Healed wound  CVS: S1 and 2 heard, no murmurs appreciated  Respiratory:  Clear to auscultation   Abdomen: Full, soft, nontender, no palpable organomegaly   Skin:  Scaling with fading erythema bilateral dorsal hands   Psychiatric: Normal mood, speech,  demeanor     Wound:  None    VAD:  None    5/8/25        General Labs reviewed:  Lab Results   Component Value Date    WBC 8.50 03/11/2025    RBC 4.67 03/11/2025    HGB 11.9 (L) 03/11/2025    HCT 38.7 (L) 03/11/2025    MCV 83 03/11/2025    MCH 25.5 (L) 03/11/2025    MCHC 30.7 (L) 03/11/2025    RDW 17.2 (H) 03/11/2025     03/11/2025    MPV 11.1 03/11/2025    GRAN 4.6 03/11/2025    GRAN 53.8 03/11/2025    LYMPH 2.9 03/11/2025    LYMPH 34.4 03/11/2025    MONO 0.8 03/11/2025    MONO 9.3 03/11/2025    EOS 0.1 03/11/2025    BASO 0.06 03/11/2025    EOSINOPHIL 1.6 03/11/2025    BASOPHIL 0.7 03/11/2025     CMP  Sodium   Date Value Ref Range Status   04/09/2025 140 136 - 145 mmol/L Final   03/11/2025 137 136 - 145 mmol/L Final     Potassium   Date Value Ref Range Status   04/09/2025 5.2 (H) 3.5 - 5.1 mmol/L Final   03/11/2025 5.4 (H) 3.5 - 5.1 mmol/L Final     Chloride   Date Value Ref Range Status   04/09/2025 106 95 - 110 mmol/L Final   03/11/2025 106 95 - 110 mmol/L Final "     CO2   Date Value Ref Range Status   04/09/2025 26 23 - 29 mmol/L Final   03/11/2025 20 (L) 23 - 29 mmol/L Final     Glucose   Date Value Ref Range Status   04/09/2025 119 (H) 70 - 110 mg/dL Final   03/11/2025 101 70 - 110 mg/dL Final     BUN   Date Value Ref Range Status   04/09/2025 27 (H) 8 - 23 mg/dL Final     Creatinine   Date Value Ref Range Status   04/09/2025 1.4 0.5 - 1.4 mg/dL Final     Calcium   Date Value Ref Range Status   04/09/2025 9.5 8.7 - 10.5 mg/dL Final   03/11/2025 9.5 8.7 - 10.5 mg/dL Final     Total Protein   Date Value Ref Range Status   02/06/2025 7.8 6.0 - 8.4 g/dL Final     Albumin   Date Value Ref Range Status   02/06/2025 3.7 3.5 - 5.2 g/dL Final     Total Bilirubin   Date Value Ref Range Status   02/06/2025 0.4 0.1 - 1.0 mg/dL Final     Comment:     For infants and newborns, interpretation of results should be based  on gestational age, weight and in agreement with clinical  observations.    Premature Infant recommended reference ranges:  Up to 24 hours.............<8.0 mg/dL  Up to 48 hours............<12.0 mg/dL  3-5 days..................<15.0 mg/dL  6-29 days.................<15.0 mg/dL       Alkaline Phosphatase   Date Value Ref Range Status   02/06/2025 71 40 - 150 U/L Final     AST   Date Value Ref Range Status   02/06/2025 27 10 - 40 U/L Final     ALT   Date Value Ref Range Status   02/06/2025 26 10 - 44 U/L Final     Anion Gap   Date Value Ref Range Status   04/09/2025 8 8 - 16 mmol/L Final     eGFR   Date Value Ref Range Status   04/09/2025 55 (L) >60 mL/min/1.73/m2 Final     Comment:     Estimated GFR calculated using the CKD-EPI creatinine (2021) equation.   03/11/2025 >60.0 >60 mL/min/1.73 m^2 Final           Micro:  Microbiology Results (last 7 days)       ** No results found for the last 168 hours. **          Imaging Reviewed:          Assessment:     Complicated MSSA bacteremia.  This was from left elbow infection.  TTE and ROSE both negative for valvular and lead  vegetation.  Antibiotics as below.      2.  MSSA Left elbow abscess.  He had I&D 10/27/2024.  He has some retained hardware with probable clinical osteomyelitis.  Completed cefazolin and rifampin 600 mg b.i.d. for 6 weeks through 12/05/2024.  He is sleeping of the protruding K wire on 03/28/2025.  He will still be at risk for recurrent/relapse of infection since he still has retained her in the elbow.  We will need to stay on suppressive doxycycline.  Discussed risk of photosensitivity.     3. Right shoulder pain.  This is chronic associated with weakness.  Has mild DJD on x-ray.     4. Diabetes mellitus.  Management as per hospitalist.    5. Bilateral hand rash.  Either pedis was Photosensitivity rash related to hydrochlorothiazide.  It has improved off hydrochlorothiazide.  Keep in mind possibility of photosensitivity related to doxycycline.  Counseled and educated.    I will see again in 3 months.    Problem List Items Addressed This Visit    None       Plan:     As above      There are no diagnoses linked to this encounter.    This note was created using Dragon voice recognition software that occasionally misinterpreted phrases or words.

## 2025-05-08 NOTE — TELEPHONE ENCOUNTER
Patient requesting to transfer prescription to the following pharmacy:     [facility] - [place], [place] - [address] Phone: [phone]   Fax: [phone]        LOV--5/1/25  FOV--6/30/25

## 2025-05-12 ENCOUNTER — LAB VISIT (OUTPATIENT)
Dept: LAB | Facility: HOSPITAL | Age: 65
End: 2025-05-12
Attending: STUDENT IN AN ORGANIZED HEALTH CARE EDUCATION/TRAINING PROGRAM
Payer: MEDICARE

## 2025-05-12 DIAGNOSIS — E87.5 HYPERKALEMIA: ICD-10-CM

## 2025-05-12 LAB
ANION GAP (OHS): 9 MMOL/L (ref 8–16)
BUN SERPL-MCNC: 21 MG/DL (ref 8–23)
CALCIUM SERPL-MCNC: 8.3 MG/DL (ref 8.7–10.5)
CHLORIDE SERPL-SCNC: 105 MMOL/L (ref 95–110)
CO2 SERPL-SCNC: 23 MMOL/L (ref 23–29)
CREAT SERPL-MCNC: 1.2 MG/DL (ref 0.5–1.4)
GFR SERPLBLD CREATININE-BSD FMLA CKD-EPI: >60 ML/MIN/1.73/M2
GLUCOSE SERPL-MCNC: 152 MG/DL (ref 70–110)
POTASSIUM SERPL-SCNC: 4.9 MMOL/L (ref 3.5–5.1)
SODIUM SERPL-SCNC: 137 MMOL/L (ref 136–145)

## 2025-05-12 PROCEDURE — 80048 BASIC METABOLIC PNL TOTAL CA: CPT

## 2025-05-12 PROCEDURE — 36415 COLL VENOUS BLD VENIPUNCTURE: CPT | Mod: PO

## 2025-05-13 ENCOUNTER — TELEPHONE (OUTPATIENT)
Dept: FAMILY MEDICINE | Facility: CLINIC | Age: 65
End: 2025-05-13
Payer: MEDICARE

## 2025-05-13 DIAGNOSIS — K08.89 TOOTHACHE: Primary | ICD-10-CM

## 2025-05-13 RX ORDER — AMOXICILLIN AND CLAVULANATE POTASSIUM 875; 125 MG/1; MG/1
1 TABLET, FILM COATED ORAL 2 TIMES DAILY
Qty: 14 TABLET | Refills: 0 | Status: SHIPPED | OUTPATIENT
Start: 2025-05-13 | End: 2025-05-20

## 2025-05-13 NOTE — TELEPHONE ENCOUNTER
Malaika Mclean Staff     ----- Message from Malaika sent at 5/13/2025  8:55 AM CDT -----  Contact: Wife  Type: Needs Medical Advice  Who Called:  Patients wife Wanda     Symptoms (please be specific):  has bad toothache  How long has patient had these symptoms:  last few days    Pharmacy name and phone #:  [facility] - [place], [place] - [address] [place] [address]: [phone] Fax: [phone]    Best Call Back Number: [phone]    Additional Information: Pt is calling in regards to a terrible toothache and possible infection in his tooth and he can't get in to the dentist right away, wanting to see if Dr Galindo could possibly call him in some amoxicillin and also the oral rinse Chlorhexidine 0.12%, stated he was given this before and it helped him. Can we please check with Dr Galindo and call back to advise. Thank You.

## 2025-05-13 NOTE — TELEPHONE ENCOUNTER
Call placed to patient, advised prescription for antibiotic was sent to pharmacy. Also advised Dr. Galindo is requesting for patient to follow up with dentist as soon as he can schedule and arrange transportation.  Patient verbalized understanding, states he is attempting to locate a dentist who takes his insurance.

## 2025-05-26 RX ORDER — DOXYCYCLINE HYCLATE 100 MG
100 TABLET ORAL 2 TIMES DAILY
Qty: 60 TABLET | Refills: 0 | Status: SHIPPED | OUTPATIENT
Start: 2025-05-26

## 2025-05-27 DIAGNOSIS — F32.A DEPRESSION, UNSPECIFIED DEPRESSION TYPE: ICD-10-CM

## 2025-05-27 RX ORDER — CITALOPRAM 10 MG/1
10 TABLET ORAL DAILY
Qty: 90 TABLET | Refills: 1 | OUTPATIENT
Start: 2025-05-27 | End: 2025-11-23

## 2025-06-05 ENCOUNTER — OFFICE VISIT (OUTPATIENT)
Dept: CARDIOLOGY | Facility: CLINIC | Age: 65
End: 2025-06-05
Payer: MEDICARE

## 2025-06-05 VITALS
SYSTOLIC BLOOD PRESSURE: 142 MMHG | HEIGHT: 70 IN | HEART RATE: 82 BPM | BODY MASS INDEX: 31.21 KG/M2 | WEIGHT: 218 LBS | DIASTOLIC BLOOD PRESSURE: 81 MMHG

## 2025-06-05 DIAGNOSIS — Z86.73 HISTORY OF STROKE: ICD-10-CM

## 2025-06-05 DIAGNOSIS — E78.2 MIXED HYPERLIPIDEMIA: ICD-10-CM

## 2025-06-05 DIAGNOSIS — I25.10 CORONARY ARTERY DISEASE, UNSPECIFIED VESSEL OR LESION TYPE, UNSPECIFIED WHETHER ANGINA PRESENT, UNSPECIFIED WHETHER NATIVE OR TRANSPLANTED HEART: ICD-10-CM

## 2025-06-05 DIAGNOSIS — Z95.810 ICD (IMPLANTABLE CARDIOVERTER-DEFIBRILLATOR) IN PLACE: Primary | ICD-10-CM

## 2025-06-05 DIAGNOSIS — I10 PRIMARY HYPERTENSION: ICD-10-CM

## 2025-06-05 PROBLEM — E66.01 SEVERE OBESITY (BMI 35.0-39.9) WITH COMORBIDITY: Status: RESOLVED | Noted: 2024-10-01 | Resolved: 2025-06-05

## 2025-06-05 PROCEDURE — 3044F HG A1C LEVEL LT 7.0%: CPT | Mod: CPTII,S$GLB,, | Performed by: INTERNAL MEDICINE

## 2025-06-05 PROCEDURE — 99214 OFFICE O/P EST MOD 30 MIN: CPT | Mod: S$GLB,,, | Performed by: INTERNAL MEDICINE

## 2025-06-05 PROCEDURE — 3079F DIAST BP 80-89 MM HG: CPT | Mod: CPTII,S$GLB,, | Performed by: INTERNAL MEDICINE

## 2025-06-05 PROCEDURE — 4010F ACE/ARB THERAPY RXD/TAKEN: CPT | Mod: CPTII,S$GLB,, | Performed by: INTERNAL MEDICINE

## 2025-06-05 PROCEDURE — 3077F SYST BP >= 140 MM HG: CPT | Mod: CPTII,S$GLB,, | Performed by: INTERNAL MEDICINE

## 2025-06-05 PROCEDURE — 99999 PR PBB SHADOW E&M-EST. PATIENT-LVL III: CPT | Mod: PBBFAC,,, | Performed by: INTERNAL MEDICINE

## 2025-06-05 PROCEDURE — G2211 COMPLEX E/M VISIT ADD ON: HCPCS | Mod: S$GLB,,, | Performed by: INTERNAL MEDICINE

## 2025-06-05 PROCEDURE — 1126F AMNT PAIN NOTED NONE PRSNT: CPT | Mod: CPTII,S$GLB,, | Performed by: INTERNAL MEDICINE

## 2025-06-05 PROCEDURE — 3066F NEPHROPATHY DOC TX: CPT | Mod: CPTII,S$GLB,, | Performed by: INTERNAL MEDICINE

## 2025-06-05 PROCEDURE — 3060F POS MICROALBUMINURIA REV: CPT | Mod: CPTII,S$GLB,, | Performed by: INTERNAL MEDICINE

## 2025-06-05 PROCEDURE — 3288F FALL RISK ASSESSMENT DOCD: CPT | Mod: CPTII,S$GLB,, | Performed by: INTERNAL MEDICINE

## 2025-06-05 PROCEDURE — 1101F PT FALLS ASSESS-DOCD LE1/YR: CPT | Mod: CPTII,S$GLB,, | Performed by: INTERNAL MEDICINE

## 2025-06-05 PROCEDURE — 3008F BODY MASS INDEX DOCD: CPT | Mod: CPTII,S$GLB,, | Performed by: INTERNAL MEDICINE

## 2025-06-05 PROCEDURE — 1159F MED LIST DOCD IN RCRD: CPT | Mod: CPTII,S$GLB,, | Performed by: INTERNAL MEDICINE

## 2025-06-06 DIAGNOSIS — N18.2 TYPE 2 DIABETES MELLITUS WITH STAGE 2 CHRONIC KIDNEY DISEASE, WITHOUT LONG-TERM CURRENT USE OF INSULIN: ICD-10-CM

## 2025-06-06 DIAGNOSIS — E11.22 TYPE 2 DIABETES MELLITUS WITH STAGE 2 CHRONIC KIDNEY DISEASE, WITHOUT LONG-TERM CURRENT USE OF INSULIN: ICD-10-CM

## 2025-06-06 RX ORDER — INSULIN GLARGINE 100 [IU]/ML
20 INJECTION, SOLUTION SUBCUTANEOUS NIGHTLY
Qty: 18 ML | Refills: 1 | Status: SHIPPED | OUTPATIENT
Start: 2025-06-06 | End: 2025-12-03

## 2025-06-17 ENCOUNTER — OFFICE VISIT (OUTPATIENT)
Dept: OPHTHALMOLOGY | Facility: CLINIC | Age: 65
End: 2025-06-17
Payer: MEDICARE

## 2025-06-17 DIAGNOSIS — Z96.1 PSEUDOPHAKIA, BOTH EYES: ICD-10-CM

## 2025-06-17 DIAGNOSIS — Z79.4 TYPE 2 DIABETES MELLITUS WITH BOTH EYES AFFECTED BY MILD NONPROLIFERATIVE RETINOPATHY WITHOUT MACULAR EDEMA, WITH LONG-TERM CURRENT USE OF INSULIN: Primary | ICD-10-CM

## 2025-06-17 DIAGNOSIS — H04.123 DRY EYE SYNDROME, BILATERAL: ICD-10-CM

## 2025-06-17 DIAGNOSIS — E11.3293 TYPE 2 DIABETES MELLITUS WITH BOTH EYES AFFECTED BY MILD NONPROLIFERATIVE RETINOPATHY WITHOUT MACULAR EDEMA, WITH LONG-TERM CURRENT USE OF INSULIN: Primary | ICD-10-CM

## 2025-06-17 DIAGNOSIS — H26.493 POSTERIOR CAPSULAR OPACIFICATION, BILATERAL: ICD-10-CM

## 2025-06-17 PROCEDURE — 3044F HG A1C LEVEL LT 7.0%: CPT | Mod: CPTII,S$GLB,, | Performed by: OPHTHALMOLOGY

## 2025-06-17 PROCEDURE — 3288F FALL RISK ASSESSMENT DOCD: CPT | Mod: CPTII,S$GLB,, | Performed by: OPHTHALMOLOGY

## 2025-06-17 PROCEDURE — 99204 OFFICE O/P NEW MOD 45 MIN: CPT | Mod: S$GLB,,, | Performed by: OPHTHALMOLOGY

## 2025-06-17 PROCEDURE — 4010F ACE/ARB THERAPY RXD/TAKEN: CPT | Mod: CPTII,S$GLB,, | Performed by: OPHTHALMOLOGY

## 2025-06-17 PROCEDURE — 3066F NEPHROPATHY DOC TX: CPT | Mod: CPTII,S$GLB,, | Performed by: OPHTHALMOLOGY

## 2025-06-17 PROCEDURE — 3060F POS MICROALBUMINURIA REV: CPT | Mod: CPTII,S$GLB,, | Performed by: OPHTHALMOLOGY

## 2025-06-17 PROCEDURE — 2022F DILAT RTA XM EVC RTNOPTHY: CPT | Mod: CPTII,S$GLB,, | Performed by: OPHTHALMOLOGY

## 2025-06-17 PROCEDURE — 99999 PR PBB SHADOW E&M-EST. PATIENT-LVL III: CPT | Mod: PBBFAC,,, | Performed by: OPHTHALMOLOGY

## 2025-06-17 PROCEDURE — 1126F AMNT PAIN NOTED NONE PRSNT: CPT | Mod: CPTII,S$GLB,, | Performed by: OPHTHALMOLOGY

## 2025-06-17 PROCEDURE — 1101F PT FALLS ASSESS-DOCD LE1/YR: CPT | Mod: CPTII,S$GLB,, | Performed by: OPHTHALMOLOGY

## 2025-06-17 PROCEDURE — 1159F MED LIST DOCD IN RCRD: CPT | Mod: CPTII,S$GLB,, | Performed by: OPHTHALMOLOGY

## 2025-06-17 RX ORDER — LOSARTAN POTASSIUM 50 MG/1
50 TABLET ORAL
COMMUNITY
Start: 2025-04-14

## 2025-06-17 NOTE — PROGRESS NOTES
HPI    New patient here for DM exam    Pt states having trouble with small print at times. Wife states pt has dry   eyes denies using gtts.    Hemoglobin A1C       Date                     Value               Ref Range             Status                02/06/2025               6.2 (H)             4.0 - 5.6 %           Final                       10/30/2023               6.5 (H)             4.0 - 5.6 %           Final                Last edited by Lorraine Crocker on 6/17/2025 10:43 AM.            Assessment /Plan     For exam results, see Encounter Report.    Type 2 diabetes mellitus with both eyes affected by mild nonproliferative retinopathy without macular edema, with long-term current use of insulin  -     Ambulatory referral/consult to Optometry    Dry eye syndrome, bilateral    Posterior capsular opacification, bilateral    Pseudophakia, both eyes      1. Type 2 diabetes mellitus with both eyes affected by mild nonproliferative retinopathy without macular edema, with long-term current use of insulin (Primary)  Mild DR changes, no DME  BG control stressed    2. Dry eye syndrome, bilateral  Recommend ATs up to QID PRN    3. Posterior capsular opacification, bilateral  OD>OS, discussed yag cap, pt interested for OD    Schedule yag cap OD only for now, consider OS in future    4. Pseudophakia, both eyes  Hold off on Rx until after yag  Try OTC readers

## 2025-06-25 ENCOUNTER — TELEPHONE (OUTPATIENT)
Dept: FAMILY MEDICINE | Facility: CLINIC | Age: 65
End: 2025-06-25
Payer: MEDICARE

## 2025-06-25 DIAGNOSIS — N18.2 TYPE 2 DIABETES MELLITUS WITH STAGE 2 CHRONIC KIDNEY DISEASE, WITHOUT LONG-TERM CURRENT USE OF INSULIN: ICD-10-CM

## 2025-06-25 DIAGNOSIS — E11.22 TYPE 2 DIABETES MELLITUS WITH STAGE 2 CHRONIC KIDNEY DISEASE, WITHOUT LONG-TERM CURRENT USE OF INSULIN: ICD-10-CM

## 2025-06-25 NOTE — TELEPHONE ENCOUNTER
"Copied from CRM #[id number]. Topic: Medications - Medication Authorization  >> Jun 25, 2025 12:23 PM Julian wrote:  Type:  RX Refill Request    Who Called: Pt  Refill or New Rx:refill  RX Name and Strength:  atorvastatin (LIPITOR) 80 MG tablet  BD AUTOSHIELD DUO PEN NEEDLE 30 gauge x 3/16" Ndle  BD POSIFLUSH NORMAL SALINE 0.9 injection  carvediloL (COREG) 12.5 MG tablet  citalopram (CELEXA) 10 MG tablet  doxycycline (VIBRAMYCIN) 100 MG Cap  empagliflozin (JARDIANCE) 25 mg tablet  ezetimibe (ZETIA) 10 mg tablet  famotidine (PEPCID) 20 MG tablet  insulin glargine U-100, Lantus, 100 unit/mL (3 mL) SubQ InPn pen  levothyroxine (SYNTHROID) 75 MCG tablet  metFORMIN (GLUCOPHAGE) 1000 MG tablet  ondansetron (ZOFRAN) 8 MG tablet  SITagliptin phosphate (JANUVIA) 100 MG Tab  How is the patient currently taking it? (ex. 1XDay): as prescribed  Is this a 30 day or 90 day RX: 90  Preferred Pharmacy with phone number:  [facility] - [facility] [address]  [address] [address]  Phone: [phone] Fax: [phone]  Local or Mail Order:mail  Ordering Provider:Galindo  Would the patient rather a call back or a response via [facility]? Call  Best Call Back Number: [phone]   Additional Information: [facility] req auth to deliver pt meds. Pt wants to make sure his meds are up to date. Pls call back and adv. Thank you.  "

## 2025-06-25 NOTE — TELEPHONE ENCOUNTER
Spoke with patient verified he will be now getting all his medications through [facility].

## 2025-06-26 ENCOUNTER — HOSPITAL ENCOUNTER (OUTPATIENT)
Dept: CARDIOLOGY | Facility: CLINIC | Age: 65
Discharge: HOME | End: 2025-06-26
Attending: GENERAL PRACTICE
Payer: MEDICARE

## 2025-06-26 ENCOUNTER — CLINICAL SUPPORT (OUTPATIENT)
Dept: CARDIOLOGY | Facility: CLINIC | Age: 65
End: 2025-06-26
Payer: MEDICARE

## 2025-06-26 DIAGNOSIS — Z95.810 PRESENCE OF AUTOMATIC (IMPLANTABLE) CARDIAC DEFIBRILLATOR: ICD-10-CM

## 2025-06-26 PROCEDURE — 93296 REM INTERROG EVL PM/IDS: CPT | Mod: PN | Performed by: GENERAL PRACTICE

## 2025-06-26 PROCEDURE — 93295 DEV INTERROG REMOTE 1/2/MLT: CPT | Mod: S$GLB,,, | Performed by: GENERAL PRACTICE

## 2025-06-26 RX ORDER — INSULIN GLARGINE 100 [IU]/ML
20 INJECTION, SOLUTION SUBCUTANEOUS NIGHTLY
Qty: 18 ML | Refills: 1 | Status: SHIPPED | OUTPATIENT
Start: 2025-06-26 | End: 2025-12-23

## 2025-06-30 ENCOUNTER — LAB VISIT (OUTPATIENT)
Dept: LAB | Facility: HOSPITAL | Age: 65
End: 2025-06-30
Payer: MEDICARE

## 2025-06-30 ENCOUNTER — OFFICE VISIT (OUTPATIENT)
Dept: FAMILY MEDICINE | Facility: CLINIC | Age: 65
End: 2025-06-30
Payer: MEDICARE

## 2025-06-30 VITALS
HEART RATE: 81 BPM | OXYGEN SATURATION: 99 % | TEMPERATURE: 98 F | DIASTOLIC BLOOD PRESSURE: 70 MMHG | RESPIRATION RATE: 12 BRPM | SYSTOLIC BLOOD PRESSURE: 128 MMHG | WEIGHT: 220.88 LBS | HEIGHT: 70 IN | BODY MASS INDEX: 31.62 KG/M2

## 2025-06-30 DIAGNOSIS — E87.5 HYPERKALEMIA: ICD-10-CM

## 2025-06-30 DIAGNOSIS — I10 PRIMARY HYPERTENSION: ICD-10-CM

## 2025-06-30 DIAGNOSIS — N18.2 TYPE 2 DIABETES MELLITUS WITH STAGE 2 CHRONIC KIDNEY DISEASE, WITHOUT LONG-TERM CURRENT USE OF INSULIN: Primary | ICD-10-CM

## 2025-06-30 DIAGNOSIS — E78.2 MIXED HYPERLIPIDEMIA: ICD-10-CM

## 2025-06-30 DIAGNOSIS — I42.9 CARDIOMYOPATHY, UNSPECIFIED TYPE: ICD-10-CM

## 2025-06-30 DIAGNOSIS — Z95.810 ICD (IMPLANTABLE CARDIOVERTER-DEFIBRILLATOR) IN PLACE: ICD-10-CM

## 2025-06-30 DIAGNOSIS — E11.22 TYPE 2 DIABETES MELLITUS WITH STAGE 2 CHRONIC KIDNEY DISEASE, WITHOUT LONG-TERM CURRENT USE OF INSULIN: Primary | ICD-10-CM

## 2025-06-30 DIAGNOSIS — N18.2 TYPE 2 DIABETES MELLITUS WITH STAGE 2 CHRONIC KIDNEY DISEASE, WITHOUT LONG-TERM CURRENT USE OF INSULIN: ICD-10-CM

## 2025-06-30 DIAGNOSIS — E11.22 TYPE 2 DIABETES MELLITUS WITH STAGE 2 CHRONIC KIDNEY DISEASE, WITHOUT LONG-TERM CURRENT USE OF INSULIN: ICD-10-CM

## 2025-06-30 LAB
ANION GAP (OHS): 11 MMOL/L (ref 8–16)
BUN SERPL-MCNC: 24 MG/DL (ref 8–23)
CALCIUM SERPL-MCNC: 9.1 MG/DL (ref 8.7–10.5)
CHLORIDE SERPL-SCNC: 107 MMOL/L (ref 95–110)
CO2 SERPL-SCNC: 21 MMOL/L (ref 23–29)
CREAT SERPL-MCNC: 1.2 MG/DL (ref 0.5–1.4)
EAG (OHS): 137 MG/DL (ref 68–131)
GFR SERPLBLD CREATININE-BSD FMLA CKD-EPI: >60 ML/MIN/1.73/M2
GLUCOSE SERPL-MCNC: 142 MG/DL (ref 70–110)
HBA1C MFR BLD: 6.4 % (ref 4–5.6)
POTASSIUM SERPL-SCNC: 5 MMOL/L (ref 3.5–5.1)
SODIUM SERPL-SCNC: 139 MMOL/L (ref 136–145)

## 2025-06-30 PROCEDURE — 3074F SYST BP LT 130 MM HG: CPT | Mod: CPTII,S$GLB,,

## 2025-06-30 PROCEDURE — 3060F POS MICROALBUMINURIA REV: CPT | Mod: CPTII,S$GLB,,

## 2025-06-30 PROCEDURE — 3008F BODY MASS INDEX DOCD: CPT | Mod: CPTII,S$GLB,,

## 2025-06-30 PROCEDURE — 36415 COLL VENOUS BLD VENIPUNCTURE: CPT | Mod: PO

## 2025-06-30 PROCEDURE — 1101F PT FALLS ASSESS-DOCD LE1/YR: CPT | Mod: CPTII,S$GLB,,

## 2025-06-30 PROCEDURE — 99999 PR PBB SHADOW E&M-EST. PATIENT-LVL IV: CPT | Mod: PBBFAC,,,

## 2025-06-30 PROCEDURE — 1159F MED LIST DOCD IN RCRD: CPT | Mod: CPTII,S$GLB,,

## 2025-06-30 PROCEDURE — 83036 HEMOGLOBIN GLYCOSYLATED A1C: CPT

## 2025-06-30 PROCEDURE — 3078F DIAST BP <80 MM HG: CPT | Mod: CPTII,S$GLB,,

## 2025-06-30 PROCEDURE — G2211 COMPLEX E/M VISIT ADD ON: HCPCS | Mod: S$GLB,,,

## 2025-06-30 PROCEDURE — 3066F NEPHROPATHY DOC TX: CPT | Mod: CPTII,S$GLB,,

## 2025-06-30 PROCEDURE — 99214 OFFICE O/P EST MOD 30 MIN: CPT | Mod: S$GLB,,,

## 2025-06-30 PROCEDURE — 80048 BASIC METABOLIC PNL TOTAL CA: CPT

## 2025-06-30 PROCEDURE — 3288F FALL RISK ASSESSMENT DOCD: CPT | Mod: CPTII,S$GLB,,

## 2025-06-30 PROCEDURE — 4010F ACE/ARB THERAPY RXD/TAKEN: CPT | Mod: CPTII,S$GLB,,

## 2025-06-30 PROCEDURE — 3044F HG A1C LEVEL LT 7.0%: CPT | Mod: CPTII,S$GLB,,

## 2025-06-30 PROCEDURE — 1160F RVW MEDS BY RX/DR IN RCRD: CPT | Mod: CPTII,S$GLB,,

## 2025-06-30 NOTE — PROGRESS NOTES
Subjective:       Patient ID:  [id number]     Chief Complaint: Follow-up      History of Present Illness    Patient presents to the clinic for follow-up. He reports he is doing well without any acute complaints. 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 He has since quit all tobacco products, particularly after experiencing heart attacks. He has  service history.  No other concerns.          Past Medical History:   Diagnosis Date    Diabetes mellitus, type 2     GERD (gastroesophageal reflux disease)     Hyperlipidemia     Hypertension       Active Problem List with Overview Notes    Diagnosis Date Noted    Failed orthopedic implant 03/28/2025    Pulmonary hypertension 02/05/2025    Cardiomyopathy, unspecified type 02/05/2025    Olecranon bursitis of left elbow 10/24/2024    Tobacco dependency 10/22/2024    Septic shock 10/22/2024    High anion gap metabolic acidosis 10/22/2024    SIRIA (acute kidney injury) 10/22/2024    Hyperkalemia 10/22/2024    Atrial fibrillation, chronic 10/22/2024    ICD (implantable cardioverter-defibrillator) in place 10/01/2024    History of stroke 10/01/2024    Mixed hyperlipidemia 10/01/2024    Encounter to discuss test results 10/01/2024    Cardiac pacemaker 12/11/2023    Atherosclerosis of aorta 12/11/2023    Status post unilateral below-knee amputation 10/30/2023    Type 2 diabetes mellitus with stage 2 chronic kidney disease, without long-term current use of insulin 10/30/2023    BMI 31.0-31.9,adult 10/30/2023    Depression 10/30/2023    Diabetes mellitus due to underlying condition with diabetic chronic kidney disease 10/30/2023    Hypertension       Review of patient's allergies indicates:   Allergen Reactions    Hydrochlorothiazide Other (See Comments)     Sun burn    Fish containing products      Other reaction(s): .       Current Medications[1]    Lab Results   Component Value Date     WBC 8.50 03/11/2025    HGB 11.9 (L) 03/11/2025    HCT 38.7 (L) 03/11/2025     03/11/2025    CHOL 99 (L) 02/06/2025    TRIG 50 02/06/2025    HDL 43 02/06/2025    ALT 26 02/06/2025    AST 27 02/06/2025     05/12/2025    K 4.9 05/12/2025     05/12/2025    CREATININE 1.2 05/12/2025    BUN 21 05/12/2025    CO2 23 05/12/2025    TSH 1.274 02/06/2025    PSA 2.1 02/06/2025    INR 1.0 10/29/2024    HGBA1C 6.2 (H) 02/06/2025       Review of Systems   Constitutional:  Negative for fatigue and fever.   HENT: Negative.  Negative for congestion, sneezing and sore throat.    Eyes: Negative.    Respiratory:  Negative for cough, shortness of breath and wheezing.    Cardiovascular:  Negative for chest pain, palpitations and leg swelling.   Gastrointestinal:  Negative for abdominal pain, nausea and vomiting.   Genitourinary: Negative.    Musculoskeletal: Negative.  Negative for arthralgias.   Skin: Negative.  Negative for rash.   Neurological:  Negative for dizziness, weakness, light-headedness, numbness and headaches.   Hematological: Negative.    Psychiatric/Behavioral: Negative.         Objective:      Physical Exam  Constitutional:       Appearance: Normal appearance.   HENT:      Head: Normocephalic and atraumatic.      Nose: Nose normal.   Eyes:      Extraocular Movements: Extraocular movements intact.   Cardiovascular:      Rate and Rhythm: Normal rate and regular rhythm.   Pulmonary:      Effort: Pulmonary effort is normal.      Breath sounds: Normal breath sounds.   Musculoskeletal:      Cervical back: Normal range of motion.      Left Lower Extremity: Left leg is amputated above knee.   Skin:     General: Skin is warm and dry.   Neurological:      General: No focal deficit present.      Mental Status: He is alert and oriented to person, place, and time.   Psychiatric:         Mood and Affect: Mood normal.         Assessment:       1. Type 2 diabetes mellitus with stage 2 chronic kidney disease, without  long-term current use of insulin    2. Hyperkalemia    3. Primary hypertension    4. ICD (implantable cardioverter-defibrillator) in place    5. Mixed hyperlipidemia    6. Cardiomyopathy, unspecified type        Plan:       Assessment & Plan    IMPRESSION:  - Reviewed recent lab results, noting elevated potassium levels.       Dhaval was seen today for follow-up.    Diagnoses and all orders for this visit:    Type 2 diabetes mellitus with stage 2 chronic kidney disease, without long-term current use of insulin  -     Hemoglobin A1C; Future  - Most recent A1C: 6.2  - CMP : ordered  - Micro: UTD  - continue current regimen   - discussed medication compliance   - yearly eye exam recommended  - LDL goal <70  - discussed importance of frequent foot exam     Hyperkalemia  -     Basic Metabolic Panel; Future    Primary hypertension  - well controlled   - continue current regimen  - recently DC HCTZ 2/2 rash  - discussed dash diet, exercise/weight loss, and increased cardiovascular exercise   - monitor BP at home w/ goal <130/80     ICD (implantable cardioverter-defibrillator) in place  Continue to f/u with cards    Mixed hyperlipidemia  - low fat diet and exercise   - continue statin    Cardiomyopathy, unspecified type  Continue to follow-up  with cardiology  Continue current regimen               Future Appointments       Date Provider Specialty Appt Notes    6/30/2025  Lab .    7/18/2025 Nehemiah Orozco MD Ophthalmology Yag Cap OD    8/7/2025 Migel Watson MD Infectious Diseases 3 mo f/u    2/6/2026 Maritza Galindo MD Family Medicine annual    5/5/2026  Cardiology [employer]tronic anugu               Portions of this note may have been dictated using voice recognition software and may contain dictation related errors in spelling / grammar / syntax not discovered on text review.     This note was generated with the assistance of ambient listening technology. Verbal consent was obtained by the patient and accompanying  "visitor(s) for the recording of patient appointment to facilitate this note. I attest to having reviewed and edited the generated note for accuracy, though some syntax or spelling errors may persist. Please contact the author of this note for any clarification.       Kaitlin Piña PA-C         [1]   Current Outpatient Medications:     aspirin 81 MG Chew, Take 81 mg by mouth once daily., Disp: , Rfl:     atorvastatin (LIPITOR) 80 MG tablet, Take 80 mg by mouth every evening., Disp: 90 tablet, Rfl: 3    BD AUTOSHIELD DUO PEN NEEDLE 30 gauge x 3/16" Ndle, , Disp: , Rfl:     BD POSIFLUSH NORMAL SALINE 0.9 injection, , Disp: , Rfl:     carvediloL (COREG) 12.5 MG tablet, Take 12.5 mg by mouth 2 (two) times daily., Disp: 180 tablet, Rfl: 3    cholecalciferol, vitamin D3, (VITAMIN D3) 125 mcg (5,000 unit) Tab, Take 5,000 Units by mouth once daily., Disp: , Rfl:     citalopram (CELEXA) 10 MG tablet, Take 1 tablet (10 mg total) by mouth once daily., Disp: 90 tablet, Rfl: 1    doxycycline (VIBRA-TABS) 100 MG tablet, Take 1 tablet (100 mg total) by mouth 2 (two) times daily., Disp: 60 tablet, Rfl: 0    doxycycline (VIBRAMYCIN) 100 MG Cap, One tablet twice daily, Disp: 60 capsule, Rfl: 0    empagliflozin (JARDIANCE) 25 mg tablet, Take 1 tablet (25 mg total) by mouth once daily., Disp: 90 tablet, Rfl: 3    ezetimibe (ZETIA) 10 mg tablet, Take 1 tablet (10 mg total) by mouth once daily., Disp: 90 tablet, Rfl: 3    famotidine (PEPCID) 20 MG tablet, Take 1 tablet (20 mg total) by mouth 2 (two) times daily., Disp: 180 tablet, Rfl: 3    insulin glargine U-100, Lantus, 100 unit/mL (3 mL) SubQ InPn pen, Inject 20 Units into the skin every evening., Disp: 18 mL, Rfl: 1    levothyroxine (SYNTHROID) 75 MCG tablet, Take 1 tablet (75 mcg total) by mouth every morning., Disp: 90 tablet, Rfl: 1    losartan (COZAAR) 50 MG tablet, Take 50 mg by mouth., Disp: , Rfl:     metFORMIN (GLUCOPHAGE) 1000 MG tablet, Take 1 tablet (1,000 mg total) by " mouth 2 (two) times daily with meals., Disp: 180 tablet, Rfl: 1    omega 3-dha-epa-fish oil (FISH OIL) 1,200 (144-216) mg Cap, Take 1 capsule by mouth once daily., Disp: , Rfl:     ondansetron (ZOFRAN) 8 MG tablet, Take 1 tablet (8 mg total) by mouth every 8 (eight) hours as needed for Nausea., Disp: 30 tablet, Rfl: 0    SITagliptin phosphate (JANUVIA) 100 MG Tab, Take 1 tablet (100 mg total) by mouth once daily., Disp: 90 tablet, Rfl: 3

## 2025-07-01 ENCOUNTER — RESULTS FOLLOW-UP (OUTPATIENT)
Dept: FAMILY MEDICINE | Facility: CLINIC | Age: 65
End: 2025-07-01

## 2025-07-09 LAB
OHS CV AF BURDEN PERCENT: < 1
OHS CV AF BURDEN PERCENT: < 1
OHS CV DC REMOTE DEVICE TYPE: NORMAL
OHS CV DC REMOTE DEVICE TYPE: NORMAL
OHS CV RV PACING PERCENT: 0.01 %
OHS CV RV PACING PERCENT: 0.01 %

## 2025-07-18 ENCOUNTER — OFFICE VISIT (OUTPATIENT)
Dept: OPHTHALMOLOGY | Facility: CLINIC | Age: 65
End: 2025-07-18
Payer: MEDICARE

## 2025-07-18 DIAGNOSIS — H26.493 POSTERIOR CAPSULAR OPACIFICATION, BILATERAL: Primary | ICD-10-CM

## 2025-07-18 PROCEDURE — 99999 PR PBB SHADOW E&M-EST. PATIENT-LVL III: CPT | Mod: PBBFAC,,, | Performed by: OPHTHALMOLOGY

## 2025-07-18 NOTE — PATIENT INSTRUCTIONS
What are floaters?     Floaters look like small specks, dots, circles, lines or cobwebs in your field of vision. While they seem to be in front of your eye, they are floating inside. Floaters are tiny clumps of gel or cells inside the vitreous that fills your eye. What you see are the shadows these clumps cast on your retina.    You usually notice floaters when looking  at something plain, like a blank wall or a blue hugh.    As we age, our vitreous starts to thicken or shrink. Sometimes clumps or strands form in the vitreous. If the vitreous pulls away from the back of the eye, it is called posterior vitreous detachment. Floaters usually happen with posterior vitreous detachment. They are not serious, and they tend to fade or go away over time. Severe floaters can be removed by surgery, but this has risks and is seldom necessary.    You are more likely to get floaters if you:    are nearsighted (you need glasses to see far away)    have had surgery for cataracts    have had inflammation (swelling) inside the eye      What are flashes?     Flashes can look like flashing lights or lightning streaks in your field of vision. Some people compare them to seeing stars after being hit on the head. You might see flashes on and off for weeks, or even months. Flashes happen when the vitreous rubs or pulls on your retina.    As people age, it is common to see flashes occasionally.       When floaters and flashes are serious     Most floaters and flashes are not a problem. However, there are times when they can be signs of a serious condition. Here is when you should call an ophthalmologist right away:    you notice a lot of new floaters    you have a lot of flashes    a shadow appears in your peripheral (side) vision    a gray curtain covers part of  your vision    These floaters and flashes could be symptoms of a torn or detached retina. This is when the retina pulls away from the back of your eye. This is a serious  condition that needs to be treated.    Summary    Floaters are dark specks or dots in your field of vision. They are shadows you see from clumps of vitreous gel in your eye. Flashes are flashes of light that look like lightning streaks in your field of vision. Flashes occur when the vitreous gel rubs or pulls on your retina.    Floaters and flashes are quite common as people age. However, they can be signs of a retinal detachment, which is a serious problem. If you suddenly have a lot of floaters and see flashes, and you notice changes in your vision, call your ophthalmologist right away.

## 2025-07-18 NOTE — PROGRESS NOTES
HPI    Pt present for YagCap OD    Pt states still having some blurry vision in OD. Denies pain/FOL/floaters    Last edited by Nehemiah Orozco MD on 7/18/2025 11:04 AM.            Assessment /Plan     For exam results, see Encounter Report.    Posterior capsular opacification, bilateral      VS PCO  Discussed YAG Cap, RBA of procedure  Pt agrees, consent signed    YAG Cap OD today, no complications    Post op precautions reviewed, RD precautions    F/u 6 months, DFE, OCT m

## 2025-08-01 RX ORDER — PEN NEEDLE, DIABETIC, SAFETY 30 GX3/16"
NEEDLE, DISPOSABLE MISCELLANEOUS
Qty: 100 EACH | Refills: 0 | OUTPATIENT
Start: 2025-08-01

## 2025-08-01 RX ORDER — PEN NEEDLE, DIABETIC, SAFETY 30 GX3/16"
1 NEEDLE, DISPOSABLE MISCELLANEOUS DAILY
Qty: 100 EACH | Refills: 5 | Status: SHIPPED | OUTPATIENT
Start: 2025-08-01

## 2025-08-01 NOTE — TELEPHONE ENCOUNTER
"Copied from CRM #[id number]. Topic: Medications - New Medication Request  >> Aug 1, 2025 10:01 AM Moncho wrote:  Type:  RX Refill Request    Who Called: patient  Refill or New Rx:new order  RX Name and Strength:BD AUTOSHIELD DUO PEN NEEDLE 30 gauge x 3/16" Ndle  How is the patient currently taking it? (ex. 1XDay):  Is this a 30 day or 90 day RX:  Preferred Pharmacy with phone number:  [facility] - [place] - [address] [facility]  [address] [place] [address]  Phone: [phone] Fax: [phone]  Local or Mail Order:local  Ordering Provider:Galindo  Would the patient rather a call back or a response via [facility]? Please order today/ patient is almost out  Best Call Back Number:[phone]  Additional Information:please send to local pharmacy  "

## 2025-08-07 ENCOUNTER — OFFICE VISIT (OUTPATIENT)
Dept: INFECTIOUS DISEASES | Facility: CLINIC | Age: 65
End: 2025-08-07
Payer: MEDICARE

## 2025-08-07 VITALS
WEIGHT: 219 LBS | OXYGEN SATURATION: 98 % | TEMPERATURE: 98 F | HEIGHT: 70 IN | DIASTOLIC BLOOD PRESSURE: 74 MMHG | SYSTOLIC BLOOD PRESSURE: 126 MMHG | HEART RATE: 79 BPM | BODY MASS INDEX: 31.35 KG/M2

## 2025-08-07 DIAGNOSIS — M86.622: Primary | ICD-10-CM

## 2025-08-07 DIAGNOSIS — A49.01 MSSA (METHICILLIN SUSCEPTIBLE STAPHYLOCOCCUS AUREUS) INFECTION: ICD-10-CM

## 2025-08-07 PROCEDURE — 99999 PR PBB SHADOW E&M-EST. PATIENT-LVL IV: CPT | Mod: PBBFAC,,, | Performed by: INTERNAL MEDICINE

## 2025-08-07 RX ORDER — DOXYCYCLINE 100 MG/1
100 CAPSULE ORAL 2 TIMES DAILY
Qty: 60 CAPSULE | Refills: 3 | Status: SHIPPED | OUTPATIENT
Start: 2025-08-07

## 2025-08-07 NOTE — PATIENT INSTRUCTIONS
I refilled your doxycycline   I will plan to see you again in 4 months   He will continue to be on doxycycline for a prolonged time to continue to prevent infection in your left elbow bone.  Call me if you have any questions

## 2025-08-07 NOTE — PROGRESS NOTES
Subjective:      Reason for consult:     HPI: Dhaval Hernández is a 64 y.o. male   with history of diabetes mellitus, hypertension, hyperlipidemia, GERD and previous left BKA.  Also with history of AICD.  He presents to the ER 10/22/2024 with 4 day history of nausea, vomiting and diarrhea.  He had no fever.  In the ER vitals were abnormal with blood pressure 75/39, pulse 60, respiratory 18, temperature 97.6° oxygen saturation 92%.  WBC was 20 K with left shift.  Creatinine 5.1.  UA abnormal with 19 WBC and 4+ glucose.  Chest x-ray with no clear acute infiltrate.  CT abdomen and pelvis showed mild cardiomegaly and also cholelithiasis.  He was commenced on IV fluid and antibiotics.     Leukocytosis resolved and WBC down to 8.0.  Blood cultures have grown MSSA in 2/2 bottles.  HIDA scan was abnormal.  Surgery was consulted to evaluate for possible cholecystitis.  Notes reviewed.  No plan for surgical intervention at this time since patient had no abdominal pain and appears to be improving.  ID asked to assist with his care.     States his symptoms started with his left elbow hurting.  He has been leaning on the left elbow to transfer from bed to wheelchair.  Has a elbow ORIF and is fixed in a flexion position.  Has had persistent pain for the last 1 week with swelling.  His other symptoms followed after the elbow pain started.       11/19/2024:  Recently hospitalized and treated for left elbow abscess with osteomyelitis complicated by MSSA bacteremia.  Improved post I&D and was ultimately discharged on cefazolin and rifampin to complete 6 week course 12/05/2024.  Sutures have been removed.  Here for follow up.    01/09/2025:  He missed 2 appointments with orthopedic surgeon because of transportation issues.  Left elbow wound continues to heal but not all the way completed.  Completed IV antibiotics and now on oral doxycycline which he is tolerating.   No other acute issues today.  He is also moving a little better  with more strength in his upper extremities.    03/06/2025:  Here for follow up.  No acute issues since last visit.  Has been seen by orthopedic surgeon with plan to clip the protruding wire.  Waiting on clearance from his PCP.  Tolerating doxycycline okay with no issues.    05/08/2025:  Since last visit had surgery with removal of the protruding K-wire from the left elbow 3/28/25.  Developed dermatitis bilateral hands around 04/14/2025 that progress.  This was thought to be secondary to hydrochlorothiazide which was prescribed newly after losartan was stopped due to hyperkalemia.  It has improved off hydrochlorothiazide and with topical cream followed by Neosporin ointment.  Left elbow much better with no pain.  Wound has healed.    08/07/2025:  Is here for follow up.  No new acute issues.  Continues tolerate doxycycline okay.  Rash both hands completely resolved.  No new issues with his left elbow.    Review of patient's allergies indicates:   Allergen Reactions    Hydrochlorothiazide Other (See Comments)     Sun burn    Fish containing products      Other reaction(s): .     Past Medical History:   Diagnosis Date    Diabetes mellitus, type 2     GERD (gastroesophageal reflux disease)     Hyperlipidemia     Hypertension      Past Surgical History:   Procedure Laterality Date    FRACTURE SURGERY      INCISION AND DRAINAGE OF ABSCESS Left 10/27/2024    Procedure: INCISION AND DRAINAGE, ABSCESS;  Surgeon: Gokul Troy MD;  Location: [facility] OR;  Service: Orthopedics;  Laterality: Left;  INCISION AND DRAIN OF LEFT ELBOW ABCESS    INSERTION OF PACEMAKER      LEG AMPUTATION      REMOVAL,ORTHOPEDIC HARDWARE,UPPER EXTREMITY Left 3/28/2025    Procedure: REMOVAL, HARDWARE, UPPER EXTREMITY;  Surgeon: Gokul Troy MD;  Location: [facility] OR;  Service: Orthopedics;  Laterality: Left;  large c-arm; removal of previous tension band wire construct; need pin cutter, rasp; tamp, mallet.      Social History     Tobacco Use     "Smoking status: Former     Types: Cigarettes    Smokeless tobacco: Former     Types: Snuff     Quit date: 04/2024    Tobacco comments:     Pt used smokeless tobacco from 2181-489 and quit. Started dipping tobacco throughout most of his 20's. Started smoking cigarettes again in 06/2024.    Substance Use Topics    Alcohol use: Not on file     Family History   Problem Relation Name Age of Onset    Arthritis Mother         Pertinent medications noted:     Review of Systems  10 system review unremarkable.    Outdoor activities:  Wheelchair-bound  Travel:  No recent travel  Implants:  Left elbow hardware  Antibiotic History:  As in HPI      Objective:      Blood pressure 126/74, pulse 79, temperature 98.2 °F (36.8 °C), height 5' 10" (1.778 m), weight 99.3 kg (219 lb), SpO2 98%. Body mass index is 31.42 kg/m².  Physical Exam      General:  Elderly man sitting in wheelchair in no acute distress   Left elbow:  Flexion contracture.  Healed wound  CVS: S1 and 2 heard, no murmurs appreciated  Respiratory:  Clear to auscultation   Abdomen: Full, soft, nontender, no palpable organomegaly   Skin:  No rash  Psychiatric: Normal mood, speech,  demeanor     Wound:  None    VAD:  None    5/8/25        General Labs reviewed:  Lab Results   Component Value Date    WBC 8.50 03/11/2025    RBC 4.67 03/11/2025    HGB 11.9 (L) 03/11/2025    HCT 38.7 (L) 03/11/2025    MCV 83 03/11/2025    MCH 25.5 (L) 03/11/2025    MCHC 30.7 (L) 03/11/2025    RDW 17.2 (H) 03/11/2025     03/11/2025    MPV 11.1 03/11/2025    GRAN 4.6 03/11/2025    GRAN 53.8 03/11/2025    LYMPH 2.9 03/11/2025    LYMPH 34.4 03/11/2025    MONO 0.8 03/11/2025    MONO 9.3 03/11/2025    EOS 0.1 03/11/2025    BASO 0.06 03/11/2025    EOSINOPHIL 1.6 03/11/2025    BASOPHIL 0.7 03/11/2025     CMP  Sodium   Date Value Ref Range Status   06/30/2025 139 136 - 145 mmol/L Final   03/11/2025 137 136 - 145 mmol/L Final     Potassium   Date Value Ref Range Status   06/30/2025 5.0 3.5 - 5.1 " mmol/L Final   03/11/2025 5.4 (H) 3.5 - 5.1 mmol/L Final     Chloride   Date Value Ref Range Status   06/30/2025 107 95 - 110 mmol/L Final   03/11/2025 106 95 - 110 mmol/L Final     CO2   Date Value Ref Range Status   06/30/2025 21 (L) 23 - 29 mmol/L Final   03/11/2025 20 (L) 23 - 29 mmol/L Final     Glucose   Date Value Ref Range Status   06/30/2025 142 (H) 70 - 110 mg/dL Final   03/11/2025 101 70 - 110 mg/dL Final     BUN   Date Value Ref Range Status   06/30/2025 24 (H) 8 - 23 mg/dL Final     Creatinine   Date Value Ref Range Status   06/30/2025 1.2 0.5 - 1.4 mg/dL Final     Calcium   Date Value Ref Range Status   06/30/2025 9.1 8.7 - 10.5 mg/dL Final   03/11/2025 9.5 8.7 - 10.5 mg/dL Final     Total Protein   Date Value Ref Range Status   02/06/2025 7.8 6.0 - 8.4 g/dL Final     Albumin   Date Value Ref Range Status   02/06/2025 3.7 3.5 - 5.2 g/dL Final     Total Bilirubin   Date Value Ref Range Status   02/06/2025 0.4 0.1 - 1.0 mg/dL Final     Comment:     For infants and newborns, interpretation of results should be based  on gestational age, weight and in agreement with clinical  observations.    Premature Infant recommended reference ranges:  Up to 24 hours.............<8.0 mg/dL  Up to 48 hours............<12.0 mg/dL  3-5 days..................<15.0 mg/dL  6-29 days.................<15.0 mg/dL       Alkaline Phosphatase   Date Value Ref Range Status   02/06/2025 71 40 - 150 U/L Final     AST   Date Value Ref Range Status   02/06/2025 27 10 - 40 U/L Final     ALT   Date Value Ref Range Status   02/06/2025 26 10 - 44 U/L Final     Anion Gap   Date Value Ref Range Status   06/30/2025 11 8 - 16 mmol/L Final     eGFR   Date Value Ref Range Status   06/30/2025 >60 >60 mL/min/1.73/m2 Final     Comment:     Estimated GFR calculated using the CKD-EPI creatinine (2021) equation.   03/11/2025 >60.0 >60 mL/min/1.73 m^2 Final           Micro:  Microbiology Results (last 7 days)       ** No results found for the last 168  hours. **          Imaging Reviewed:          Assessment:     Complicated MSSA bacteremia.  This was from left elbow infection.  Resolved     2.  MSSA Left elbow abscess.  He had I&D 10/27/2024.  He has some retained hardware with probable clinical osteomyelitis.  Completed cefazolin and rifampin 600 mg b.i.d. for 6 weeks through 12/05/2024.  He is sleeping of the protruding K wire on 03/28/2025.  He will still be at risk for recurrent/relapse of infection since he still has retained her in the elbow.  Continue chronic suppressive doxycycline.     3. Right shoulder pain.  This is chronic associated with weakness.  Has mild DJD on x-ray.     4. Diabetes mellitus.  Management as per hospitalist.    5. Bilateral hand rash.  Resolved.      6. Health maintenance.  He is reluctant to receive shingles vaccination.  He was counseled and educated.  States he will get it.      I will see again in 4 months.    Problem List Items Addressed This Visit    None  Visit Diagnoses         Chronic osteomyelitis of left humerus    -  Primary      MSSA (methicillin susceptible Staphylococcus aureus) infection                 Plan:     As above      Chronic osteomyelitis of left humerus    MSSA (methicillin susceptible Staphylococcus aureus) infection    Other orders  -     doxycycline (VIBRAMYCIN) 100 MG Cap; Take 1 capsule (100 mg total) by mouth 2 (two) times daily.  Dispense: 60 capsule; Refill: 3        This note was created using Dragon voice recognition software that occasionally misinterpreted phrases or words.

## 2025-08-18 DIAGNOSIS — N18.2 TYPE 2 DIABETES MELLITUS WITH STAGE 2 CHRONIC KIDNEY DISEASE, WITHOUT LONG-TERM CURRENT USE OF INSULIN: ICD-10-CM

## 2025-08-18 DIAGNOSIS — E11.22 TYPE 2 DIABETES MELLITUS WITH STAGE 2 CHRONIC KIDNEY DISEASE, WITHOUT LONG-TERM CURRENT USE OF INSULIN: ICD-10-CM

## 2025-08-19 RX ORDER — INSULIN GLARGINE 100 [IU]/ML
20 INJECTION, SOLUTION SUBCUTANEOUS NIGHTLY
Qty: 18 ML | Refills: 1 | Status: SHIPPED | OUTPATIENT
Start: 2025-08-19 | End: 2026-02-15

## (undated) DEVICE — GLOVE SENSICARE PI GRN 8.5

## (undated) DEVICE — DRESSING N ADH OIL EMUL 3X3

## (undated) DEVICE — SLEEVE SCD EXPRESS KNEE MEDIUM

## (undated) DEVICE — SUT ETHICON 3-0 BLK MONO PS

## (undated) DEVICE — BANDAGE ROLL COTTN 4.5INX4.1YD

## (undated) DEVICE — CLOSURE SKIN STERI STRIP 1/2X4

## (undated) DEVICE — SYR 10CC LUER LOCK

## (undated) DEVICE — GLOVE SENSICARE PI GRN 8

## (undated) DEVICE — STRAP OR TABLE 5IN X 72IN

## (undated) DEVICE — PENCIL SMK EVAC CONNECTOR 10FT

## (undated) DEVICE — BNDG COFLEX FOAM LF2 ST 4X5YD

## (undated) DEVICE — PACK CUSTOM UNIV BASIN SLI

## (undated) DEVICE — GLOVE SENSICARE PI SURG 8

## (undated) DEVICE — BANDAGE ESMARK ELASTIC ST 4X9

## (undated) DEVICE — SPONGE BULKEE II ABSRB 6X6.75

## (undated) DEVICE — GLOVE SENSICARE PI GRN 7.5

## (undated) DEVICE — SUT ETHILON 3-0 PS2 18 BLK

## (undated) DEVICE — NDL SAFETY 21G X 1 1/2 ECLPSE

## (undated) DEVICE — DRAPE U SPLIT SHEET 54X76IN

## (undated) DEVICE — LINER SUCTION 3000CC

## (undated) DEVICE — DRAPE THREE-QTR REINF 53X77IN

## (undated) DEVICE — ELECTRODE MEGADYNE RETURN DUAL

## (undated) DEVICE — SOL NACL IRR 1000ML BTL

## (undated) DEVICE — SOL NACL IRR 3000ML

## (undated) DEVICE — ELECTRODE BLD 1 INCH TEFLON

## (undated) DEVICE — STOCKINETTE IMPERV INTRM 9X44

## (undated) DEVICE — BENZOIN TINCTURE CAPSULET

## (undated) DEVICE — GLOVE SENSICARE PI SURG 7.5

## (undated) DEVICE — SUT MONOCRYL 3-0 SH U/D

## (undated) DEVICE — DRAPE C ARM 42 X 120 10/BX

## (undated) DEVICE — APPLICATOR CHLORAPREP ORN 26ML

## (undated) DEVICE — DRAPE HAND STERILE

## (undated) DEVICE — SUT 2-0 VICRYL / CT-1

## (undated) DEVICE — ELECTRODE REM PLYHSV RETURN 9

## (undated) DEVICE — SUT ETHILON 2-0 PSLX 30IN

## (undated) DEVICE — GLOVE SENSICARE PI ALOE 7.5